# Patient Record
Sex: MALE | Race: WHITE | NOT HISPANIC OR LATINO | Employment: OTHER | URBAN - NONMETROPOLITAN AREA
[De-identification: names, ages, dates, MRNs, and addresses within clinical notes are randomized per-mention and may not be internally consistent; named-entity substitution may affect disease eponyms.]

---

## 2020-09-01 ENCOUNTER — HOSPITAL ENCOUNTER (INPATIENT)
Facility: HOSPITAL | Age: 75
LOS: 11 days | Discharge: NURSING, REHAB OR LONG-TERM CARE | DRG: 870 | End: 2020-09-12
Attending: EMERGENCY MEDICINE | Admitting: STUDENT IN AN ORGANIZED HEALTH CARE EDUCATION/TRAINING PROGRAM
Payer: COMMERCIAL

## 2020-09-01 ENCOUNTER — APPOINTMENT (EMERGENCY)
Dept: CT IMAGING | Facility: HOSPITAL | Age: 75
DRG: 870 | End: 2020-09-01
Payer: COMMERCIAL

## 2020-09-01 ENCOUNTER — APPOINTMENT (EMERGENCY)
Dept: RADIOLOGY | Facility: HOSPITAL | Age: 75
DRG: 870 | End: 2020-09-01
Payer: COMMERCIAL

## 2020-09-01 DIAGNOSIS — N17.9 ACUTE RENAL FAILURE (ARF) (HCC): ICD-10-CM

## 2020-09-01 DIAGNOSIS — A41.9 SEPTIC SHOCK (HCC): ICD-10-CM

## 2020-09-01 DIAGNOSIS — J18.9 PNEUMONIA: ICD-10-CM

## 2020-09-01 DIAGNOSIS — R60.0 BILATERAL LEG EDEMA: ICD-10-CM

## 2020-09-01 DIAGNOSIS — I10 HYPERTENSION: ICD-10-CM

## 2020-09-01 DIAGNOSIS — R41.82 ALTERED MENTAL STATUS, UNSPECIFIED ALTERED MENTAL STATUS TYPE: ICD-10-CM

## 2020-09-01 DIAGNOSIS — R73.9 HYPERGLYCEMIA: ICD-10-CM

## 2020-09-01 DIAGNOSIS — J18.9 COMMUNITY ACQUIRED PNEUMONIA OF RIGHT LUNG: ICD-10-CM

## 2020-09-01 DIAGNOSIS — E11.9 TYPE 2 DIABETES MELLITUS WITHOUT COMPLICATION, WITH LONG-TERM CURRENT USE OF INSULIN (HCC): ICD-10-CM

## 2020-09-01 DIAGNOSIS — R77.8 ELEVATED TROPONIN: ICD-10-CM

## 2020-09-01 DIAGNOSIS — G93.49 ENCEPHALOPATHY DUE TO INFECTION: ICD-10-CM

## 2020-09-01 DIAGNOSIS — Z79.4 TYPE 2 DIABETES MELLITUS WITHOUT COMPLICATION, WITH LONG-TERM CURRENT USE OF INSULIN (HCC): ICD-10-CM

## 2020-09-01 DIAGNOSIS — I42.9 CARDIOMYOPATHY, UNSPECIFIED TYPE (HCC): ICD-10-CM

## 2020-09-01 DIAGNOSIS — R50.9 FEVER AND CHILLS: ICD-10-CM

## 2020-09-01 DIAGNOSIS — J96.01 ACUTE RESPIRATORY FAILURE WITH HYPOXIA (HCC): ICD-10-CM

## 2020-09-01 DIAGNOSIS — D64.9 ANEMIA: ICD-10-CM

## 2020-09-01 DIAGNOSIS — R65.21 SEPTIC SHOCK (HCC): ICD-10-CM

## 2020-09-01 DIAGNOSIS — A41.9 SEPSIS (HCC): Primary | ICD-10-CM

## 2020-09-01 DIAGNOSIS — R79.89 ELEVATED BRAIN NATRIURETIC PEPTIDE (BNP) LEVEL: ICD-10-CM

## 2020-09-01 DIAGNOSIS — B99.9 ENCEPHALOPATHY DUE TO INFECTION: ICD-10-CM

## 2020-09-01 DIAGNOSIS — N17.9 AKI (ACUTE KIDNEY INJURY) (HCC): ICD-10-CM

## 2020-09-01 PROBLEM — E87.1 HYPONATREMIA: Status: ACTIVE | Noted: 2020-09-01

## 2020-09-01 LAB
ALBUMIN SERPL BCP-MCNC: 2.1 G/DL (ref 3.5–5)
ALP SERPL-CCNC: 112 U/L (ref 46–116)
ALT SERPL W P-5'-P-CCNC: 34 U/L (ref 12–78)
AMORPH URATE CRY URNS QL MICRO: ABNORMAL /HPF
AMPHETAMINES SERPL QL SCN: NEGATIVE
ANION GAP SERPL CALCULATED.3IONS-SCNC: 12 MMOL/L (ref 4–13)
ANION GAP SERPL CALCULATED.3IONS-SCNC: 13 MMOL/L (ref 4–13)
APTT PPP: 38 SECONDS (ref 23–37)
ARTERIAL PATENCY WRIST A: YES
AST SERPL W P-5'-P-CCNC: 43 U/L (ref 5–45)
BACTERIA UR QL AUTO: ABNORMAL /HPF
BARBITURATES UR QL: NEGATIVE
BASE EXCESS BLDA CALC-SCNC: -6.7 MMOL/L
BASOPHILS # BLD MANUAL: 0.09 THOUSAND/UL (ref 0–0.1)
BASOPHILS NFR MAR MANUAL: 1 % (ref 0–1)
BENZODIAZ UR QL: NEGATIVE
BILIRUB SERPL-MCNC: 0.73 MG/DL (ref 0.2–1)
BILIRUB UR QL STRIP: ABNORMAL
BUN SERPL-MCNC: 32 MG/DL (ref 5–25)
BUN SERPL-MCNC: 33 MG/DL (ref 5–25)
CALCIUM SERPL-MCNC: 8.3 MG/DL (ref 8.3–10.1)
CALCIUM SERPL-MCNC: 8.6 MG/DL (ref 8.3–10.1)
CHLORIDE SERPL-SCNC: 100 MMOL/L (ref 100–108)
CHLORIDE SERPL-SCNC: 96 MMOL/L (ref 100–108)
CK MB SERPL-MCNC: 3.6 % (ref 0–2.5)
CK MB SERPL-MCNC: 8.6 NG/ML (ref 0–5)
CK SERPL-CCNC: 238 U/L (ref 39–308)
CLARITY UR: ABNORMAL
CO2 SERPL-SCNC: 20 MMOL/L (ref 21–32)
CO2 SERPL-SCNC: 20 MMOL/L (ref 21–32)
COCAINE UR QL: NEGATIVE
COLOR UR: YELLOW
CREAT SERPL-MCNC: 3.21 MG/DL (ref 0.6–1.3)
CREAT SERPL-MCNC: 3.33 MG/DL (ref 0.6–1.3)
CRP SERPL QL: 233.6 MG/L
EOSINOPHIL # BLD MANUAL: 0.18 THOUSAND/UL (ref 0–0.4)
EOSINOPHIL NFR BLD MANUAL: 2 % (ref 0–6)
ERYTHROCYTE [DISTWIDTH] IN BLOOD BY AUTOMATED COUNT: 13.2 % (ref 11.6–15.1)
ETHANOL SERPL-MCNC: <3 MG/DL (ref 0–3)
FERRITIN SERPL-MCNC: 958 NG/ML (ref 8–388)
FLUAV RNA NPH QL NAA+PROBE: NORMAL
FLUBV RNA NPH QL NAA+PROBE: NORMAL
GFR SERPL CREATININE-BSD FRML MDRD: 17 ML/MIN/1.73SQ M
GFR SERPL CREATININE-BSD FRML MDRD: 18 ML/MIN/1.73SQ M
GLUCOSE SERPL-MCNC: 240 MG/DL (ref 65–140)
GLUCOSE SERPL-MCNC: 257 MG/DL (ref 65–140)
GLUCOSE SERPL-MCNC: 304 MG/DL (ref 65–140)
GLUCOSE SERPL-MCNC: 379 MG/DL (ref 65–140)
GLUCOSE UR STRIP-MCNC: NEGATIVE MG/DL
HCO3 BLDA-SCNC: 17.3 MMOL/L (ref 22–28)
HCT VFR BLD AUTO: 28.6 % (ref 36.5–49.3)
HGB BLD-MCNC: 9.5 G/DL (ref 12–17)
HGB UR QL STRIP.AUTO: NEGATIVE
HYPERCHROMIA BLD QL SMEAR: PRESENT
INR PPP: 1.28 (ref 0.84–1.19)
KETONES UR STRIP-MCNC: ABNORMAL MG/DL
LACTATE SERPL-SCNC: 1.5 MMOL/L (ref 0.5–2)
LEUKOCYTE ESTERASE UR QL STRIP: NEGATIVE
LIPASE SERPL-CCNC: 245 U/L (ref 73–393)
LYMPHOCYTES # BLD AUTO: 0.9 THOUSAND/UL (ref 0.6–4.47)
LYMPHOCYTES # BLD AUTO: 10 % (ref 14–44)
MAGNESIUM SERPL-MCNC: 1.5 MG/DL (ref 1.6–2.6)
MCH RBC QN AUTO: 28.1 PG (ref 26.8–34.3)
MCHC RBC AUTO-ENTMCNC: 33.2 G/DL (ref 31.4–37.4)
MCV RBC AUTO: 85 FL (ref 82–98)
METHADONE UR QL: NEGATIVE
MONOCYTES # BLD AUTO: 0.72 THOUSAND/UL (ref 0–1.22)
MONOCYTES NFR BLD: 8 % (ref 4–12)
NASAL CANNULA: 2
NEUTROPHILS # BLD MANUAL: 7.05 THOUSAND/UL (ref 1.85–7.62)
NEUTS SEG NFR BLD AUTO: 78 % (ref 43–75)
NITRITE UR QL STRIP: NEGATIVE
NON-SQ EPI CELLS URNS QL MICRO: ABNORMAL /HPF
NRBC BLD AUTO-RTO: 0 /100 WBCS
NT-PROBNP SERPL-MCNC: 5830 PG/ML
O2 CT BLDA-SCNC: 11.8 ML/DL (ref 16–23)
OPIATES UR QL SCN: NEGATIVE
OXYCODONE+OXYMORPHONE UR QL SCN: NEGATIVE
OXYHGB MFR BLDA: 95.5 % (ref 94–97)
PCO2 BLDA: 28.8 MM HG (ref 36–44)
PCP UR QL: NEGATIVE
PH BLDA: 7.4 [PH] (ref 7.35–7.45)
PH UR STRIP.AUTO: 5 [PH]
PLATELET # BLD AUTO: 222 THOUSANDS/UL (ref 149–390)
PLATELET BLD QL SMEAR: ADEQUATE
PMV BLD AUTO: 10.4 FL (ref 8.9–12.7)
PO2 BLDA: 80.3 MM HG (ref 75–129)
POTASSIUM SERPL-SCNC: 3.8 MMOL/L (ref 3.5–5.3)
POTASSIUM SERPL-SCNC: 4 MMOL/L (ref 3.5–5.3)
PROCALCITONIN SERPL-MCNC: 1.4 NG/ML
PROT SERPL-MCNC: 6.9 G/DL (ref 6.4–8.2)
PROT UR STRIP-MCNC: ABNORMAL MG/DL
PROTHROMBIN TIME: 15.7 SECONDS (ref 11.6–14.5)
RBC # BLD AUTO: 3.38 MILLION/UL (ref 3.88–5.62)
RBC #/AREA URNS AUTO: ABNORMAL /HPF
RBC MORPH BLD: PRESENT
RSV RNA NPH QL NAA+PROBE: NORMAL
SARS-COV-2 RNA RESP QL NAA+PROBE: NEGATIVE
SODIUM SERPL-SCNC: 129 MMOL/L (ref 136–145)
SODIUM SERPL-SCNC: 132 MMOL/L (ref 136–145)
SP GR UR STRIP.AUTO: >=1.03 (ref 1–1.03)
SPECIMEN SOURCE: ABNORMAL
THC UR QL: NEGATIVE
TOTAL CELLS COUNTED SPEC: 100
TRIGL SERPL-MCNC: 286 MG/DL
TROPONIN I SERPL-MCNC: 2.47 NG/ML
TROPONIN I SERPL-MCNC: 2.85 NG/ML
UROBILINOGEN UR QL STRIP.AUTO: 0.2 E.U./DL
VARIANT LYMPHS # BLD AUTO: 1 %
WBC # BLD AUTO: 9.04 THOUSAND/UL (ref 4.31–10.16)
WBC #/AREA URNS AUTO: ABNORMAL /HPF

## 2020-09-01 PROCEDURE — 93005 ELECTROCARDIOGRAM TRACING: CPT

## 2020-09-01 PROCEDURE — 99291 CRITICAL CARE FIRST HOUR: CPT | Performed by: EMERGENCY MEDICINE

## 2020-09-01 PROCEDURE — 85007 BL SMEAR W/DIFF WBC COUNT: CPT | Performed by: PHYSICIAN ASSISTANT

## 2020-09-01 PROCEDURE — 83735 ASSAY OF MAGNESIUM: CPT | Performed by: PHYSICIAN ASSISTANT

## 2020-09-01 PROCEDURE — 87635 SARS-COV-2 COVID-19 AMP PRB: CPT | Performed by: PHYSICIAN ASSISTANT

## 2020-09-01 PROCEDURE — 82043 UR ALBUMIN QUANTITATIVE: CPT | Performed by: NURSE PRACTITIONER

## 2020-09-01 PROCEDURE — 85610 PROTHROMBIN TIME: CPT | Performed by: PHYSICIAN ASSISTANT

## 2020-09-01 PROCEDURE — 86140 C-REACTIVE PROTEIN: CPT | Performed by: EMERGENCY MEDICINE

## 2020-09-01 PROCEDURE — 0T9B70Z DRAINAGE OF BLADDER WITH DRAINAGE DEVICE, VIA NATURAL OR ARTIFICIAL OPENING: ICD-10-PCS | Performed by: EMERGENCY MEDICINE

## 2020-09-01 PROCEDURE — 96361 HYDRATE IV INFUSION ADD-ON: CPT

## 2020-09-01 PROCEDURE — 83605 ASSAY OF LACTIC ACID: CPT | Performed by: PHYSICIAN ASSISTANT

## 2020-09-01 PROCEDURE — 82728 ASSAY OF FERRITIN: CPT | Performed by: EMERGENCY MEDICINE

## 2020-09-01 PROCEDURE — 74176 CT ABD & PELVIS W/O CONTRAST: CPT

## 2020-09-01 PROCEDURE — 99285 EMERGENCY DEPT VISIT HI MDM: CPT

## 2020-09-01 PROCEDURE — G1004 CDSM NDSC: HCPCS

## 2020-09-01 PROCEDURE — 70450 CT HEAD/BRAIN W/O DYE: CPT

## 2020-09-01 PROCEDURE — 71250 CT THORAX DX C-: CPT

## 2020-09-01 PROCEDURE — 87631 RESP VIRUS 3-5 TARGETS: CPT | Performed by: EMERGENCY MEDICINE

## 2020-09-01 PROCEDURE — 96365 THER/PROPH/DIAG IV INF INIT: CPT

## 2020-09-01 PROCEDURE — 82948 REAGENT STRIP/BLOOD GLUCOSE: CPT

## 2020-09-01 PROCEDURE — 83690 ASSAY OF LIPASE: CPT | Performed by: PHYSICIAN ASSISTANT

## 2020-09-01 PROCEDURE — 87449 NOS EACH ORGANISM AG IA: CPT | Performed by: NURSE PRACTITIONER

## 2020-09-01 PROCEDURE — 83520 IMMUNOASSAY QUANT NOS NONAB: CPT | Performed by: EMERGENCY MEDICINE

## 2020-09-01 PROCEDURE — 83935 ASSAY OF URINE OSMOLALITY: CPT | Performed by: NURSE PRACTITIONER

## 2020-09-01 PROCEDURE — 84484 ASSAY OF TROPONIN QUANT: CPT | Performed by: NURSE PRACTITIONER

## 2020-09-01 PROCEDURE — 80048 BASIC METABOLIC PNL TOTAL CA: CPT | Performed by: NURSE PRACTITIONER

## 2020-09-01 PROCEDURE — 82570 ASSAY OF URINE CREATININE: CPT | Performed by: NURSE PRACTITIONER

## 2020-09-01 PROCEDURE — 82553 CREATINE MB FRACTION: CPT | Performed by: EMERGENCY MEDICINE

## 2020-09-01 PROCEDURE — 84145 PROCALCITONIN (PCT): CPT | Performed by: PHYSICIAN ASSISTANT

## 2020-09-01 PROCEDURE — 71045 X-RAY EXAM CHEST 1 VIEW: CPT

## 2020-09-01 PROCEDURE — 36600 WITHDRAWAL OF ARTERIAL BLOOD: CPT

## 2020-09-01 PROCEDURE — 82805 BLOOD GASES W/O2 SATURATION: CPT | Performed by: PHYSICIAN ASSISTANT

## 2020-09-01 PROCEDURE — 84540 ASSAY OF URINE/UREA-N: CPT | Performed by: NURSE PRACTITIONER

## 2020-09-01 PROCEDURE — 72125 CT NECK SPINE W/O DYE: CPT

## 2020-09-01 PROCEDURE — 84300 ASSAY OF URINE SODIUM: CPT | Performed by: NURSE PRACTITIONER

## 2020-09-01 PROCEDURE — 87040 BLOOD CULTURE FOR BACTERIA: CPT | Performed by: PHYSICIAN ASSISTANT

## 2020-09-01 PROCEDURE — 99292 CRITICAL CARE ADDL 30 MIN: CPT | Performed by: STUDENT IN AN ORGANIZED HEALTH CARE EDUCATION/TRAINING PROGRAM

## 2020-09-01 PROCEDURE — 80053 COMPREHEN METABOLIC PANEL: CPT | Performed by: PHYSICIAN ASSISTANT

## 2020-09-01 PROCEDURE — 84478 ASSAY OF TRIGLYCERIDES: CPT | Performed by: EMERGENCY MEDICINE

## 2020-09-01 PROCEDURE — 80320 DRUG SCREEN QUANTALCOHOLS: CPT | Performed by: EMERGENCY MEDICINE

## 2020-09-01 PROCEDURE — 83880 ASSAY OF NATRIURETIC PEPTIDE: CPT | Performed by: PHYSICIAN ASSISTANT

## 2020-09-01 PROCEDURE — 99292 CRITICAL CARE ADDL 30 MIN: CPT | Performed by: EMERGENCY MEDICINE

## 2020-09-01 PROCEDURE — 99291 CRITICAL CARE FIRST HOUR: CPT | Performed by: NURSE PRACTITIONER

## 2020-09-01 PROCEDURE — 81001 URINALYSIS AUTO W/SCOPE: CPT | Performed by: PHYSICIAN ASSISTANT

## 2020-09-01 PROCEDURE — 85730 THROMBOPLASTIN TIME PARTIAL: CPT | Performed by: PHYSICIAN ASSISTANT

## 2020-09-01 PROCEDURE — 80307 DRUG TEST PRSMV CHEM ANLYZR: CPT | Performed by: EMERGENCY MEDICINE

## 2020-09-01 PROCEDURE — 85027 COMPLETE CBC AUTOMATED: CPT | Performed by: PHYSICIAN ASSISTANT

## 2020-09-01 PROCEDURE — 84484 ASSAY OF TROPONIN QUANT: CPT | Performed by: PHYSICIAN ASSISTANT

## 2020-09-01 PROCEDURE — 82550 ASSAY OF CK (CPK): CPT | Performed by: EMERGENCY MEDICINE

## 2020-09-01 PROCEDURE — 36415 COLL VENOUS BLD VENIPUNCTURE: CPT | Performed by: PHYSICIAN ASSISTANT

## 2020-09-01 PROCEDURE — 84156 ASSAY OF PROTEIN URINE: CPT | Performed by: NURSE PRACTITIONER

## 2020-09-01 RX ORDER — INSULIN GLARGINE 100 [IU]/ML
28 INJECTION, SOLUTION SUBCUTANEOUS
Status: ON HOLD | COMMUNITY
End: 2020-09-12 | Stop reason: SDUPTHER

## 2020-09-01 RX ORDER — SIMVASTATIN 20 MG
20 TABLET ORAL
COMMUNITY
End: 2020-09-19 | Stop reason: HOSPADM

## 2020-09-01 RX ORDER — ACETAMINOPHEN 325 MG/1
650 TABLET ORAL ONCE
Status: COMPLETED | OUTPATIENT
Start: 2020-09-01 | End: 2020-09-01

## 2020-09-01 RX ORDER — ALBUMIN, HUMAN INJ 5% 5 %
12.5 SOLUTION INTRAVENOUS ONCE
Status: COMPLETED | OUTPATIENT
Start: 2020-09-01 | End: 2020-09-02

## 2020-09-01 RX ORDER — LEVALBUTEROL INHALATION SOLUTION 0.63 MG/3ML
0.63 SOLUTION RESPIRATORY (INHALATION) EVERY 6 HOURS PRN
Status: DISCONTINUED | OUTPATIENT
Start: 2020-09-01 | End: 2020-09-10

## 2020-09-01 RX ORDER — HEPARIN SODIUM 5000 [USP'U]/ML
5000 INJECTION, SOLUTION INTRAVENOUS; SUBCUTANEOUS EVERY 8 HOURS SCHEDULED
Status: DISCONTINUED | OUTPATIENT
Start: 2020-09-01 | End: 2020-09-02

## 2020-09-01 RX ORDER — CEFTRIAXONE 1 G/50ML
1000 INJECTION, SOLUTION INTRAVENOUS EVERY 24 HOURS
Status: DISCONTINUED | OUTPATIENT
Start: 2020-09-02 | End: 2020-09-02

## 2020-09-01 RX ORDER — CHLORHEXIDINE GLUCONATE 0.12 MG/ML
15 RINSE ORAL EVERY 12 HOURS SCHEDULED
Status: DISCONTINUED | OUTPATIENT
Start: 2020-09-01 | End: 2020-09-09

## 2020-09-01 RX ORDER — ACETAMINOPHEN 325 MG/1
650 TABLET ORAL EVERY 6 HOURS PRN
Status: DISCONTINUED | OUTPATIENT
Start: 2020-09-01 | End: 2020-09-02

## 2020-09-01 RX ORDER — MAGNESIUM SULFATE 1 G/100ML
1 INJECTION INTRAVENOUS ONCE
Status: COMPLETED | OUTPATIENT
Start: 2020-09-01 | End: 2020-09-01

## 2020-09-01 RX ORDER — PRAVASTATIN SODIUM 40 MG
40 TABLET ORAL
Status: DISCONTINUED | OUTPATIENT
Start: 2020-09-02 | End: 2020-09-02

## 2020-09-01 RX ORDER — LISINOPRIL 10 MG/1
10 TABLET ORAL DAILY
COMMUNITY
End: 2020-09-01

## 2020-09-01 RX ORDER — CEFTRIAXONE 2 G/50ML
2000 INJECTION, SOLUTION INTRAVENOUS ONCE
Status: COMPLETED | OUTPATIENT
Start: 2020-09-01 | End: 2020-09-01

## 2020-09-01 RX ADMIN — SODIUM CHLORIDE 1000 ML: 0.9 INJECTION, SOLUTION INTRAVENOUS at 18:03

## 2020-09-01 RX ADMIN — CEFTRIAXONE 2000 MG: 2 INJECTION, SOLUTION INTRAVENOUS at 17:33

## 2020-09-01 RX ADMIN — INSULIN LISPRO 3 UNITS: 100 INJECTION, SOLUTION INTRAVENOUS; SUBCUTANEOUS at 20:36

## 2020-09-01 RX ADMIN — ALBUMIN (HUMAN) 12.5 G: 12.5 INJECTION, SOLUTION INTRAVENOUS at 22:58

## 2020-09-01 RX ADMIN — SODIUM CHLORIDE 1000 ML: 0.9 INJECTION, SOLUTION INTRAVENOUS at 17:06

## 2020-09-01 RX ADMIN — ACETAMINOPHEN 650 MG: 325 TABLET ORAL at 17:19

## 2020-09-01 RX ADMIN — SODIUM CHLORIDE 1000 ML: 0.9 INJECTION, SOLUTION INTRAVENOUS at 17:16

## 2020-09-01 RX ADMIN — HEPARIN SODIUM 5000 UNITS: 5000 INJECTION INTRAVENOUS; SUBCUTANEOUS at 22:58

## 2020-09-01 RX ADMIN — PHENYLEPHRINE HYDROCHLORIDE 25 MCG/MIN: 10 INJECTION INTRAVENOUS at 20:33

## 2020-09-01 RX ADMIN — ACETAMINOPHEN 650 MG: 325 TABLET ORAL at 23:16

## 2020-09-01 RX ADMIN — AZITHROMYCIN 500 MG: 500 INJECTION, POWDER, LYOPHILIZED, FOR SOLUTION INTRAVENOUS at 17:36

## 2020-09-01 RX ADMIN — MAGNESIUM SULFATE HEPTAHYDRATE 1 G: 1 INJECTION, SOLUTION INTRAVENOUS at 18:48

## 2020-09-01 NOTE — ED ATTENDING ATTESTATION
9/1/2020  IWill MD, saw and evaluated the patient  I have discussed the patient with the resident/non-physician practitioner and agree with the resident's/non-physician practitioner's findings, Plan of Care, and MDM as documented in the resident's/non-physician practitioner's note, except where noted  All available labs and Radiology studies were reviewed  I was present for key portions of any procedure(s) performed by the resident/non-physician practitioner and I was immediately available to provide assistance  At this point I agree with the current assessment done in the Emergency Department        ED Course         Critical Care Time  Procedures

## 2020-09-01 NOTE — ED PROVIDER NOTES
History  Chief Complaint   Patient presents with    Altered Mental Status     per ems family called for altered mental status and increase in SOB  pt tachypneic with room air pulse ox 92%  pt disoriented to situation  abdomin distended, denies pain, nausea     The patient is a 66year old male, PMH HTN, DM2, who presents to the ED today via EMS from home due to SOB and confusion  Family called EMS due to SOB  The patient states that he has been having a persistent dry cough at home over the last few days  Family states that he is more confused that his normal      Wife Shankar Parrish states at bedside states that 4 days ago, after returning home from work, he felt very tired and "run down"  He had a sporadic dry cough with his fatigue  She states that his PCP ordered an outpatient COVID yesterday, which was negative  Wife states that today, upon waking up the patient seemed improved, but throughout the day he became more and more confused  She states that he "was seeing and saying things that did not make sense"  She then states that PTA he seemed to have SOB, so EMS was called  Upon arrival the patient was 93% on RA, but EMS placed on 6 L of NC oxygen  The wife denies any nausea, vomiting, dizziness, chest pain, abdominal pain  Wife states that his abdomen is normal per him  Patient does not drink ETOH nor smoke  Patient quit smoking 20 years ago  No recent medication changes  History provided by:  EMS personnel, spouse and patient  History limited by:  Mental status change  Altered Mental Status   Presenting symptoms: confusion    Severity:  Moderate  Most recent episode:   Today  Episode history:  Continuous  Timing:  Constant  Progression:  Worsening  Chronicity:  New  Context: recent illness    Context: not dementia, not drug use, not head injury, not homeless, taking medications as prescribed and not recent change in medication    Associated symptoms: difficulty breathing and hallucinations    Associated symptoms: no abdominal pain, normal movement, no agitation, no bladder incontinence, no decreased appetite, no depression, no fever, no light-headedness, no nausea, no palpitations, no rash, no seizures and no vomiting    Difficulty breathing:     Severity:  Moderate    Duration:  1 day    Timing:  Constant    Progression:  Partially resolved      Prior to Admission Medications   Prescriptions Last Dose Informant Patient Reported? Taking?   insulin aspart (NovoLOG) 100 units/mL injection   Yes Yes   Sig: Inject under the skin 3 (three) times a day before meals   insulin glargine (LANTUS) 100 units/mL subcutaneous injection   Yes Yes   Sig: Inject under the skin daily at bedtime   linaGLIPtin-metFORMIN HCl (JENTADUETO XR PO)   Yes Yes   Sig: Take by mouth   simvastatin (ZOCOR) 20 mg tablet   Yes Yes   Sig: Take 20 mg by mouth daily at bedtime      Facility-Administered Medications: None       Past Medical History:   Diagnosis Date    Diabetes mellitus ([facility] [address] )     Hyperlipidemia     Hypertension        History reviewed  No pertinent surgical history  History reviewed  No pertinent family history  I have reviewed and agree with the history as documented  E-Cigarette/Vaping     E-Cigarette/Vaping Substances     Social History     Tobacco Use    Smoking status: Never Smoker    Smokeless tobacco: Never Used   Substance Use Topics    Alcohol use: Never     Frequency: Never    Drug use: Never       Review of Systems   Constitutional: Negative for chills, decreased appetite and fever  HENT: Negative for ear pain  Eyes: Negative for pain and visual disturbance  Respiratory: Positive for cough and shortness of breath  Negative for stridor  Cardiovascular: Negative for chest pain and palpitations  Gastrointestinal: Negative for abdominal pain, nausea and vomiting  Genitourinary: Negative for bladder incontinence, dysuria and hematuria  Musculoskeletal: Negative for arthralgias and back pain     Skin: Negative for color change and rash  Neurological: Negative for seizures, speech difficulty and light-headedness  Psychiatric/Behavioral: Positive for confusion and hallucinations  Negative for agitation  Physical Exam  Physical Exam  Vitals signs and nursing note reviewed  Constitutional:       General: He is not in acute distress  Appearance: He is well-developed  HENT:      Head: Normocephalic and atraumatic  Mouth/Throat:      Mouth: Mucous membranes are dry  Eyes:      Extraocular Movements: Extraocular movements intact  Right eye: No nystagmus  Left eye: No nystagmus  Pupils: Pupils are equal, round, and reactive to light  Neck:      Musculoskeletal: Normal range of motion  Cardiovascular:      Rate and Rhythm: Regular rhythm  Tachycardia present  Pulses: Normal pulses  Heart sounds: No murmur  Pulmonary:      Effort: Pulmonary effort is normal  No respiratory distress  Breath sounds: Normal breath sounds  Abdominal:      General: Bowel sounds are normal       Palpations: Abdomen is soft  Tenderness: There is no abdominal tenderness  Musculoskeletal:         General: No swelling  Skin:     General: Skin is warm and dry  Capillary Refill: Capillary refill takes less than 2 seconds  Neurological:      General: No focal deficit present  Mental Status: He is alert  He is confused  Motor: Motor function is intact  Coordination: Coordination is intact     Psychiatric:         Mood and Affect: Mood normal          Behavior: Behavior normal          Vital Signs  ED Triage Vitals   Temperature Pulse Respirations Blood Pressure SpO2   09/01/20 1708 09/01/20 1702 09/01/20 1702 09/01/20 1702 09/01/20 1702   (!) 105 °F (40 6 °C) (!) 126 (!) 30 98/57 92 %      Temp Source Heart Rate Source Patient Position - Orthostatic VS BP Location FiO2 (%)   09/01/20 1708 09/01/20 1702 09/01/20 1702 09/01/20 1702 --   Rectal Monitor Sitting Left arm       Pain Score       09/01/20 1719       Med Not Given for Pain - for MAR use only           Vitals:    09/01/20 2030 09/01/20 2056 09/01/20 2110 09/01/20 2115   BP: (!) 81/53 (!) 86/52 97/57 100/58   Pulse: (!) 114 (!) 114 (!) 113 (!) 113   Patient Position - Orthostatic VS:             Visual Acuity  Visual Acuity      Most Recent Value   L Pupil Size (mm)  3   R Pupil Size (mm)  3          ED Medications  Medications   pravastatin (PRAVACHOL) tablet 40 mg (has no administration in time range)   chlorhexidine (PERIDEX) 0 12 % oral rinse 15 mL (has no administration in time range)   heparin (porcine) subcutaneous injection 5,000 Units (has no administration in time range)   insulin lispro (HumaLOG) 100 units/mL subcutaneous injection 1-6 Units (3 Units Subcutaneous Given 9/1/20 2036)   phenylephrine (REG-SYNEPHRINE) 50 mg (STANDARD CONCENTRATION) in sodium chloride 0 9% 250 mL (50 mcg/min Intravenous Rate/Dose Change 9/1/20 2055)   acetaminophen (TYLENOL) tablet 650 mg (has no administration in time range)   azithromycin (ZITHROMAX) 500 mg in sodium chloride 0 9 % 250 mL IVPB (has no administration in time range)   cefTRIAXone (ROCEPHIN) IVPB (premix) 1,000 mg 50 mL (has no administration in time range)   levalbuterol (XOPENEX) inhalation solution 0 63 mg (has no administration in time range)   cefTRIAXone (ROCEPHIN) IVPB (premix) 2,000 mg 50 mL (0 mg Intravenous Stopped 9/1/20 1803)   sodium chloride 0 9 % bolus 1,000 mL (0 mL Intravenous Stopped 9/1/20 1803)     Followed by   sodium chloride 0 9 % bolus 1,000 mL (0 mL Intravenous Stopped 9/1/20 1847)   acetaminophen (TYLENOL) tablet 650 mg (650 mg Oral Given 9/1/20 1719)   azithromycin (ZITHROMAX) 500 mg in sodium chloride 0 9 % 250 mL IVPB (0 mg Intravenous Stopped 9/1/20 1842)   magnesium sulfate IVPB (premix) SOLN 1 g (0 g Intravenous Stopped 9/1/20 1950)       Diagnostic Studies  Results Reviewed     Procedure Component Value Units Date/Time Legionella antigen, urine [[id number]]     Lab Status:  No result Specimen:  Urine, Catheter     Strep Pneumoniae, Urine [[id number]]     Lab Status:  No result Specimen:  Urine     Ferritin [[id number]]  (Abnormal) Collected:  09/01/20 1731    Lab Status:  Final result Specimen:  Blood from Arm, Left Updated:  09/01/20 2123     Ferritin 958 ng/mL     Troponin I [[id number]]  (Abnormal) Collected:  09/01/20 2010    Lab Status:  Final result Specimen:  Blood from Arm, Left Updated:  09/01/20 2039     Troponin I 2 47 ng/mL     Basic metabolic panel [[id number]]  (Abnormal) Collected:  09/01/20 2010    Lab Status:  Final result Specimen:  Blood from Arm, Left Updated:  09/01/20 2035     Sodium 132 mmol/L      Potassium 3 8 mmol/L      Chloride 100 mmol/L      CO2 20 mmol/L      ANION GAP 12 mmol/L      BUN 32 mg/dL      Creatinine 3 21 mg/dL      Glucose 240 mg/dL      Calcium 8 3 mg/dL      eGFR 18 ml/min/1 73sq m     Narrative:       [place] guidelines for Chronic Kidney Disease (CKD):     Stage 1 with normal or high GFR (GFR > 90 mL/min/1 73 square meters)    Stage 2 Mild CKD (GFR = 60-89 mL/min/1 73 square meters)    Stage 3A Moderate CKD (GFR = 45-59 mL/min/1 73 square meters)    Stage 3B Moderate CKD (GFR = 30-44 mL/min/1 73 square meters)    Stage 4 Severe CKD (GFR = 15-29 mL/min/1 73 square meters)    Stage 5 End Stage CKD (GFR <15 mL/min/1 73 square meters)  Note: GFR calculation is accurate only with a steady state creatinine    Fingerstick Glucose (POCT) [[id number]]  (Abnormal) Collected:  09/01/20 2008    Lab Status:  Final result Updated:  09/01/20 2023     POC Glucose 257 mg/dl     Troponin I [[id number]]     Lab Status:  No result Specimen:  Blood     Blood gas, Arterial [[id number]]  (Abnormal) Collected:  09/01/20 1823    Lab Status:  Final result Specimen:  Blood, Arterial from Radial, Left Updated:  09/01/20 1835     pH, Arterial 7 396     pCO2, Arterial 28 8 mm Hg      pO2, Arterial 80 3 mm Hg      HCO3, Arterial 17 3 mmol/L      Base Excess, Arterial -6 7 mmol/L      O2 Content, Arterial 11 8 mL/dL      O2 HGB,Arterial  95 5 %      SOURCE Radial, Left     BETHANIE TEST Yes     Nasal Cannula 2    Influenza A/B and RSV PCR [[id number]]  (Normal) Collected:  09/01/20 1731    Lab Status:  Final result Specimen:  Nasopharyngeal from Nose Updated:  09/01/20 1824     INFLUENZA A PCR None Detected     INFLUENZA B PCR None Detected     RSV PCR None Detected    CKMB [[id number]]  (Abnormal) Collected:  09/01/20 1706    Lab Status:  Final result Specimen:  Blood from Hand, Right Updated:  09/01/20 1818     CK-MB Index 3 6 %      CK-MB 8 6 ng/mL     Novel Coronavirus (Covid-19),PCR [facility] [[id number]]  (Normal) Collected:  09/01/20 1706    Lab Status:  Final result Specimen:  Nares from Nose Updated:  09/01/20 1811     SARS-CoV-2 Negative    Narrative: The specimen collection materials, transport medium, and/or testing methodology utilized in the production of these test results have been proven to be reliable in a limited validation with an abbreviated program under the Emergency Utilization Authorization provided by the FDA  Testing reported as "Presumptive positive" will be confirmed with secondary testing with a reference laboratory to ensure result accuracy  Clinical caution and judgement should be used with the interpretation of these results with consideration of the clinical impression and other laboratory testing  Testing reported as "Positive" or "Negative" has been proven to be accurate according to standard laboratory validation requirements  All testing is performed with control materials showing appropriate reactivity at standard intervals        Rapid drug screen, urine [[id number]]  (Normal) Collected:  09/01/20 1731    Lab Status:  Final result Specimen:  Urine, Catheter Updated:  09/01/20 1758     Amph/Meth UR Negative     Barbiturate Ur Negative     Benzodiazepine Urine Negative     Cocaine Urine Negative     Methadone Urine Negative     Opiate Urine Negative     PCP Ur Negative     THC Urine Negative     Oxycodone Urine Negative    Narrative:       FOR MEDICAL PURPOSES ONLY  IF CONFIRMATION NEEDED PLEASE CONTACT THE LAB WITHIN 5 DAYS      Drug Screen Cutoff Levels:  AMPHETAMINE/METHAMPHETAMINES  1000 ng/mL  BARBITURATES     200 ng/mL  BENZODIAZEPINES     200 ng/mL  COCAINE      300 ng/mL  METHADONE      300 ng/mL  OPIATES      300 ng/mL  PHENCYCLIDINE     25 ng/mL  THC       50 ng/mL  OXYCODONE      100 ng/mL    NT-BNP PRO [[id number]]  (Abnormal) Collected:  09/01/20 1706    Lab Status:  Final result Specimen:  Blood from Hand, Right Updated:  09/01/20 1757     NT-proBNP 5,830 pg/mL     Magnesium [[id number]]  (Abnormal) Collected:  09/01/20 1706    Lab Status:  Final result Specimen:  Blood from Hand, Right Updated:  09/01/20 1757     Magnesium 1 5 mg/dL     Lipase [[id number]]  (Normal) Collected:  09/01/20 1706    Lab Status:  Final result Specimen:  Blood from Hand, Right Updated:  09/01/20 1757     Lipase 245 u/L     C-reactive protein [[id number]]  (Abnormal) Collected:  09/01/20 1706    Lab Status:  Final result Specimen:  Blood from Hand, Right Updated:  09/01/20 1757      6 mg/L     Triglycerides [[id number]]  (Abnormal) Collected:  09/01/20 1706    Lab Status:  Final result Specimen:  Blood from Hand, Right Updated:  09/01/20 1757     Triglycerides 286 mg/dL     CK (with reflex to MB) [[id number]]  (Normal) Collected:  09/01/20 1706    Lab Status:  Final result Specimen:  Blood from Hand, Right Updated:  09/01/20 1757     Total  U/L     CBC and differential [[id number]]  (Abnormal) Collected:  09/01/20 1706    Lab Status:  Final result Specimen:  Blood from Hand, Right Updated:  09/01/20 1756     WBC 9 04 Thousand/uL      RBC 3 38 Million/uL      Hemoglobin 9 5 g/dL      Hematocrit 28 6 %      MCV 85 fL      MCH 28 1 pg      MCHC 33 2 g/dL      RDW 13 2 % MPV 10 4 fL      Platelets 582 Thousands/uL      nRBC 0 /100 WBCs     Narrative: This is an appended report  These results have been appended to a previously verified report  Urine Microscopic [[id number]]  (Abnormal) Collected:  09/01/20 1732    Lab Status:  Final result Specimen:  Urine, Indwelling Means Catheter Updated:  09/01/20 1751     RBC, UA None Seen /hpf      WBC, UA 0-5 /hpf      Epithelial Cells None Seen /hpf      Bacteria, UA Moderate /hpf      AMORPH URATES Moderate /hpf     Ethanol [[id number]]  (Normal) Collected:  09/01/20 1731    Lab Status:  Final result Specimen:  Blood from Arm, Left Updated:  09/01/20 1751     Ethanol Lvl <3 mg/dL     Troponin I [[id number]]  (Abnormal) Collected:  09/01/20 1706    Lab Status:  Final result Specimen:  Blood from Arm, Right Updated:  09/01/20 1746     Troponin I 2 85 ng/mL     UA w Reflex to Microscopic w Reflex to Culture [[id number]]  (Abnormal) Collected:  09/01/20 1732    Lab Status:  Final result Specimen:  Urine, Indwelling Means Catheter Updated:  09/01/20 1743     Color, UA Yellow     Clarity, UA Slightly Cloudy     Specific Gravity, UA >=1 030     pH, UA 5 0     Leukocytes, UA Negative     Nitrite, UA Negative     Protein, UA 30 (1+) mg/dl      Glucose, UA Negative mg/dl      Ketones, UA Trace mg/dl      Urobilinogen, UA 0 2 E U /dl      Bilirubin, UA Moderate     Blood, UA Negative    Fingerstick Glucose (POCT) [[id number]]  (Abnormal) Collected:  09/01/20 1715    Lab Status:  Final result Updated:  09/01/20 1743     POC Glucose 379 mg/dl     Lactic acid [[id number]]  (Normal) Collected:  09/01/20 1706    Lab Status:  Final result Specimen:  Blood from Hand, Right Updated:  09/01/20 1742     LACTIC ACID 1 5 mmol/L     Narrative:       Result may be elevated if tourniquet was used during collection  Interleukin-6,Serum [[id number]] Collected:  09/01/20 1731    Lab Status:   In process Specimen:  Blood from Arm, Left Updated:  09/01/20 1739 Comprehensive metabolic panel [[id number]]  (Abnormal) Collected:  09/01/20 1706    Lab Status:  Final result Specimen:  Blood from Hand, Right Updated:  09/01/20 1737     Sodium 129 mmol/L      Potassium 4 0 mmol/L      Chloride 96 mmol/L      CO2 20 mmol/L      ANION GAP 13 mmol/L      BUN 33 mg/dL      Creatinine 3 33 mg/dL      Glucose 304 mg/dL      Calcium 8 6 mg/dL      AST 43 U/L      ALT 34 U/L      Alkaline Phosphatase 112 U/L      Total Protein 6 9 g/dL      Albumin 2 1 g/dL      Total Bilirubin 0 73 mg/dL      eGFR 17 ml/min/1 73sq m     Narrative:       [place] guidelines for Chronic Kidney Disease (CKD):     Stage 1 with normal or high GFR (GFR > 90 mL/min/1 73 square meters)    Stage 2 Mild CKD (GFR = 60-89 mL/min/1 73 square meters)    Stage 3A Moderate CKD (GFR = 45-59 mL/min/1 73 square meters)    Stage 3B Moderate CKD (GFR = 30-44 mL/min/1 73 square meters)    Stage 4 Severe CKD (GFR = 15-29 mL/min/1 73 square meters)    Stage 5 End Stage CKD (GFR <15 mL/min/1 73 square meters)  Note: GFR calculation is accurate only with a steady state creatinine    Protime-INR [[id number]]  (Abnormal) Collected:  09/01/20 1706    Lab Status:  Final result Specimen:  Blood from Arm, Right Updated:  09/01/20 1736     Protime 15 7 seconds      INR 1 28    APTT [[id number]]  (Abnormal) Collected:  09/01/20 1706    Lab Status:  Final result Specimen:  Blood from Arm, Right Updated:  09/01/20 1736     PTT 38 seconds     Procalcitonin with AM Reflex [[id number]] Collected:  09/01/20 1706    Lab Status: In process Specimen:  Blood from Arm, Right Updated:  09/01/20 1716    Blood culture #1 [[id number]] Collected:  09/01/20 1706    Lab Status: In process Specimen:  Blood from Hand, Left Updated:  09/01/20 1715    Blood culture #2 [[id number]] Collected:  09/01/20 1706    Lab Status:   In process Specimen:  Blood from Hand, Right Updated:  09/01/20 1715    Calcium, ionized [[id number]]     Lab Status:  No result Specimen:  Blood                  CT head without contrast   Final Result by Kaiser Rahman DO (09/01 1817)      No acute intracranial abnormality  Minor microangiopathic changes  Workstation performed: [id number]         CT cervical spine without contrast   Final Result by Kaiser Rahman DO (09/01 1820)      Mild cervical degenerative change with no acute osseous abnormality  Innumerable small pulmonary nodules noted within the lung apices with mild right apical scarring  See separate CT chest, abdomen and pelvis report performed today  Workstation performed: [id number]         CT chest abdomen pelvis wo contrast   Final Result by Mainor Luis MD (09/01 1830)      1  Bilateral predominantly upper lobe diffuse nodular interstitial changes of inflammatory infectious etiology  2   Subsegmental atelectasis /consolidation in the right upper lobe in a bronchovascular distribution  3   Posterior bibasilar dependent changes with possible atelectasis/infiltrate in the left lung base  4   Diffuse bladder wall thickening which is decompressed containing a Means balloon  5  Mediastinal lymphadenopathy as described largest in the subcarinal region measuring 1 8 cm  Workstation performed: [id number]         XR chest portable   Final Result by Shannan Valdes MD (09/01 1742)      Left basilar opacity could represent pericardial fat pad, effusion, atelectasis and/or pneumonia  Right mid lung opacity could represent pneumonia, follow-up with unenhanced chest CT recommended                 Workstation performed: [id number]         XR chest portable    (Results Pending)              Procedures  ECG 12 Lead Documentation Only    Date/Time: 9/1/2020 6:53 PM  Performed by: Quinn Zarate PA-C  Authorized by: Quinn Zarate PA-C     Indications / Diagnosis:  Altered mental status   ECG reviewed by me, the ED Provider: yes    Patient location: ED  Previous ECG:     Previous ECG:  Unavailable    Comparison to cardiac monitor: Yes    Interpretation:     Interpretation: abnormal    Rate:     ECG rate:  128    ECG rate assessment: tachycardic    Rhythm:     Rhythm: sinus tachycardia    Ectopy:     Ectopy: none    Conduction:     Conduction: normal    ST segments:     ST segments:  Non-specific  T waves:     T waves: non-specific    CriticalCare Time  Performed by: Isael Lopez PA-C  Authorized by: Isael Lopez PA-C     Critical care provider statement:     Critical care time (minutes):  120    Critical care end time:  9/1/2020 6:56 PM    Critical care time was exclusive of:  Separately billable procedures and treating other patients and teaching time    Critical care was necessary to treat or prevent imminent or life-threatening deterioration of the following conditions:  Sepsis    Critical care was time spent personally by me on the following activities:  Blood draw for specimens, obtaining history from patient or surrogate, ordering and performing treatments and interventions, interpretation of cardiac output measurements, ordering and review of laboratory studies, ordering and review of radiographic studies, re-evaluation of patient's condition, review of old charts, examination of patient, evaluation of patient's response to treatment, discussions with consultants and development of treatment plan with patient or surrogate    I assumed direction of critical care for this patient from another provider in my specialty: no               ED Course  ED Course as of Sep 01 2154   [address] Sep 01, 2020   1736 Wife is at bedside  States patient was his normal self until a few days ago when he was complaining of malaise  Had negative COVID-19 test yesterday then became confused with fever today  No recent hospitalizations or illnesses  1737 No history of anti-psychotic usage  Concerning for possible sepsis versus NMS (neuroleptic malignant syndrome)  1744 Corrected sodium 133      1815 COVID19 negative      1816 CXR:IMPRESSION:     Left basilar opacity could represent pericardial fat pad, effusion, atelectasis and/or pneumonia      Right mid lung opacity could represent pneumonia, follow-up with unenhanced chest CT recommended                 US AUDIT      Most Recent Value   Initial Alcohol Screen: US AUDIT-C    1  How often do you have a drink containing alcohol?  0 Filed at: 09/01/2020 1701   2  How many drinks containing alcohol do you have on a typical day you are drinking? 0 Filed at: 09/01/2020 1701   3a  Male UNDER 65: How often do you have five or more drinks on one occasion? 0 Filed at: 09/01/2020 1701   3b  FEMALE Any Age, or MALE 65+: How often do you have 4 or more drinks on one occassion? 0 Filed at: 09/01/2020 1701   Audit-C Score  0 Filed at: 09/01/2020 1701                  DOMINICK/DAST-10      Most Recent Value   How many times in the past year have you    Used an illegal drug or used a prescription medication for non-medical reasons? Never Filed at: 09/01/2020 1701              Initial Sepsis Screening     Row Name 09/01/20 1831                Is the patient's history suggestive of a new or worsening infection? (!) Yes (Proceed)  -SB        Suspected source of infection  pneumonia  -SB        Are two or more of the following signs & symptoms of infection both present and new to the patient? (!) Yes (Proceed)  -SB        Indicate SIRS criteria  Hyperthemia > 38 3C (100 9F); Tachycardia > 90 bpm;Tachypnea > 20 resp per min; Altered mental status  -SB        If the answer is yes to both questions, suspicion of sepsis is present          If severe sepsis is present AND tissue hypoperfusion perists in the hour after fluid resuscitation or lactate > 4, the patient meets criteria for SEPTIC SHOCK          Are any of the following organ dysfunction criteria present within 6 hours of suspected infection and SIRS criteria that are NOT considered to be chronic conditions? (!) Yes  -SB        Organ dysfunction  Creatinine > 0 5 mg/dl ABOVE BASELINE;Creatinine > 2 0 mg/dL  -SB        Date of presentation of severe sepsis  09/01/20  -SB        Time of presentation of severe sepsis  1831  -SB        Tissue hypoperfusion persists in the hour after crystalloid fluid administration, evidenced, by either:  SBP < 90 mm/Hg ( ___ mm/Hg in comment field)  -SB        Was hypotension present within one hour of the conclusion of crystalloid fluid administration?   Yes  -SB        Date of presentation of septic shock          Time of presentation of septic shock            User Key  (r) = Recorded By, (t) = Taken By, (c) = Cosigned By    Initials Name Provider Type    AYLEEN Chapa Physician Assistant           Default Flowsheet Data (last 720 hours)      Sepsis Reassess     Row Name 09/01/20 1842                   Repeat Volume Status and Tissue Perfusion Assessment Performed    Repeat Volume Status and Tissue Perfusion Assessment Performed  Yes  -SB           Volume Status and Tissue Perfusion Post Fluid Resuscitation * Must Document All *    Vital Signs Reviewed (HR, RR, BP, T)  Yes  -SB        Shock Index Reviewed  Yes  -SB        Arterial Oxygen Saturation Reviewed (POx, SaO2 or SpO2)  Yes (comment %)  -SB        Cardio  (!) Tachycardia  -SB        Pulmonary  Other (comment) crackles in left base  -SB        Capillary Refill  Brisk  -SB        Peripheral Pulses  Radial  -SB        Peripheral Pulse  +2  -SB        Skin  Warm  -SB        Urine output assessed  Adequate  -SB           *OR*   Intensive Monitoring- Must Document One of the Following Four *:    Vital Signs Reviewed  Yes  -SB        * Central Venous Pressure (CVP or RAP)          * Central Venous Oxygen (SVO2, ScvO2 or Oxygen saturation via central catheter)          * Bedside Cardiovascular US in IVC diameter and % collapse          * Passive Leg Raise OR Crystalloid Challenge  [employer]          User Key  (r) = Recorded By, (t) = Taken By, (c) = Cosigned By    Initials Name Provider Type    SHARAD Carmona PA-C Physician Assistant                      MDM  Number of Diagnoses or Management Options  Altered mental status, unspecified altered mental status type:   Elevated troponin:   Pneumonia:   Sepsis [facility]):   Diagnosis management comments: ddx patient upon arrival was +fever, tachycardic, hypotensive with altered mental status  Septic workup was initiated  Initial orders of 30 ml/kg of NSS placed per sepsis protocol, IV Rocephin azithromycin ordered infection pneumonia  Patient lab work illustrated proBNP elevation troponin creatinine of 3, glucose of 379  No leukocytosis or lactic acidosis  Patient received 2 L normal saline bolus but was stopped due to recommendation of ICU  Dr Ac Hatfield due to elevated BNP  Reassessment illustrated some crackles in the bases of his lung fields  Patient did have cognitive improvement after the IV fluids, Tylenol, antibiotics  Due to patient's lab work, blood pressure of 72/48, tachycardia with found infection of pneumonia possible UTI, discussion with ICU for admission  ICU doctor Ac Hatfield accepted patient          Amount and/or Complexity of Data Reviewed  Clinical lab tests: ordered and reviewed  Tests in the radiology section of CPT®: ordered and reviewed  Decide to obtain previous medical records or to obtain history from someone other than the patient: yes  Obtain history from someone other than the patient: yes  Review and summarize past medical records: yes  Discuss the patient with other providers: yes  Independent visualization of images, tracings, or specimens: yes    Risk of Complications, Morbidity, and/or Mortality  Presenting problems: high  Diagnostic procedures: high  Management options: high  General comments: Cognition improved     Patient Progress  Patient progress: improved        Disposition  Final diagnoses:   Sepsis ([address] )   Altered mental status, unspecified altered mental status type   Pneumonia   Elevated troponin   Acute renal failure (ARF) ([facility])   Fever and chills   Elevated brain natriuretic peptide (BNP) level   Hyperglycemia     Time reflects when diagnosis was documented in both MDM as applicable and the Disposition within this note     Time User Action Codes Description Comment    9/1/2020  6:35 PM Eulis Faby Add [A41 9] Sepsis ([facility] [address] )     9/1/2020  6:35 PM Eulis Faby Add [R41 82] Altered mental status, unspecified altered mental status type     9/1/2020  6:36 PM Eulis Faby Add [J18 9] Pneumonia     9/1/2020  6:36 PM Eulis Faby Add [R79 89] Elevated troponin     9/1/2020  9:52 PM Karen Ric Add [N17 9] Acute renal failure (ARF) ([facility] [address] )     9/1/2020  9:52 PM Karen Ric Add [R50 9] Fever and chills     9/1/2020  9:52 PM Karen Ric Add [R79 89] Elevated brain natriuretic peptide (BNP) level     9/1/2020  9:54 PM Karen Ric Add [R73 9] Hyperglycemia       ED Disposition     ED Disposition Condition Date/Time Comment    Admit Stable Tue Sep 1, 2020  6:36 PM Case was discussed with caron and the patient's admission status was agreed to be Admission Status: inpatient status to the service of Dr Emili Ricketts    Follow-up Information    None         Current Discharge Medication List      CONTINUE these medications which have NOT CHANGED    Details   insulin aspart (NovoLOG) 100 units/mL injection Inject under the skin 3 (three) times a day before meals      insulin glargine (LANTUS) 100 units/mL subcutaneous injection Inject under the skin daily at bedtime      linaGLIPtin-metFORMIN HCl (JENTADUETO XR PO) Take by mouth      simvastatin (ZOCOR) 20 mg tablet Take 20 mg by mouth daily at bedtime           No discharge procedures on file      PDMP Review       Value Time User    PDMP Reviewed  Yes 9/1/2020  9:51 PM Parth Ricks MD          ED Provider  Electronically Signed by           Breanne Lui AYLEEN  09/01/20 2904       Fredi Castañeda MD  09/01/20 3687

## 2020-09-01 NOTE — SEPSIS NOTE
Sepsis Note   Denita Mannena 66 y o  male MRN: [id number]  Unit/Bed#: ED 01 Encounter: [id number]      qSOFA     Row Name 09/01/20 1829 09/01/20 1815 09/01/20 1803 09/01/20 1800 09/01/20 1744    Altered mental status GCS < 15          1    Respiratory Rate > / =22  1  1  1  1      Systolic BP < / =073  0  1  1  1      Q Sofa Score  2  3  3  3  3    Row Name 09/01/20 1730 09/01/20 1702             Altered mental status GCS < 15           Respiratory Rate > / =22  1  1       Systolic BP < / =939  0  1       Q Sofa Score  1  2           Initial Sepsis Screening     Row Name 09/01/20 1831                Is the patient's history suggestive of a new or worsening infection? (!) Yes (Proceed)  -SB        Suspected source of infection  pneumonia  -SB        Are two or more of the following signs & symptoms of infection both present and new to the patient? (!) Yes (Proceed)  -SB        Indicate SIRS criteria  Hyperthemia > 38 3C (100 9F); Tachycardia > 90 bpm;Tachypnea > 20 resp per min; Altered mental status  -SB        If the answer is yes to both questions, suspicion of sepsis is present          If severe sepsis is present AND tissue hypoperfusion perists in the hour after fluid resuscitation or lactate > 4, the patient meets criteria for SEPTIC SHOCK          Are any of the following organ dysfunction criteria present within 6 hours of suspected infection and SIRS criteria that are NOT considered to be chronic conditions? (!) Yes  -SB        Organ dysfunction  Creatinine > 0 5 mg/dl ABOVE BASELINE;Creatinine > 2 0 mg/dL  -SB        Date of presentation of severe sepsis  09/01/20  -SB        Time of presentation of severe sepsis  1831  -SB        Tissue hypoperfusion persists in the hour after crystalloid fluid administration, evidenced, by either:  SBP < 90 mm/Hg ( ___ mm/Hg in comment field)  -SB        Was hypotension present within one hour of the conclusion of crystalloid fluid administration? Yes  -SB        Date of presentation of septic shock          Time of presentation of septic shock            User Key  (r) = Recorded By, (t) = Taken By, (c) = Cosigned By    [address] Name Provider Type    AYLEEN Chapa Physician Assistant

## 2020-09-02 ENCOUNTER — APPOINTMENT (INPATIENT)
Dept: NON INVASIVE DIAGNOSTICS | Facility: HOSPITAL | Age: 75
DRG: 870 | End: 2020-09-02
Payer: COMMERCIAL

## 2020-09-02 ENCOUNTER — APPOINTMENT (INPATIENT)
Dept: RADIOLOGY | Facility: HOSPITAL | Age: 75
DRG: 870 | End: 2020-09-02
Payer: COMMERCIAL

## 2020-09-02 PROBLEM — J96.01 ACUTE RESPIRATORY FAILURE WITH HYPOXIA (HCC): Status: ACTIVE | Noted: 2020-09-02

## 2020-09-02 PROBLEM — D64.9 ANEMIA: Status: ACTIVE | Noted: 2020-09-02

## 2020-09-02 PROBLEM — I21.A1 TYPE 2 MI (MYOCARDIAL INFARCTION) (HCC): Status: ACTIVE | Noted: 2020-09-01

## 2020-09-02 PROBLEM — I21.4 NSTEMI (NON-ST ELEVATED MYOCARDIAL INFARCTION) (HCC): Status: ACTIVE | Noted: 2020-09-01

## 2020-09-02 LAB
ALBUMIN SERPL BCP-MCNC: 2.1 G/DL (ref 3.5–5)
ALP SERPL-CCNC: 100 U/L (ref 46–116)
ALT SERPL W P-5'-P-CCNC: 36 U/L (ref 12–78)
ANION GAP SERPL CALCULATED.3IONS-SCNC: 12 MMOL/L (ref 4–13)
ANION GAP SERPL CALCULATED.3IONS-SCNC: 13 MMOL/L (ref 4–13)
ANION GAP SERPL CALCULATED.3IONS-SCNC: 13 MMOL/L (ref 4–13)
ANION GAP SERPL CALCULATED.3IONS-SCNC: 14 MMOL/L (ref 4–13)
ANION GAP SERPL CALCULATED.3IONS-SCNC: 16 MMOL/L (ref 4–13)
APTT PPP: 63 SECONDS (ref 23–37)
APTT PPP: 70 SECONDS (ref 23–37)
ARTERIAL PATENCY WRIST A: YES
AST SERPL W P-5'-P-CCNC: 81 U/L (ref 5–45)
ATRIAL RATE: 128 BPM
BASE EX.OXY STD BLDV CALC-SCNC: 55.7 % (ref 60–80)
BASE EXCESS BLDA CALC-SCNC: -6.2 MMOL/L
BASE EXCESS BLDA CALC-SCNC: -9.5 MMOL/L
BASE EXCESS BLDV CALC-SCNC: -9.7 MMOL/L
BILIRUB DIRECT SERPL-MCNC: 0.22 MG/DL (ref 0–0.2)
BILIRUB SERPL-MCNC: 0.71 MG/DL (ref 0.2–1)
BLD SMEAR INTERP: NORMAL
BODY TEMPERATURE: 104.5 DEGREES FEHRENHEIT
BUN SERPL-MCNC: 32 MG/DL (ref 5–25)
BUN SERPL-MCNC: 34 MG/DL (ref 5–25)
BUN SERPL-MCNC: 34 MG/DL (ref 5–25)
BUN SERPL-MCNC: 36 MG/DL (ref 5–25)
BUN SERPL-MCNC: 40 MG/DL (ref 5–25)
CA-I BLD-SCNC: 1.1 MMOL/L (ref 1.12–1.32)
CALCIUM SERPL-MCNC: 7.8 MG/DL (ref 8.3–10.1)
CALCIUM SERPL-MCNC: 7.9 MG/DL (ref 8.3–10.1)
CALCIUM SERPL-MCNC: 7.9 MG/DL (ref 8.3–10.1)
CALCIUM SERPL-MCNC: 8.1 MG/DL (ref 8.3–10.1)
CALCIUM SERPL-MCNC: 8.2 MG/DL (ref 8.3–10.1)
CHLORIDE SERPL-SCNC: 100 MMOL/L (ref 100–108)
CHLORIDE SERPL-SCNC: 101 MMOL/L (ref 100–108)
CHLORIDE SERPL-SCNC: 101 MMOL/L (ref 100–108)
CO2 SERPL-SCNC: 17 MMOL/L (ref 21–32)
CO2 SERPL-SCNC: 17 MMOL/L (ref 21–32)
CO2 SERPL-SCNC: 19 MMOL/L (ref 21–32)
CO2 SERPL-SCNC: 20 MMOL/L (ref 21–32)
CO2 SERPL-SCNC: 20 MMOL/L (ref 21–32)
CORTIS SERPL-MCNC: 11.5 UG/DL
CREAT SERPL-MCNC: 2.93 MG/DL (ref 0.6–1.3)
CREAT SERPL-MCNC: 3.01 MG/DL (ref 0.6–1.3)
CREAT SERPL-MCNC: 3.21 MG/DL (ref 0.6–1.3)
CREAT SERPL-MCNC: 3.56 MG/DL (ref 0.6–1.3)
CREAT SERPL-MCNC: 3.61 MG/DL (ref 0.6–1.3)
CREAT UR-MCNC: 282 MG/DL
ERYTHROCYTE [DISTWIDTH] IN BLOOD BY AUTOMATED COUNT: 13.5 % (ref 11.6–15.1)
FERRITIN SERPL-MCNC: 1150 NG/ML (ref 8–388)
GFR SERPL CREATININE-BSD FRML MDRD: 15 ML/MIN/1.73SQ M
GFR SERPL CREATININE-BSD FRML MDRD: 16 ML/MIN/1.73SQ M
GFR SERPL CREATININE-BSD FRML MDRD: 18 ML/MIN/1.73SQ M
GFR SERPL CREATININE-BSD FRML MDRD: 19 ML/MIN/1.73SQ M
GFR SERPL CREATININE-BSD FRML MDRD: 20 ML/MIN/1.73SQ M
GLUCOSE SERPL-MCNC: 203 MG/DL (ref 65–140)
GLUCOSE SERPL-MCNC: 206 MG/DL (ref 65–140)
GLUCOSE SERPL-MCNC: 208 MG/DL (ref 65–140)
GLUCOSE SERPL-MCNC: 228 MG/DL (ref 65–140)
GLUCOSE SERPL-MCNC: 245 MG/DL (ref 65–140)
GLUCOSE SERPL-MCNC: 249 MG/DL (ref 65–140)
GLUCOSE SERPL-MCNC: 253 MG/DL (ref 65–140)
GLUCOSE SERPL-MCNC: 253 MG/DL (ref 65–140)
GLUCOSE SERPL-MCNC: 254 MG/DL (ref 65–140)
GLUCOSE SERPL-MCNC: 259 MG/DL (ref 65–140)
HCO3 BLDA-SCNC: 15.9 MMOL/L (ref 22–28)
HCO3 BLDA-SCNC: 17.1 MMOL/L (ref 22–28)
HCO3 BLDV-SCNC: 16.6 MMOL/L (ref 24–30)
HCT VFR BLD AUTO: 28.2 % (ref 36.5–49.3)
HFNC FLOW LPM: 55
HGB BLD-MCNC: 9.3 G/DL (ref 12–17)
IRON SATN MFR SERPL: 9 %
IRON SERPL-MCNC: 12 UG/DL (ref 65–175)
L PNEUMO1 AG UR QL IA.RAPID: NEGATIVE
LACTATE SERPL-SCNC: 1.7 MMOL/L (ref 0.5–2)
LDH SERPL-CCNC: 458 U/L (ref 81–234)
MAGNESIUM SERPL-MCNC: 1.7 MG/DL (ref 1.6–2.6)
MAGNESIUM SERPL-MCNC: 2.2 MG/DL (ref 1.6–2.6)
MCH RBC QN AUTO: 28.3 PG (ref 26.8–34.3)
MCHC RBC AUTO-ENTMCNC: 33 G/DL (ref 31.4–37.4)
MCV RBC AUTO: 86 FL (ref 82–98)
MICROALBUMIN UR-MCNC: 170 MG/L (ref 0–20)
MICROALBUMIN/CREAT 24H UR: 60 MG/G CREATININE (ref 0–30)
NON VENT HFNC FIO2: 55
NON VENT TYPE HFNC: ABNORMAL
NRBC BLD AUTO-RTO: 0 /100 WBCS
O2 CT BLDA-SCNC: 13.3 ML/DL (ref 16–23)
O2 CT BLDA-SCNC: 95.4 ML/DL (ref 16–23)
O2 CT BLDV-SCNC: 8.1 ML/DL
OSMOLALITY UR: 512 MMOL/KG
OXYHGB MFR BLDA: 90.7 % (ref 94–97)
OXYHGB MFR BLDA: 94.9 % (ref 94–97)
P AXIS: 72 DEGREES
PCO2 BLDA: 27.1 MM HG (ref 36–44)
PCO2 BLDA: 33.1 MM HG (ref 36–44)
PCO2 BLDV: 37.7 MM HG (ref 42–50)
PCO2 TEMP ADJ BLDA: 31.3 MM HG (ref 36–44)
PH BLD: 7.37 [PH] (ref 7.35–7.45)
PH BLDA: 7.3 [PH] (ref 7.35–7.45)
PH BLDA: 7.42 [PH] (ref 7.35–7.45)
PH BLDV: 7.26 [PH] (ref 7.3–7.4)
PHOSPHATE SERPL-MCNC: 2.7 MG/DL (ref 2.3–4.1)
PLATELET # BLD AUTO: 279 THOUSANDS/UL (ref 149–390)
PMV BLD AUTO: 10.2 FL (ref 8.9–12.7)
PO2 BLD: 94.1 MM HG (ref 75–129)
PO2 BLDA: 64.6 MM HG (ref 75–129)
PO2 BLDA: 75.5 MM HG (ref 75–129)
PO2 BLDV: 31.5 MM HG (ref 35–45)
POTASSIUM SERPL-SCNC: 3.8 MMOL/L (ref 3.5–5.3)
POTASSIUM SERPL-SCNC: 3.9 MMOL/L (ref 3.5–5.3)
POTASSIUM SERPL-SCNC: 4.2 MMOL/L (ref 3.5–5.3)
POTASSIUM SERPL-SCNC: 4.5 MMOL/L (ref 3.5–5.3)
POTASSIUM SERPL-SCNC: 4.5 MMOL/L (ref 3.5–5.3)
PR INTERVAL: 152 MS
PROCALCITONIN SERPL-MCNC: 6.81 NG/ML
PROT SERPL-MCNC: 6.4 G/DL (ref 6.4–8.2)
PROT UR-MCNC: 146 MG/DL
PROT/CREAT UR: 0.52 MG/G{CREAT} (ref 0–0.1)
QRS AXIS: 74 DEGREES
QRSD INTERVAL: 96 MS
QT INTERVAL: 298 MS
QTC INTERVAL: 435 MS
RBC # BLD AUTO: 3.29 MILLION/UL (ref 3.88–5.62)
RETICS # AUTO: NORMAL 10*3/UL (ref 14356–105094)
RETICS # CALC: 0.76 % (ref 0.37–1.87)
S PNEUM AG UR QL: NEGATIVE
SODIUM 24H UR-SCNC: 49 MOL/L
SODIUM SERPL-SCNC: 131 MMOL/L (ref 136–145)
SODIUM SERPL-SCNC: 132 MMOL/L (ref 136–145)
SODIUM SERPL-SCNC: 133 MMOL/L (ref 136–145)
SODIUM SERPL-SCNC: 133 MMOL/L (ref 136–145)
SODIUM SERPL-SCNC: 134 MMOL/L (ref 136–145)
SPECIMEN SOURCE: ABNORMAL
SPECIMEN SOURCE: ABNORMAL
T WAVE AXIS: -55 DEGREES
TIBC SERPL-MCNC: 136 UG/DL (ref 250–450)
TROPONIN I SERPL-MCNC: 10.54 NG/ML
TROPONIN I SERPL-MCNC: 17.64 NG/ML
TROPONIN I SERPL-MCNC: 26.17 NG/ML
TROPONIN I SERPL-MCNC: 33.67 NG/ML
TROPONIN I SERPL-MCNC: 34.83 NG/ML
TROPONIN I SERPL-MCNC: 4.55 NG/ML
TROPONIN I SERPL-MCNC: 8.25 NG/ML
TSH SERPL DL<=0.05 MIU/L-ACNC: 0.76 UIU/ML (ref 0.36–3.74)
UUN 24H UR-MCNC: 623 MG/DL
VENTRICULAR RATE: 128 BPM
WBC # BLD AUTO: 11.58 THOUSAND/UL (ref 4.31–10.16)

## 2020-09-02 PROCEDURE — 85730 THROMBOPLASTIN TIME PARTIAL: CPT | Performed by: STUDENT IN AN ORGANIZED HEALTH CARE EDUCATION/TRAINING PROGRAM

## 2020-09-02 PROCEDURE — 82948 REAGENT STRIP/BLOOD GLUCOSE: CPT

## 2020-09-02 PROCEDURE — 94770 HB EXHALED CARBON DIOXIDE TEST: CPT

## 2020-09-02 PROCEDURE — 99292 CRITICAL CARE ADDL 30 MIN: CPT | Performed by: NURSE PRACTITIONER

## 2020-09-02 PROCEDURE — 99223 1ST HOSP IP/OBS HIGH 75: CPT | Performed by: INTERNAL MEDICINE

## 2020-09-02 PROCEDURE — 84484 ASSAY OF TROPONIN QUANT: CPT | Performed by: INTERNAL MEDICINE

## 2020-09-02 PROCEDURE — 82533 TOTAL CORTISOL: CPT | Performed by: INTERNAL MEDICINE

## 2020-09-02 PROCEDURE — 93308 TTE F-UP OR LMTD: CPT | Performed by: INTERNAL MEDICINE

## 2020-09-02 PROCEDURE — 84100 ASSAY OF PHOSPHORUS: CPT | Performed by: NURSE PRACTITIONER

## 2020-09-02 PROCEDURE — 94760 N-INVAS EAR/PLS OXIMETRY 1: CPT

## 2020-09-02 PROCEDURE — 84145 PROCALCITONIN (PCT): CPT | Performed by: PHYSICIAN ASSISTANT

## 2020-09-02 PROCEDURE — 84484 ASSAY OF TROPONIN QUANT: CPT | Performed by: NURSE PRACTITIONER

## 2020-09-02 PROCEDURE — 71045 X-RAY EXAM CHEST 1 VIEW: CPT

## 2020-09-02 PROCEDURE — 83735 ASSAY OF MAGNESIUM: CPT | Performed by: NURSE PRACTITIONER

## 2020-09-02 PROCEDURE — 83540 ASSAY OF IRON: CPT | Performed by: INTERNAL MEDICINE

## 2020-09-02 PROCEDURE — 83010 ASSAY OF HAPTOGLOBIN QUANT: CPT | Performed by: INTERNAL MEDICINE

## 2020-09-02 PROCEDURE — 82248 BILIRUBIN DIRECT: CPT | Performed by: INTERNAL MEDICINE

## 2020-09-02 PROCEDURE — 82330 ASSAY OF CALCIUM: CPT | Performed by: INTERNAL MEDICINE

## 2020-09-02 PROCEDURE — 36620 INSERTION CATHETER ARTERY: CPT | Performed by: NURSE PRACTITIONER

## 2020-09-02 PROCEDURE — 80048 BASIC METABOLIC PNL TOTAL CA: CPT | Performed by: INTERNAL MEDICINE

## 2020-09-02 PROCEDURE — 31500 INSERT EMERGENCY AIRWAY: CPT | Performed by: NURSE PRACTITIONER

## 2020-09-02 PROCEDURE — 93325 DOPPLER ECHO COLOR FLOW MAPG: CPT | Performed by: INTERNAL MEDICINE

## 2020-09-02 PROCEDURE — 83615 LACTATE (LD) (LDH) ENZYME: CPT | Performed by: INTERNAL MEDICINE

## 2020-09-02 PROCEDURE — 80053 COMPREHEN METABOLIC PANEL: CPT | Performed by: NURSE PRACTITIONER

## 2020-09-02 PROCEDURE — 87081 CULTURE SCREEN ONLY: CPT | Performed by: INTERNAL MEDICINE

## 2020-09-02 PROCEDURE — 94640 AIRWAY INHALATION TREATMENT: CPT

## 2020-09-02 PROCEDURE — 94002 VENT MGMT INPAT INIT DAY: CPT

## 2020-09-02 PROCEDURE — 4A133B1 MONITORING OF ARTERIAL PRESSURE, PERIPHERAL, PERCUTANEOUS APPROACH: ICD-10-PCS | Performed by: INTERNAL MEDICINE

## 2020-09-02 PROCEDURE — 85045 AUTOMATED RETICULOCYTE COUNT: CPT | Performed by: INTERNAL MEDICINE

## 2020-09-02 PROCEDURE — 36556 INSERT NON-TUNNEL CV CATH: CPT | Performed by: INTERNAL MEDICINE

## 2020-09-02 PROCEDURE — 03HY32Z INSERTION OF MONITORING DEVICE INTO UPPER ARTERY, PERCUTANEOUS APPROACH: ICD-10-PCS | Performed by: INTERNAL MEDICINE

## 2020-09-02 PROCEDURE — 5A1955Z RESPIRATORY VENTILATION, GREATER THAN 96 CONSECUTIVE HOURS: ICD-10-PCS | Performed by: INTERNAL MEDICINE

## 2020-09-02 PROCEDURE — 0BH18EZ INSERTION OF ENDOTRACHEAL AIRWAY INTO TRACHEA, VIA NATURAL OR ARTIFICIAL OPENING ENDOSCOPIC: ICD-10-PCS | Performed by: INTERNAL MEDICINE

## 2020-09-02 PROCEDURE — 93308 TTE F-UP OR LMTD: CPT

## 2020-09-02 PROCEDURE — 80048 BASIC METABOLIC PNL TOTAL CA: CPT | Performed by: NURSE PRACTITIONER

## 2020-09-02 PROCEDURE — 93321 DOPPLER ECHO F-UP/LMTD STD: CPT | Performed by: INTERNAL MEDICINE

## 2020-09-02 PROCEDURE — 83550 IRON BINDING TEST: CPT | Performed by: INTERNAL MEDICINE

## 2020-09-02 PROCEDURE — 82805 BLOOD GASES W/O2 SATURATION: CPT | Performed by: INTERNAL MEDICINE

## 2020-09-02 PROCEDURE — 84443 ASSAY THYROID STIM HORMONE: CPT | Performed by: INTERNAL MEDICINE

## 2020-09-02 PROCEDURE — 99291 CRITICAL CARE FIRST HOUR: CPT | Performed by: NURSE PRACTITIONER

## 2020-09-02 PROCEDURE — 02HV33Z INSERTION OF INFUSION DEVICE INTO SUPERIOR VENA CAVA, PERCUTANEOUS APPROACH: ICD-10-PCS | Performed by: INTERNAL MEDICINE

## 2020-09-02 PROCEDURE — 85730 THROMBOPLASTIN TIME PARTIAL: CPT | Performed by: NURSE PRACTITIONER

## 2020-09-02 PROCEDURE — 85027 COMPLETE CBC AUTOMATED: CPT | Performed by: NURSE PRACTITIONER

## 2020-09-02 PROCEDURE — 82805 BLOOD GASES W/O2 SATURATION: CPT | Performed by: NURSE PRACTITIONER

## 2020-09-02 PROCEDURE — 82728 ASSAY OF FERRITIN: CPT | Performed by: INTERNAL MEDICINE

## 2020-09-02 PROCEDURE — 93005 ELECTROCARDIOGRAM TRACING: CPT

## 2020-09-02 PROCEDURE — 4A133J1 MONITORING OF ARTERIAL PULSE, PERIPHERAL, PERCUTANEOUS APPROACH: ICD-10-PCS | Performed by: INTERNAL MEDICINE

## 2020-09-02 PROCEDURE — 93010 ELECTROCARDIOGRAM REPORT: CPT | Performed by: INTERNAL MEDICINE

## 2020-09-02 PROCEDURE — 87476 LYME DIS DNA AMP PROBE: CPT | Performed by: INTERNAL MEDICINE

## 2020-09-02 PROCEDURE — 83605 ASSAY OF LACTIC ACID: CPT | Performed by: INTERNAL MEDICINE

## 2020-09-02 RX ORDER — HEPARIN SODIUM 10000 [USP'U]/100ML
3-20 INJECTION, SOLUTION INTRAVENOUS
Status: DISCONTINUED | OUTPATIENT
Start: 2020-09-02 | End: 2020-09-05

## 2020-09-02 RX ORDER — FENTANYL CITRATE 50 UG/ML
100 INJECTION, SOLUTION INTRAMUSCULAR; INTRAVENOUS
Status: DISCONTINUED | OUTPATIENT
Start: 2020-09-02 | End: 2020-09-06

## 2020-09-02 RX ORDER — FENTANYL CITRATE 50 UG/ML
50 INJECTION, SOLUTION INTRAMUSCULAR; INTRAVENOUS ONCE
Status: COMPLETED | OUTPATIENT
Start: 2020-09-02 | End: 2020-09-02

## 2020-09-02 RX ORDER — HEPARIN SODIUM 1000 [USP'U]/ML
4000 INJECTION, SOLUTION INTRAVENOUS; SUBCUTANEOUS
Status: DISCONTINUED | OUTPATIENT
Start: 2020-09-02 | End: 2020-09-05

## 2020-09-02 RX ORDER — ALBUMIN, HUMAN INJ 5% 5 %
25 SOLUTION INTRAVENOUS ONCE
Status: COMPLETED | OUTPATIENT
Start: 2020-09-02 | End: 2020-09-02

## 2020-09-02 RX ORDER — SUCCINYLCHOLINE/SOD CL,ISO/PF 100 MG/5ML
1 SYRINGE (ML) INTRAVENOUS ONCE
Status: COMPLETED | OUTPATIENT
Start: 2020-09-02 | End: 2020-09-02

## 2020-09-02 RX ORDER — VANCOMYCIN HYDROCHLORIDE 1 G/200ML
12.5 INJECTION, SOLUTION INTRAVENOUS EVERY 24 HOURS
Status: DISCONTINUED | OUTPATIENT
Start: 2020-09-03 | End: 2020-09-05

## 2020-09-02 RX ORDER — HEPARIN SODIUM 1000 [USP'U]/ML
2000 INJECTION, SOLUTION INTRAVENOUS; SUBCUTANEOUS
Status: DISCONTINUED | OUTPATIENT
Start: 2020-09-02 | End: 2020-09-05

## 2020-09-02 RX ORDER — MIDAZOLAM HYDROCHLORIDE 2 MG/2ML
2 INJECTION, SOLUTION INTRAMUSCULAR; INTRAVENOUS ONCE
Status: COMPLETED | OUTPATIENT
Start: 2020-09-02 | End: 2020-09-02

## 2020-09-02 RX ORDER — FUROSEMIDE 10 MG/ML
20 INJECTION INTRAMUSCULAR; INTRAVENOUS ONCE
Status: COMPLETED | OUTPATIENT
Start: 2020-09-02 | End: 2020-09-02

## 2020-09-02 RX ORDER — LEVALBUTEROL 1.25 MG/.5ML
1.25 SOLUTION, CONCENTRATE RESPIRATORY (INHALATION)
Status: DISCONTINUED | OUTPATIENT
Start: 2020-09-02 | End: 2020-09-03

## 2020-09-02 RX ORDER — MIDAZOLAM HYDROCHLORIDE 2 MG/2ML
4 INJECTION, SOLUTION INTRAMUSCULAR; INTRAVENOUS ONCE
Status: COMPLETED | OUTPATIENT
Start: 2020-09-02 | End: 2020-09-02

## 2020-09-02 RX ORDER — MAGNESIUM SULFATE HEPTAHYDRATE 40 MG/ML
2 INJECTION, SOLUTION INTRAVENOUS ONCE
Status: COMPLETED | OUTPATIENT
Start: 2020-09-02 | End: 2020-09-02

## 2020-09-02 RX ORDER — PROPOFOL 10 MG/ML
5-50 INJECTION, EMULSION INTRAVENOUS
Status: DISCONTINUED | OUTPATIENT
Start: 2020-09-02 | End: 2020-09-06

## 2020-09-02 RX ORDER — CEFEPIME HYDROCHLORIDE 1 G/50ML
1000 INJECTION, SOLUTION INTRAVENOUS EVERY 12 HOURS
Status: DISCONTINUED | OUTPATIENT
Start: 2020-09-02 | End: 2020-09-03

## 2020-09-02 RX ORDER — FENTANYL CITRATE-0.9 % NACL/PF 10 MCG/ML
50 PLASTIC BAG, INJECTION (ML) INTRAVENOUS CONTINUOUS
Status: DISCONTINUED | OUTPATIENT
Start: 2020-09-02 | End: 2020-09-06

## 2020-09-02 RX ORDER — ACETAMINOPHEN 160 MG/5ML
650 SUSPENSION, ORAL (FINAL DOSE FORM) ORAL EVERY 4 HOURS PRN
Status: DISCONTINUED | OUTPATIENT
Start: 2020-09-02 | End: 2020-09-06

## 2020-09-02 RX ORDER — ETOMIDATE 2 MG/ML
30 INJECTION INTRAVENOUS ONCE
Status: COMPLETED | OUTPATIENT
Start: 2020-09-02 | End: 2020-09-02

## 2020-09-02 RX ADMIN — METRONIDAZOLE 500 MG: 500 INJECTION, SOLUTION INTRAVENOUS at 09:21

## 2020-09-02 RX ADMIN — NOREPINEPHRINE BITARTRATE 5 MCG/MIN: 1 INJECTION INTRAVENOUS at 08:07

## 2020-09-02 RX ADMIN — CEFEPIME HYDROCHLORIDE 1000 MG: 1 INJECTION, SOLUTION INTRAVENOUS at 14:10

## 2020-09-02 RX ADMIN — FENTANYL CITRATE 100 MCG: 50 INJECTION INTRAMUSCULAR; INTRAVENOUS at 22:12

## 2020-09-02 RX ADMIN — LEVALBUTEROL HYDROCHLORIDE 1.25 MG: 1.25 SOLUTION, CONCENTRATE RESPIRATORY (INHALATION) at 15:05

## 2020-09-02 RX ADMIN — NOREPINEPHRINE BITARTRATE 12 MCG/MIN: 1 INJECTION INTRAVENOUS at 19:42

## 2020-09-02 RX ADMIN — FENTANYL CITRATE 50 MCG: 0.05 INJECTION, SOLUTION INTRAMUSCULAR; INTRAVENOUS at 03:32

## 2020-09-02 RX ADMIN — INSULIN LISPRO 2 UNITS: 100 INJECTION, SOLUTION INTRAVENOUS; SUBCUTANEOUS at 00:27

## 2020-09-02 RX ADMIN — IPRATROPIUM BROMIDE 0.5 MG: 0.5 SOLUTION RESPIRATORY (INHALATION) at 20:04

## 2020-09-02 RX ADMIN — CEFEPIME HYDROCHLORIDE 1000 MG: 1 INJECTION, SOLUTION INTRAVENOUS at 02:56

## 2020-09-02 RX ADMIN — LEVALBUTEROL HYDROCHLORIDE 0.63 MG: 0.63 SOLUTION RESPIRATORY (INHALATION) at 00:19

## 2020-09-02 RX ADMIN — ETOMIDATE 30 MG: 2 INJECTION, SOLUTION INTRAVENOUS at 04:00

## 2020-09-02 RX ADMIN — INSULIN LISPRO 3 UNITS: 100 INJECTION, SOLUTION INTRAVENOUS; SUBCUTANEOUS at 12:03

## 2020-09-02 RX ADMIN — CHLORHEXIDINE GLUCONATE 0.12% ORAL RINSE 15 ML: 1.2 LIQUID ORAL at 08:24

## 2020-09-02 RX ADMIN — Medication 0.04 UNITS/MIN: at 02:30

## 2020-09-02 RX ADMIN — FUROSEMIDE 20 MG: 10 INJECTION, SOLUTION INTRAMUSCULAR; INTRAVENOUS at 10:10

## 2020-09-02 RX ADMIN — PROPOFOL 30 MCG/KG/MIN: 10 INJECTION, EMULSION INTRAVENOUS at 18:55

## 2020-09-02 RX ADMIN — Medication 0.04 UNITS/MIN: at 09:21

## 2020-09-02 RX ADMIN — IPRATROPIUM BROMIDE 0.5 MG: 0.5 SOLUTION RESPIRATORY (INHALATION) at 15:05

## 2020-09-02 RX ADMIN — ACETAMINOPHEN 650 MG: 325 TABLET ORAL at 03:39

## 2020-09-02 RX ADMIN — INSULIN LISPRO 3 UNITS: 100 INJECTION, SOLUTION INTRAVENOUS; SUBCUTANEOUS at 06:06

## 2020-09-02 RX ADMIN — IPRATROPIUM BROMIDE 0.5 MG: 0.5 SOLUTION RESPIRATORY (INHALATION) at 08:14

## 2020-09-02 RX ADMIN — PROPOFOL 35 MCG/KG/MIN: 10 INJECTION, EMULSION INTRAVENOUS at 12:32

## 2020-09-02 RX ADMIN — Medication 80 MG: at 04:00

## 2020-09-02 RX ADMIN — CHLORHEXIDINE GLUCONATE 0.12% ORAL RINSE 15 ML: 1.2 LIQUID ORAL at 20:20

## 2020-09-02 RX ADMIN — MAGNESIUM SULFATE HEPTAHYDRATE 2 G: 40 INJECTION, SOLUTION INTRAVENOUS at 02:26

## 2020-09-02 RX ADMIN — METRONIDAZOLE 500 MG: 500 INJECTION, SOLUTION INTRAVENOUS at 16:52

## 2020-09-02 RX ADMIN — MIDAZOLAM HYDROCHLORIDE 4 MG: 1 INJECTION, SOLUTION INTRAMUSCULAR; INTRAVENOUS at 23:05

## 2020-09-02 RX ADMIN — IPRATROPIUM BROMIDE 0.5 MG: 0.5 SOLUTION RESPIRATORY (INHALATION) at 11:02

## 2020-09-02 RX ADMIN — LEVALBUTEROL HYDROCHLORIDE 1.25 MG: 1.25 SOLUTION, CONCENTRATE RESPIRATORY (INHALATION) at 08:14

## 2020-09-02 RX ADMIN — VANCOMYCIN HYDROCHLORIDE 1250 MG: 1 INJECTION, POWDER, LYOPHILIZED, FOR SOLUTION INTRAVENOUS at 03:46

## 2020-09-02 RX ADMIN — MIDAZOLAM 2 MG: 1 INJECTION INTRAMUSCULAR; INTRAVENOUS at 04:34

## 2020-09-02 RX ADMIN — PHENYLEPHRINE HYDROCHLORIDE 150 MCG/MIN: 10 INJECTION INTRAVENOUS at 10:10

## 2020-09-02 RX ADMIN — LEVALBUTEROL HYDROCHLORIDE 1.25 MG: 1.25 SOLUTION, CONCENTRATE RESPIRATORY (INHALATION) at 11:02

## 2020-09-02 RX ADMIN — HEPARIN SODIUM 12 UNITS/KG/HR: 10000 INJECTION, SOLUTION INTRAVENOUS at 02:23

## 2020-09-02 RX ADMIN — PHENYLEPHRINE HYDROCHLORIDE 160 MCG/MIN: 10 INJECTION INTRAVENOUS at 05:11

## 2020-09-02 RX ADMIN — ALBUMIN (HUMAN) 25 G: 12.5 INJECTION, SOLUTION INTRAVENOUS at 03:36

## 2020-09-02 RX ADMIN — PROPOFOL 40 MCG/KG/MIN: 10 INJECTION, EMULSION INTRAVENOUS at 04:00

## 2020-09-02 RX ADMIN — PROPOFOL 40 MCG/KG/MIN: 10 INJECTION, EMULSION INTRAVENOUS at 07:09

## 2020-09-02 RX ADMIN — LEVALBUTEROL HYDROCHLORIDE 1.25 MG: 1.25 SOLUTION, CONCENTRATE RESPIRATORY (INHALATION) at 20:04

## 2020-09-02 RX ADMIN — PROPOFOL 50 MCG/KG/MIN: 10 INJECTION, EMULSION INTRAVENOUS at 23:06

## 2020-09-02 RX ADMIN — Medication 0.04 UNITS/MIN: at 18:10

## 2020-09-02 RX ADMIN — SODIUM CHLORIDE 6 UNITS/HR: 9 INJECTION, SOLUTION INTRAVENOUS at 16:51

## 2020-09-02 RX ADMIN — Medication 50 MCG/HR: at 05:03

## 2020-09-02 NOTE — PROCEDURES
Intubation    Date/Time: 9/2/2020 4:00 AM  Performed by: Jonatan Rivers by: JASMINE Self     Patient location:  Bedside  Other Assisting Provider: No    Consent:     Consent obtained:  Emergent situation  Universal protocol:     Relevant documents present and verified: yes      Test results available and properly labeled: yes      Radiology Images displayed and confirmed  If images not available, report reviewed: yes      Required blood products, implants, devices, and special equipment available: yes      Immediately prior to procedure, a time out was called: yes      Patient identity confirmed:  Arm band, anonymous protocol, patient vented/unresponsive and verbally with patient  Pre-procedure details:     Patient status:  Altered mental status    Pretreatment medications:  Etomidate    Paralytics:  Succinylcholine  Indications:     Indications for intubation: respiratory distress, airway protection and hypoxemia    Procedure details:     Preoxygenation:  Bag valve mask (On HFNC)    CPR in progress: no      Intubation method:  Oral    Oral intubation technique:  Glidescope ([place])    Laryngoscope blade: Mac 3    Tube size (mm):  8 0    Tube type:  Hi-lo and cuffed    Number of attempts:  1    Ventilation between attempts: no      Cricoid pressure: no      Tube visualized through cords: yes    Placement assessment:     ETT to lip:  23    Tube secured with:  ETT leblanc    Breath sounds:  Equal and absent over the epigastrium    Placement verification: chest rise, condensation, CXR verification, direct visualization, equal breath sounds, ETCO2 detector and tube exhalation      CXR findings:  ETT in proper place    Ventilator settings:  CMV, 500 60% +8, 12  Post-procedure details:     Patient tolerance of procedure:   Tolerated well, no immediate complications

## 2020-09-02 NOTE — PLAN OF CARE
Pt admitted to ICU at 2200  Pt pleasant, alert and oriented  At 2400, pt began to have a temp 103 and greater  Pt became confused, agitated, wanting to get up and have a BM in the bathroom and leave  [place] placed by CRNP at [address]  Pt given tylenol and paced with ice packs with no alleviation of temperature  Pt put on targeted temperature mgmt machine at 0300 to assist with getting patient normothermic  [place] Temp was 105  3  Pt repeated attempted to get OOB and go home  Pt became agitated, aggressive, and unable to be reoriented  It look 2-4 staff to keep patient in bed  Pt's O2 needs went from O22L to high flow at 0100  Pt tachypneic with pursed lip breathing and Dyspnea at rest despite pt stating he is not SOB  Pt on shane, vasopressin, and heparin for elevated trops  At 0400, pt intubated, OGT placed  Pt continued to bite tube  Pt given prn fentanyl, versed  Started on propofol and fentanyl gtts  [place]ane titrated for MAP >65  Pt placed in restraints d/t agitation, inability to follow commands, and pulling at tube  Education about restraints provided to wife by [address]  Pt with low urine output-pt only had 85ml out for shift  Pt also received total 750ml albumin  Pt placed on TTM machine to assist with getting patient to normothermia  Problem: Potential for Falls  Goal: Patient will remain free of falls  Description: INTERVENTIONS:  - Assess patient frequently for physical needs  -  Identify cognitive and physical deficits and behaviors that affect risk of falls    -  [place] fall precautions as indicated by assessment   - Educate patient/family on patient safety including physical limitations  - Instruct patient to call for assistance with activity based on assessment  - Modify environment to reduce risk of injury  - Consider OT/PT consult to assist with strengthening/mobility  9/2/2020 0757 by Mattie Crowe, RN  Outcome: Progressing  9/2/2020 0756 by Mattie Crowe, RN  Outcome: Progressing     Problem: Prexisting or High Potential for Compromised Skin Integrity  Goal: Skin integrity is maintained or improved  Description: INTERVENTIONS:  - Identify patients at risk for skin breakdown  - Assess and monitor skin integrity  - Assess and monitor nutrition and hydration status  - Monitor labs   - Assess for incontinence   - Turn and reposition patient  - Assist with mobility/ambulation  - Relieve pressure over bony prominences  - Avoid friction and shearing  - Provide appropriate hygiene as needed including keeping skin clean and dry  - Evaluate need for skin moisturizer/barrier cream  - Collaborate with interdisciplinary team   - Patient/family teaching  - Consider wound care consult   9/2/2020 0757 by Damari Broderick RN  Outcome: Progressing  9/2/2020 0756 by Damari Broderick RN  Outcome: Progressing     Problem: Nutrition/Hydration-ADULT  Goal: Nutrient/Hydration intake appropriate for improving, restoring or maintaining nutritional needs  Description: Monitor and assess patient's nutrition/hydration status for malnutrition  Collaborate with interdisciplinary team and initiate plan and interventions as ordered  Monitor patient's weight and dietary intake as ordered or per policy  Utilize nutrition screening tool and intervene as necessary  Determine patient's food preferences and provide high-protein, high-caloric foods as appropriate       INTERVENTIONS:  - Monitor oral intake, urinary output, labs, and treatment plans  - Assess nutrition and hydration status and recommend course of action  - Evaluate amount of meals eaten  - Assist patient with eating if necessary   - Allow adequate time for meals  - Recommend/ encourage appropriate diets, oral nutritional supplements, and vitamin/mineral supplements  - Order, calculate, and assess calorie counts as needed  - Recommend, monitor, and adjust tube feedings and TPN/PPN based on assessed needs  - Assess need for intravenous fluids  - Provide specific nutrition/hydration education as appropriate  - Include patient/family/caregiver in decisions related to nutrition  9/2/2020 0757 by Petty Goss RN  Outcome: Progressing  9/2/2020 0756 by Petty Goss RN  Outcome: Not Progressing     Problem: PAIN - ADULT  Goal: Verbalizes/displays adequate comfort level or baseline comfort level  Description: Interventions:  - Encourage patient to monitor pain and request assistance  - Assess pain using appropriate pain scale  - Administer analgesics based on type and severity of pain and evaluate response  - Implement non-pharmacological measures as appropriate and evaluate response  - Consider cultural and social influences on pain and pain management  - Notify physician/advanced practitioner if interventions unsuccessful or patient reports new pain  9/2/2020 0757 by Petty Goss RN  Outcome: Progressing  9/2/2020 0756 by Petty Goss RN  Outcome: Progressing     Problem: INFECTION - ADULT  Goal: Absence or prevention of progression during hospitalization  Description: INTERVENTIONS:  - Assess and monitor for signs and symptoms of infection  - Monitor lab/diagnostic results  - Monitor all insertion sites, i e  indwelling lines, tubes, and drains  - Monitor endotracheal if appropriate and nasal secretions for changes in amount and color  - [place] appropriate cooling/warming therapies per order  - Administer medications as ordered  - Instruct and encourage patient and family to use good hand hygiene technique  - Identify and instruct in appropriate isolation precautions for identified infection/condition  9/2/2020 0757 by Petty Goss RN  Outcome: Progressing  9/2/2020 0756 by Petty Goss RN  Outcome: Progressing     Problem: SAFETY ADULT  Goal: Patient will remain free of falls  Description: INTERVENTIONS:  - Assess patient frequently for physical needs  -  Identify cognitive and physical deficits and behaviors that affect risk of falls    -  [place] fall precautions as indicated by assessment   - Educate patient/family on patient safety including physical limitations  - Instruct patient to call for assistance with activity based on assessment  - Modify environment to reduce risk of injury  - Consider OT/PT consult to assist with strengthening/mobility  9/2/2020 0757 by Ozzy Blanco RN  Outcome: Progressing  9/2/2020 0756 by Ozzy Blanco RN  Outcome: Progressing  Goal: Maintain or return to baseline ADL function  Description: INTERVENTIONS:  -  Assess patient's ability to carry out ADLs; assess patient's baseline for ADL function and identify physical deficits which impact ability to perform ADLs (bathing, care of mouth/teeth, toileting, grooming, dressing, etc )  - Assess/evaluate cause of self-care deficits   - Assess range of motion  - Assess patient's mobility; develop plan if impaired  - Assess patient's need for assistive devices and provide as appropriate  - Encourage maximum independence but intervene and supervise when necessary  - Involve family in performance of ADLs  - Assess for home care needs following discharge   - Consider OT consult to assist with ADL evaluation and planning for discharge  - Provide patient education as appropriate  9/2/2020 0757 by Ozyz Blanco RN  Outcome: Progressing  9/2/2020 0756 by Ozzy Blanco RN  Outcome: Progressing  Goal: Maintain or return mobility status to optimal level  Description: INTERVENTIONS:  - Assess patient's baseline mobility status (ambulation, transfers, stairs, etc )    - Identify cognitive and physical deficits and behaviors that affect mobility  - Identify mobility aids required to assist with transfers and/or ambulation (gait belt, sit-to-stand, lift, walker, cane, etc )  - [place] fall precautions as indicated by assessment  - Record patient progress and toleration of activity level on Mobility SBAR; progress patient to next Phase/Stage  - Instruct patient to call for assistance with activity based on assessment  - Consider rehabilitation consult to assist with strengthening/weightbearing, etc   9/2/2020 0757 by Quita Padron RN  Outcome: Progressing  9/2/2020 0756 by Quita Padron RN  Outcome: Progressing     Problem: DISCHARGE PLANNING  Goal: Discharge to home or other facility with appropriate resources  Description: INTERVENTIONS:  - Identify barriers to discharge w/patient and caregiver  - Arrange for needed discharge resources and transportation as appropriate  - Identify discharge learning needs (meds, wound care, etc )  - Arrange for interpretive services to assist at discharge as needed  - Refer to Case Management Department for coordinating discharge planning if the patient needs post-hospital services based on physician/advanced practitioner order or complex needs related to functional status, cognitive ability, or social support system  9/2/2020 0757 by Quita Padron RN  Outcome: Progressing  9/2/2020 0756 by Qiuta Padron RN  Outcome: Progressing     Problem: Knowledge Deficit  Goal: Patient/family/caregiver demonstrates understanding of disease process, treatment plan, medications, and discharge instructions  Description: Complete learning assessment and assess knowledge base    Interventions:  - Provide teaching at level of understanding  - Provide teaching via preferred learning methods  9/2/2020 0757 by Quita Padron RN  Outcome: Progressing  9/2/2020 0756 by Quita Padron RN  Outcome: Progressing     Problem: CARDIOVASCULAR - ADULT  Goal: Absence of cardiac dysrhythmias or at baseline rhythm  Description: INTERVENTIONS:  - Continuous cardiac monitoring, vital signs, obtain 12 lead EKG if ordered  - Administer antiarrhythmic and heart rate control medications as ordered  - Monitor electrolytes and administer replacement therapy as ordered  9/2/2020 0757 by Quita Padron RN  Outcome: Progressing  9/2/2020 0756 by Ely Mijares RN  Outcome: Progressing     Problem: METABOLIC, FLUID AND ELECTROLYTES - ADULT  Goal: Electrolytes maintained within normal limits  Description: INTERVENTIONS:  - Monitor labs and assess patient for signs and symptoms of electrolyte imbalances  - Administer electrolyte replacement as ordered  - Monitor response to electrolyte replacements, including repeat lab results as appropriate  - Instruct patient on fluid and nutrition as appropriate  9/2/2020 0757 by Ely Mijares RN  Outcome: Progressing  9/2/2020 0756 by Ely Mijares RN  Outcome: Progressing  Goal: Glucose maintained within target range  Description: INTERVENTIONS:  - Monitor Blood Glucose as ordered  - Assess for signs and symptoms of hyperglycemia and hypoglycemia  - Administer ordered medications to maintain glucose within target range  - Assess nutritional intake and initiate nutrition service referral as needed  9/2/2020 0757 by Ely Mijares RN  Outcome: Progressing  9/2/2020 0756 by Ely Mijares RN  Outcome: Progressing     Problem: NEUROSENSORY - ADULT  Goal: Achieves stable or improved neurological status  Description: INTERVENTIONS  - Monitor and report changes in neurological status  - Monitor vital signs such as temperature, blood pressure, glucose, and any other labs ordered   - Initiate measures to prevent increased intracranial pressure  - Monitor for seizure activity and implement precautions if appropriate      9/2/2020 0757 by Ely Mijares RN  Outcome: Not Progressing  9/2/2020 0756 by Ely Mijares RN  Outcome: Progressing     Problem: CARDIOVASCULAR - ADULT  Goal: Maintains optimal cardiac output and hemodynamic stability  Description: INTERVENTIONS:  - Monitor I/O, vital signs and rhythm  - Monitor for S/S and trends of decreased cardiac output  - Administer and titrate ordered vasoactive medications to optimize hemodynamic stability  - Assess quality of pulses, skin color and temperature  - Assess for signs of decreased coronary artery perfusion  - Instruct patient to report change in severity of symptoms  9/2/2020 0757 by Kaylee Moore RN  Outcome: Not Progressing  9/2/2020 0756 by Kaylee Moore RN  Outcome: Progressing     Problem: RESPIRATORY - ADULT  Goal: Achieves optimal ventilation and oxygenation  Description: INTERVENTIONS:  - Assess for changes in respiratory status  - Assess for changes in mentation and behavior  - Position to facilitate oxygenation and minimize respiratory effort  - Oxygen administered by appropriate delivery if ordered  - Initiate smoking cessation education as indicated  - Encourage broncho-pulmonary hygiene including cough, deep breathe, Incentive Spirometry  - Assess the need for suctioning and aspirate as needed  - Assess and instruct to report SOB or any respiratory difficulty  - Respiratory Therapy support as indicated  9/2/2020 0757 by Kaylee Moore RN  Outcome: Not Progressing  9/2/2020 0756 by Kaylee Moore RN  Outcome: Progressing     Problem: GENITOURINARY - ADULT  Goal: Maintains or returns to baseline urinary function  Description: INTERVENTIONS:  - Assess urinary function  - Encourage oral fluids to ensure adequate hydration if ordered  - Administer IV fluids as ordered to ensure adequate hydration  - Administer ordered medications as needed  - Offer frequent toileting  - Follow urinary retention protocol if ordered  9/2/2020 0757 by Kaylee Moore RN  Outcome: Not Progressing  9/2/2020 0756 by Kaylee Moore RN  Outcome: Progressing     Problem: METABOLIC, FLUID AND ELECTROLYTES - ADULT  Goal: Fluid balance maintained  Description: INTERVENTIONS:  - Monitor labs   - Monitor I/O and WT  - Instruct patient on fluid and nutrition as appropriate  - Assess for signs & symptoms of volume excess or deficit  9/2/2020 0757 by Kaylee Moore RN  Outcome: Not Progressing  9/2/2020 0756 by Raysa Hendrix RN  Outcome: Progressing

## 2020-09-02 NOTE — ASSESSMENT & PLAN NOTE
· Unknown baseline, but patient and wife deny any history of kidney disease  · Creat in ED 3 33, now 3 6  · Means placed in ED  · Received 3L NS in ED  · Trend labs  · Avoid nephrotoxins, hypotension  · Continue pressors  · Given 750ml albumin overnight  · Urine studies ordered  · Consider nephrology consult if does not improve

## 2020-09-02 NOTE — UTILIZATION REVIEW
Notification of Inpatient Admission/Inpatient Authorization Request   This is a Notification of Inpatient Admission for [address]  Be advised that this patient was admitted to our facility under Inpatient Status  Contact Amaury Kay at [phone] for additional admission information  [facility]  DEDICATED [phone]  Patient Name:   Katharine Race   YOB: 1945       [address]   [address]:   [address]  Tax ID: [id number]  NPI: [id number] Attending Provider/NPI:  Phone:  Address: Fabian Watkins [[id number]]  [phone]  SAME AS FACILITY   Place of Service Code: 24 Place of Service Name:  [address] [place] [address]   Start Date: 9/1/20 1842     Discharge Date & Time: No discharge date for patient encounter  Type of Admission: Inpatient Status Discharge Disposition (if discharged): Final discharge disposition not confirmed   Patient Diagnoses: Altered mental status [R41 82]  Pneumonia [J18 9]  Elevated troponin [R79 89]  Sepsis ([facility] [address] ) [A41 9]  Altered mental status, unspecified altered mental status type [R41 82]     Orders: Admission Orders (From admission, onward)     Ordered        09/01/20 1842  Inpatient Admission  Once                    Assigned Utilization Review Contact: Amaury Kay  Utilization   Network Utilization Review Department  Phone: [phone]; Fax [phone]  Email: [email or web address]   ATTENTION PAYERS: Please call the assigned Utilization  directly with any questions or concerns ALL voicemails in the department are confidential  Send all requests for admission clinical reviews, approved or denied determinations and any other requests to dedicated fax number belonging to the campus where the patient is receiving treatment

## 2020-09-02 NOTE — RESPIRATORY THERAPY NOTE
RT Protocol Note  Kiana Guzman 66 y o  male MRN: [id number]  Unit/Bed#: -01 Encounter: [id number]    Assessment    Principal Problem:    Septic shock ([facility] )  Active Problems:    Community acquired pneumonia of right lung    Hypertension    CHERISE (acute kidney injury) ([place] [address] )    Elevated brain natriuretic peptide (BNP) level    Elevated troponin    Type 2 diabetes mellitus, with long-term current use of insulin (HCC)    Encephalopathy due to infection    Hyponatremia      Home Pulmonary Medications:         Past Medical History:   Diagnosis Date    Diabetes mellitus ([place] [address] )     Hyperlipidemia     Hypertension      Social History     Socioeconomic History    Marital status: /Civil Union     Spouse name: None    Number of children: None    Years of education: None    Highest education level: None   Occupational History    None   Social Needs    Financial resource strain: None    Food insecurity     Worry: None     Inability: None    Transportation needs     Medical: None     Non-medical: None   Tobacco Use    Smoking status: Never Smoker    Smokeless tobacco: Never Used   Substance and Sexual Activity    Alcohol use: Not Currently     Frequency: Never    Drug use: Never    Sexual activity: Yes     Partners: Female   Lifestyle    Physical activity     Days per week: None     Minutes per session: None    Stress: None   Relationships    Social connections     Talks on phone: None     Gets together: None     Attends Confucianist service: None     Active member of club or organization: None     Attends meetings of clubs or organizations: None     Relationship status: None    Intimate partner violence     Fear of current or ex partner: None     Emotionally abused: None     Physically abused: None     Forced sexual activity: None   Other Topics Concern    None   Social History Narrative    None       Subjective         Objective    Physical Exam:   Assessment Type: Assess only  General Appearance: Alert, Awake  Respiratory Pattern: Normal  Chest Assessment: Chest expansion symmetrical, Trachea midline  Bilateral Breath Sounds: Diminished  Cough: None  O2 Device: 2L NC    Vitals:  Blood pressure (!) 77/52, pulse (!) 112, temperature 100 4 °F (38 °C), temperature source Oral, resp  rate (!) 30, height 6' 3" (1 905 m), weight 78 8 kg (173 lb 11 6 oz), SpO2 93 %  Results from last 7 days   Lab Units 09/01/20  1823   PH ART  7 396   PCO2 ART mm Hg 28 8*   PO2 ART mm Hg 80 3   HCO3 ART mmol/L 17 3*   BASE EXC ART mmol/L -6 7   O2 CONTENT ART mL/dL 11 8*   O2 HGB, ARTERIAL % 95 5   ABG SOURCE  Radial, Left   BETHANIE TEST  Yes       Imaging and other studies: I have personally reviewed pertinent reports  O2 Device: 2L NC     Plan    Respiratory Plan: Mild Distress pathway        Resp Comments: Pt admitted for change in MS/Sepsis/PNA with mild SOB upon arrival  Pt stable on 2L NC with no dyspnea/no distress  BS dim  Pt Hx states he was a former smoker - pt denies tobacco use  No home O2/no cpap  Plan to try flutter valve /IS to assist with PNA

## 2020-09-02 NOTE — PROGRESS NOTES
Vancomycin Assessment    Dionte Arora is a 66 y o  male who is currently receiving vancomycin 1250mg IV once for Pneumonia     Relevant clinical data and objective history reviewed:  Creatinine   Date Value Ref Range Status   09/02/2020 3 01 (H) 0 60 - 1 30 mg/dL Final     Comment:     Standardized to IDMS reference method   09/01/2020 3 21 (H) 0 60 - 1 30 mg/dL Final     Comment:     Standardized to IDMS reference method   09/01/2020 3 33 (H) 0 60 - 1 30 mg/dL Final     Comment:     Standardized to IDMS reference method     BP (!) 82/50   Pulse (!) 135   Temp (!) 104 5 °F (40 3 °C)   Resp (!) 44   Ht 6' 3" (1 905 m)   Wt 78 8 kg (173 lb 11 6 oz)   SpO2 93%   BMI 21 71 kg/m²   I/O last 3 completed shifts: In: 3300 [IV Piggyback:3300]  Out: -   Lab Results   Component Value Date/Time    BUN 32 (H) 09/02/2020 12:04 AM    WBC 9 04 09/01/2020 05:06 PM    HGB 9 5 (L) 09/01/2020 05:06 PM    HCT 28 6 (L) 09/01/2020 05:06 PM    MCV 85 09/01/2020 05:06 PM     09/01/2020 05:06 PM     Temp Readings from Last 3 Encounters:   09/02/20 (!) 104 5 °F (40 3 °C)     Vancomycin Days of Therapy: 1    Assessment/Plan  The patient is currently on vancomycin utilizing scheduled dosing based on actual body weight  Baseline risks associated with therapy include: pre-existing renal impairment, concomitant nephrotoxic medications, and advanced age  The patient is currently receiving 1250mg IV once and after clinical evaluation will be changed to 1250mg IV once, then 1000mg IV Q24H  Pharmacy will also follow closely for s/sx of nephrotoxicity, infusion reactions, and appropriateness of therapy  BMP and CBC will be ordered per protocol  Plan for trough as patient approaches steady state, prior to the 4th  dose at approximately 0330 on 9/5/20  Due to infection severity, will target a trough of 15-20 (appropriate for most indications)     Pharmacy will continue to follow the patients culture results and clinical progress daily      Romana Michaud, Pharmacist

## 2020-09-02 NOTE — ASSESSMENT & PLAN NOTE
· Secondary to pneumonia vs unclear etiology  · POA - fever, tachypnea, tachycardia, increased Cr, persistent hypotension not fluid responsive  · In the ED  · Hypotensive, normal lactate, given 3L NSS  · ABX - Ceftriaxone, Azithromycin  · CT C/A/P without contrast:   Bilateral predominantly upper lobe diffuse nodular interstitial changes of inflammatory infectious etiology  2   Subsegmental atelectasis /consolidation in the right upper lobe in a bronchovascular distribution  3   Posterior bibasilar dependent changes with possible atelectasis/infiltrate in the left lung base  4   Diffuse bladder wall thickening which is decompressed containing a Means balloon  5  Mediastinal lymphadenopathy as described largest in the subcarinal region measuring 1 8 cm    · Micro work up  · Covid repeat in ED on 9/1 negative  · Flu/ RSV negative  · Urine strep and legionella - negative  · Urine + bacteria, cloudy, thickened bladder wall on CT scan in ED  · Procalcitonin 1 4  > 6 8  · Blood cultures - No growth in 24 hrs  · Sputum culture - needs to be collected  · Lyme - pending    · ABX - broadened due to severity of illness, Cont Cefepime, Vanco, and Flagyl  · Persistent fever despite tylenol and ice packs, utilizing [employer] for normothermia  · Vasopressors added after continued hypotension despite IVF resuscitation  · Cont Vaso and Levo   Shane weaned off evening of 9/2/2020  · Consider LP if not improving, in setting of high temp to 105 and acute encephalopathy

## 2020-09-02 NOTE — PROCEDURES
Arterial Line Insertion    Date/Time: 9/2/2020 2:30 AM  Performed by: Jamar Carroll by: Nathanial Rinne, [facility]     Patient location:  Bedside and ICU  Other Assisting Provider: No    Consent:     Consent obtained:  Emergent situation  Universal protocol:     Test results available and properly labeled: yes      Site/side marked: yes      Immediately prior to procedure a time out was called: yes      Patient identity confirmed:  Arm band and verbally with patient  Indications:     Indications: hemodynamic monitoring, continuous blood pressure monitoring and frequent labs / infusion    Pre-procedure details:     Skin preparation:  Chlorhexidine    Preparation: Patient was prepped and draped in sterile fashion    Anesthesia (see MAR for exact dosages): Anesthesia method:  Local infiltration    Local anesthetic:  Lidocaine 1% w/o epi  Procedure details:     Location / Tip of Catheter:  Radial    Laterality:  Right    Rony's test performed: yes      Needle gauge:  20 G    Placement technique:  Ultrasound guided    Number of attempts:  2    Successful placement: yes      Transducer: waveform confirmed    Post-procedure details:     Post-procedure:  Secured with tape, sterile dressing applied, sutured and wrist guard applied    CMS:  Normal and unchanged    Patient tolerance of procedure:   Tolerated well, no immediate complications

## 2020-09-02 NOTE — ASSESSMENT & PLAN NOTE
No results found for: HGBA1C    Recent Labs     09/01/20  1715 09/01/20 2008   POCGLU 379* 257*       Blood Sugar Average: Last 72 hrs:  (P) 318       · Takes metformin 1000mg BID, Januvia 100mg daily, Lantus 28 units at hs, Novolog 6 units TID w meals  · Hold home meds- pt currently on clear liquids  · Sliding scale q6 hours   · Consider non-DKA insulin gtt if remains hyperglycemic

## 2020-09-02 NOTE — CASE MANAGEMENT
CM spoke to [address] [place] [address] team:  Intubated and on IV pressors  Cefepime,Flagyl  Heparin drip:  Shane: Vasopressin: Norepinephrine    CM  Consulted  Reason for Consult? Post Acute Placement (specify)      Post Acute Home Needs (VNA/DME/Infusion      Will continue to follow medical management and assist with dc needs

## 2020-09-02 NOTE — RESPIRATORY THERAPY NOTE
RT Ventilator Management Note  Alfonzo Hutchison 66 y o  male MRN: [id number]  Unit/Bed#: -01 Encounter: [id number]      Daily Screen       9/2/2020  0820             Patient safety screen outcome[de-identified]  Failed    Not Ready for Weaning due to[de-identified]  Underline problem not resolved            Physical Exam:   Assessment Type: Assess only  General Appearance: Alert, Awake  Respiratory Pattern: Normal  Chest Assessment: Chest expansion symmetrical, Trachea midline  Bilateral Breath Sounds: Diminished  Cough: None  O2 Device: 2L NC      Pt comfortable on current vent settings

## 2020-09-02 NOTE — ASSESSMENT & PLAN NOTE
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[employer]  C/o feeling cold on 8/31- outpt covid test was negative  Worse day of admission, EMS called and was 93% on RA  · Covid repeat in ED on 9/1 negative  · Consider retesting if no improvement  · Flu/ RSV negative  · Urine + bacteria, cloudy, thickened bladder wall on CT scan in ED  · Received 3L NSS in ED  · Lactic was normal on presentation  · Hypotension and elevated creatinine to 3 33  · CT C/A/P without contrast:   Bilateral predominantly upper lobe diffuse nodular interstitial changes of inflammatory infectious etiology  2   Subsegmental atelectasis /consolidation in the right upper lobe in a bronchovascular distribution  3   Posterior bibasilar dependent changes with possible atelectasis/infiltrate in the left lung base  4   Diffuse bladder wall thickening which is decompressed containing a Means balloon  5  Mediastinal lymphadenopathy as described largest in the subcarinal region measuring 1 8 cm    · Follow up on Blood cultures  · Started on Azithromycin and Ceftriaxone in ED for CAP  · Above ABX stopped and broadened to Vanc and Cefepime due to repeat temp up to 105 2 (bladder temp)  · Arctic sun for fever  · Procalcitonin 1 4  · Albumin, neosynephrine gtt PRN for hypotension     · Vasopressin added for continued hypotension  · Consider LP if not improving, in setting of high temp to 105 on 2 occasions

## 2020-09-02 NOTE — H&P
H&P- Leena Garcia 1945, 66 y o  male MRN: [id number]    Unit/Bed#: -01 Encounter: [id number]    Primary Care Provider: Lidya Hernadez DO   Date and time admitted to hospital: 9/1/2020  4:56 PM        * Septic shock 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"[employer]  C/o feeling cold on 8/31- outpt covid test was negative  Worse day of admission, EMS called and was 93% on RA  · Covid repeat in ED on 9/1 negative  · Flu/ RSV negative  · Urine + bacteria, cloudy, thickened bladder wall on CT scan in ED  · Received 3L NSS in ED  · Lactic was normal on presentation  · Hypotension and elevated creatinine to 3 33  · CT C/A/P without contrast:   Bilateral predominantly upper lobe diffuse nodular interstitial changes of inflammatory infectious etiology  2   Subsegmental atelectasis /consolidation in the right upper lobe in a bronchovascular distribution  3   Posterior bibasilar dependent changes with possible atelectasis/infiltrate in the left lung base  4   Diffuse bladder wall thickening which is decompressed containing a Means balloon  5  Mediastinal lymphadenopathy as described largest in the subcarinal region measuring 1 8 cm    · Follow up on Blood cultures  · Started on Azithromycin and Ceftriaxone in ED for CAP  · Procalcitonin pending  · Albumin, neosynephrine gtt PRN for hypotension       Community acquired pneumonia of right lung  Assessment & Plan  · RML pna concern on CXR  · Received azithromycin , ceftriaxone in ED  · Will add strept/ legionella to urine studies  · Continue ABX  · O2 via NC PRN, maintain sat > 90%  · Respiratory protocol, airway clearance, IS  · Xopenex tx PRN    CHERISE (acute kidney injury) ([facility] [address] )  Assessment & Plan  · Unknown baseline, but patient and wife deny any history of kidney disease  · Creat in ED 3 33  · Means placed in ED  · Received 3L NS in ED  · Trend labs  · Avoid nephrotoxins, hypotension  · Consider nephrology consult if does not improve  Elevated troponin  Assessment & Plan  · Elevated to 2 85 in ED without EKG changes  · Likely demand in setting of sepsis  · Continue to trend to peak  · EKG PRN    Elevated brain natriuretic peptide (BNP) level  Assessment & Plan  · No history of heart failure  · Strict I&O  · Daily weight  · TTE in am   · Received 3L NSS in ED  Hold on further fluids, consider albumin for hypotension  Encephalopathy due to infection  Assessment & Plan  · Wife reports confusion new today  · Likely secondary to PNA, sepsis  · Avoid sedating medications  · Monitor neuro status  Type 2 diabetes mellitus, with long-term current use of insulin ([facility] [address] )  Assessment & Plan  No results found for: HGBA1C    Recent Labs     09/01/20  1715 09/01/20 2008   POCGLU 379* 257*       Blood Sugar Average: Last 72 hrs:  (P) 318       · Takes metformin 1000mg BID, Januvia 100mg daily, Lantus 28 units at hs, Novolog 6 units TID w meals  · Hold home meds- pt currently on clear liquids  · Sliding scale q6 hours   · Consider non-DKA insulin gtt if remains hyperglycemic     Hyponatremia  Assessment & Plan  · 129 on presentation to ED, 132 after corrected for glucose  · Secondary to dehydration, hyperglycemia  · Received 3L NSS in ED  · Monitor labs  · Monitor neurostatus    Hypertension  Assessment & Plan  · Previously on lisinopril, no longer taking  · Currently hypotensive secondary to # 1 and 2  · Continue to monitor BP  ·           -------------------------------------------------------------------------------------------------------------  Chief Complaint: SOB    History of Present Illness     Kris Jones is a 66 y o  male w PMH of DM, HTN, hyperlipidemia, who presented to  ED with 4 day history of feeling run down    Yesterday his wife states that he complained feeling cold and had decreased appetite  Blood sugars yesterday were per his norm in the mid 100 range  He had outpt Covid testing which was negative  Today his wife states he unsteady gait generally was not feeling continue with intermittent dry cough, and  confusion  EMS was called  Sats were 93% on air that he was placed on 6 L via nasal cannula EN route for his shortness of breath  He denies any chest pain, palpitations, productive cough, nausea/vomiting/diarrhea  Denies any sick contacts or recent trauma  No lightheadedness, dizziness, ear pain/pressure or any nasal congestion  In the ED, chest x-ray was concerning for right middle lobe pneumonia  CT of the chest was done and showed right-sided atelectasis/consolidation, atelectasis/infiltrate in left lung base  CT of the abdomen and pelvis were also completed and showed some gallbladder wall thickening  Lactic was negative  Pro BNP was 5800  UDS negative, alcohol negative  Rectal temp 105°  Sodium 129, corrected to 132 for a glucose of 304  Initial troponin was 2 85 without EKG changes, thought to be due to demand  Systolic blood pressure in the 70s and 80s at times  He received a total of 3 L of normal saline, magnesium replacement, given Azithromycin and ceftriaxone  Admitted to the critical care service  History obtained from patient, wife, ED provider  -------------------------------------------------------------------------------------------------------------  Dispo: Admit to Stepdown Level 1    Code Status: Level 1 - Full Code  --------------------------------------------------------------------------------------------------------------  Review of Systems    A 12-point, complete review of systems was reviewed and negative except as stated above     Physical Exam  Vitals signs and nursing note reviewed  Constitutional:       General: He is not in acute distress  Appearance: He is obese  He is not toxic-appearing or diaphoretic     HENT:      Head: Normocephalic and atraumatic  Nose: No rhinorrhea  Mouth/Throat:      Comments: Lips and tongue dried  Lips reddened, some edema, peeling  Dried blood from pt chewing  Picking at dried skin  Eyes:      General: No scleral icterus  Conjunctiva/sclera: Conjunctivae normal       Pupils: Pupils are equal, round, and reactive to light  Neck:      Musculoskeletal: Neck supple  Trachea: No tracheal deviation  Cardiovascular:      Rate and Rhythm: Regular rhythm  Tachycardia present  Pulses: Normal pulses  Heart sounds: Normal heart sounds  No murmur  No friction rub  Pulmonary:      Effort: Pulmonary effort is normal  No respiratory distress  Breath sounds: Normal breath sounds  No wheezing  Comments: Crackles left base  Diminished  Congested non-productive cough  Abdominal:      General: Bowel sounds are normal  There is no distension  Palpations: Abdomen is soft  Tenderness: There is no abdominal tenderness  There is no guarding  Comments: Large, protuberant, semifirm  States this is has norm  Genitourinary:     Comments: Means in place, draining dark yellow urine  Musculoskeletal: Normal range of motion  General: No swelling  Lymphadenopathy:      Cervical: No cervical adenopathy  Skin:     General: Skin is warm and dry  Capillary Refill: Capillary refill takes less than 2 seconds  Coloration: Skin is not jaundiced or pale  Neurological:      Mental Status: He is alert  Comments: Awake and alert, Answers questions appropriately  States is in hospital, can recall situation prior to presenting to ED  Knows year, birthday, president   Able to hold full coherent conversation w staff, wife and friends   Psychiatric:         Mood and Affect: Mood normal        --------------------------------------------------------------------------------------------------------------  Vitals:   Vitals:    09/01/20 2056 09/01/20 2110 09/01/20 2115 09/01/20 2150   BP: (!) 86/52 97/57 100/58    BP Location:       Pulse: (!) 114 (!) 113 (!) 113    Resp: 19 19 (!) 29    Temp: (!) 101 7 °F (38 7 °C) (!) 101 8 °F (38 8 °C) (!) 101 8 °F (38 8 °C)    TempSrc:       SpO2: 94% 93% 93%    Weight:    78 8 kg (173 lb 11 6 oz)   Height:    6' 3" (1 905 m)     Temp  Min: 101 5 °F (38 6 °C)  Max: 105 °F (40 6 °C)  IBW: 84 5 kg  Height: 6' 3" (190 5 cm)  Body mass index is 21 71 kg/m²  Laboratory and Diagnostics:  Results from last 7 days   Lab Units 09/01/20  1706   WBC Thousand/uL 9 04   HEMOGLOBIN g/dL 9 5*   HEMATOCRIT % 28 6*   PLATELETS Thousands/uL 222   MONO PCT % 8     Results from last 7 days   Lab Units 09/01/20 2010 09/01/20  1706   SODIUM mmol/L 132* 129*   POTASSIUM mmol/L 3 8 4 0   CHLORIDE mmol/L 100 96*   CO2 mmol/L 20* 20*   ANION GAP mmol/L 12 13   BUN mg/dL 32* 33*   CREATININE mg/dL 3 21* 3 33*   CALCIUM mg/dL 8 3 8 6   GLUCOSE RANDOM mg/dL 240* 304*   ALT U/L  --  34   AST U/L  --  43   ALK PHOS U/L  --  112   ALBUMIN g/dL  --  2 1*   TOTAL BILIRUBIN mg/dL  --  0 73     Results from last 7 days   Lab Units 09/01/20  1706   MAGNESIUM mg/dL 1 5*      Results from last 7 days   Lab Units 09/01/20  1706   INR  1 28*   PTT seconds 38*      Results from last 7 days   Lab Units 09/01/20 2010 09/01/20  1706   TROPONIN I ng/mL 2 47* 2 85*     Results from last 7 days   Lab Units 09/01/20  1706   LACTIC ACID mmol/L 1 5     ABG:  Results from last 7 days   Lab Units 09/01/20  1823   PH ART  7 396   PCO2 ART mm Hg 28 8*   PO2 ART mm Hg 80 3   HCO3 ART mmol/L 17 3*   BASE EXC ART mmol/L -6 7   ABG SOURCE  Radial, Left     VBG:  Results from last 7 days   Lab Units 09/01/20  1823   ABG SOURCE  Radial, Left           Micro: COVID RSV/ flu negative  Blood cultures pending  EKG: ST, no ST changes  Imaging: I have personally reviewed pertinent reports     and I have personally reviewed pertinent films in PACS     CXR left base opacity, right middle lobe opacity  CT C/A/P without contrast: 1  Bilateral predominantly upper lobe diffuse nodular interstitial changes of inflammatory infectious etiology      2   Subsegmental atelectasis /consolidation in the right upper lobe in a bronchovascular distribution      3  Posterior bibasilar dependent changes with possible atelectasis/infiltrate in the left lung base      4  Diffuse bladder wall thickening which is decompressed containing a Means balloon      5  Mediastinal lymphadenopathy as described largest in the subcarinal region measuring 1 8 cm  Historical Information   Past Medical History:   Diagnosis Date    Diabetes mellitus ([facility] [address] )     Hyperlipidemia     Hypertension      History reviewed  No pertinent surgical history  Social History   Social History     Substance and Sexual Activity   Alcohol Use Not Currently    Frequency: Never     Social History     Substance and Sexual Activity   Drug Use Never     Social History     Tobacco Use   Smoking Status Never Smoker   Smokeless Tobacco Never Used     Exercise History: Play golf routinely  Continues to work about 50 hours/ week  Family History:   History reviewed  No pertinent family history          Medications:  Current Facility-Administered Medications   Medication Dose Route Frequency    acetaminophen (TYLENOL) tablet 650 mg  650 mg Oral Q6H PRN    [START ON 9/2/2020] azithromycin (ZITHROMAX) 500 mg in sodium chloride 0 9 % 250 mL IVPB  500 mg Intravenous Q24H    [START ON 9/2/2020] cefTRIAXone (ROCEPHIN) IVPB (premix) 1,000 mg 50 mL  1,000 mg Intravenous Q24H    chlorhexidine (PERIDEX) 0 12 % oral rinse 15 mL  15 mL Swish & Spit Q12H [facility]    heparin (porcine) subcutaneous injection 5,000 Units  5,000 Units Subcutaneous Q8H [facility]    insulin lispro (HumaLOG) 100 units/mL subcutaneous injection 1-6 Units  1-6 Units Subcutaneous Q6H [facility]    levalbuterol (XOPENEX) inhalation solution 0 63 mg  0 63 mg Nebulization Q6H PRN    phenylephrine (REG-SYNEPHRINE) 50 mg (STANDARD CONCENTRATION) in sodium chloride 0 9% 250 mL   mcg/min Intravenous Titrated    [START ON 9/2/2020] pravastatin (PRAVACHOL) tablet 40 mg  40 mg Oral Daily With Dinner     Home medications:  Prior to Admission Medications   Prescriptions Last Dose Informant Patient Reported? Taking?   insulin aspart (NovoLOG) 100 units/mL injection   Yes Yes   Sig: Inject under the skin 3 (three) times a day before meals   insulin glargine (LANTUS) 100 units/mL subcutaneous injection   Yes Yes   Sig: Inject under the skin daily at bedtime   linaGLIPtin-metFORMIN HCl (JENTADUETO XR PO)   Yes Yes   Sig: Take by mouth   simvastatin (ZOCOR) 20 mg tablet   Yes Yes   Sig: Take 20 mg by mouth daily at bedtime      Facility-Administered Medications: None     Allergies: Allergies   Allergen Reactions    Iodinated Diagnostic Agents      Hives       ------------------------------------------------------------------------------------------------------------  Advance Directive and Living Will:      Power of :    POLST:    ------------------------------------------------------------------------------------------------------------  Anticipated Length of Stay is > 2 midnights    Care Time Delivered:   Upon my evaluation, this patient had a high probability of imminent or life-threatening deterioration due to sepsis, PNA, hypotension  , which required my direct attention, intervention, and personal management  I have personally provided 35 minutes (1820 to 1855) of critical care time, exclusive of procedures, teaching, family meetings, and any prior time recorded by providers other than myself  Deny Overall, CRNP        Portions of the record may have been created with voice recognition software  Occasional wrong word or "sound a like" substitutions may have occurred due to the inherent limitations of voice recognition software    Read the chart carefully and recognize, using context, where substitutions have occurred

## 2020-09-02 NOTE — ASSESSMENT & PLAN NOTE
· Elevated to 2 85 in ED without EKG changes  · Likely demand in setting of sepsis  · Continue to trend to peak  · EKG PRN

## 2020-09-02 NOTE — RESPIRATORY THERAPY NOTE
RT Ventilator Management Note  Favio Benites 66 y o  male MRN: [id number]  Unit/Bed#: -01 Encounter: [id number]      Daily Screen     No data found in the last 10 encounters  Physical Exam:   Pt status unimproved- pt highly agitated/restless/combative- attending MD notified - decision made to intubae for airway protection/impending resp failure  Pt intubated by AP without incident via [place] with 8ETT/24@ lip (CXR pending)- b/l BS, +ETCO2 change  Pt placed on APV-CMV 50/12 +8 @ 60% -pt highly asynchronous- causing erratic mechanics (sedation meds to be ordered)  BS dim/improved with scant thick brown secretions  EtCo2 22  Pt remains hypotensive  SpO2 99%  No plan to wean at this time  Repeat ABG pending when calm  Pt placed into ASV 85% +8 @ 60% with improvement with asynchrony - resp rate in high 20's but overall mechanics are more stable  Vt remains 500 range   PS ~5

## 2020-09-02 NOTE — ASSESSMENT & PLAN NOTE
· Elevated to 2 85 in ED without EKG changes  · Likely demand in setting of sepsis  · Continue to trend to peak  · EKG PRN    · Troponin up to 8 5  Asymptomatic prior to intubation  · Heparin gtt    · TTE ordered

## 2020-09-02 NOTE — PROGRESS NOTES
CM met with Mrs Kelsie Jones, baseline information was obtained  CM met with patient at the bedside,baseline information  was obtained  CM discussed the role of CM in helping the patient develop a discharge plan and assist the patient in carry out their plan  09/02/20 1201   Patient Information   Mental Status Intubated   Primary Caregiver Self   Support System Immediate family   Activities of Daily Living Prior to Admission   Functional Status Independent   Assistive Device No device needed   Living Arrangement House;Lives with someone   [address] to [address]: [address] Self       No POA/ No Advance Directive: Mrs Kelsie Jones would be the person assisting with decisions with making if need be  PCP: Cynthia Patel    Pt has a prescription plan and uses [facility] for his medications  Pt is not a   Denies SA/MH hx    Pt resides with his wife in a 1 story home, has 1 daughter Kareem  Pt works full time as a   House set up: 1 story home  Functional level PTA: very Independent prior to admission   DME use:  none  Prior use of Home Care or STR:none  Transportation:+ , family available for transport prn   Community Resources: None    Reviewed the role of CM with Mrs Kelsie Jones, + support provided  Will continue to follow for medical management and needs upon dc

## 2020-09-02 NOTE — ASSESSMENT & PLAN NOTE
· Baseline Cr 1 0 as of 05/2020  · Creat in ED 3 33  Trending down appropriately  · Means placed in ED  · Received 3L NS in ED  · Trend labs  · Avoid nephrotoxins, hypotension  · Continue pressors  · Goal MAP >65  · Urine studies ordered     · Nephrology consult - pt may need CRRT  · 9/2 Lasix given

## 2020-09-02 NOTE — ASSESSMENT & PLAN NOTE
· 129 on presentation to ED, 132 after corrected for glucose  · Secondary to dehydration, hyperglycemia     · Received 3L NSS in ED  · Monitor labs  · Monitor neurostatus

## 2020-09-02 NOTE — ASSESSMENT & PLAN NOTE
· RML pna concern on CXR  · Received azithromycin , ceftriaxone in ED  · Will add strept/ legionella to urine studies  · Continue ABX  · O2 via NC PRN, maintain sat > 90%  · Respiratory protocol, airway clearance, IS  · Xopenex tx PRN

## 2020-09-02 NOTE — ASSESSMENT & PLAN NOTE
· RML pna concern on CXR  · Received azithromycin , ceftriaxone in ED  · Will add strept/ legionella to urine studies  · Continue ABX  · O2 via NC PRN, maintain sat > 90%  · Respiratory protocol, airway clearance, IS  · Xopenex tx PRN  · Febrile to 105 2- abx broadened to Vanc and Cefepime  · Intubated overnight on 9/2  · ASV 85%, FOI2 60%

## 2020-09-02 NOTE — ASSESSMENT & PLAN NOTE
· Wife reports confusion new today  · Likely secondary to PNA, sepsis  · Avoid sedating medications  · Monitor neuro status

## 2020-09-02 NOTE — PROGRESS NOTES
Pt presently intubated/sedated, propofol running at 18 9 ml/h, providing 499 kcal at current rate  Noting use of vasopressin, hypotension  As medically appropriate, recommend initiating trophic feeding of Jevity 1 2 at 10 ml/h  Advance as medically appropriate 10 ml/h q 8h to goal rate of 55 ml/h  Addition of prosource daily  TF + propofol + prosource daily will provide 2144 kcal, 88g PRO, 1065 ml free water, 224 g CHO, 24 g fiber  Water flush per MD given hypotension  Will monitor BS levels  If unable to control BS levels with insulin, can adjust to glucerna formula  Will follow up

## 2020-09-02 NOTE — ASSESSMENT & PLAN NOTE
· CT chest - consolidation right upper lobe, infiltrate left lower lobe, diffuse nodular interstitial inflammatory changes  · See septic shock plan above  · See acute hypoxic respiratory plan above

## 2020-09-02 NOTE — ASSESSMENT & PLAN NOTE
· No history of heart failure  · Strict I&O  · Daily weight  · TTE in am   · Received 3L NSS in ED  Hold on further fluids, consider albumin for hypotension     · Received 750ml albumin overnight

## 2020-09-02 NOTE — UTILIZATION REVIEW
Initial Clinical Review    Admission: Date/Time/Statement:   Admission Orders (From admission, onward)     Ordered        09/01/20 1842  Inpatient Admission  Once                   Orders Placed This Encounter   Procedures    Inpatient Admission     Standing Status:   Standing     Number of Occurrences:   1     Order Specific Question:   Admitting Physician     Answer:   Sandy Martinez [[id number]]     Order Specific Question:   Level of Care     Answer:   Level 1 Stepdown [13]     Order Specific Question:   Estimated length of stay     Answer:   More than 2 Midnights     Order Specific Question:   Certification     Answer:   I certify that inpatient services are medically necessary for this patient for a duration of greater than two midnights  See H&P and MD Progress Notes for additional information about the patient's course of treatment  ED Arrival Information     Expected Arrival Acuity Means of Arrival Escorted By Service Admission Type    - 9/1/2020 16:56 [facility], [facility]  Critical Care/ICU Emergency    Arrival Complaint    confusion        Chief Complaint   Patient presents with    Altered Mental Status     per ems family called for altered mental status and increase in SOB  pt tachypneic with room air pulse ox 92%  pt disoriented to situation  abdomin distended, denies pain, nausea     Assessment/Plan: 66year old male to the ED from home via EMS with change in mental status and shortness of breath  H/O htn, DM Type II  4 days prior he started with fatigue, sporadic dry cough  Tested day prior and neg for COVID  Admitted to ICU critical care for  Septic shock  Upon EMS arrival, pulse ox 93%  He arrives with temp of 105, tachycardic, tachypneic, hypotensive and hypoxic  Given IVfluid bolus  Blood cultures pending  IV abx started  Has elevated BNP so unable to complete full fluid bolus per weight  Crackles in lungs  BP 72/48    Treat for pneumonia, possible UTI  Head CT normal  CT C/A/P shows:  Bilateral predominantly upper lobe diffuse nodular interstitial changes of inflammatory infectious etiology  Procal elevated  Creatinine 3 33  Albumin, neosynephrine as needed for bp  Place on NC oxygen  Confusion is new today  Continue to monitor neuro status  Troponin elevated likely due to sepsis  Continue to trend  His lips and tongue are dried  Awake and alert, able to answer questions appropriately, holding conversation with family, friends  PROCEDURE NOTE:  9/2/20  Arterial line placed right radial    9/2 UPdate:  Repeat COVID negative  Intubated for increased WOB, tachypnea agitation  Heparin drip started for NSTEMI  Troponin up to 8 5  Check ECHO  Fentanyl and propofol drip  Spiked a temp of 105 3 overnight despite tylenol  Placed on cooling blanket  ON norepi, vasopressin drip       ED Triage Vitals   Temperature Pulse Respirations Blood Pressure SpO2   09/01/20 1708 09/01/20 1702 09/01/20 1702 09/01/20 1702 09/01/20 1702   (!) 105 °F (40 6 °C) (!) 126 (!) 30 98/57 92 %      Temp Source Heart Rate Source Patient Position - Orthostatic VS BP Location FiO2 (%)   09/01/20 1708 09/01/20 1702 09/01/20 1702 09/01/20 1702 09/02/20 0100   Rectal Monitor Sitting Left arm 55      Pain Score       09/01/20 1719       Med Not Given for Pain - for MAR use only          Wt Readings from Last 1 Encounters:   09/01/20 78 8 kg (173 lb 11 6 oz)     Additional Vital Signs:  Date/Time   Temp   Pulse   Resp   BP   MAP (mmHg)   Arterial Line BP   MAP   SpO2   FiO2 (%)   Calculated FIO2 (%) - Nasal Cannula   O2 Flow Rate (L/min)   Nasal Cannula O2 Flow Rate (L/min)   O2 Device   Patient Position - Orthostatic VS   CVP (mean)    09/02/20 1400   98 4 °F (36 9 °C)   87   20         101/58   73 mmHg   94 %               Ventilator   Lying   23 mmHg    09/02/20 1100   98 4 °F (36 9 °C)   90   17         97/57   71 mmHg   95 %               Ventilator   Lying   21 mmHg 09/02/20 1030   98 5 °F (36 9 °C)   91   19   98/56   71   103/61   74 mmHg   96 %               Ventilator   Lying   21 mmHg    09/02/20 1000   98 8 °F (37 1 °C)   93   19         96/54   69 mmHg   95 %               Ventilator   Lying   21 mmHg    09/02/20 0800   98 3 °F (36 8 °C)   91   23Abnormal           80/50   61 mmHg   93 %               Ventilator   Lying       09/02/20 0730   98 °F (36 7 °C)   90   26Abnormal     79/47Abnormal     59   82/50   62 mmHg   93 %               Ventilator   Lying       09/02/20 0515      71   28Abnormal           71/48   56 mmHg   97 %                         09/02/20 0500   101 9 °F (38 8 °C)Abnormal     107Abnormal     27Abnormal           75/50   59 mmHg   96 %                         09/02/20 0445      64   30Abnormal           76/49   59 mmHg   74 %Abnormal                           09/02/20 0430   103 1 °F (39 5 °C)Abnormal     107Abnormal     33Abnormal     84/47Abnormal     62   84/52   64 mmHg   91 %                  Lying       09/02/20 0415      132Abnormal     32Abnormal     91/53   66   93/52   67 mmHg   85 %Abnormal                           09/02/20 0400   104 9 °F (40 5 °C)Abnormal     85   44Abnormal     99/54   70   105/56   75 mmHg   32 %Abnormal                           09/02/20 0300   105 3 °F (40 7 °C)Abnormal     135Abnormal     44Abnormal           101/61   74 mmHg   93 %                         09/01/20 2345      133Abnormal     39Abnormal                 92 %                         09/01/20 2215      119Abnormal     30Abnormal     82/52Abnormal     62         93 %                         09/01/20 2110   101 8 °F (38 8 °C)Abnormal     113Abnormal     19   97/57   65         93 %      28      2 L/min   Nasal cannula          09/01/20 2056   101 7 °F (38 7 °C)Abnormal     114Abnormal     19   86/52Abnormal     63         94 %      28      2 L/min   Nasal cannula          09/01/20 2030   101 5 °F (38 6 °C)Abnormal     114Abnormal     24Abnormal     81/53Abnormal     62         93 %      28      2 L/min   Nasal cannula          09/01/20 1930   101 8 °F (38 8 °C)Abnormal     115Abnormal     28Abnormal     71/46Abnormal     54         94 %      28      2 L/min   Nasal cannula          09/01/20 1915   102 °F (38 9 °C)Abnormal     118Abnormal     31Abnormal     80/53Abnormal     62         93 %      28      2 L/min   Nasal cannula          09/01/20 1900   102 2 °F (39 °C)Abnormal     114Abnormal     31Abnormal     83/51Abnormal     61         93 %      28      2 L/min   Nasal cannula          09/01/20 1845   102 4 °F (39 1 °C)Abnormal     116Abnormal     33Abnormal     72/48Abnormal     55         92 %               Nasal cannula          09/01/20 1841   102 6 °F (39 2 °C)Abnormal     119Abnormal     30Abnormal     83/50Abnormal     57         91 %      28      2 L/min   Nasal cannula          09/01/20 1829   102 9 °F (39 4 °C)Abnormal     116Abnormal     30Abnormal     108/56   59         92 %      28      2 L/min   Nasal cannula   Lying       09/01/20 1730      122Abnormal     33Abnormal     103/54            96 %      28      2 L/min   Nasal cannula          09/01/20 1708   105 °F (40 6 °C)Abnormal                                                09/01/20 1702      126Abnormal     30Abnormal     98/57            92 %               None (Room air)   Sitting          Weights (last 14 days)       Pertinent Labs/Diagnostic Test Results:   9/2 CXR @5442: Interval advancement of the endotracheal catheter with its tip now residing 4 cm from the supriya  Left IJ central venous catheter identified with its tip in the superior vena cava   No pneumothorax  Increasing interstitial and alveolar edema  9/2 CXR  0558:  Interval advancement of the endotracheal catheter with its tip now residing 4 cm from the supriya  Left IJ central venous catheter identified with its tip in the superior vena cava   No pneumothorax  Increasing interstitial and alveolar edema  9/1 CT head: No acute intracranial abnormality   Minor microangiopathic changes  9/1 CT Cspine: Mild cervical degenerative change with no acute osseous abnormality  Innumerable small pulmonary nodules noted within the lung apices with mild right apical scarring   See separate CT chest, abdomen and pelvis report performed today  9/1 CT C/A/P: Bilateral predominantly upper lobe diffuse nodular interstitial changes of inflammatory infectious etiology     Subsegmental atelectasis /consolidation in the right upper lobe in a bronchovascular distribution      Posterior bibasilar dependent changes with possible atelectasis/infiltrate in the left lung base     Diffuse bladder wall thickening which is decompressed containing a Means balloon  5  Mediastinal lymphadenopathy as described largest in the subcarinal region measuring 1 8 cm    9/1 CXR: Left basilar opacity could represent pericardial fat pad, effusion, atelectasis and/or pneumonia  Right mid lung opacity could represent pneumonia, follow-up with unenhanced chest CT recommended  9/1 EKG:  Interpretation:     Interpretation: abnormal    Rate:     ECG rate:  128    ECG rate assessment: tachycardic    Rhythm:     Rhythm: sinus tachycardia    Ectopy:     Ectopy: none    Conduction:     Conduction: normal    ST segments:     ST segments:  Non-specific  T waves:     T waves: non-specific    Results from last 7 days   Lab Units 09/01/20  1706   SARS-COV-2  Negative     Results from last 7 days   Lab Units 09/02/20  0449 09/01/20  1706   WBC Thousand/uL 11 58* 9 04   HEMOGLOBIN g/dL 9 3* 9 5*   HEMATOCRIT % 28 2* 28 6*   PLATELETS Thousands/uL 279 222         Results from last 7 days   Lab Units 09/02/20  1034 09/02/20  0915 09/02/20  0449 09/02/20  0004 09/01/20 2010 09/01/20  1706 SODIUM mmol/L  --  133* 133* 134* 132* 129*   POTASSIUM mmol/L  --  4 2 3 8 3 9 3 8 4 0   CHLORIDE mmol/L  --  100 100 101 100 96*   CO2 mmol/L  --  20* 17* 19* 20* 20*   ANION GAP mmol/L  --  13 16* 14* 12 13   BUN mg/dL  --  34* 34* 32* 32* 33*   CREATININE mg/dL  --  3 56* 3 61* 3 01* 3 21* 3 33*   EGFR ml/min/1 73sq m  --  16 15 19 18 17   CALCIUM mg/dL  --  8 1* 7 9* 8 2* 8 3 8 6   CALCIUM, IONIZED mmol/L 1 10*  --   --   --   --   --    MAGNESIUM mg/dL  --   --  2 2 1 7  --  1 5*   PHOSPHORUS mg/dL  --   --  2 7  --   --   --      Results from last 7 days   Lab Units 09/02/20  0449 09/01/20  1706   AST U/L 81* 43   ALT U/L 36 34   ALK PHOS U/L 100 112   TOTAL PROTEIN g/dL 6 4 6 9   ALBUMIN g/dL 2 1* 2 1*   TOTAL BILIRUBIN mg/dL 0 71 0 73     Results from last 7 days   Lab Units 09/02/20  0605 09/01/20 2008 09/01/20  1715   POC GLUCOSE mg/dl 249* 257* 379*     Results from last 7 days   Lab Units 09/02/20  0915 09/02/20  0449 09/02/20 0004 09/01/20 2010 09/01/20  1706   GLUCOSE RANDOM mg/dL 245* 253* 208* 240* 304*             No results found for: BETA-HYDROXYBUTYRATE   Results from last 7 days   Lab Units 09/02/20  0734 09/02/20  0219 09/01/20  1823   PH ART  7 300* 7 417 7  396   PCO2 ART mm Hg 33 1* 27 1* 28 8*   PO2 ART mm Hg 64 6* 75 5 80 3   HCO3 ART mmol/L 15 9* 17 1* 17 3*   BASE EXC ART mmol/L -9 5 -6 2 -6 7   O2 CONTENT ART mL/dL 13 3* 95 4* 11 8*   O2 HGB, ARTERIAL % 90 7* 94 9 95 5   ABG SOURCE  Line, Arterial Line, Arterial Radial, Left   NON VENT TYPE HFNC   --  HFNC Flow  --    HFNC FLOW LPM   --  55  --      Results from last 7 days   Lab Units 09/02/20  0915   PH GOLD  7 261*   PCO2 GOLD mm Hg 37 7*   PO2 GOLD mm Hg 31 5*   HCO3 GOLD mmol/L 16 6*   BASE EXC GOLD mmol/L -9 7   O2 CONTENT GOLD ml/dL 8 1   O2 HGB, VENOUS % 55 7*         Results from last 7 days   Lab Units 09/01/20  1706   CK TOTAL U/L 238   CK MB INDEX % 3 6*   CK MB ng/mL 8 6*     Results from last 7 days   Lab Units 09/02/20  1158 09/02/20  0734 09/02/20  0449 09/02/20  0004 09/01/20 2010 09/01/20  1706   TROPONIN I ng/mL 17 64* 10 54* 8 25* 4 55* 2 47* 2 85*         Results from last 7 days   Lab Units 09/02/20  0818 09/01/20  1706   PROTIME seconds  --  15 7*   INR   --  1 28*   PTT seconds 70* 38*     Results from last 7 days   Lab Units 09/02/20  0734   TSH 3RD GENERATON uIU/mL 0 760     Results from last 7 days   Lab Units 09/02/20  0449 09/01/20  1706   PROCALCITONIN ng/ml 6 81* 1 40*     Results from last 7 days   Lab Units 09/02/20  0915 09/01/20  1706   LACTIC ACID mmol/L 1 7 1 5       Results from last 7 days   Lab Units 09/01/20  1706   NT-PRO BNP pg/mL 5,830*     Results from last 7 days   Lab Units 09/01/20  1731   FERRITIN ng/mL 958*         Results from last 7 days   Lab Units 09/01/20  1706   LIPASE u/L 245     Results from last 7 days   Lab Units 09/01/20  1706   CRP mg/L 233 6*         Results from last 7 days   Lab Units 09/01/20  1732   CLARITY UA  Slightly Cloudy   COLOR UA  Yellow   SPEC GRAV UA  >=1 030   PH UA  5 0   GLUCOSE UA mg/dl Negative   KETONES UA mg/dl Trace*   BLOOD UA  Negative   PROTEIN UA mg/dl 30 (1+)*   NITRITE UA  Negative   BILIRUBIN UA  Moderate*   UROBILINOGEN UA E U /dl 0 2   LEUKOCYTES UA  Negative   WBC UA /hpf 0-5   RBC UA /hpf None Seen   BACTERIA UA /hpf Moderate*   EPITHELIAL CELLS WET PREP /hpf None Seen   SODIUM UR  49   CREATININE UR mg/dL 282 0  282 0  282 0   PROTEIN UR mg/dL 146   PROT/CREAT RATIO UR  0 52*     Results from last 7 days   Lab Units 09/01/20  2238 09/01/20  1731   STREP PNEUMONIAE ANTIGEN, URINE   --  Negative   LEGIONELLA URINARY ANTIGEN  Negative  --    INFLUENZA A PCR   --  None Detected   INFLUENZA B PCR   --  None Detected   RSV PCR   --  None Detected         Results from last 7 days   Lab Units 09/01/20  1731   AMPH/METH  Negative   BARBITURATE UR  Negative   BENZODIAZEPINE UR  Negative   COCAINE UR  Negative   METHADONE URINE  Negative   OPIATE UR Negative   PCP UR  Negative   THC UR  Negative     Results from last 7 days   Lab Units 09/01/20  1731   ETHANOL LVL mg/dL <3     Results from last 7 days   Lab Units 09/01/20  1706   BLOOD CULTURE  Received in Microbiology Lab  Culture in Progress  Received in Microbiology Lab  Culture in Progress       Results from last 7 days   Lab Units 09/01/20  1706   TOTAL COUNTED  100     ED Treatment:   Medication Administration from 09/01/2020 1655 to 09/01/2020 2147       Date/Time Order Dose Route Action     09/01/2020 1733 cefTRIAXone (ROCEPHIN) IVPB (premix) 2,000 mg 50 mL 2,000 mg Intravenous New Bag     09/01/2020 1706 sodium chloride 0 9 % bolus 1,000 mL 1,000 mL Intravenous New Bag     09/01/2020 1716 sodium chloride 0 9 % bolus 1,000 mL 1,000 mL Intravenous New Bag     09/01/2020 1803 sodium chloride 0 9 % bolus 1,000 mL 1,000 mL Intravenous New Bag     09/01/2020 1719 acetaminophen (TYLENOL) tablet 650 mg 650 mg Oral Given     09/01/2020 1736 azithromycin (ZITHROMAX) 500 mg in sodium chloride 0 9 % 250 mL IVPB 500 mg Intravenous New Bag     09/01/2020 1848 magnesium sulfate IVPB (premix) SOLN 1 g 1 g Intravenous New Bag     09/01/2020 2036 insulin lispro (HumaLOG) 100 units/mL subcutaneous injection 1-6 Units 3 Units Subcutaneous Given     09/01/2020 2033 phenylephrine (REG-SYNEPHRINE) 50 mg (STANDARD CONCENTRATION) in sodium chloride 0 9% 250 mL 25 mcg/min Intravenous New Bag        Past Medical History:   Diagnosis Date    [facility]     Hyperlipidemia     Hypertension        Admitting Diagnosis: Altered mental status [R41 82]  Pneumonia [J18 9]  Elevated troponin [R79 89]  Sepsis ([facility] [address] ) [A41 9]  Altered mental status, unspecified altered mental status type [R41 82]  Age/Sex: 66 y o  male  Admission Orders:  Deep laryngeal suction  Elevate HOB  Heparin drip with PTT  SCDS  I/O  Neuro check every 4 hours  CMP, CBC, ionized calcium, mag, phos, troponin every 3 hours  PTT, BMP,lyme pcr  Scheduled Medications:  cefepime, 1,000 mg, Intravenous, Q12H  chlorhexidine, 15 mL, Swish & Spit, Q12H [address]  insulin lispro, 1-6 Units, Subcutaneous, Q6H JAVID  ipratropium, 0 5 mg, Nebulization, 4x Daily  levalbuterol, 1 25 mg, Nebulization, 4x Daily  metroNIDAZOLE, 500 mg, Intravenous, Q8H  [START ON 9/3/2020] vancomycin, 12 5 mg/kg, Intravenous, Q24H      Continuous IV Infusions:  fentaNYL, 50 mcg/hr, Intravenous, Continuous  heparin (porcine), 3-20 Units/kg/hr (Order-Specific), Intravenous, Titrated  norepinephrine, 1-30 mcg/min, Intravenous, Titrated  phenylephine,  mcg/min, Intravenous, Titrated  propofol, 5-50 mcg/kg/min, Intravenous, Titrated  vasopressin (PITRESSIN) in 0 9 % sodium chloride 100 mL, 0 04 Units/min, Intravenous, Continuous      PRN Meds:  acetaminophen, 650 mg, Per NG Tube, Q4H PRN  fentanyl citrate (PF), 100 mcg, Intravenous, Q1H PRN  heparin (porcine), 2,000 Units, Intravenous, Q1H PRN  heparin (porcine), 4,000 Units, Intravenous, Q1H PRN  levalbuterol, 0 63 mg, Nebulization, Q6H PRN        IP CONSULT TO CASE MANAGEMENT  IP CONSULT TO CASE MANAGEMENT  IP CONSULT TO PHARMACY  IP CONSULT TO NEPHROLOGY    Network Utilization Review Department  [email or web address]  ATTENTION: Please call with any questions or concerns to [phone] and carefully listen to the prompts so that you are directed to the right person  All voicemails are confidential   Clare Willard all requests for admission clinical reviews, approved or denied determinations and any other requests to dedicated fax number below belonging to the campus where the patient is receiving treatment   List of dedicated fax numbers for the Facilities:  FACILITY NAME UR FAX NUMBER   ADMISSION DENIALS (Administrative/Medical Necessity) [phone]   [address] (Maternity/NICU/Pediatrics) [phone]   [facility] [address] [facility] [address] [facility] [phone]   [address] [facility] [address] [facility] [phone]   [employer] [address] [phone] [address] [facility], [facility]  [phone]   [address] [place], [place] [address] [address] [phone]

## 2020-09-02 NOTE — ASSESSMENT & PLAN NOTE
· No history of heart failure  · Strict I&O  · Daily weight  · TTE in am   · Received 3L NSS in ED  Hold on further fluids, consider albumin for hypotension

## 2020-09-02 NOTE — ASSESSMENT & PLAN NOTE
No results found for: HGBA1C    Recent Labs     09/01/20  1715 09/01/20 2008 09/02/20  0605   POCGLU 379* 257* 249*       Blood Sugar Average: Last 72 hrs:  (P) 295       · Takes metformin 1000mg BID, Januvia 100mg daily, Lantus 28 units at hs, Novolog 6 units TID w meals  · Hold home meds- pt currently on clear liquids  · Sliding scale q6 hours   · Consider non-DKA insulin gtt if remains hyperglycemic

## 2020-09-02 NOTE — CONSULTS
Consultation - Nephrology   Jonatan Quiñonez 66 y o  male MRN: [id number]  Unit/Bed#: -01 Encounter: [id number]      A/P:    CHERISE (acute kidney injury)   Likely ATN from severe sepsis with shock  Unlikely glomerulonephritis  urine negative for hematuria or pyuria  Unknown baseline, but patient and wife deny any history of kidney disease, critical care team received creatinine level from May this year from the primary care's office which was 1 mg/dL  Creat in ED 3 33, now 3 6 mildly improved now after her IV albumin infusion and blood pressure improvement it came down to 2 93 with mild increase in urine output 800 cc which is improved from 160 cc yesterday  Received 3L NS in ED    Plan:  Trend labs  Avoid nephrotoxins, hypotension, IV contrast agents  Adjust renally excreted medication as per the GFR less than 10  MAP  maintain around 65 mm of mercury  Continue pressors as needed and  Given 750ml albumin overnight, once blood pressure is better tolerated would prefer diuretics  Patient hypoalbuminemic severely hypotensive will benefit from further albumin infusion with diuretics  Urine studies  noted , Urine does not show any hematuria no pyuria just 1+ protein in the setting of sepsis that is unremarkable  Unlikely to have any GN   Most likely ATN from severe sepsis  Imaging of the kidney on CAT scan was unremarkable  Clinically volume overloaded, with severe dilated heart failure,  Anion gap metabolic acidosis most likely from the sepsis    He renal function does not improve with albumin infusion, blood pressure improvement and diuretics--- will suggest inotropics agents milrinone for increasing ejection fraction in the setting of severe dilated Takotsubo cardiomyopathy  No need for acute renal replacement therapy at this point but it might be necessary near future if ATN does not respond to the current management plan    Wife and daughter at the bedside, they clearly mentioning to me that temporary dialysis can be acceptable but he never wanted to be on  permanent renal replacement therapy or life-sustaining measures  Septic shock   Workup and empiric antibiotic as per the primary team   Most likely pneumonia on CT scan but on atypical infection isn't also been working out in the setting of high ferritin  Continue empiric antibiotic and adjust as contrast sensitivity report involves  SARS neg  NSTEMI (non-ST elevated myocardial infarction)   Troponin elevation peaked at 34 which most likely from the severe sepsis with cardiomyopathy currently on heparin drip , need and inotropic agents if hypertension does not improve for better renal perfusion and renal function to be improved        Type 2 diabetes mellitus, with long-term current use of insulin   Blood sugar is running high will defer insulin management by the critical care team   Avoid metformin  Hyponatremia    129 on presentation to ED, 132 after corrected for glucose  Secondary to dehydration, hyperglycemia  Severe d septic shock with volume depletion  Received 3L NSS in ED  Improving slowly  Hopefully with diuresis it will continue to improve if renal function improves      Anemia    Unknown baseline, Stable at 9 since admission  Continue to trend labs  Iron Level no but ferritin and CRP is I most likely from the bone marrow suppression from acute sepsis with reactant protein is high  Monitor for any signs of bleeding  Consider further anemia work up       Thank you for allowing us to participate in the care of your patient  Please feel free to contact us regarding the care of this patient, or any other questions/concerns that may be applicable      Patient Active Problem List   Diagnosis    Septic shock ([facility] [address] )    Community acquired pneumonia    Hypertension    CHERISE (acute kidney injury) ([facility] [address] )    Elevated brain natriuretic peptide (BNP) level    [facility]    Type 2 diabetes mellitus, with long-term current use of insulin (HCC)    Encephalopathy due to infection    Hyponatremia    Acute respiratory failure with hypoxia (HCC)    Anemia       History of Present Illness   Physician Requesting Consult: Michael Alva DO  Reason for Consult / Principal Problem: Sam with sepsis  Hx and PE limited by:   HPI: Erin Chen is a 66y o  year old male who  admitted for severe sepsis, PNA  Patient currently intubated sedated on multiple pressors history was taken mostly from the clinically free air team as well as from wife and daughter at the bedside  As per wife's statement the patient very active only history of insulin dependent diabetes mellitus  He has active  last worked on Thursday Saturday he started feeling sick and Sunday cut a row so Monday he was brought in because of altered mental status  In the ICU patient was hyper thyroid make 80 and currently on [place]  He is very hypotensive he received several L of IV fluids without any good response now on 3 different pressors blood pressure responding little better than before  But remained oliguric creatinine upon presentation was 3 33  Wife not aware of any prior kidney failure  According to the primary care team the patient's baseline creatinine is 1 which was done in May  Suspected days elevated creatinine is from septic shock with severe hypertension  Also noted by the critical care team the 2D echo did show only 25% ejection fraction and severely dilated cardiomyopathy likely Takotsubo  Elevated troponin 34 on heparin drip  Empiric antibiotic broad-spectrum started with suspicion of pneumonia although CT scan of the chest did not show impressive pneumonia but muscle atelectasis  Patient received 700 mg of high% albumin  Currently the patient is maintaining better mean artery pressure and pressures has been weaned with urine output improvement noted from 160-800 the daytime    Trial for respiratory support with withdrawal of sedation is being weaned on without much success  Anemia noted with high ferritin CRP but LFTs are normal   [id number], anemia noted with low iron high ferritin level  He will ice his workup is ongoing with LDH elevation  Nephrologist being here her sister side spending but given thrombocytopenia absent unlikely renal failure is secondary to HE with HUS TTP  Urine does not show any hematuria no pyuria just 1+ protein in the setting of sepsis that is unremarkable  Unlikely to have any GN   Will not pursue any surgical workup  Nephrology was consulted for Lamin I in setting of severe sepsis  Wife and daughter at the bedside day clearly mentioning to me that temporary dialysis can be acceptable but he never wanted to be on for permanent renal replacement therapy or life-sustaining measures  Although he is a  he has not traveled anywhere outside [facility] neighborhood  History obtained from chart review and unobtainable from patient due to mental status    Review of Systems - Negative except as mentioned above in HPI, more specifics as mentioned below  Review of Systems - Negative except HPI    Historical Information   Past Medical History:   Diagnosis Date    Diabetes mellitus ([facility] [address] )     Hyperlipidemia     Hypertension      History reviewed  No pertinent surgical history  Social History   Social History     Substance and Sexual Activity   Alcohol Use Not Currently    Frequency: Never     Social History     Substance and Sexual Activity   Drug Use Never     Social History     Tobacco Use   Smoking Status Never Smoker   Smokeless Tobacco Never Used     History reviewed  No pertinent family history      Meds/Allergies   all current active meds have been reviewed, current meds:   Current Facility-Administered Medications   Medication Dose Route Frequency    acetaminophen (TYLENOL) oral suspension 650 mg  650 mg Per NG Tube Q4H PRN    cefepime (MAXIPIME) IVPB (premix) 1,000 mg 50 mL  1,000 mg Intravenous Q12H    chlorhexidine (PERIDEX) 0 12 % oral rinse 15 mL  15 mL Swish & Spit Q12H [facility]    fentaNYL 1000 mcg in sodium chloride 0 9% 100mL infusion  50 mcg/hr Intravenous Continuous    fentanyl citrate (PF) 100 MCG/2ML 100 mcg  100 mcg Intravenous Q1H PRN    heparin (porcine) 25,000 units in 0 45% NaCl 250 mL infusion (premix)  3-20 Units/kg/hr (Order-Specific) Intravenous Titrated    heparin (porcine) injection 2,000 Units  2,000 Units Intravenous Q1H PRN    heparin (porcine) injection 4,000 Units  4,000 Units Intravenous Q1H PRN    insulin lispro (HumaLOG) 100 units/mL subcutaneous injection 1-6 Units  1-6 Units Subcutaneous Q6H [facility]    ipratropium (ATROVENT) 0 02 % inhalation solution 0 5 mg  0 5 mg Nebulization 4x Daily    levalbuterol (XOPENEX) inhalation solution 0 63 mg  0 63 mg Nebulization Q6H PRN    levalbuterol (XOPENEX) inhalation solution 1 25 mg  1 25 mg Nebulization 4x Daily    metroNIDAZOLE (FLAGYL) IVPB (premix) 500 mg 100 mL  500 mg Intravenous Q8H    norepinephrine (LEVOPHED) 4 mg (STANDARD CONCENTRATION) IV in sodium chloride 0 9% 250 mL  1-30 mcg/min Intravenous Titrated    phenylephrine (REG-SYNEPHRINE) 50 mg (STANDARD CONCENTRATION) in sodium chloride 0 9% 250 mL   mcg/min Intravenous Titrated    propofol (DIPRIVAN) 1000 mg in 100 mL infusion (premix)  5-50 mcg/kg/min Intravenous Titrated    [START ON 9/3/2020] vancomycin (VANCOCIN) IVPB (premix) 1,000 mg 200 mL  12 5 mg/kg Intravenous Q24H    vasopressin (PITRESSIN) 20 Units in sodium chloride 0 9 % 100 mL infusion  0 04 Units/min Intravenous Continuous    and PTA meds:    Medications Prior to Admission   Medication    insulin aspart (NovoLOG) 100 units/mL injection    insulin glargine (LANTUS) 100 units/mL subcutaneous injection    metFORMIN (GLUCOPHAGE) 1000 MG tablet    simvastatin (ZOCOR) 20 mg tablet    sitaGLIPtin (JANUVIA) 100 mg tablet Allergies   Allergen Reactions    Iodinated Diagnostic Agents Hives       Objective     Intake/Output Summary (Last 24 hours) at 9/2/2020 1015  Last data filed at 9/2/2020 1001  Gross per 24 hour   Intake 5317 62 ml   Output 500 ml   Net 4817 62 ml       Invasive Devices:   Urethral Catheter Temperature probe 16 Fr  (Active)   Reasons to continue Urinary Catheter  Accurate I&O assessment in critically ill patients (48 hr  max) 09/01/20 2200   Site Assessment Clean;Skin intact 09/01/20 2200   Collection Container Standard drainage bag 09/01/20 2200   Securement Method Securing device (Describe) 09/01/20 2200   Output (mL) 20 mL 09/02/20 1001       Physical Exam      I/O last 3 completed shifts: In: 6394 [P O :240; I V :481; IV Piggyback:4500]  Out: 470 [Urine:160; Emesis/NG output:300; Blood:10]    Vitals:    09/02/20 1000   BP:    Pulse: 93   Resp: 19   Temp: 98 8 °F (37 1 °C)   SpO2: 95%       Gen: in NAD, intubated sedated on mechanical ventilation  HEENT: no sclerous icterus, MMM, neck supple left IJ triple-lumen catheter noted ETT in please at midline  CV: +S1/S2, RRR  Lungs:  Wheezes bilaterally with decreased air entry bilaterally  Abd: +BS, soft NT/ND, bees  Ext: all four extremities are warm , the TM pads in place is  Skin: no rashes noted  Neuro Sedated    Current Weight: Weight - Scale: 78 8 kg (173 lb 11 6 oz)  First Weight: Weight - Scale: 101 kg (223 lb 12 3 oz)    Lab Results:  I have personally reviewed pertinent labs      CBC:   Lab Results   Component Value Date    WBC 11 58 (H) 09/02/2020    HGB 9 3 (L) 09/02/2020    HCT 28 2 (L) 09/02/2020    MCV 86 09/02/2020     09/02/2020    MCH 28 3 09/02/2020    MCHC 33 0 09/02/2020    RDW 13 5 09/02/2020    MPV 10 2 09/02/2020    NRBC 0 09/02/2020     CMP:   Lab Results   Component Value Date    K 4 2 09/02/2020     09/02/2020    CO2 20 (L) 09/02/2020    BUN 34 (H) 09/02/2020    CREATININE 3 56 (H) 09/02/2020    CALCIUM 8 1 (L) 09/02/2020    AST 81 (H) 09/02/2020    ALT 36 09/02/2020    ALKPHOS 100 09/02/2020    EGFR 16 09/02/2020     Phosphorus:   Lab Results   Component Value Date    PHOS 2 7 09/02/2020     Magnesium:   Lab Results   Component Value Date    MG 2 2 09/02/2020     Urinalysis:   Lab Results   Component Value Date    COLORU Yellow 09/01/2020    CLARITYU Slightly Cloudy 09/01/2020    SPECGRAV >=1 030 09/01/2020    PHUR 5 0 09/01/2020    LEUKOCYTESUR Negative 09/01/2020    NITRITE Negative 09/01/2020    GLUCOSEU Negative 09/01/2020    KETONESU Trace (A) 09/01/2020    BILIRUBINUR Moderate (A) 09/01/2020    BLOODU Negative 09/01/2020     Ionized Calcium: No results found for: CAION  Coagulation:   Lab Results   Component Value Date    INR 1 28 (H) 09/01/2020     Troponin:   Lab Results   Component Value Date    TROPONINI 10 54 (H) 09/02/2020     ABG:   Lab Results   Component Value Date    PHART 7 300 (L) 09/02/2020    HXZ8UFB 33 1 (L) 09/02/2020    PO2ART 64 6 (L) 09/02/2020    OOW4UXR 15 9 (L) 09/02/2020    BEART -9 5 09/02/2020    SOURCE Line, Arterial 09/02/2020       Results from last 7 days   Lab Units 09/02/20  0915 09/02/20  0449 09/02/20  0004  09/01/20  1706   POTASSIUM mmol/L 4 2 3 8 3 9   < > 4 0   CHLORIDE mmol/L 100 100 101   < > 96*   CO2 mmol/L 20* 17* 19*   < > 20*   BUN mg/dL 34* 34* 32*   < > 33*   CREATININE mg/dL 3 56* 3 61* 3 01*   < > 3 33*   CALCIUM mg/dL 8 1* 7 9* 8 2*   < > 8 6   ALK PHOS U/L  --  100  --   --  112   ALT U/L  --  36  --   --  34   AST U/L  --  81*  --   --  43    < > = values in this interval not displayed  Radiology review:  Procedure: Ct Chest Abdomen Pelvis Wo Contrast    Result Date: 9/1/2020  Narrative: CT CHEST, ABDOMEN AND PELVIS WITHOUT IV CONTRAST INDICATION:   confusion, SOB  COMPARISON:  None   TECHNIQUE: CT examination of the chest, abdomen and pelvis was performed without intravenous contrast   Axial, sagittal, and coronal 2D reformatted images were created from the source data and submitted for interpretation  Radiation dose length product (DLP) for this visit:  1327 mGy-cm   This examination, like all CT scans performed in the [facility], was performed utilizing techniques to minimize radiation dose exposure, including the use of iterative reconstruction and automated exposure control  Enteric contrast was administered  FINDINGS: CHEST LUNGS:  Diffuse bilateral upper lobe nodular interstitial changes  Most of the nodules are less than 2 mm  Breathing motion artifact seen  Right upper lobe subsegmental atelectasis seen  Posterior bibasilar dependent changes seen     Atelectasis/infiltrates in the lung bases posteriorly bilaterally  There is no tracheal or endobronchial lesion  PLEURA:  Unremarkable  HEART/GREAT VESSELS:  Unremarkable for patient's age  MEDIASTINUM AND YUKI:  Prevascular lymph nodes largest measuring 1 1 cm  Right paratracheal lymph nodes largest measuring 1 5 cm  Subcarinal lymph node measuring 1 8 cm  No hilar lymphadenopathy seen  CHEST WALL AND LOWER NECK:   Shotty left supraclavicular lymph nodes  ABDOMEN LIVER/BILIARY TREE:  Unremarkable  GALLBLADDER:  Contracted  No gallstones seen  SPLEEN:  Unremarkable  PANCREAS:  Unremarkable  ADRENAL GLANDS:  Unremarkable  KIDNEYS/URETERS:  Unremarkable  No hydronephrosis  STOMACH AND BOWEL:  Unremarkable  APPENDIX:  No findings to suggest appendicitis  ABDOMINOPELVIC CAVITY:  No ascites  No pneumoperitoneum  No lymphadenopathy  VESSELS:  Unremarkable for patient's age  PELVIS REPRODUCTIVE ORGANS:  Calcifications in the prostate seen  URINARY BLADDER:  Bladder is decompressed with diffuse wall thickening measuring 1 3 cm in maximum thickness and demonstrates a Means balloon  ABDOMINAL WALL/INGUINAL REGIONS:  Small left inguinal hernia containing omental fat  OSSEOUS STRUCTURES:  Degenerative disc changes in the lumbar spine seen  Impression: 1    Bilateral predominantly upper lobe diffuse nodular interstitial changes of inflammatory infectious etiology  2   Subsegmental atelectasis /consolidation in the right upper lobe in a bronchovascular distribution  3   Posterior bibasilar dependent changes with possible atelectasis/infiltrate in the left lung base  4   Diffuse bladder wall thickening which is decompressed containing a Means balloon  5  Mediastinal lymphadenopathy as described largest in the subcarinal region measuring 1 8 cm  Workstation performed: [id number]     Procedure: Xr Chest Portable    Result Date: 9/2/2020  Narrative: CHEST INDICATION:   CVC insertion  COMPARISON:  Chest x-ray from September 2, 2020  EXAM PERFORMED/VIEWS:  XR CHEST PORTABLE FINDINGS: Endotracheal tube tip has advanced with its tip now measuring 4 cm from the supriya  Interval level introduction of a left IJ central venous catheter with its tip in the in the superior vena cava  Orogastric catheter is identified with its tip distal to  the hemidiaphragm  Stable cardiomediastinal silhouette with prominence of the hilar shadows and central more than peripheral vascular markings  Interval increase in the hazy opacities in the midlung fields likely represent alveolar edema  Suggestion of right mid lung platelike atelectasis  No pneumothorax or pleural effusion  No consolidation  The lungs are clear  No pneumothorax or pleural effusion  Osseous structures appear within normal limits for patient age  Impression: Interval advancement of the endotracheal catheter with its tip now residing 4 cm from the supriya  Left IJ central venous catheter identified with its tip in the superior vena cava  No pneumothorax  Increasing interstitial and alveolar edema  Workstation performed: [id number]     Procedure: Xr Chest Portable    Result Date: 9/2/2020  Narrative: CHEST INDICATION:   ETT placement    Sepsis COMPARISON:  09/01/2020 EXAM PERFORMED/VIEWS:  XR CHEST PORTABLE Images: 4 FINDINGS: Cardiomediastinal silhouette appears unremarkable  Endotracheal tube is present with its tip lying approximately 7 6 cm above the supriya  This can be advanced 2 to 3 cm  Diffuse increased interstitial lung markings are noted  No pneumothorax or pleural effusion  Osseous structures appear within normal limits for patient age  Nasogastric tube extends into the proximal stomach below the left hemidiaphragm  Impression: 1  The endotracheal tube is present with its tip lying approximately 7 6 cm above the supriya  This can be advanced 2 to 3 cm  2   Increased interstitial lung markings  Consider infectious etiologies  The study was marked in [facility] for immediate notification  Workstation performed: [id number]     Procedure: Xr Chest Portable    Result Date: 9/1/2020  Narrative: CHEST INDICATION:   sob  COMPARISON:  None EXAM PERFORMED/VIEWS:  XR CHEST PORTABLE FINDINGS: Cardiomediastinal silhouette appears unremarkable  Left basilar opacity could represent pericardial fat pad, effusion, atelectasis and/or pneumonia  Right mid lung opacity could represent pneumonia, follow-up with unenhanced chest CT recommended  No pneumothorax  Osseous structures appear within normal limits for patient age  Impression: Left basilar opacity could represent pericardial fat pad, effusion, atelectasis and/or pneumonia  Right mid lung opacity could represent pneumonia, follow-up with unenhanced chest CT recommended  Workstation performed: [id number]     Procedure: Ct Head Without Contrast    Result Date: 9/1/2020  Narrative: CT BRAIN - WITHOUT CONTRAST INDICATION:   confusion  COMPARISON:  None  TECHNIQUE:  CT examination of the brain was performed  In addition to axial images, sagittal and coronal 2D reformatted images were created and submitted for interpretation  Radiation dose length product (DLP) for this visit:  999 mGy-cm     This examination, like all CT scans performed in the [facility], was performed utilizing techniques to minimize radiation dose exposure, including the use of iterative reconstruction and automated exposure control  IMAGE QUALITY:  Diagnostic  FINDINGS: PARENCHYMA: Decreased attenuation is noted in periventricular and subcortical white matter demonstrating an appearance that is statistically most likely to represent mild microangiopathic change  No CT signs of acute infarction  No intracranial mass, mass effect or midline shift  No acute parenchymal hemorrhage  VENTRICLES AND EXTRA-AXIAL SPACES:  Normal for the patient's age  VISUALIZED ORBITS AND PARANASAL SINUSES:  Unremarkable  CALVARIUM AND EXTRACRANIAL SOFT TISSUES:  Normal      Impression: No acute intracranial abnormality  Minor microangiopathic changes  Workstation performed: [id number]     Procedure: Ct Cervical Spine Without Contrast    Result Date: 9/1/2020  Narrative: CT CERVICAL SPINE - WITHOUT CONTRAST INDICATION:   confusion  COMPARISON:  None  TECHNIQUE:  CT examination of the cervical spine was performed without intravenous contrast   Contiguous axial images were obtained  Sagittal and coronal reconstructions were performed  Radiation dose length product (DLP) for this visit:  490 mGy-cm   This examination, like all CT scans performed in the [facility], was performed utilizing techniques to minimize radiation dose exposure, including the use of iterative reconstruction and automated exposure control  IMAGE QUALITY:  Diagnostic  FINDINGS: ALIGNMENT:  Normal alignment of the cervical spine  No subluxation  VERTEBRAL BODIES:  No fracture  DEGENERATIVE CHANGES:  Mild multilevel cervical degenerative changes are noted without critical central canal stenosis  Partial fusion of the left C2-3 facet and right C4-5 facet  PREVERTEBRAL AND PARASPINAL SOFT TISSUES:  Normal  THORACIC INLET:  Numerous small nodules are seen within the lung apices bilaterally, too numerous to count  Mild right apical scarring    See separate CT chest, abdomen and pelvis report  Impression: Mild cervical degenerative change with no acute osseous abnormality  Innumerable small pulmonary nodules noted within the lung apices with mild right apical scarring  See separate CT chest, abdomen and pelvis report performed today  Workstation performed: [id number]         EKG, Pathology, and Other Studies:  Reviewed    Counseling / Coordination of Care  Total ADDITIONAL floor / unit time spent today 68 minutes minutes  Greater than 50% of total time was spent with the patient and / or family counseling and / or coordination of care  A description of the counseling / coordination of care:  Primary care care team nursing staff and insulin staff patient's daughter and patient's wife    Leon Ramírez MD      This consultation note was produced in part using a dictation device which may document imprecise wording from author's original intent

## 2020-09-02 NOTE — ASSESSMENT & PLAN NOTE
· Unknown baseline, Stable at 9 since admission  · Continue to trend labs  · Monitor for any signs of bleeding  · Consider further anemia work up

## 2020-09-02 NOTE — ASSESSMENT & PLAN NOTE
· Previously on lisinopril, no longer taking  · Currently hypotensive secondary to # 1 and 2  · Continue to monitor BP  ·

## 2020-09-02 NOTE — ASSESSMENT & PLAN NOTE
· Secondary to septic shock  · Elevated Trop 2 85 in ED without EKG changes, pt asymptomatic  · Trop peaked at 34 and are trending down  Draw with morning labs and stop trending  · EKG Q12  · Heparin gtt    · ECHO - EF 05-81%, RV systolic function reduced, mod MVR  · If ECG changes will need transfer for interventional cardiology

## 2020-09-02 NOTE — PROGRESS NOTES
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[employer]  C/o feeling cold on 8/31- outpt covid test was negative  Worse day of admission, EMS called and was 93% on RA  · Covid repeat in ED on 9/1 negative  · Consider retesting if no improvement  · Flu/ RSV negative  · Urine + bacteria, cloudy, thickened bladder wall on CT scan in ED  · Received 3L NSS in ED  · Lactic was normal on presentation  · Hypotension and elevated creatinine to 3 33  · CT C/A/P without contrast:   Bilateral predominantly upper lobe diffuse nodular interstitial changes of inflammatory infectious etiology  2   Subsegmental atelectasis /consolidation in the right upper lobe in a bronchovascular distribution  3   Posterior bibasilar dependent changes with possible atelectasis/infiltrate in the left lung base  4   Diffuse bladder wall thickening which is decompressed containing a Means balloon  5  Mediastinal lymphadenopathy as described largest in the subcarinal region measuring 1 8 cm    · Follow up on Blood cultures  · Started on Azithromycin and Ceftriaxone in ED for CAP  · Above ABX stopped and broadened to Vanc and Cefepime due to repeat temp up to 105 2 (bladder temp)  · Arctic sun for fever  · Procalcitonin 1 4  · Albumin, neosynephrine gtt PRN for hypotension     · Vasopressin added for continued hypotension  · Consider LP if not improving, in setting of high temp to 105 on 2 occasions    Community acquired pneumonia  Assessment & Plan  · RML pna concern on CXR  · Received azithromycin , ceftriaxone in ED  · Will add strept/ legionella to urine studies  · Continue ABX  · O2 via NC PRN, maintain sat > 90%  · Respiratory protocol, airway clearance, IS  · Xopenex tx PRN  · Febrile to 105 2- abx broadened to Vanc and Cefepime  · Intubated overnight on 9/2  · ASV 85%, FOI2 60%    CHERISE (acute kidney injury) ([facility] [address] )  Assessment & Plan  · Unknown baseline, but patient and wife deny any history of kidney disease  · Creat in ED 3 33, now 3 6  · Means placed in ED  · Received 3L NS in ED  · Trend labs  · Avoid nephrotoxins, hypotension  · Continue pressors  · Given 750ml albumin overnight  · Urine studies ordered  · Consider nephrology consult if does not improve  NSTEMI (non-ST elevated myocardial infarction) ([facility])  Assessment & Plan  · Elevated to 2 85 in ED without EKG changes  · Likely demand in setting of sepsis  · Continue to trend to peak  · EKG PRN    · Troponin up to 8 5  Asymptomatic prior to intubation  · Heparin gtt  · TTE ordered    Elevated brain natriuretic peptide (BNP) level  Assessment & Plan  · No history of heart failure  · Strict I&O  · Daily weight  · TTE in am   · Received 3L NSS in ED  Hold on further fluids, consider albumin for hypotension  · Received 750ml albumin overnight    Encephalopathy due to infection  Assessment & Plan  · Wife reports confusion new today  · Likely secondary to PNA, sepsis, fever  · Avoid sedating medications  · Monitor neuro status     · UDS and alcohol negative on admission    Type 2 diabetes mellitus, with long-term current use of insulin [facility])  Assessment & Plan  No results found for: HGBA1C    Recent Labs     09/01/20  1715 09/01/20 2008 09/02/20  0605   POCGLU 379* 257* 249*       Blood Sugar Average: Last 72 hrs:  (P) 295       · Takes metformin 1000mg BID, Januvia 100mg daily, Lantus 28 units at hs, Novolog 6 units TID w meals  · Hold home meds- pt currently on clear liquids  · Sliding scale q6 hours   · Consider non-DKA insulin gtt if remains hyperglycemic     Hyponatremia  Assessment & Plan  · 129 on presentation to ED, 132 after corrected for glucose  · Secondary to dehydration, hyperglycemia  · Received 3L NSS in ED  · Monitor labs  · Monitor neurostatus    Anemia  Assessment & Plan  · Unknown baseline, Stable at 9 since admission  · Continue to trend labs  · Monitor for any signs of bleeding  · Consider further anemia work up    Acute respiratory failure with hypoxia ([address] )  Assessment & Plan  · Pt w worsening agitation, work of breathing  Unable to tolerate mask for aerosol tx  Pulling off HFNC  · Intubated 8 0 ETT  · Vent day #1 , 16, 60%, +8  · ETT high on CXR, will advance 2 cm  · Will Need post intubation ABG, difficulty w vent synchrony  Will increase sedation  · Propofol gtt at 40 mcg  · Fentanyl gtt @ 50  · PRN Fentanyl and versed  · Component may be related to Arctic sun use/ shivering  Will wean off and evaluate          ----------------------------------------------------------------------------------------  HPI/24hr events: Pt admitted yesterday for sepsis, PNA  Overnight temp spiked to 105 2  Despite tylenol and other measures, unable to reduce temp and arctic sun was placed  Became more agitated and confused after temp spike which continued to escalate throughout the night  BP was low after starting casi gtt  Given 250 5% albumin with minimal improvement  Started on vasopressin gtt and was given additional 500ml 5% albumin, again w minimal effect  Sally was placed  Staff at bedside throughout the night due to agitation and confusion  Had multiple episodes where he would desat and started wheezing  Aerosol was given, but pt pulled at mask  Antibiotics were also broadened out after temp spike  He continued to have increased WOB, agitation  Decision was made for intubation, which was done without incident  Placed on vent but had difficulty getting pt to become synchronous w vent despite additional sedation and multiple trialed modes   Was able to get settled on ASV    Disposition: Continue Critical Care   Code Status: Level 1 - Full Code  ---------------------------------------------------------------------------------------  SUBJECTIVE    Review of Systems   Reason unable to perform ROS: intubated and sedated  ---------------------------------------------------------------------------------------  OBJECTIVE    Vitals   Vitals:    20 0615 20 0630 20 0645 20 0700   BP:       BP Location:       Pulse: 100 98 95    Resp: (!) 26 (!) 25 (!) 28    Temp:    97 8 °F (36 6 °C)   TempSrc:       SpO2: 92% 92% 92%    Weight:       Height:         Temp (24hrs), Av 7 °F (39 3 °C), Min:97 8 °F (36 6 °C), Max:105 3 °F (40 7 °C)  Current: Temperature: 97 8 °F (36 6 °C)  Arterial Line BP: 83/51  Arterial Line MAP (mmHg): 62 mmHg    Respiratory:  SpO2: SpO2: 94 %, SpO2 Activity: SpO2 Activity: At Rest, SpO2 Device: O2 Device: Ventilator, Capnography: Capnography: No    Invasive/non-invasive ventilation settings   Respiratory    Lab Data (Last 4 hours)    None         O2/Vent Data (Last 4 hours)       0427          Insp Time (sec) (sec) 1 65                   Physical Exam  Vitals signs and nursing note reviewed  Constitutional:       General: He is not in acute distress  Appearance: He is not ill-appearing or diaphoretic  Comments: Intubated and sedated   HENT:      Head: Normocephalic and atraumatic  Mouth/Throat:      Comments: 8 0 ETT in place, OGT in place  Eyes:      Conjunctiva/sclera: Conjunctivae normal       Pupils: Pupils are equal, round, and reactive to light  Neck:      Musculoskeletal: Neck supple  Trachea: No tracheal deviation  Cardiovascular:      Rate and Rhythm: Regular rhythm  Tachycardia present  Pulses: Normal pulses  Heart sounds: Normal heart sounds  No murmur  No friction rub  Pulmonary:      Breath sounds: No wheezing or rales  Comments: On vent  Currently synchronous     Abdominal:      General: Bowel sounds are normal  There is no distension  Tenderness: There is no abdominal tenderness  There is no guarding  Comments: Large and round  Semisoft  Genitourinary:     Comments: Means in place draining dark yellow cloudy urine  Musculoskeletal:      Comments: Currently intubated and sedated, equal strength, PEREZ prior to intubation  Trace extremity edema   Skin:     General: Skin is warm and dry  Capillary Refill: Capillary refill takes less than 2 seconds  Coloration: Skin is not pale     Neurological:      Comments: Intubated and sedated         Laboratory and Diagnostics:  Results from last 7 days   Lab Units 09/02/20 0449 09/01/20  1706   WBC Thousand/uL 11 58* 9 04   HEMOGLOBIN g/dL 9 3* 9 5*   HEMATOCRIT % 28 2* 28 6*   PLATELETS Thousands/uL 279 222   MONO PCT %  --  8     Results from last 7 days   Lab Units 09/02/20 0449 09/02/20 0004 09/01/20 2010 09/01/20  1706   SODIUM mmol/L 133* 134* 132* 129*   POTASSIUM mmol/L 3 8 3 9 3 8 4 0   CHLORIDE mmol/L 100 101 100 96*   CO2 mmol/L 17* 19* 20* 20*   ANION GAP mmol/L 16* 14* 12 13   BUN mg/dL 34* 32* 32* 33*   CREATININE mg/dL 3 61* 3 01* 3 21* 3 33*   CALCIUM mg/dL 7 9* 8 2* 8 3 8 6   GLUCOSE RANDOM mg/dL 253* 208* 240* 304*   ALT U/L 36  --   --  34   AST U/L 81*  --   --  43   ALK PHOS U/L 100  --   --  112   ALBUMIN g/dL 2 1*  --   --  2 1*   TOTAL BILIRUBIN mg/dL 0 71  --   --  0 73     Results from last 7 days   Lab Units 09/02/20 0449 09/02/20 0004 09/01/20  1706   MAGNESIUM mg/dL 2 2 1 7 1 5*   PHOSPHORUS mg/dL 2 7  --   --       Results from last 7 days   Lab Units 09/01/20  1706   INR  1 28*   PTT seconds 38*      Results from last 7 days   Lab Units 09/02/20 0449 09/02/20  0004 09/01/20 2010 09/01/20  1706   TROPONIN I ng/mL 8 25* 4 55* 2 47* 2 85*     Results from last 7 days   Lab Units 09/01/20  1706   LACTIC ACID mmol/L 1 5     ABG:  Results from last 7 days   Lab Units 09/02/20  0219   PH ART  7 417   PCO2 ART mm Hg 27 1*   PO2 ART mm Hg 75 5   HCO3 ART mmol/L 17 1*   BASE EXC ART mmol/L -6 2   ABG SOURCE  Line, Arterial     VBG:  Results from last 7 days   Lab Units 09/02/20  0219   ABG SOURCE  Line, Arterial     Results from last 7 days   Lab Units 09/01/20  1706   PROCALCITONIN ng/ml 1 40*       Micro  Results from last 7 days   Lab Units 09/01/20  1706   BLOOD CULTURE  Received in Microbiology Lab  Culture in Progress  Received in Microbiology Lab  Culture in Progress  EKG: ST  Imaging: I have personally reviewed pertinent reports  and I have personally reviewed pertinent films in PACS  CXR w ETT approx 8 0 cm above supriya  Was advanced 2 cm by RT  OGT in stomach  Intake and Output  I/O       08/31 0701 - 09/01 0700 09/01 0701 - 09/02 0700 09/02 0701 - 09/03 0700    P  O   240     I V  (mL/kg)  481 (6 1)     IV Piggyback  4500     Total Intake(mL/kg)  5221 (66 3)     Urine (mL/kg/hr)  160     Emesis/NG output  300     Blood  10     Total Output  470     Net  +4751                  Height and Weights   Height: 6' 3" (190 5 cm)  IBW: 84 5 kg  Body mass index is 21 71 kg/m²  Weight (last 2 days)     Date/Time   Weight    09/01/20 2150   78 8 (173 72)    09/01/20 1702   101 (223 77)                Nutrition       Diet Orders   (From admission, onward)             Start     Ordered    09/02/20 0239  Diet NPO  Diet effective now     Question Answer Comment   Diet Type NPO    RD to adjust diet per protocol?  No        09/02/20 0238                  Active Medications  Scheduled Meds:  Current Facility-Administered Medications   Medication Dose Route Frequency Provider Last Rate    acetaminophen  650 mg Per NG Tube Q4H PRN JASMINE Lewis      cefepime  1,000 mg Intravenous Q12H JASMINE Lewis Stopped (09/02/20 0326)    chlorhexidine  15 mL Swish & Spit Q12H [address] JASMINE Lewis      fentaNYL  50 mcg/hr Intravenous Continuous ZIA LewisNP 50 mcg/hr (09/02/20 1723)    fentanyl citrate (PF) 100 mcg Intravenous Q1H PRN Glenora Blood, CRNP      heparin (porcine)  3-20 Units/kg/hr (Order-Specific) Intravenous Titrated Glenora Blood, CRNP 12 Units/kg/hr (09/02/20 0223)    heparin (porcine)  2,000 Units Intravenous Q1H PRN Glenora Blood, CRNP      heparin (porcine)  4,000 Units Intravenous Q1H PRN Glenora Blood, CRNP      insulin lispro  1-6 Units Subcutaneous Q6H [address] Norma Robjesus [place], CRNP      ipratropium  0 5 mg Nebulization 4x Daily Blondell Escort, CRNP      levalbuterol  0 63 mg Nebulization Q6H PRN Glenora Blood, CRNP      levalbuterol  1 25 mg Nebulization 4x Daily Ericka A Ramella, CRNP      norepinephrine  1-30 mcg/min Intravenous Titrated Ericka A Ramella, CRNP      phenylephine   mcg/min Intravenous Titrated Glenora Blood, CRNP 180 mcg/min (09/02/20 0630)    propofol  5-50 mcg/kg/min Intravenous Titrated Glenora Blood, CRNP 40 mcg/kg/min (09/02/20 0709)   Moe [START ON 9/3/2020] vancomycin  12 5 mg/kg Intravenous Q24H Glenora Blood, CRNP      vasopressin (PITRESSIN) in 0 9 % sodium chloride 100 mL  0 04 Units/min Intravenous Continuous Glenora Blood, CRNP 0 04 Units/min (09/02/20 0230)     Continuous Infusions:  fentaNYL, 50 mcg/hr, Last Rate: 50 mcg/hr (09/02/20 0503)  heparin (porcine), 3-20 Units/kg/hr (Order-Specific), Last Rate: 12 Units/kg/hr (09/02/20 0223)  norepinephrine, 1-30 mcg/min  phenylephine,  mcg/min, Last Rate: 180 mcg/min (09/02/20 0630)  propofol, 5-50 mcg/kg/min, Last Rate: 40 mcg/kg/min (09/02/20 0709)  vasopressin (PITRESSIN) in 0 9 % sodium chloride 100 mL, 0 04 Units/min, Last Rate: 0 04 Units/min (09/02/20 0230)      PRN Meds:   acetaminophen, 650 mg, Q4H PRN  fentanyl citrate (PF), 100 mcg, Q1H PRN  heparin (porcine), 2,000 Units, Q1H PRN  heparin (porcine), 4,000 Units, Q1H PRN  levalbuterol, 0 63 mg, Q6H PRN        Invasive Devices Review  Invasive Devices     Peripheral Intravenous Line            Peripheral IV 09/01/20 Left Antecubital 1 day    Peripheral IV 09/01/20 Right Hand less than 1 day    Peripheral IV 09/02/20 Distal;Dorsal (posterior); Left Forearm less than 1 day          Arterial Line            Arterial Line 09/02/20 Right Radial less than 1 day          Drain            NG/OG/Enteral Tube Orogastric 14 Fr Center mouth less than 1 day    Urethral Catheter Temperature probe 16 Fr  less than 1 day          Airway            ETT  Oral 8 mm less than 1 day                Rationale for remaining devices: bonnie nur, ETT for support of respiratory and hemodynamics  Accurate I&O  ---------------------------------------------------------------------------------------  Advance Directive and Living Will:      Power of :    POLST:    ---------------------------------------------------------------------------------------  Care Time Delivered:   Upon my evaluation, this patient had a high probability of imminent or life-threatening deterioration due to worsening sepsis, hypotension, respiratory failure , which required my direct attention, intervention, and personal management  I have personally provided total of 90 minutes (0010 to 0530) of critical care time, exclusive of procedures, teaching, family meetings, and any prior time recorded by providers other than myself  Wife called and notified of above events  All questions answered  Planning to visit around lunchtime w daughter today  JASMINE Conway      Portions of the record may have been created with voice recognition software  Occasional wrong word or "sound a like" substitutions may have occurred due to the inherent limitations of voice recognition software    Read the chart carefully and recognize, using context, where substitutions have occurred

## 2020-09-02 NOTE — ASSESSMENT & PLAN NOTE
· Wife reports confusion new today  · Likely secondary to PNA, sepsis, fever  · Avoid sedating medications  · Monitor neuro status     · UDS and alcohol negative on admission

## 2020-09-02 NOTE — ASSESSMENT & PLAN NOTE
· Pt w worsening agitation, work of breathing  Unable to tolerate mask for aerosol tx  Pulling off HFNC  · Intubated 8 0 ETT  · Vent day #1 , 16, 60%, +8  · ETT high on CXR, will advance 2 cm  · Will Need post intubation ABG, difficulty w vent synchrony  Will increase sedation  · Propofol gtt at 40 mcg  · Fentanyl gtt @ 50  · PRN Fentanyl and versed  · Component may be related to Arctic sun use/ shivering  Will wean off and evaluate

## 2020-09-02 NOTE — ASSESSMENT & PLAN NOTE
· Unknown baseline, but patient and wife deny any history of kidney disease  · Creat in ED 3 33  · Means placed in ED  · Received 3L NS in ED  · Trend labs  · Avoid nephrotoxins, hypotension  · Consider nephrology consult if does not improve

## 2020-09-02 NOTE — ASSESSMENT & PLAN NOTE
· Increase oxygenation requirements with worsening encephalopathy  · 9/2 Intubated 8 0 ETT  · Vent day #2 ASV 85%, 60% fiO2  · Propofol and Fentanyl for sedation  · PRN Fentanyl and versed  · Atrovent/Xopenex Q 6

## 2020-09-02 NOTE — PROCEDURES
Central Line Insertion    Date/Time: 9/2/2020 7:45 AM  Performed by: JASMINE Claudio  Authorized by: JASMINE Claudio     Patient location:  ICU  Other Assisting Provider: Yes (comment) Farzad Zhang RN)    Consent:     Consent obtained:  Verbal and emergent situation (via phone)    Consent given by:  Spouse    Risks discussed:  Arterial puncture, incorrect placement, nerve damage, bleeding, infection and pneumothorax    Alternatives discussed:  No treatment  Universal protocol:     Procedure explained and questions answered to patient or proxy's satisfaction: yes      Radiology Images displayed and confirmed  If images not available, report reviewed: yes      Site/side marked: yes      Immediately prior to procedure, a time out was called: yes      Patient identity confirmed:  Arm band and hospital-assigned identification number  Pre-procedure details:     Hand hygiene: Hand hygiene performed prior to insertion      Sterile barrier technique: All elements of maximal sterile technique followed      Skin preparation:  ChloraPrep    Skin preparation agent: Skin preparation agent completely dried prior to procedure    Indications:     Central line indications: medications requiring central line and hemodynamic monitoring    Anesthesia (see MAR for exact dosages):      Anesthesia method:  Local infiltration    Local anesthetic:  Lidocaine 1% w/o epi  Procedure details:     Location:  Left internal jugular    Vessel type: vein      Laterality:  Left    Approach: percutaneous technique used      Patient position:  Flat    Catheter type:  Triple lumen 16cm    Catheter size:  7 Fr    Landmarks identified: yes      Ultrasound guidance: yes      Sterile ultrasound techniques: Sterile gel and sterile probe covers were used      Manometry confirmation: no      Number of attempts:  1    Successful placement: yes    Post-procedure details:     Post-procedure:  Dressing applied and line sutured    Assessment:  Blood return through all ports, free fluid flow, no pneumothorax on x-ray and placement verified by x-ray    Post-procedure complications: none      Patient tolerance of procedure: Tolerated well, no immediate complications  Comments:      Image of wire placement in [facility] saved and attempted to upload to [facility] system

## 2020-09-02 NOTE — ASSESSMENT & PLAN NOTE
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[employer]  C/o feeling cold on 8/31- outpt covid test was negative  Worse day of admission, EMS called and was 93% on RA  · Covid repeat in ED on 9/1 negative  · Flu/ RSV negative  · Urine + bacteria, cloudy, thickened bladder wall on CT scan in ED  · Received 3L NSS in ED  · Lactic was normal on presentation  · Hypotension and elevated creatinine to 3 33  · CT C/A/P without contrast:   Bilateral predominantly upper lobe diffuse nodular interstitial changes of inflammatory infectious etiology  2   Subsegmental atelectasis /consolidation in the right upper lobe in a bronchovascular distribution  3   Posterior bibasilar dependent changes with possible atelectasis/infiltrate in the left lung base  4   Diffuse bladder wall thickening which is decompressed containing a Means balloon  5  Mediastinal lymphadenopathy as described largest in the subcarinal region measuring 1 8 cm    · Follow up on Blood cultures  · Started on Azithromycin and Ceftriaxone in ED for CAP  · Procalcitonin pending  · Albumin, neosynephrine gtt PRN for hypotension

## 2020-09-03 ENCOUNTER — APPOINTMENT (INPATIENT)
Dept: ULTRASOUND IMAGING | Facility: HOSPITAL | Age: 75
DRG: 870 | End: 2020-09-03
Payer: COMMERCIAL

## 2020-09-03 ENCOUNTER — APPOINTMENT (INPATIENT)
Dept: RADIOLOGY | Facility: HOSPITAL | Age: 75
DRG: 870 | End: 2020-09-03
Payer: COMMERCIAL

## 2020-09-03 LAB
ABO GROUP BLD: NORMAL
ABO GROUP BLD: NORMAL
ALBUMIN SERPL BCP-MCNC: 2.6 G/DL (ref 3.5–5)
ALP SERPL-CCNC: 90 U/L (ref 46–116)
ALT SERPL W P-5'-P-CCNC: 74 U/L (ref 12–78)
ANION GAP SERPL CALCULATED.3IONS-SCNC: 14 MMOL/L (ref 4–13)
APTT PPP: 57 SECONDS (ref 23–37)
APTT PPP: 58 SECONDS (ref 23–37)
APTT PPP: 66 SECONDS (ref 23–37)
APTT PPP: 74 SECONDS (ref 23–37)
AST SERPL W P-5'-P-CCNC: 230 U/L (ref 5–45)
BASE EXCESS BLDA CALC-SCNC: -6.9 MMOL/L
BASOPHILS # BLD AUTO: 0.04 THOUSANDS/ΜL (ref 0–0.1)
BASOPHILS NFR BLD AUTO: 0 % (ref 0–1)
BILIRUB SERPL-MCNC: 0.52 MG/DL (ref 0.2–1)
BLD GP AB SCN SERPL QL: NEGATIVE
BODY TEMPERATURE: 98.9 DEGREES FEHRENHEIT
BUN SERPL-MCNC: 38 MG/DL (ref 5–25)
CA-I BLD-SCNC: 1.04 MMOL/L (ref 1.12–1.32)
CALCIUM SERPL-MCNC: 7.7 MG/DL (ref 8.3–10.1)
CHLORIDE SERPL-SCNC: 99 MMOL/L (ref 100–108)
CK MB SERPL-MCNC: 175.5 NG/ML (ref 0–5)
CK MB SERPL-MCNC: 4.5 % (ref 0–2.5)
CK SERPL-CCNC: 3922 U/L (ref 39–308)
CO2 SERPL-SCNC: 18 MMOL/L (ref 21–32)
CREAT SERPL-MCNC: 2.47 MG/DL (ref 0.6–1.3)
EOSINOPHIL # BLD AUTO: 0.25 THOUSAND/ΜL (ref 0–0.61)
EOSINOPHIL NFR BLD AUTO: 2 % (ref 0–6)
ERYTHROCYTE [DISTWIDTH] IN BLOOD BY AUTOMATED COUNT: 14 % (ref 11.6–15.1)
EST. AVERAGE GLUCOSE BLD GHB EST-MCNC: 189 MG/DL
GFR SERPL CREATININE-BSD FRML MDRD: 24 ML/MIN/1.73SQ M
GLUCOSE SERPL-MCNC: 116 MG/DL (ref 65–140)
GLUCOSE SERPL-MCNC: 117 MG/DL (ref 65–140)
GLUCOSE SERPL-MCNC: 133 MG/DL (ref 65–140)
GLUCOSE SERPL-MCNC: 139 MG/DL (ref 65–140)
GLUCOSE SERPL-MCNC: 153 MG/DL (ref 65–140)
GLUCOSE SERPL-MCNC: 166 MG/DL (ref 65–140)
GLUCOSE SERPL-MCNC: 181 MG/DL (ref 65–140)
GLUCOSE SERPL-MCNC: 185 MG/DL (ref 65–140)
GLUCOSE SERPL-MCNC: 185 MG/DL (ref 65–140)
GLUCOSE SERPL-MCNC: 196 MG/DL (ref 65–140)
GLUCOSE SERPL-MCNC: 206 MG/DL (ref 65–140)
GLUCOSE SERPL-MCNC: 219 MG/DL (ref 65–140)
GLUCOSE SERPL-MCNC: 219 MG/DL (ref 65–140)
GLUCOSE SERPL-MCNC: 229 MG/DL (ref 65–140)
HAPTOGLOB SERPL-MCNC: 400 MG/DL (ref 34–355)
HBA1C MFR BLD: 8.2 %
HCO3 BLDA-SCNC: 18.6 MMOL/L (ref 22–28)
HCT VFR BLD AUTO: 24.1 % (ref 36.5–49.3)
HGB BLD-MCNC: 8.2 G/DL (ref 12–17)
IMM GRANULOCYTES # BLD AUTO: 0.14 THOUSAND/UL (ref 0–0.2)
IMM GRANULOCYTES NFR BLD AUTO: 1 % (ref 0–2)
LIPASE SERPL-CCNC: 61 U/L (ref 73–393)
LYMPHOCYTES # BLD AUTO: 1.04 THOUSANDS/ΜL (ref 0.6–4.47)
LYMPHOCYTES NFR BLD AUTO: 9 % (ref 14–44)
MAGNESIUM SERPL-MCNC: 2 MG/DL (ref 1.6–2.6)
MCH RBC QN AUTO: 29 PG (ref 26.8–34.3)
MCHC RBC AUTO-ENTMCNC: 34 G/DL (ref 31.4–37.4)
MCV RBC AUTO: 85 FL (ref 82–98)
MONOCYTES # BLD AUTO: 0.63 THOUSAND/ΜL (ref 0.17–1.22)
MONOCYTES NFR BLD AUTO: 5 % (ref 4–12)
NEUTROPHILS # BLD AUTO: 9.92 THOUSANDS/ΜL (ref 1.85–7.62)
NEUTS SEG NFR BLD AUTO: 83 % (ref 43–75)
NRBC BLD AUTO-RTO: 0 /100 WBCS
O2 CT BLDA-SCNC: 12.6 ML/DL (ref 16–23)
OXYHGB MFR BLDA: 96.4 % (ref 94–97)
PCO2 BLDA: 36.9 MM HG (ref 36–44)
PCO2 TEMP ADJ BLDA: 37.2 MM HG (ref 36–44)
PH BLD: 7.32 [PH] (ref 7.35–7.45)
PH BLDA: 7.32 [PH] (ref 7.35–7.45)
PLATELET # BLD AUTO: 244 THOUSANDS/UL (ref 149–390)
PMV BLD AUTO: 10.4 FL (ref 8.9–12.7)
PO2 BLD: 94.6 MM HG (ref 75–129)
PO2 BLDA: 93.4 MM HG (ref 75–129)
POTASSIUM SERPL-SCNC: 4.4 MMOL/L (ref 3.5–5.3)
PROCALCITONIN SERPL-MCNC: 36.73 NG/ML
PROT SERPL-MCNC: 6.8 G/DL (ref 6.4–8.2)
PS VENT FIO2: 60
PS VENT PEEP: 8
RBC # BLD AUTO: 2.83 MILLION/UL (ref 3.88–5.62)
RH BLD: POSITIVE
RH BLD: POSITIVE
SODIUM SERPL-SCNC: 131 MMOL/L (ref 136–145)
SPECIMEN EXPIRATION DATE: NORMAL
SPECIMEN SOURCE: ABNORMAL
TROPONIN I SERPL-MCNC: 22.59 NG/ML
TROPONIN I SERPL-MCNC: 29.42 NG/ML
VENT - PS: ABNORMAL
WBC # BLD AUTO: 12.02 THOUSAND/UL (ref 4.31–10.16)

## 2020-09-03 PROCEDURE — 80053 COMPREHEN METABOLIC PANEL: CPT | Performed by: INTERNAL MEDICINE

## 2020-09-03 PROCEDURE — G0427 INPT/ED TELECONSULT70: HCPCS | Performed by: INTERNAL MEDICINE

## 2020-09-03 PROCEDURE — 84145 PROCALCITONIN (PCT): CPT | Performed by: INTERNAL MEDICINE

## 2020-09-03 PROCEDURE — 84484 ASSAY OF TROPONIN QUANT: CPT | Performed by: PHYSICIAN ASSISTANT

## 2020-09-03 PROCEDURE — 94760 N-INVAS EAR/PLS OXIMETRY 1: CPT

## 2020-09-03 PROCEDURE — 94640 AIRWAY INHALATION TREATMENT: CPT

## 2020-09-03 PROCEDURE — 83735 ASSAY OF MAGNESIUM: CPT | Performed by: INTERNAL MEDICINE

## 2020-09-03 PROCEDURE — 82948 REAGENT STRIP/BLOOD GLUCOSE: CPT

## 2020-09-03 PROCEDURE — 30233N1 TRANSFUSION OF NONAUTOLOGOUS RED BLOOD CELLS INTO PERIPHERAL VEIN, PERCUTANEOUS APPROACH: ICD-10-PCS | Performed by: INTERNAL MEDICINE

## 2020-09-03 PROCEDURE — 82330 ASSAY OF CALCIUM: CPT | Performed by: INTERNAL MEDICINE

## 2020-09-03 PROCEDURE — 94770 HB EXHALED CARBON DIOXIDE TEST: CPT

## 2020-09-03 PROCEDURE — 71045 X-RAY EXAM CHEST 1 VIEW: CPT

## 2020-09-03 PROCEDURE — 86900 BLOOD TYPING SEROLOGIC ABO: CPT | Performed by: PHYSICIAN ASSISTANT

## 2020-09-03 PROCEDURE — 86920 COMPATIBILITY TEST SPIN: CPT

## 2020-09-03 PROCEDURE — 87205 SMEAR GRAM STAIN: CPT | Performed by: INTERNAL MEDICINE

## 2020-09-03 PROCEDURE — 83036 HEMOGLOBIN GLYCOSYLATED A1C: CPT | Performed by: INTERNAL MEDICINE

## 2020-09-03 PROCEDURE — 87070 CULTURE OTHR SPECIMN AEROBIC: CPT | Performed by: INTERNAL MEDICINE

## 2020-09-03 PROCEDURE — 99232 SBSQ HOSP IP/OBS MODERATE 35: CPT | Performed by: INTERNAL MEDICINE

## 2020-09-03 PROCEDURE — 86850 RBC ANTIBODY SCREEN: CPT | Performed by: PHYSICIAN ASSISTANT

## 2020-09-03 PROCEDURE — 82805 BLOOD GASES W/O2 SATURATION: CPT | Performed by: PHYSICIAN ASSISTANT

## 2020-09-03 PROCEDURE — 76705 ECHO EXAM OF ABDOMEN: CPT

## 2020-09-03 PROCEDURE — 86901 BLOOD TYPING SEROLOGIC RH(D): CPT | Performed by: PHYSICIAN ASSISTANT

## 2020-09-03 PROCEDURE — 85730 THROMBOPLASTIN TIME PARTIAL: CPT | Performed by: PHYSICIAN ASSISTANT

## 2020-09-03 PROCEDURE — 94003 VENT MGMT INPAT SUBQ DAY: CPT

## 2020-09-03 PROCEDURE — 85730 THROMBOPLASTIN TIME PARTIAL: CPT | Performed by: STUDENT IN AN ORGANIZED HEALTH CARE EDUCATION/TRAINING PROGRAM

## 2020-09-03 PROCEDURE — 82553 CREATINE MB FRACTION: CPT | Performed by: PHYSICIAN ASSISTANT

## 2020-09-03 PROCEDURE — 82550 ASSAY OF CK (CPK): CPT | Performed by: PHYSICIAN ASSISTANT

## 2020-09-03 PROCEDURE — 83690 ASSAY OF LIPASE: CPT | Performed by: PHYSICIAN ASSISTANT

## 2020-09-03 PROCEDURE — 99233 SBSQ HOSP IP/OBS HIGH 50: CPT | Performed by: PHYSICIAN ASSISTANT

## 2020-09-03 PROCEDURE — P9016 RBC LEUKOCYTES REDUCED: HCPCS

## 2020-09-03 PROCEDURE — 85025 COMPLETE CBC W/AUTO DIFF WBC: CPT | Performed by: INTERNAL MEDICINE

## 2020-09-03 RX ORDER — CEFEPIME HYDROCHLORIDE 1 G/50ML
1000 INJECTION, SOLUTION INTRAVENOUS EVERY 24 HOURS
Status: DISCONTINUED | OUTPATIENT
Start: 2020-09-04 | End: 2020-09-04

## 2020-09-03 RX ORDER — FUROSEMIDE 10 MG/ML
40 INJECTION INTRAMUSCULAR; INTRAVENOUS ONCE
Status: COMPLETED | OUTPATIENT
Start: 2020-09-03 | End: 2020-09-03

## 2020-09-03 RX ORDER — ALBUMIN (HUMAN) 12.5 G/50ML
50 SOLUTION INTRAVENOUS ONCE
Status: COMPLETED | OUTPATIENT
Start: 2020-09-03 | End: 2020-09-03

## 2020-09-03 RX ORDER — LEVALBUTEROL 1.25 MG/.5ML
1.25 SOLUTION, CONCENTRATE RESPIRATORY (INHALATION)
Status: DISCONTINUED | OUTPATIENT
Start: 2020-09-03 | End: 2020-09-06

## 2020-09-03 RX ORDER — CALCIUM GLUCONATE 20 MG/ML
2 INJECTION, SOLUTION INTRAVENOUS ONCE
Status: COMPLETED | OUTPATIENT
Start: 2020-09-03 | End: 2020-09-03

## 2020-09-03 RX ADMIN — Medication 0.04 UNITS/MIN: at 17:35

## 2020-09-03 RX ADMIN — Medication 0.04 UNITS/MIN: at 09:45

## 2020-09-03 RX ADMIN — METRONIDAZOLE 500 MG: 500 INJECTION, SOLUTION INTRAVENOUS at 00:15

## 2020-09-03 RX ADMIN — CHLORHEXIDINE GLUCONATE 0.12% ORAL RINSE 15 ML: 1.2 LIQUID ORAL at 08:28

## 2020-09-03 RX ADMIN — PROPOFOL 30 MCG/KG/MIN: 10 INJECTION, EMULSION INTRAVENOUS at 23:11

## 2020-09-03 RX ADMIN — PROPOFOL 30 MCG/KG/MIN: 10 INJECTION, EMULSION INTRAVENOUS at 17:14

## 2020-09-03 RX ADMIN — HEPARIN SODIUM 12 UNITS/KG/HR: 10000 INJECTION, SOLUTION INTRAVENOUS at 00:45

## 2020-09-03 RX ADMIN — LEVALBUTEROL HYDROCHLORIDE 1.25 MG: 1.25 SOLUTION, CONCENTRATE RESPIRATORY (INHALATION) at 08:00

## 2020-09-03 RX ADMIN — ACETAMINOPHEN 650 MG: 650 SUSPENSION ORAL at 16:03

## 2020-09-03 RX ADMIN — NOREPINEPHRINE BITARTRATE 10 MCG/MIN: 1 INJECTION INTRAVENOUS at 01:56

## 2020-09-03 RX ADMIN — IPRATROPIUM BROMIDE 0.5 MG: 0.5 SOLUTION RESPIRATORY (INHALATION) at 20:26

## 2020-09-03 RX ADMIN — PROPOFOL 30 MCG/KG/MIN: 10 INJECTION, EMULSION INTRAVENOUS at 12:11

## 2020-09-03 RX ADMIN — METRONIDAZOLE 500 MG: 500 INJECTION, SOLUTION INTRAVENOUS at 16:03

## 2020-09-03 RX ADMIN — HEPARIN SODIUM 2000 UNITS: 1000 INJECTION, SOLUTION INTRAVENOUS; SUBCUTANEOUS at 17:15

## 2020-09-03 RX ADMIN — NOREPINEPHRINE BITARTRATE 8 MCG/MIN: 1 INJECTION INTRAVENOUS at 19:34

## 2020-09-03 RX ADMIN — CALCIUM GLUCONATE 2 G: 20 INJECTION, SOLUTION INTRAVENOUS at 04:25

## 2020-09-03 RX ADMIN — LEVALBUTEROL HYDROCHLORIDE 1.25 MG: 1.25 SOLUTION, CONCENTRATE RESPIRATORY (INHALATION) at 20:26

## 2020-09-03 RX ADMIN — Medication 50 MCG/HR: at 16:16

## 2020-09-03 RX ADMIN — HEPARIN SODIUM 2000 UNITS: 1000 INJECTION, SOLUTION INTRAVENOUS; SUBCUTANEOUS at 05:50

## 2020-09-03 RX ADMIN — PROPOFOL 30 MCG/KG/MIN: 10 INJECTION, EMULSION INTRAVENOUS at 05:18

## 2020-09-03 RX ADMIN — CEFEPIME HYDROCHLORIDE 1000 MG: 1 INJECTION, SOLUTION INTRAVENOUS at 14:22

## 2020-09-03 RX ADMIN — IPRATROPIUM BROMIDE 0.5 MG: 0.5 SOLUTION RESPIRATORY (INHALATION) at 08:00

## 2020-09-03 RX ADMIN — Medication 0.04 UNITS/MIN: at 03:11

## 2020-09-03 RX ADMIN — FUROSEMIDE 40 MG: 10 INJECTION, SOLUTION INTRAMUSCULAR; INTRAVENOUS at 10:36

## 2020-09-03 RX ADMIN — SODIUM CHLORIDE 8 UNITS/HR: 9 INJECTION, SOLUTION INTRAVENOUS at 04:51

## 2020-09-03 RX ADMIN — NOREPINEPHRINE BITARTRATE 9 MCG/MIN: 1 INJECTION INTRAVENOUS at 08:36

## 2020-09-03 RX ADMIN — VANCOMYCIN HYDROCHLORIDE 1000 MG: 1 INJECTION, SOLUTION INTRAVENOUS at 03:06

## 2020-09-03 RX ADMIN — IPRATROPIUM BROMIDE 0.5 MG: 0.5 SOLUTION RESPIRATORY (INHALATION) at 14:35

## 2020-09-03 RX ADMIN — Medication 50 MCG/HR: at 01:10

## 2020-09-03 RX ADMIN — HEPARIN SODIUM 16 UNITS/KG/HR: 10000 INJECTION, SOLUTION INTRAVENOUS at 21:27

## 2020-09-03 RX ADMIN — ACETAMINOPHEN 650 MG: 650 SUSPENSION ORAL at 04:12

## 2020-09-03 RX ADMIN — LEVALBUTEROL HYDROCHLORIDE 1.25 MG: 1.25 SOLUTION, CONCENTRATE RESPIRATORY (INHALATION) at 14:35

## 2020-09-03 RX ADMIN — SODIUM CHLORIDE 8 UNITS/HR: 9 INJECTION, SOLUTION INTRAVENOUS at 13:09

## 2020-09-03 RX ADMIN — METRONIDAZOLE 500 MG: 500 INJECTION, SOLUTION INTRAVENOUS at 08:32

## 2020-09-03 RX ADMIN — ALBUMIN (HUMAN) 50 G: 0.25 INJECTION, SOLUTION INTRAVENOUS at 03:05

## 2020-09-03 RX ADMIN — CEFEPIME HYDROCHLORIDE 1000 MG: 1 INJECTION, SOLUTION INTRAVENOUS at 01:15

## 2020-09-03 RX ADMIN — CHLORHEXIDINE GLUCONATE 0.12% ORAL RINSE 15 ML: 1.2 LIQUID ORAL at 21:33

## 2020-09-03 RX ADMIN — FAMOTIDINE 20 MG: 10 INJECTION, SOLUTION INTRAVENOUS at 10:58

## 2020-09-03 NOTE — ASSESSMENT & PLAN NOTE
· 129 on presentation to ED  · Secondary to dehydration, hyperglycemia     · Received 3L NSS in ED  · Monitor labs  · Monitor neuro status

## 2020-09-03 NOTE — ASSESSMENT & PLAN NOTE
Lab Results   Component Value Date    HGBA1C 8 2 (H) 09/03/2020       Recent Labs     09/03/20  1608 09/03/20  1811 09/03/20 2011 09/03/20  2210   POCGLU 116 153* 139 133       Blood Sugar Average: Last 72 hrs:  (P) 126 [id number]       · Takes metformin 1000mg BID, Januvia 100mg daily, Lantus 28 units at hs, Novolog 6 units TID w meals  · Hold home meds- pt currently on clear liquids  · Non-DKA insulin gtt started 9/2 for persistent hyperglycemia

## 2020-09-03 NOTE — ASSESSMENT & PLAN NOTE
· Unknown baseline, Stable at 9 since admission  · Continue to trend labs  · Monitor for any signs of bleeding  · Consider further anemia work up  · Transfuse with 1 u PRBC 9/3/20 - consent obtained from wife  · No obvious signs of bleeding

## 2020-09-03 NOTE — ASSESSMENT & PLAN NOTE
· Secondary to pneumonia vs unclear etiology  · POA - fever, tachypnea, tachycardia, increased Cr, persistent hypotension not fluid responsive  · In the ED  · Hypotensive, normal lactate, given 3L NSS  · ABX - Ceftriaxone, Azithromycin  · CT C/A/P without contrast:   Bilateral predominantly upper lobe diffuse nodular interstitial changes of inflammatory infectious etiology  2   Subsegmental atelectasis /consolidation in the right upper lobe in a bronchovascular distribution  3   Posterior bibasilar dependent changes with possible atelectasis/infiltrate in the left lung base  4   Diffuse bladder wall thickening which is decompressed containing a Means balloon  5  Mediastinal lymphadenopathy as described largest in the subcarinal region measuring 1 8 cm    · Micro work up  · Covid repeat in ED on 9/1 negative  · Flu/ RSV negative  · Urine strep and legionella - negative  · Urine + bacteria, cloudy, thickened bladder wall on CT scan in ED  · Procalcitonin 1 4  > 6 8  · Blood cultures - No growth in 48 hrs  · Sputum culture - 2+ polys, no bacteria  · Lyme - pending    · ABX - broadened due to severity of illness, Cont Cefepime (1 G Q 24 due to renal insufficiency), Vanco, and Flagyl (D#4)  · Increase cefepime to 2 Q 24 when GFR 30-60 per ID  · Persistent fever despite tylenol and ice packs, utilizing [employer] for normothermia  · Vasopressors added after continued hypotension despite IVF resuscitation  · Cont Levo  Shane weaned off evening of 9/2/2020  Vaso off 9/3  · Consider LP if not improving, in setting of high temp to 105 and acute encephalopathy  · Spoke with wife - pt had altered mental status at home that improved acutely with administration of IVF and antibiotics  · RUQ u/s performed today: No gallstones seen    Some gallbladder wall thickening and pericholecystic fluid is present which could be related to underdistention or other pathology  ·  ID consult placed today - Dr Nickolas Olea  · Consult general surgery for possible cholecystitis per ID recommendations

## 2020-09-03 NOTE — PROGRESS NOTES
Vancomycin IV Pharmacy-to-Dose Consultation    Denita Arellano is a 66 y o  male who is currently receiving Vancomycin IV with management by the Pharmacy Consult service  Assessment/Plan:  The patient was reviewed  Renal function is stable and no signs or symptoms of nephrotoxicity and/or infusion reactions were documented in the chart  Based on todays assessment, continue current vancomycin (day # 2) dosing of 1000 mg Q24H, with a plan for trough to be drawn at 0330 on 9/5/20  We will continue to follow the patients culture results and clinical progress daily      Kenzie Feldman

## 2020-09-03 NOTE — PLAN OF CARE
Pt remains on vasopressin, levophed, propofol, fentanyl, heparin, and insulin gtts  Attempted to wean levophed and propofol however Pt's BP didn't tolerate and pt with difficulty with comfort with ventilator on lower propofol levels less than 30  Pt's temp slowly up to 101 1  Attempted to give tylenol without success of bringing temperature down  TTM machine restarted to manage temperature at 0500  Pt coughing on vent and suctioned for sm amt sputum which was sent to the lab  Pt given calcium replacement this am   Pt given 50g of albumin this shift per nephrology orders  Urine output steady and QS  Will continue to monitor pt and treat as needed  Problem: Potential for Falls  Goal: Patient will remain free of falls  Description: INTERVENTIONS:  - Assess patient frequently for physical needs  -  Identify cognitive and physical deficits and behaviors that affect risk of falls    -  [place] fall precautions as indicated by assessment   - Educate patient/family on patient safety including physical limitations  - Instruct patient to call for assistance with activity based on assessment  - Modify environment to reduce risk of injury  - Consider OT/PT consult to assist with strengthening/mobility  Outcome: Progressing     Problem: Prexisting or High Potential for Compromised Skin Integrity  Goal: Skin integrity is maintained or improved  Description: INTERVENTIONS:  - Identify patients at risk for skin breakdown  - Assess and monitor skin integrity  - Assess and monitor nutrition and hydration status  - Monitor labs   - Assess for incontinence   - Turn and reposition patient  - Assist with mobility/ambulation  - Relieve pressure over bony prominences  - Avoid friction and shearing  - Provide appropriate hygiene as needed including keeping skin clean and dry  - Evaluate need for skin moisturizer/barrier cream  - Collaborate with interdisciplinary team   - Patient/family teaching  - Consider wound care consult Outcome: Progressing     Problem: Nutrition/Hydration-ADULT  Goal: Nutrient/Hydration intake appropriate for improving, restoring or maintaining nutritional needs  Description: Monitor and assess patient's nutrition/hydration status for malnutrition  Collaborate with interdisciplinary team and initiate plan and interventions as ordered  Monitor patient's weight and dietary intake as ordered or per policy  Utilize nutrition screening tool and intervene as necessary  Determine patient's food preferences and provide high-protein, high-caloric foods as appropriate       INTERVENTIONS:  - Monitor oral intake, urinary output, labs, and treatment plans  - Assess nutrition and hydration status and recommend course of action  - Evaluate amount of meals eaten  - Assist patient with eating if necessary   - Allow adequate time for meals  - Recommend/ encourage appropriate diets, oral nutritional supplements, and vitamin/mineral supplements  - Order, calculate, and assess calorie counts as needed  - Recommend, monitor, and adjust tube feedings and TPN/PPN based on assessed needs  - Assess need for intravenous fluids  - Provide specific nutrition/hydration education as appropriate  - Include patient/family/caregiver in decisions related to nutrition  Outcome: Progressing     Problem: PAIN - ADULT  Goal: Verbalizes/displays adequate comfort level or baseline comfort level  Description: Interventions:  - Encourage patient to monitor pain and request assistance  - Assess pain using appropriate pain scale  - Administer analgesics based on type and severity of pain and evaluate response  - Implement non-pharmacological measures as appropriate and evaluate response  - Consider cultural and social influences on pain and pain management  - Notify physician/advanced practitioner if interventions unsuccessful or patient reports new pain  Outcome: Progressing     Problem: INFECTION - ADULT  Goal: Absence or prevention of progression during hospitalization  Description: INTERVENTIONS:  - Assess and monitor for signs and symptoms of infection  - Monitor lab/diagnostic results  - Monitor all insertion sites, i e  indwelling lines, tubes, and drains  - Monitor endotracheal if appropriate and nasal secretions for changes in amount and color  - [place] appropriate cooling/warming therapies per order  - Administer medications as ordered  - Instruct and encourage patient and family to use good hand hygiene technique  - Identify and instruct in appropriate isolation precautions for identified infection/condition  Outcome: Progressing     Problem: SAFETY ADULT  Goal: Patient will remain free of falls  Description: INTERVENTIONS:  - Assess patient frequently for physical needs  -  Identify cognitive and physical deficits and behaviors that affect risk of falls  -  [place] fall precautions as indicated by assessment   - Educate patient/family on patient safety including physical limitations  - Instruct patient to call for assistance with activity based on assessment  - Modify environment to reduce risk of injury  - Consider OT/PT consult to assist with strengthening/mobility  Outcome: Progressing     Problem: Knowledge Deficit  Goal: Patient/family/caregiver demonstrates understanding of disease process, treatment plan, medications, and discharge instructions  Description: Complete learning assessment and assess knowledge base    Interventions:  - Provide teaching at level of understanding  - Provide teaching via preferred learning methods  Outcome: Progressing     Problem: NEUROSENSORY - ADULT  Goal: Achieves stable or improved neurological status  Description: INTERVENTIONS  - Monitor and report changes in neurological status  - Monitor vital signs such as temperature, blood pressure, glucose, and any other labs ordered   - Initiate measures to prevent increased intracranial pressure  - Monitor for seizure activity and implement precautions if appropriate      Outcome: Progressing     Problem: CARDIOVASCULAR - ADULT  Goal: Absence of cardiac dysrhythmias or at baseline rhythm  Description: INTERVENTIONS:  - Continuous cardiac monitoring, vital signs, obtain 12 lead EKG if ordered  - Administer antiarrhythmic and heart rate control medications as ordered  - Monitor electrolytes and administer replacement therapy as ordered  Outcome: Progressing     Problem: METABOLIC, FLUID AND ELECTROLYTES - ADULT  Goal: Electrolytes maintained within normal limits  Description: INTERVENTIONS:  - Monitor labs and assess patient for signs and symptoms of electrolyte imbalances  - Administer electrolyte replacement as ordered  - Monitor response to electrolyte replacements, including repeat lab results as appropriate  - Instruct patient on fluid and nutrition as appropriate  Outcome: Progressing  Goal: Glucose maintained within target range  Description: INTERVENTIONS:  - Monitor Blood Glucose as ordered  - Assess for signs and symptoms of hyperglycemia and hypoglycemia  - Administer ordered medications to maintain glucose within target range  - Assess nutritional intake and initiate nutrition service referral as needed  Outcome: Progressing     Problem: SAFETY ADULT  Goal: Maintain or return to baseline ADL function  Description: INTERVENTIONS:  -  Assess patient's ability to carry out ADLs; assess patient's baseline for ADL function and identify physical deficits which impact ability to perform ADLs (bathing, care of mouth/teeth, toileting, grooming, dressing, etc )  - Assess/evaluate cause of self-care deficits   - Assess range of motion  - Assess patient's mobility; develop plan if impaired  - Assess patient's need for assistive devices and provide as appropriate  - Encourage maximum independence but intervene and supervise when necessary  - Involve family in performance of ADLs  - Assess for home care needs following discharge   - Consider OT consult to assist with ADL evaluation and planning for discharge  - Provide patient education as appropriate  Outcome: Not Progressing  Goal: Maintain or return mobility status to optimal level  Description: INTERVENTIONS:  - Assess patient's baseline mobility status (ambulation, transfers, stairs, etc )    - Identify cognitive and physical deficits and behaviors that affect mobility  - Identify mobility aids required to assist with transfers and/or ambulation (gait belt, sit-to-stand, lift, walker, cane, etc )  - [place] fall precautions as indicated by assessment  - Record patient progress and toleration of activity level on Mobility SBAR; progress patient to next Phase/Stage  - Instruct patient to call for assistance with activity based on assessment  - Consider rehabilitation consult to assist with strengthening/weightbearing, etc   Outcome: Not Progressing     Problem: DISCHARGE PLANNING  Goal: Discharge to home or other facility with appropriate resources  Description: INTERVENTIONS:  - Identify barriers to discharge w/patient and caregiver  - Arrange for needed discharge resources and transportation as appropriate  - Identify discharge learning needs (meds, wound care, etc )  - Arrange for interpretive services to assist at discharge as needed  - Refer to Case Management Department for coordinating discharge planning if the patient needs post-hospital services based on physician/advanced practitioner order or complex needs related to functional status, cognitive ability, or social support system  Outcome: Not Progressing     Problem: CARDIOVASCULAR - ADULT  Goal: Maintains optimal cardiac output and hemodynamic stability  Description: INTERVENTIONS:  - Monitor I/O, vital signs and rhythm  - Monitor for S/S and trends of decreased cardiac output  - Administer and titrate ordered vasoactive medications to optimize hemodynamic stability  - Assess quality of pulses, skin color and temperature  - Assess for signs of decreased coronary artery perfusion  - Instruct patient to report change in severity of symptoms  Outcome: Not Progressing     Problem: RESPIRATORY - ADULT  Goal: Achieves optimal ventilation and oxygenation  Description: INTERVENTIONS:  - Assess for changes in respiratory status  - Assess for changes in mentation and behavior  - Position to facilitate oxygenation and minimize respiratory effort  - Oxygen administered by appropriate delivery if ordered  - Initiate smoking cessation education as indicated  - Encourage broncho-pulmonary hygiene including cough, deep breathe, Incentive Spirometry  - Assess the need for suctioning and aspirate as needed  - Assess and instruct to report SOB or any respiratory difficulty  - Respiratory Therapy support as indicated  Outcome: Not Progressing     Problem: GENITOURINARY - ADULT  Goal: Maintains or returns to baseline urinary function  Description: INTERVENTIONS:  - Assess urinary function  - Encourage oral fluids to ensure adequate hydration if ordered  - Administer IV fluids as ordered to ensure adequate hydration  - Administer ordered medications as needed  - Offer frequent toileting  - Follow urinary retention protocol if ordered  Outcome: Not Progressing     Problem: METABOLIC, FLUID AND ELECTROLYTES - ADULT  Goal: Fluid balance maintained  Description: INTERVENTIONS:  - Monitor labs   - Monitor I/O and WT  - Instruct patient on fluid and nutrition as appropriate  - Assess for signs & symptoms of volume excess or deficit  Outcome: Not Progressing

## 2020-09-03 NOTE — RESPIRATORY THERAPY NOTE
RT Ventilator Management Note  Kiana Guzman 66 y o  male MRN: [id number]  Unit/Bed#: -01 Encounter: [id number]      Daily Screen       9/2/2020  2004 9/3/2020  0800          Patient safety screen outcome[de-identified]    Failed      Not Ready for Weaning due to[de-identified]  Underline problem not resolved  Underline problem not resolved              Physical Exam:   Assessment Type: During-treatment  General Appearance: Sedated  Respiratory Pattern: Assisted  Chest Assessment: Chest expansion symmetrical  Bilateral Breath Sounds: Coarse, Rhonchi  Cough: Productive  Suction: ET Tube  O2 Device: vent      Resp Comments: Pt resting comfortably on current ASV settings, no vent changes at this time

## 2020-09-03 NOTE — ASSESSMENT & PLAN NOTE
· Wife reports confusion new on day of admission that improved with administration of IVF and antibiotics  · Likely secondary to PNA, sepsis, fever  · Avoid sedating medications  · Monitor neuro status     · UDS and alcohol negative on admission

## 2020-09-03 NOTE — ASSESSMENT & PLAN NOTE
· Baseline Cr 1 0 as of 05/2020  · Creat in ED 3 33  Trending down appropriately  · Means placed in ED  · Received 3L NS in ED  · Trend labs  · Avoid nephrotoxins, hypotension  · Continue pressors  · Goal MAP >65  · Urine studies ordered     · Nephrology consult - pt may need CRRT  · Dosing lasix prn - 40 mg IV 9/3/20

## 2020-09-03 NOTE — ASSESSMENT & PLAN NOTE
· Secondary to septic shock  · Elevated Trop 2 85 in ED without EKG changes, pt asymptomatic  · Trop peaked at 34 and are trending down  Draw with morning labs and spot check on 9/3/20 to ensure it is trending down  · EKG Q12  · Continue Heparin gtt  · ECHO - EF 02-88%, RV systolic function reduced, mod MVR - leaning toward takotsubo cardiomyopathy based on the echo findings  Could represent apical ballooning/Takotsubo cardiomyopathy given the basal   segments have dynamic function    · If ECG changes will need transfer for interventional cardiology

## 2020-09-03 NOTE — ASSESSMENT & PLAN NOTE
No results found for: HGBA1C    Recent Labs     09/02/20  1202 09/02/20  1558 09/02/20  1803 09/02/20 2015   POCGLU 259* 253* 228* 206*       Blood Sugar Average: Last 72 hrs:  (P) 261 [id number]       · Takes metformin 1000mg BID, Januvia 100mg daily, Lantus 28 units at hs, Novolog 6 units TID w meals  · Hold home meds- pt currently on clear liquids  · Non-DKA insulin gtt started 9/2 for persistent hyperglycemia

## 2020-09-03 NOTE — CONSULTS
Consultation - General Surgery   Molly Vickers 66 y o  male MRN: [id number]  Unit/Bed#: -01 Encounter: [id number]    Assessment/Plan     Assessment:  Sepsis  respiratory failure  Doubt gallbladder etiology for the symptoms based on current findings  Plan:  I recommend continue with current treatment  The patient does become more stable and there are more findings of an abdominal pain or symptoms then we may proceed with a HIDA scan  At this point I recommend continue with broad-spectrum antibiotics  Will follow with you  History of Present Illness     HPI:  Molly Vickers is a 66 y o  male who presents with hospitalization for decreased appetite and fatigue and unsteady gait  And confusion  Patient is currently on the ventilator and all history is obtained from the patient's chart       Consults    Review of Systems    Historical Information   Past Medical History:   Diagnosis Date    Diabetes mellitus ([facility], [place] [address] )     Hyperlipidemia     Hypertension      History reviewed  No pertinent surgical history    Social History   Social History     Substance and Sexual Activity   Alcohol Use Not Currently    Frequency: Never     Social History     Substance and Sexual Activity   Drug Use Never     E-Cigarette/Vaping     E-Cigarette/Vaping Substances     Social History     Tobacco Use   Smoking Status Never Smoker   Smokeless Tobacco Never Used     Family History: non-contributory    Meds/Allergies   all current active meds have been reviewed  Allergies   Allergen Reactions    Iodinated Diagnostic Agents Hives       Objective   First Vitals:   Blood Pressure: 98/57 (09/01/20 1702)  Pulse: (!) 126 (09/01/20 1702)  Temperature: (!) 105 °F (40 6 °C) (09/01/20 1708)  Temp Source: Rectal (09/01/20 1708)  Respirations: (!) 30 (09/01/20 1702)  Height: 6' 3" (190 5 cm) (09/01/20 2150)  Weight - Scale: 101 kg (223 lb 12 3 oz) (09/01/20 1702)  SpO2: 92 % (09/01/20 1702)    Current Vitals:   Blood Pressure: 104/51 (09/03/20 1615)  Pulse: 104 (09/03/20 1630)  Temperature: 98 7 °F (37 1 °C) (09/03/20 1600)  Temp Source: Bladder (09/03/20 1600)  Respirations: 19 (09/03/20 1630)  Height: 6' 3" (190 5 cm) (09/02/20 0857)  Weight - Scale: 78 8 kg (173 lb 11 6 oz) (09/01/20 2150)  SpO2: 97 % (09/03/20 1630)      Intake/Output Summary (Last 24 hours) at 9/3/2020 1729  Last data filed at 9/3/2020 1633  Gross per 24 hour   Intake 3522 16 ml   Output 2850 ml   Net 672 16 ml       Invasive Devices     Central Venous Catheter Line            CVC Central Lines 09/02/20 Triple 16cm 1 day          Peripheral Intravenous Line            Peripheral IV 09/01/20 Right Hand 2 days    Peripheral IV 09/02/20 Distal;Dorsal (posterior); Left Forearm 1 day          Arterial Line            Arterial Line 09/02/20 Right Radial 1 day          Drain            NG/OG/Enteral Tube Orogastric 14 Fr Center mouth 1 day    Urethral Catheter Temperature probe 16 Fr  1 day          Airway            ETT  Oral 8 mm 1 day                Physical Exam    Lab Results: I have personally reviewed pertinent lab results  Imaging: I have personally reviewed pertinent reports  EKG, Pathology, and Other Studies: I have personally reviewed pertinent reports  Counseling / Coordination of Care  Total floor / unit time spent today 25 minutes  Greater than 50% of total time was spent with the patient and / or family counseling and / or coordination of care  A description of the counseling / coordination of care: Huma Tavarez

## 2020-09-03 NOTE — PROGRESS NOTES
Progress Note - Leena Garcia 1945, 66 y o  male MRN: [id number]    Unit/Bed#: -01 Encounter: [id number]    Primary Care Provider: Lidya Hernadez DO   Date and time admitted to hospital: 9/1/2020  4:56 PM        * Septic shock ([facility] [address] )  Assessment & Plan  · Secondary to pneumonia vs unclear etiology  · POA - fever, tachypnea, tachycardia, increased Cr, persistent hypotension not fluid responsive  · In the ED  · Hypotensive, normal lactate, given 3L NSS  · ABX - Ceftriaxone, Azithromycin  · CT C/A/P without contrast:   Bilateral predominantly upper lobe diffuse nodular interstitial changes of inflammatory infectious etiology  2   Subsegmental atelectasis /consolidation in the right upper lobe in a bronchovascular distribution  3   Posterior bibasilar dependent changes with possible atelectasis/infiltrate in the left lung base  4   Diffuse bladder wall thickening which is decompressed containing a Means balloon  5  Mediastinal lymphadenopathy as described largest in the subcarinal region measuring 1 8 cm    · Micro work up  · Covid repeat in ED on 9/1 negative  · Flu/ RSV negative  · Urine strep and legionella - negative  · Urine + bacteria, cloudy, thickened bladder wall on CT scan in ED  · Procalcitonin 1 4  > 6 8  · Blood cultures - No growth in 24 hrs  · Sputum culture - needs to be collected  · Lyme - pending    · ABX - broadened due to severity of illness, Cont Cefepime, Vanco, and Flagyl  · Persistent fever despite tylenol and ice packs, utilizing [employer] for normothermia  · Vasopressors added after continued hypotension despite IVF resuscitation  · Cont Vaso and Levo   Shane weaned off evening of 9/2/2020  · Consider LP if not improving, in setting of high temp to 105 and acute encephalopathy    Acute respiratory failure with hypoxia [facility])  Assessment & Plan  · Increase oxygenation requirements with worsening encephalopathy  · 9/2 Intubated 8 0 ETT  · Vent day #2 ASV 85%, 60% fiO2  · Propofol and Fentanyl for sedation  · PRN Fentanyl and versed  · Atrovent/Xopenex Q 6    Community acquired pneumonia  Assessment & Plan  · CT chest - consolidation right upper lobe, infiltrate left lower lobe, diffuse nodular interstitial inflammatory changes  · See septic shock plan above  · See acute hypoxic respiratory plan above    CHERISE (acute kidney injury) ([facility] )  Assessment & Plan  · Baseline Cr 1 0 as of 05/2020  · Creat in ED 3 33  Trending down appropriately  · Means placed in ED  · Received 3L NS in ED  · Trend labs  · Avoid nephrotoxins, hypotension  · Continue pressors  · Goal MAP >65  · Urine studies ordered  · Nephrology consult - pt may need CRRT  · 9/2 Lasix given    Type 2 MI (myocardial infarction) ([facility] )  Assessment & Plan  · Secondary to septic shock  · Elevated Trop 2 85 in ED without EKG changes, pt asymptomatic  · Trop peaked at 34 and are trending down  Draw with morning labs and stop trending  · EKG Q12  · Heparin gtt  · ECHO - EF 47-96%, RV systolic function reduced, mod MVR  · If ECG changes will need transfer for interventional cardiology    Elevated brain natriuretic peptide (BNP) level  Assessment & Plan  · No history of heart failure  · Strict I&O  · Daily weight  · TTE in am   · Received 3L NSS in ED  Hold on further fluids, consider albumin for hypotension  · Received 750ml albumin overnight    Encephalopathy due to infection  Assessment & Plan  · Wife reports confusion new on day of admission  · Likely secondary to PNA, sepsis, fever  · Avoid sedating medications  · Monitor neuro status     · UDS and alcohol negative on admission    Type 2 diabetes mellitus, with long-term current use of insulin [facility])  Assessment & Plan  No results found for: HGBA1C    Recent Labs     09/02/20  1202 09/02/20  1558 09/02/20  1803 09/02/20 2015   POCGLU 259* 253* 228* 206*       Blood Sugar Average: Last 72 hrs:  (P) 261 [id number]       · Takes metformin 1000mg BID, Januvia 100mg daily, Lantus 28 units at hs, Novolog 6 units TID w meals  · Hold home meds- pt currently on clear liquids  · Non-DKA insulin gtt started 9/2 for persistent hyperglycemia  Hyponatremia  Assessment & Plan  · 129 on presentation to ED, 132 after corrected for glucose  · Secondary to dehydration, hyperglycemia  · Received 3L NSS in ED  · Monitor labs  · Monitor neurostatus    Anemia  Assessment & Plan  · Unknown baseline, Stable at 9 since admission  · Continue to trend labs  · Monitor for any signs of bleeding  · Consider further anemia work up    ----------------------------------------------------------------------------------------  HPI/24hr events:  Pressor support increased during the day on 9/2 and Levophed was added  During the evening of 9/2, Neosynephrine was weaned off  Late evening 9/2, he started breath holding with volumes greater than 1 L and became tachycardic  Sedation was increased and versed 4 mg IV were administered  Attempts were also made to change the mode of ventilation which resulted in worsening asynchrony  After Versed was administered, breath holding and elevated volumes resolved  Troponin peaked at 34 83 and decreased to 33 67  Every 3 hour troponin checks discontinued  Repeat troponin scheduled with morning labs  No other significant events overnight        Disposition: Continue Critical Care   Code Status: Level 1 - Full Code  ---------------------------------------------------------------------------------------    Review of Systems  Review of systems was unable to be performed secondary to sedation  ---------------------------------------------------------------------------------------  OBJECTIVE    Vitals   Vitals:    09/03/20 0415 09/03/20 0430 09/03/20 0445 09/03/20 0500   BP:       BP Location:       Pulse: 97 97 97 99   Resp: 16 (!) 24 17 22   Temp:    (!) 101 1 °F (38 4 °C)   TempSrc:    Bladder   SpO2: 97% 97% 97% 97%   Weight:       Height:         Temp (24hrs), Av 8 °F (37 1 °C), Min:97 8 °F (36 6 °C), Max:101 1 °F (38 4 °C)  Current: Temperature: (!) 101 1 °F (38 4 °C)  Arterial Line BP: 108/62  Arterial Line MAP (mmHg): 79 mmHg    Respiratory:  SpO2: SpO2: 97 %  Nasal Cannula O2 Flow Rate (L/min): 6 L/min    Invasive/non-invasive ventilation settings   Respiratory    Lab Data (Last 4 hours)    None         O2/Vent Data (Last 4 hours)    None                Physical Exam  Constitutional:       Appearance: He is well-developed and overweight  He is ill-appearing  He is not diaphoretic  Interventions: He is sedated, intubated and restrained  HENT:      Head: Normocephalic and atraumatic  Right Ear: External ear normal       Left Ear: External ear normal       Nose: Nose normal    Eyes:      General: No scleral icterus  Right eye: No discharge  Left eye: No discharge  Comments: Pupils pinpoint  Sluggish to light  Cardiovascular:      Rate and Rhythm: Normal rate and regular rhythm  Pulses: Normal pulses  Heart sounds: No friction rub  No gallop  Pulmonary:      Effort: Respiratory distress present  He is intubated  Breath sounds: No rhonchi or rales  Comments: Wheezes noted bilaterally  Abdominal:      General: Bowel sounds are normal  There is no distension  Palpations: Abdomen is soft  There is no mass  Musculoskeletal:         General: No deformity or signs of injury  Right lower leg: No edema  Left lower leg: No edema  Skin:     General: Skin is warm and dry  Coloration: Skin is not jaundiced or pale  Findings: No rash  Comments: TTM pads in place  Neurological:      Comments: Sedated           Laboratory and Diagnostics:  Results from last 7 days   Lab Units 20  0355 20  0449 20  1706   WBC Thousand/uL 12 02* 11 58* 9 04   HEMOGLOBIN g/dL 8 2* 9 3* 9 5*   HEMATOCRIT % 24 1* 28 2* 28 6*   PLATELETS Thousands/uL 244 279 222   NEUTROS PCT % 83*  -- --    MONOS PCT % 5  --   --    MONO PCT %  --   --  8     Results from last 7 days   Lab Units 09/03/20 0355 09/02/20 1938 09/02/20 1448 09/02/20 0915 09/02/20 0449 09/02/20 0004 09/01/20 2010 09/01/20  1706   SODIUM mmol/L 131* 131* 132* 133* 133* 134* 132* 129*   POTASSIUM mmol/L 4 4 4 5 4 5 4 2 3 8 3 9 3 8 4 0   CHLORIDE mmol/L 99* 101 100 100 100 101 100 96*   CO2 mmol/L 18* 17* 20* 20* 17* 19* 20* 20*   ANION GAP mmol/L 14* 13 12 13 16* 14* 12 13   BUN mg/dL 38* 40* 36* 34* 34* 32* 32* 33*   CREATININE mg/dL 2 47* 2 93* 3 21* 3 56* 3 61* 3 01* 3 21* 3 33*   CALCIUM mg/dL 7 7* 7 8* 7 9* 8 1* 7 9* 8 2* 8 3 8 6   GLUCOSE RANDOM mg/dL 219* 203* 254* 245* 253* 208* 240* 304*   ALT U/L 74  --   --   --  36  --   --  34   AST U/L 230*  --   --   --  81*  --   --  43   ALK PHOS U/L 90  --   --   --  100  --   --  112   ALBUMIN g/dL 2 6*  --   --   --  2 1*  --   --  2 1*   TOTAL BILIRUBIN mg/dL 0 52  --   --   --  0 71  --   --  0 73     Results from last 7 days   Lab Units 09/03/20 0355 09/02/20 0449 09/02/20 0004 09/01/20  1706   MAGNESIUM mg/dL 2 0 2 2 1 7 1 5*   PHOSPHORUS mg/dL  --  2 7  --   --       Results from last 7 days   Lab Units 09/03/20 0355 09/02/20 1448 09/02/20 0818 09/01/20  1706   INR   --   --   --  1 28*   PTT seconds 58* 63* 70* 38*      Results from last 7 days   Lab Units 09/03/20 0357 09/02/20 2217 09/02/20 1938 09/02/20  1600 09/02/20  1158 09/02/20  0734 09/02/20  0449   TROPONIN I ng/mL 29 42* 33 67* 34 83* 26 17* 17 64* 10 54* 8 25*     Results from last 7 days   Lab Units 09/02/20  0915 09/01/20  1706   LACTIC ACID mmol/L 1 7 1 5     ABG:  Results from last 7 days   Lab Units 09/02/20  0734   PH ART  7 300*   PCO2 ART mm Hg 33 1*   PO2 ART mm Hg 64 6*   HCO3 ART mmol/L 15 9*   BASE EXC ART mmol/L -9 5   ABG SOURCE  Line, Arterial     VBG:  Results from last 7 days   Lab Units 09/02/20  0915 09/02/20  0734   PH GOLD  7 261*  --    PCO2 GOLD mm Hg 37 7*  --    PO2 GOLD mm Hg 31 5*  --    HCO3 GOLD mmol/L 16 6*  --    BASE EXC GOLD mmol/L -9 7  --    ABG SOURCE   --  Line, Arterial     Results from last 7 days   Lab Units 09/02/20  0449 09/01/20  1706   PROCALCITONIN ng/ml 6 81* 1 40*       Micro  Results from last 7 days   Lab Units 09/01/20  2238 09/01/20  1731 09/01/20  1706   BLOOD CULTURE   --   --  No Growth at 24 hrs  No Growth at 24 hrs  LEGIONELLA URINARY ANTIGEN  Negative  --   --    STREP PNEUMONIAE ANTIGEN, URINE   --  Negative  --        EKG: Reviewed  Imaging: I have personally reviewed pertinent reports  Intake and Output  I/O       09/01 0701 - 09/02 0700 09/02 0701 - 09/03 0700    P  O  240     I V  (mL/kg) 481 (6 1) 1188 7 (15 1)    NG/GT  30    IV Piggyback 4500 250    Feedings  57    Total Intake(mL/kg) 5221 (66 3) 1525 7 (19 4)    Urine (mL/kg/hr) 160 815 (0 7)    Emesis/NG output 300     Stool  0    Blood 10 5    Total Output 470 820    Net +4751 +705 7          Unmeasured Stool Occurrence  1 x          Height and Weights   Height: 6' 3" (190 5 cm)  IBW: 84 5 kg  Body mass index is 21 71 kg/m²  Weight (last 2 days)     Date/Time   Weight    09/01/20 2150   78 8 (173 72)    09/01/20 1702   101 (223 77)                Nutrition       Diet Orders   (From admission, onward)             Start     Ordered    09/02/20 0955  Diet Enteral/Parenteral; Tube Feeding No Oral Diet; Jevity 1 2 Jefry; Continuous; 10; Prosource Protein Liquid - One Packet; 0  Diet effective now     Question Answer Comment   Diet Type Enteral/Parenteral    Enteral/Parenteral Tube Feeding No Oral Diet    Tube Feeding Formula: Jevity 1 2 Jefry    Bolus/Cyclic/Continuous Continuous    Tube Feeding Goal Rate (mL/hr): 10    Prosource Protein Liquid - No Carb Prosource Protein Liquid - One Packet    Tube Feeding water flush (mL): 0    RD to adjust diet per protocol?  No        09/02/20 0955                  Active Medications  Scheduled Meds:  Current Facility-Administered Medications   Medication Dose Route Frequency Provider Last Rate    acetaminophen  650 mg Per NG Tube Q4H PRN Kayla Settle, CRNP      cefepime  1,000 mg Intravenous Q12H Kayla Settle, CRNP Stopped (09/03/20 0145)    chlorhexidine  15 mL Swish & Spit Q12H [address] Kayla Settle, CRNP      fentaNYL  50 mcg/hr Intravenous Continuous Kayla Settle, CRNP 50 mcg/hr (09/03/20 0110)    fentanyl citrate (PF)  100 mcg Intravenous Q1H PRN Kayla Settle, CRNP      heparin (porcine)  3-20 Units/kg/hr (Order-Specific) Intravenous Titrated Kayla Settle, CRNP 12 Units/kg/hr (09/03/20 0045)    heparin (porcine)  2,000 Units Intravenous Q1H PRN Kayla Settle, CRNP      heparin (porcine)  4,000 Units Intravenous Q1H PRN Kayla Settle, CRNP      insulin regular (HumuLIN R,NovoLIN R) infusion  0 3-21 Units/hr Intravenous Titrated Ericka A Ramella, CRNP 8 Units/hr (09/03/20 0451)    ipratropium  0 5 mg Nebulization 4x Daily Ericka A Ramella, CRNP      levalbuterol  0 63 mg Nebulization Q6H PRN Kayla Settle, CRNP      levalbuterol  1 25 mg Nebulization 4x Daily Ericka A Ramella, CRNP      metroNIDAZOLE  500 mg Intravenous Q8H Ericka A Ramella, CRNP Stopped (09/03/20 0045)    norepinephrine  1-30 mcg/min Intravenous Titrated Ericka A Ramella, CRNP 10 mcg/min (09/03/20 0354)    propofol  5-50 mcg/kg/min Intravenous Titrated Kayla Settle, CRNP 30 mcg/kg/min (09/03/20 0518)    vancomycin  12 5 mg/kg Intravenous Q24H Kayla Settle, CRNP Stopped (09/03/20 0406)    vasopressin (PITRESSIN) in 0 9 % sodium chloride 100 mL  0 04 Units/min Intravenous Continuous Kayla Settle, CRNP 0 04 Units/min (09/03/20 0311)     Continuous Infusions:  fentaNYL, 50 mcg/hr, Last Rate: 50 mcg/hr (09/03/20 0110)  heparin (porcine), 3-20 Units/kg/hr (Order-Specific), Last Rate: 12 Units/kg/hr (09/03/20 0045)  insulin regular (HumuLIN R,NovoLIN R) infusion, 0 3-21 Units/hr, Last Rate: 8 Units/hr (09/03/20 0451)  norepinephrine, 1-30 mcg/min, Last Rate: 10 mcg/min (09/03/20 0354)  propofol, 5-50 mcg/kg/min, Last Rate: 30 mcg/kg/min (09/03/20 0318)  vasopressin (PITRESSIN) in 0 9 % sodium chloride 100 mL, 0 04 Units/min, Last Rate: 0 04 Units/min (09/03/20 2831)      PRN Meds:   acetaminophen, 650 mg, Q4H PRN  fentanyl citrate (PF), 100 mcg, Q1H PRN  heparin (porcine), 2,000 Units, Q1H PRN  heparin (porcine), 4,000 Units, Q1H PRN  levalbuterol, 0 63 mg, Q6H PRN        Invasive Devices Review  Invasive Devices     Central Venous Catheter Line            CVC Central Lines 09/02/20 Triple 16cm less than 1 day          Peripheral Intravenous Line            Peripheral IV 09/01/20 Left Antecubital 2 days    Peripheral IV 09/01/20 Right Hand 1 day    Peripheral IV 09/02/20 Distal;Dorsal (posterior); Left Forearm 1 day          Arterial Line            Arterial Line 09/02/20 Right Radial 1 day          Drain            NG/OG/Enteral Tube Orogastric 14 Fr Center mouth 1 day    Urethral Catheter Temperature probe 16 Fr  1 day          Airway            ETT  Oral 8 mm 1 day                Rationale for remaining devices: CVC: medications requiring central venous access  ETT: respiratory failure  Arterial line: hypotension present  Miguelangel: accurate I/O monitoring   ---------------------------------------------------------------------------------------  Advance Directive and Living Will:      Power of :    POLST:    ---------------------------------------------------------------------------------------  Care Time Delivered:   Upon my evaluation, this patient had a high probability of imminent or life-threatening deterioration due to septic shock requiring pressors, respiratory failure requiring mechanical ventilation, which required my direct attention, intervention, and personal management    I have personally provided 20 minutes (0400 to [phone]) of critical care time, exclusive of procedures, teaching, family meetings, and any prior time recorded by providers other than myself  [facility], PA-C      Portions of the record may have been created with voice recognition software  Occasional wrong word or "sound a like" substitutions may have occurred due to the inherent limitations of voice recognition software    Read the chart carefully and recognize, using context, where substitutions have occurred

## 2020-09-03 NOTE — RESPIRATORY THERAPY NOTE
RT Ventilator Management Note  Yulia Vargas 66 y o  male MRN: [id number]  Unit/Bed#: -01 Encounter: [id number]      Daily Screen       9/2/2020  0820 9/2/2020 2004          Patient safety screen outcome[de-identified]  Failed        Not Ready for Weaning due to[de-identified]  Underline problem not resolved  Underline problem not resolved              Physical Exam:          Rt assumed care of 73yo pt on vent support for airway protection/impending resp failure -  Pt initially admitted for change in MS/Sepsis/PNA/fever with mild SOB upon arrival to ED  Pt received on ASV 85%, +8 @ 60% - tolerating well with adequate mechanics (spont breaths, PS 5, VT ~ 600)  EtCO2 26  Size 8ETT secure 26 @ lip (ETT advanced as per cxr)     BS dim/coarse with scant thick cloudy secretions  VSS/SpO2 95%  No plan to wean at this time- maintain/titrate ASV as appropriate

## 2020-09-03 NOTE — PROGRESS NOTES
Progress Note - Nephrology   Kiana Guzman 66 y o  male MRN: [id number]  Unit/Bed#: -01 Encounter: [id number]    A/P:     CHERISE (acute kidney injury)   Likely ATN from severe sepsis with shock  Unlikely glomerulonephritis  urine negative for hematuria or pyuria  Unknown baseline, but patient and wife deny any history of kidney disease, critical care team received creatinine level from May this year from the primary care's office which was 1 mg/dL  Creat in ED 3 33, now 3 6 mildly improved now after her IV albumin infusion and blood pressure improvement it came down to 2 93 with mild increase in urine output 800 cc which is improved from 160 cc yesterday  Creatinine and urine output both continues to be improving with improvement of septic shock and vasopressor has been weaned off      Continue to Avoid nephrotoxins, hypotension, IV contrast agents  Adjust renally excreted medication as per the GFR less than 10  MAP  maintain around 65 mm of mercury    Imaging of the kidney on CAT scan was unremarkable  Clinically volume overloaded, with severe dilated heart failure,  Anion gap metabolic acidosis improving    No need for acute renal replacement therapy at this point but it might be necessary near future if ATN does not respond to the current management plan  As mentioned in clearly mentioning to me that temporary dialysis can be acceptable but he never wanted to be on  permanent renal replacement therapy or life-sustaining measures              Septic shock   Workup and empiric antibiotic as per the primary team   Most likely pneumonia on CT scan but on atypical infection isn't also been working out in the setting of high ferritin  Continue empiric antibiotic and adjust as contrast sensitivity report involves  SARS neg    Resumed suspicion for gallbladder wall thickening to the cholecystitis surgical consult a requested      NSTEMI (non-ST elevated myocardial infarction)   Troponin elevation peaked at 29 which most likely from the severe sepsis with cardiomyopathy currently on heparin drip , need and inotropic agents if hypertension does not improve for better renal perfusion and renal function to be improved         Type 2 diabetes mellitus, with long-term current use of insulin   Blood sugar is running high will defer insulin management by the critical care team   Avoid metformin      Hyponatremia   Improving, hypotonic hyponatremia resolving with septic shock resolution  And IV hydration     Anemia     Unknown baseline, Stable at 9 since admission  Continue to trend labs  Iron Level no but ferritin and CRP is I most likely from the bone marrow suppression from acute sepsis with reactant protein is high  Monitor for any signs of bleeding  Consider further anemia work up    Septic shock [facility])    Patient Active Problem List   Diagnosis    Septic shock ([facility] )    Community acquired pneumonia    CHERISE (acute kidney injury) ([facility] )    Elevated brain natriuretic peptide (BNP) level    Type 2 MI (myocardial infarction) ([facility] )    Type 2 diabetes mellitus, with long-term current use of insulin (HCC)    Encephalopathy due to infection    Hyponatremia    Acute respiratory failure with hypoxia (HCC)    Anemia         Subjective:   Remains intubated and sedated    Objective:     Vitals: Blood pressure 101/60, pulse 86, temperature 98 9 °F (37 2 °C), temperature source Bladder, resp  rate 16, height 6' 3" (1 905 m), weight 78 8 kg (173 lb 11 6 oz), SpO2 90 %  ,Body mass index is 21 71 kg/m²  Weight (last 2 days)     Date/Time   Weight    09/01/20 2150   78 8 (173 72)    09/01/20 1702   101 (223 77)                Intake/Output Summary (Last 24 hours) at 9/3/2020 1214  Last data filed at 9/3/2020 1153  Gross per 24 hour   Intake 2958 93 ml   Output 2150 ml   Net 808 93 ml     I/O last 3 completed shifts: In: 5462 1 [P O :240; I V :2872 1; NG/GT:80; IV Piggyback:2100;  Feedings:170]  Out: 2973 [Urine:1400; Emesis/NG output:300; Blood:15]    Urethral Catheter Temperature probe 16 Fr  (Active)   Reasons to continue Urinary Catheter  Accurate I&O assessment in critically ill patients (48 hr  max) 09/03/20 1048   Site Assessment Clean;Skin intact 09/03/20 0400   Collection Container Standard drainage bag 09/03/20 0400   Securement Method Securing device (Describe) 09/03/20 0400   Output (mL) 450 mL 09/03/20 1153       Physical Exam: /60   Pulse 86   Temp 98 9 °F (37 2 °C) (Bladder)   Resp 16   Ht 6' 3" (1 905 m)   Wt 78 8 kg (173 lb 11 6 oz)   SpO2 90%   BMI 21 71 kg/m²     General Appearance:    Alert, cooperative, no distress, intubated sedated ill-appearing  On MV   Head:    Normocephalic, without obvious abnormality, atraumatic   Eyes:    Conjunctiva/corneas clear   Ears:    Normal external ears   Nose:   Nares normal, septum midline, mucosa normal, no drainage    or sinus tenderness   Throat:   Lips, mucosa, and tongue normal; teeth and gums normal   Neck:   Supple, symmetrical, trachea midline, no adenopathy;        thyroid:  No enlargement/tenderness/nodules; no carotid    bruit or JVD   Back:     Symmetric, no curvature, ROM normal, no CVA tenderness   Lungs:     Critical and wheezes to auscultation bilaterally, respirations unlabored   Chest wall:    No tenderness or deformity   Heart:    Regular rate and rhythm, S1 and S2 normal, no murmur, rub   or gallop   Abdomen:     Soft, non-tender, bowel sounds active   Extremities:   Extremities normal, atraumatic, no cyanosis or edema   Skin:   Skin color, texture, turgor normal, no rashes or lesions   Lymph nodes:   Cervical normal   Neurologic:   CNII-XII intact            Lab, Imaging and other studies: I have personally reviewed pertinent labs    CBC:   Lab Results   Component Value Date    WBC 12 02 (H) 09/03/2020    HGB 8 2 (L) 09/03/2020    HCT 24 1 (L) 09/03/2020    MCV 85 09/03/2020     09/03/2020    MCH 29 0 09/03/2020    MCHC 34 0 09/03/2020    RDW 14 0 09/03/2020    MPV 10 4 09/03/2020    NRBC 0 09/03/2020     CMP:   Lab Results   Component Value Date    K 4 4 09/03/2020    CL 99 (L) 09/03/2020    CO2 18 (L) 09/03/2020    BUN 38 (H) 09/03/2020    CREATININE 2 47 (H) 09/03/2020    CALCIUM 7 7 (L) 09/03/2020     (H) 09/03/2020    ALT 74 09/03/2020    ALKPHOS 90 09/03/2020    EGFR 24 09/03/2020         Results from last 7 days   Lab Units 09/03/20  0355 09/02/20  1938 09/02/20  1448  09/02/20  0449  09/01/20  1706   POTASSIUM mmol/L 4 4 4 5 4 5   < > 3 8   < > 4 0   CHLORIDE mmol/L 99* 101 100   < > 100   < > 96*   CO2 mmol/L 18* 17* 20*   < > 17*   < > 20*   BUN mg/dL 38* 40* 36*   < > 34*   < > 33*   CREATININE mg/dL 2 47* 2 93* 3 21*   < > 3 61*   < > 3 33*   CALCIUM mg/dL 7 7* 7 8* 7 9*   < > 7 9*   < > 8 6   ALK PHOS U/L 90  --   --   --  100  --  112   ALT U/L 74  --   --   --  36  --  34   AST U/L 230*  --   --   --  81*  --  43    < > = values in this interval not displayed  Phosphorus: No results found for: PHOS  Magnesium:   Lab Results   Component Value Date    MG 2 0 09/03/2020     Urinalysis: No results found for: Georjean Shall, SPECGRAV, PHUR, LEUKOCYTESUR, NITRITE, PROTEINUA, GLUCOSEU, KETONESU, BILIRUBINUR, BLOODU  Ionized Calcium: No results found for: CAION  Coagulation: No results found for: PT, INR, APTT  Troponin:   Lab Results   Component Value Date    TROPONINI 22 59 (H) 09/03/2020     ABG:   Lab Results   Component Value Date    PHART 7 320 (L) 09/03/2020    ONW5NIW 36 9 09/03/2020    PO2ART 93 4 09/03/2020    QII8SSE 18 6 (L) 09/03/2020    BEART -6 9 09/03/2020    SOURCE Line, Arterial 09/03/2020     Radiology review:     IMAGING  Procedure: Ct Chest Abdomen Pelvis Wo Contrast    Result Date: 9/1/2020  Narrative: CT CHEST, ABDOMEN AND PELVIS WITHOUT IV CONTRAST INDICATION:   confusion, SOB  COMPARISON:  None   TECHNIQUE: CT examination of the chest, abdomen and pelvis was performed without intravenous contrast   Axial, sagittal, and coronal 2D reformatted images were created from the source data and submitted for interpretation  Radiation dose length product (DLP) for this visit:  1327 mGy-cm   This examination, like all CT scans performed in the [facility], was performed utilizing techniques to minimize radiation dose exposure, including the use of iterative reconstruction and automated exposure control  Enteric contrast was administered  FINDINGS: CHEST LUNGS:  Diffuse bilateral upper lobe nodular interstitial changes  Most of the nodules are less than 2 mm  Breathing motion artifact seen  Right upper lobe subsegmental atelectasis seen  Posterior bibasilar dependent changes seen     Atelectasis/infiltrates in the lung bases posteriorly bilaterally  There is no tracheal or endobronchial lesion  PLEURA:  Unremarkable  HEART/GREAT VESSELS:  Unremarkable for patient's age  MEDIASTINUM AND YUKI:  Prevascular lymph nodes largest measuring 1 1 cm  Right paratracheal lymph nodes largest measuring 1 5 cm  Subcarinal lymph node measuring 1 8 cm  No hilar lymphadenopathy seen  CHEST WALL AND LOWER NECK:   Shotty left supraclavicular lymph nodes  ABDOMEN LIVER/BILIARY TREE:  Unremarkable  GALLBLADDER:  Contracted  No gallstones seen  SPLEEN:  Unremarkable  PANCREAS:  Unremarkable  ADRENAL GLANDS:  Unremarkable  KIDNEYS/URETERS:  Unremarkable  No hydronephrosis  STOMACH AND BOWEL:  Unremarkable  APPENDIX:  No findings to suggest appendicitis  ABDOMINOPELVIC CAVITY:  No ascites  No pneumoperitoneum  No lymphadenopathy  VESSELS:  Unremarkable for patient's age  PELVIS REPRODUCTIVE ORGANS:  Calcifications in the prostate seen  URINARY BLADDER:  Bladder is decompressed with diffuse wall thickening measuring 1 3 cm in maximum thickness and demonstrates a Emans balloon  ABDOMINAL WALL/INGUINAL REGIONS:  Small left inguinal hernia containing omental fat   OSSEOUS STRUCTURES:  Degenerative disc changes in the lumbar spine seen  Impression: 1  Bilateral predominantly upper lobe diffuse nodular interstitial changes of inflammatory infectious etiology  2   Subsegmental atelectasis /consolidation in the right upper lobe in a bronchovascular distribution  3   Posterior bibasilar dependent changes with possible atelectasis/infiltrate in the left lung base  4   Diffuse bladder wall thickening which is decompressed containing a Means balloon  5  Mediastinal lymphadenopathy as described largest in the subcarinal region measuring 1 8 cm  Workstation performed: [id number]     Procedure: Xr Chest Portable    Result Date: 9/3/2020  Narrative: CHEST INDICATION:   hypoxia, intubated  COMPARISON:  9/2/2020 EXAM PERFORMED/VIEWS:  XR CHEST PORTABLE FINDINGS:  The tip of the endotracheal tube projects 3 3 cm above the supriya  The tip of the enteric tube projects at the stomach  The tip of the left internal jugular central venous catheter projects at the superior vena cava  Cardiomediastinal silhouette appears unremarkable  Redemonstrated are bilateral parahilar and diffuse interstitial opacities throughout both lungs, which have improved  No new lung opacity  No evidence of a pleural effusion or pneumothorax  Osseous structures appear within normal limits for patient age  Impression: Improved pulmonary edema  Support devices as described Workstation performed: [id number]     Procedure: Xr Chest Portable    Result Date: 9/2/2020  Narrative: CHEST INDICATION:   CVC insertion  COMPARISON:  Chest x-ray from September 2, 2020  EXAM PERFORMED/VIEWS:  XR CHEST PORTABLE FINDINGS: Endotracheal tube tip has advanced with its tip now measuring 4 cm from the supriya  Interval level introduction of a left IJ central venous catheter with its tip in the in the superior vena cava  Orogastric catheter is identified with its tip distal to  the hemidiaphragm   Stable cardiomediastinal silhouette with prominence of the hilar shadows and central more than peripheral vascular markings  Interval increase in the hazy opacities in the midlung fields likely represent alveolar edema  Suggestion of right mid lung platelike atelectasis  No pneumothorax or pleural effusion  No consolidation  The lungs are clear  No pneumothorax or pleural effusion  Osseous structures appear within normal limits for patient age  Impression: Interval advancement of the endotracheal catheter with its tip now residing 4 cm from the supriya  Left IJ central venous catheter identified with its tip in the superior vena cava  No pneumothorax  Increasing interstitial and alveolar edema  Workstation performed: [id number]     Procedure: Xr Chest Portable    Result Date: 9/2/2020  Narrative: CHEST INDICATION:   ETT placement  Sepsis COMPARISON:  09/01/2020 EXAM PERFORMED/VIEWS:  XR CHEST PORTABLE Images: 4 FINDINGS: Cardiomediastinal silhouette appears unremarkable  Endotracheal tube is present with its tip lying approximately 7 6 cm above the supriya  This can be advanced 2 to 3 cm  Diffuse increased interstitial lung markings are noted  No pneumothorax or pleural effusion  Osseous structures appear within normal limits for patient age  Nasogastric tube extends into the proximal stomach below the left hemidiaphragm  Impression: 1  The endotracheal tube is present with its tip lying approximately 7 6 cm above the supriya  This can be advanced 2 to 3 cm  2   Increased interstitial lung markings  Consider infectious etiologies  The study was marked in [facility] for immediate notification  Workstation performed: [id number]     Procedure: Xr Chest Portable    Result Date: 9/1/2020  Narrative: CHEST INDICATION:   sob  COMPARISON:  None EXAM PERFORMED/VIEWS:  XR CHEST PORTABLE FINDINGS: Cardiomediastinal silhouette appears unremarkable  Left basilar opacity could represent pericardial fat pad, effusion, atelectasis and/or pneumonia   Right mid lung opacity could represent pneumonia, follow-up with unenhanced chest CT recommended  No pneumothorax  Osseous structures appear within normal limits for patient age  Impression: Left basilar opacity could represent pericardial fat pad, effusion, atelectasis and/or pneumonia  Right mid lung opacity could represent pneumonia, follow-up with unenhanced chest CT recommended  Workstation performed: [id number]     Procedure: Ct Head Without Contrast    Result Date: 9/1/2020  Narrative: CT BRAIN - WITHOUT CONTRAST INDICATION:   confusion  COMPARISON:  None  TECHNIQUE:  CT examination of the brain was performed  In addition to axial images, sagittal and coronal 2D reformatted images were created and submitted for interpretation  Radiation dose length product (DLP) for this visit:  999 mGy-cm   This examination, like all CT scans performed in the [facility], was performed utilizing techniques to minimize radiation dose exposure, including the use of iterative reconstruction and automated exposure control  IMAGE QUALITY:  Diagnostic  FINDINGS: PARENCHYMA: Decreased attenuation is noted in periventricular and subcortical white matter demonstrating an appearance that is statistically most likely to represent mild microangiopathic change  No CT signs of acute infarction  No intracranial mass, mass effect or midline shift  No acute parenchymal hemorrhage  VENTRICLES AND EXTRA-AXIAL SPACES:  Normal for the patient's age  VISUALIZED ORBITS AND PARANASAL SINUSES:  Unremarkable  CALVARIUM AND EXTRACRANIAL SOFT TISSUES:  Normal      Impression: No acute intracranial abnormality  Minor microangiopathic changes  Workstation performed: [id number]     Procedure: Ct Cervical Spine Without Contrast    Result Date: 9/1/2020  Narrative: CT CERVICAL SPINE - WITHOUT CONTRAST INDICATION:   confusion  COMPARISON:  None  TECHNIQUE:  CT examination of the cervical spine was performed without intravenous contrast   Contiguous axial images were obtained  Sagittal and coronal reconstructions were performed  Radiation dose length product (DLP) for this visit:  490 mGy-cm   This examination, like all CT scans performed in the [facility], was performed utilizing techniques to minimize radiation dose exposure, including the use of iterative reconstruction and automated exposure control  IMAGE QUALITY:  Diagnostic  FINDINGS: ALIGNMENT:  Normal alignment of the cervical spine  No subluxation  VERTEBRAL BODIES:  No fracture  DEGENERATIVE CHANGES:  Mild multilevel cervical degenerative changes are noted without critical central canal stenosis  Partial fusion of the left C2-3 facet and right C4-5 facet  PREVERTEBRAL AND PARASPINAL SOFT TISSUES:  Normal  THORACIC INLET:  Numerous small nodules are seen within the lung apices bilaterally, too numerous to count  Mild right apical scarring  See separate CT chest, abdomen and pelvis report  Impression: Mild cervical degenerative change with no acute osseous abnormality  Innumerable small pulmonary nodules noted within the lung apices with mild right apical scarring  See separate CT chest, abdomen and pelvis report performed today  Workstation performed: [id number]     Procedure: Us Abdomen Limited    Result Date: 9/3/2020  Narrative: RIGHT UPPER QUADRANT ULTRASOUND INDICATION:    sepsis  COMPARISON:  CT from 9/1/2020 TECHNIQUE:   Real-time ultrasound of the right upper quadrant was performed with a curvilinear transducer with both volumetric sweeps and still imaging techniques  FINDINGS: PANCREAS:  The pancreas is partially obscured by bowel gas  AORTA AND IVC:  Visualized portions are normal for patient age  LIVER: Size:  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  The liver measures 20 7 cm in the midclavicular line  Contour:  Surface contour is smooth  Parenchyma:  Echogenicity and echotexture are within normal limits  No evidence of suspicious mass   Limited imaging of the main portal vein shows it to be patent and hepatopetal  Prominent vessel extends from the liver towards the abdominal wall with faint Doppler flow and may represent a recannulated periumbilical vein  No obvious nodularity in the liver is seen  BILIARY: No gallstones are seen  The gallbladder wall is mildly thickened at 3 5 mm  Trace pericholecystic fluid is present  No gallstone is seen  Sonographic Savage Lose sign could not be obtained on this limited patient No intrahepatic biliary dilatation  CBD measures 2 4 mm  No choledocholithiasis  KIDNEY: Right kidney measures 11 0 cm  Within normal limits  ASCITES:   None  Impression: No gallstones seen  Some gallbladder wall thickening and pericholecystic fluid is present which could be related to underdistention or other pathology  Gallbladder inflammation is somewhat less likely given the lack of distention unless early and should be correlated clinically   The common bile duct is normal in caliber Workstation performed: [id number]       Current Facility-Administered Medications   Medication Dose Route Frequency    acetaminophen (TYLENOL) oral suspension 650 mg  650 mg Per NG Tube Q4H PRN    cefepime (MAXIPIME) IVPB (premix) 1,000 mg 50 mL  1,000 mg Intravenous Q12H    chlorhexidine (PERIDEX) 0 12 % oral rinse 15 mL  15 mL Swish & Spit Q12H [address]    famotidine (PEPCID) injection 20 mg  20 mg Intravenous Q24H JAVID    fentaNYL 1000 mcg in sodium chloride 0 9% 100mL infusion  50 mcg/hr Intravenous Continuous    fentanyl citrate (PF) 100 MCG/2ML 100 mcg  100 mcg Intravenous Q1H PRN    heparin (porcine) 25,000 units in 0 45% NaCl 250 mL infusion (premix)  3-20 Units/kg/hr (Order-Specific) Intravenous Titrated    heparin (porcine) injection 2,000 Units  2,000 Units Intravenous Q1H PRN    heparin (porcine) injection 4,000 Units  4,000 Units Intravenous Q1H PRN    insulin regular (HumuLIN R,NovoLIN R) 1 Units/mL in sodium chloride 0 9 % 100 mL infusion  0 3-21 Units/hr Intravenous Titrated    ipratropium (ATROVENT) 0 02 % inhalation solution 0 5 mg  0 5 mg Nebulization Q6H    levalbuterol (XOPENEX) inhalation solution 0 63 mg  0 63 mg Nebulization Q6H PRN    levalbuterol (XOPENEX) inhalation solution 1 25 mg  1 25 mg Nebulization Q6H    metroNIDAZOLE (FLAGYL) IVPB (premix) 500 mg 100 mL  500 mg Intravenous Q8H    norepinephrine (LEVOPHED) 4 mg (STANDARD CONCENTRATION) IV in sodium chloride 0 9% 250 mL  1-30 mcg/min Intravenous Titrated    propofol (DIPRIVAN) 1000 mg in 100 mL infusion (premix)  5-50 mcg/kg/min Intravenous Titrated    vancomycin (VANCOCIN) IVPB (premix) 1,000 mg 200 mL  12 5 mg/kg Intravenous Q24H    vasopressin (PITRESSIN) 20 Units in sodium chloride 0 9 % 100 mL infusion  0 04 Units/min Intravenous Continuous     Medications Discontinued During This Encounter   Medication Reason    sodium chloride 0 9 % bolus 1,000 mL     sodium chloride 0 9 % bolus 1,000 mL     lisinopril (ZESTRIL) 10 mg tablet     linaGLIPtin-metFORMIN HCl (JENTADUETO XR PO) Discontinued by another clinician    heparin (porcine) subcutaneous injection 5,000 Units     cefTRIAXone (ROCEPHIN) IVPB (premix) 1,000 mg 50 mL     heparin (ACS LOW) Duplicate order    pravastatin (PRAVACHOL) tablet 40 mg     acetaminophen (TYLENOL) tablet 650 mg     azithromycin (ZITHROMAX) 500 mg in sodium chloride 0 9 % 250 mL IVPB     insulin lispro (HumaLOG) 100 units/mL subcutaneous injection 1-6 Units     phenylephrine (REG-SYNEPHRINE) 50 mg (STANDARD CONCENTRATION) in sodium chloride 0 9% 250 mL     ipratropium (ATROVENT) 0 02 % inhalation solution 0 5 mg     levalbuterol (XOPENEX) inhalation solution 1 25 mg        Spring Baum MD      This progress note was produced in part using a dictation device which may document imprecise wording from author's original intent

## 2020-09-03 NOTE — CONSULTS
REQUIRED DOCUMENTATION:   1  This service was provided via Telemedicine  2  Provider located at [address] [place] [address]  3  TeleMed provider: DO Melvin Hager  Identify all parties in room with patient during tele consult: GABRIEL Herndon  After connecting through [employer], patient was identified by name and date of birth and assistant checked wristband  Telemedicine visit and exam was conducted confidentially over secure lines  My office door was closed  No one else was in the room  The nurse stayed in the room in order to assist with the history and to conduct the exam   I have reviewed the patient's record in [address] [place] [address] Rd  TeleConsultation - Infectious Disease   Duey Seen 66 y o  male MRN: [id number]  Unit/Bed#: -01 Encounter: [id number]    IMPRESSION:    Septic shock: With high-grade fever (105 F), tachypnea, tachycardia, CHERISE, persistent hypotension refractory to IV fluid hydration, acute hypoxic respiratory failure   Source of septic shock may be community-acquired pneumonia or biliary:  Noted COVID-19 has been negative x2, flu/RSV negative, urine strep and Legionella antigen negative  Blood cultures no growth to date  Elevated procalcitonin of 36 noted with upward trend   Distended abdomen, transaminitis (AST>ALT), abnormal CT abd, RUQ abd US: consider the possibility of acute cholecystitis  Cholestasis is not evident with total bilirubin of 0 52 and normal alkaline phosphatase   Acute hypoxic respiratory failure:  Requiring mechanical ventilation  Patient was intubated on September 2nd   NSTEMI: noted peak troponin of 34, which is trending down   Acute encephalopathy in the setting of sepsis   CHERISE:  Baseline creatinine 1 0 in May 2020  Creatinine is trending down with increased urine output     Patient is critically ill with multiorgan dysfunction noted    RECOMMENDATIONS:   Follow blood and sputum cultures   Send tracheal aspirate culture results   Check MRSA nares screen   Serial abdominal exams  Consider HIDA scan  Consider general surgery consult given severity of pt's illness and concern for acute cholecystitis   Agree with current broad-spectrum antibiotic regimen of vancomycin IV, cefepime IV and metronidazole 500 mg IV q 8 hours   Continue vancomycin IV dosing per pharmacy protocol for target trough of 15-20 given severity of his illness   Renal adjust dose of cefepime to 1g IV q 24 hours  If GFR improves to 30 - 60, increase cefepime dose to 2 g IV Q 24 hours   Remains on vasopressors per ICU service   Ventilator management and sedation as per ICU service   Monitor clinical response, temperature curve, WBC count, creatinine, LFTs    Extensive review of the medical records in epic including review of the notes, radiographs, and laboratory results  My recommendations were discussed with Dr Brisa Diehl, from the critical care service  Thank you for allowing me to participate in the care of this patient  The ID service will follow  HISTORY OF PRESENT ILLNESS:  Reason for Consult:  Septic shock  Source: EMR, RN  Patient is intubated and unable to provide history  HPI: Jonatan Quiñonez is a 66y o  year old man with PMH of DM 2, hypertension who was admitted on September 1st with a 4 day history of malaise and fatigue  His wife reported that he felt cold and had decreased appetite the day prior to admission  Outpatient COVID-19 test was negative  On the day of admission his wife noted that his gait was unsteady, he was not feeling well  She also noted intermittent dry cough and that he was confused  EMS was contacted and noted that his O2 saturation was 93% on room air  In the ER, chest x-ray was done and was concerning for right middle lobe infiltrate  CT chest demonstrated right-sided atelectasis and consolidation  His rectal temperature was 105° F  He was noted to be hypotensive with SBP 70s to 80s, requiring IVF hydration    He was initially given ceftriaxone and azithromycin and admitted to the critical care service for septic shock  Patient has required vasopressors while in ICU care  Pt was intubated yesterday for acute hypoxic respiratory failure  REVIEW OF SYSTEMS: unable to obtain    PAST MEDICAL HISTORY:  Past Medical History:   Diagnosis Date    Diabetes mellitus ([address] )     Hyperlipidemia     Hypertension      History reviewed  No pertinent surgical history  FAMILY HISTORY: Unknown    SOCIAL HISTORY:  Social History   Social History     Substance and Sexual Activity   Alcohol Use Not Currently    Frequency: Never     Social History     Substance and Sexual Activity   Drug Use Never     Social History     Tobacco Use   Smoking Status Never Smoker   Smokeless Tobacco Never Used     ALLERGIES:  Allergies   Allergen Reactions    Iodinated Diagnostic Agents Hives     MEDICATIONS:  All current active medications have been reviewed    Scheduled Meds:  Current Facility-Administered Medications   Medication Dose Route Frequency Provider Last Rate    acetaminophen  650 mg Per NG Tube Q4H PRN JASMINE Christian      cefepime  1,000 mg Intravenous Q12H JASMINE Christian Stopped (09/03/20 0145)    chlorhexidine  15 mL Swish & Spit Q12H [facility] JASMINE Christian      famotidine  20 mg Intravenous Q24H [facility] Nina Monte PA-C      fentaNYL  50 mcg/hr Intravenous Continuous JASMINE Christian 50 mcg/hr (09/03/20 0110)    fentanyl citrate (PF)  100 mcg Intravenous Q1H PRN JASMINE Christian      heparin (porcine)  3-20 Units/kg/hr (Order-Specific) Intravenous Titrated ZIA ChristianNP 14 Units/kg/hr (09/03/20 1042)    heparin (porcine)  2,000 Units Intravenous Q1H PRN JASMINE Christian      heparin (porcine)  4,000 Units Intravenous Q1H PRN JASMINE Christian      insulin regular (HumuLIN R,NovoLIN R) infusion  0 3-21 Units/hr Intravenous Titrated JASMINE Sykes 8 Units/hr (09/03/20 1213)    ipratropium  0 5 mg Nebulization Q6H Gurbinder Noemi Alysa, DO      levalbuterol  0 63 mg Nebulization Q6H PRN JASMINE Mullins      levalbuterol  1 25 mg Nebulization Q6H Gurbinder Noemi Alysa, DO      metroNIDAZOLE  500 mg Intravenous Q8H Erickapamella Arrington CRNP Stopped (20 0902)    norepinephrine  1-30 mcg/min Intravenous Titrated Ericka Arrington, CRNP 8 mcg/min (20 1216)    propofol  5-50 mcg/kg/min Intravenous Titrated John Smithd, CRNP 30 mcg/kg/min (20 1211)    vancomycin  12 5 mg/kg Intravenous Q24H John Smithd, CRNP Stopped (20 0406)    vasopressin (PITRESSIN) in 0 9 % sodium chloride 100 mL  0 04 Units/min Intravenous Continuous John Siegel, CRNP 0 04 Units/min (20 0945)     Continuous Infusions:fentaNYL, 50 mcg/hr, Last Rate: 50 mcg/hr (20 0110)  heparin (porcine), 3-20 Units/kg/hr (Order-Specific), Last Rate: 14 Units/kg/hr (20 1042)  insulin regular (HumuLIN R,NovoLIN R) infusion, 0 3-21 Units/hr, Last Rate: 8 Units/hr (20 1213)  norepinephrine, 1-30 mcg/min, Last Rate: 8 mcg/min (20 121)  propofol, 5-50 mcg/kg/min, Last Rate: 30 mcg/kg/min (20 1211)  vasopressin (PITRESSIN) in 0 9 % sodium chloride 100 mL, 0 04 Units/min, Last Rate: 0 04 Units/min (20 0945)    PRN Meds:   acetaminophen    fentanyl citrate (PF)    heparin (porcine)    heparin (porcine)    levalbuterol    PHYSICAL EXAM:  Temp:  [97 8 °F (36 6 °C)-101 3 °F (38 5 °C)] 98 9 °F (37 2 °C)  HR:  [] 86  Resp:  [10-33] 16  BP: ()/(52-65) 101/60  SpO2:  [90 %-100 %] 90 %  Temp (24hrs), Av °F (37 2 °C), Min:97 8 °F (36 6 °C), Max:101 3 °F (38 5 °C)  Current: Temperature: 98 9 °F (37 2 °C)    Intake/Output Summary (Last 24 hours) at 9/3/2020 1242  Last data filed at 9/3/2020 1153  Gross per 24 hour   Intake 2958 93 ml   Output 2150 ml   Net 808 93 ml     Limited exam due to telehealth visit which was mostly done by the patient's nurse    Pt is still on vasopressin and levophed  He is on propofol and fentanyl for sedation  He is also on insulin drip and heparin drip  General Appearance:  Pt is critically ill, requiring pressors, remains intubated   Head:  Normocephalic, without obvious abnormality, atraumatic   Eyes:  closed   Throat: ETT, OGT intact   Lungs:   Coarse breath sounds with crackles throughout lung sanchez per RN   Heart:  S1, S2, regular rate/rhythm per RN  Monitor shows HR 60-80s per RN   Abdomen:   Soft, distended, firm, nontender per RN   : Scrotal edema reported  Means catheter present per RN Urine output 200-300 cc/hr per  Urine appears yellow, hazy    Extremities: Trace edema of upper legs/knees per RN    Skin: No rash per RN   Neurologic: Sedated      LABS, IMAGING, & OTHER STUDIES:  Lab Results:  I have personally reviewed pertinent labs  Results from last 7 days   Lab Units 09/03/20 0355 09/02/20 0449 09/01/20  1706   WBC Thousand/uL 12 02* 11 58* 9 04   HEMOGLOBIN g/dL 8 2* 9 3* 9 5*   PLATELETS Thousands/uL 244 279 222     Results from last 7 days   Lab Units 09/03/20  0355 09/02/20 0449 09/01/20  1706   POTASSIUM mmol/L 4 4   < > 3 8   < > 4 0   CHLORIDE mmol/L 99*   < > 100   < > 96*   CO2 mmol/L 18*   < > 17*   < > 20*   BUN mg/dL 38*   < > 34*   < > 33*   CREATININE mg/dL 2 47*   < > 3 61*   < > 3 33*   EGFR ml/min/1 73sq m 24   < > 15   < > 17   CALCIUM mg/dL 7 7*   < > 7 9*   < > 8 6   AST U/L 230*  --  81*  --  43   ALT U/L 74  --  36  --  34   ALK PHOS U/L 90  --  100  --  112    < > = values in this interval not displayed  Lipase 245, 61    Results from last 7 days   Lab Units 09/03/20  0405 09/01/20 2238 09/01/20  1706   BLOOD CULTURE   --   --  No Growth at 24 hrs  No Growth at 24 hrs     GRAM STAIN RESULT  2+ Polys  No bacteria seen  --   --    LEGIONELLA URINARY ANTIGEN   --  Negative  --      Results from last 7 days   Lab Units 09/03/20  0355 09/02/20 0449 09/01/20  1706   PROCALCITONIN ng/ml 36 73* 6 81* 1 40*     Results from last 7 days   Lab Units 09/01/20  1706   CRP mg/L 233 6*     Results from last 7 days   Lab Units 09/02/20  0915 09/01/20  1731   FERRITIN ng/mL 1,150* 958*     Imaging Studies:   9/3 CXR (images personally reviewed): Redemonstrated are bilateral parahilar and diffuse interstitial opacities throughout both lungs, which have improved  9/3 RUQ US: No gallstones seen  Some gallbladder wall thickening and pericholecystic fluid is present which could be related to underdistention or other pathology  The common bile duct is normal in caliber    9/1 CT Ch/Abd/Pelv (images personally reviewed):   1  Bilateral predominantly upper lobe diffuse nodular interstitial changes of inflammatory infectious etiology  2   Subsegmental atelectasis /consolidation in the right upper lobe in a bronchovascular distribution  3   Posterior bibasilar dependent changes with possible atelectasis/infiltrate in the left lung base  4   Diffuse bladder wall thickening which is decompressed containing a Means balloon  5  Mediastinal lymphadenopathy as described largest in the subcarinal region measuring 1 8 cm  I have personally reviewed pertinent imaging study reports  VIRTUAL VISIT DISCLAIMER  In the absence of a face-to-face physical evaluation by the physician, the diagnosis provided is both limited and provisional in terms of accuracy and completeness  This is not intended to replace a full medical face-to-face evaluation by the physician

## 2020-09-03 NOTE — PLAN OF CARE
Problem: Potential for Falls  Goal: Patient will remain free of falls  Description: INTERVENTIONS:  - Assess patient frequently for physical needs  -  Identify cognitive and physical deficits and behaviors that affect risk of falls  -  [place] fall precautions as indicated by assessment   - Educate patient/family on patient safety including physical limitations  - Instruct patient to call for assistance with activity based on assessment  - Modify environment to reduce risk of injury  - Consider OT/PT consult to assist with strengthening/mobility  Outcome: Progressing     Problem: INFECTION - ADULT  Goal: Absence or prevention of progression during hospitalization  Description: INTERVENTIONS:  - Assess and monitor for signs and symptoms of infection  - Monitor lab/diagnostic results  - Monitor all insertion sites, i e  indwelling lines, tubes, and drains  - Monitor endotracheal if appropriate and nasal secretions for changes in amount and color  - Nunda appropriate cooling/warming therapies per order  - Administer medications as ordered  - Instruct and encourage patient and family to use good hand hygiene technique  - Identify and instruct in appropriate isolation precautions for identified infection/condition  Outcome: Progressing     Problem: SAFETY ADULT  Goal: Patient will remain free of falls  Description: INTERVENTIONS:  - Assess patient frequently for physical needs  -  Identify cognitive and physical deficits and behaviors that affect risk of falls    -  [place] fall precautions as indicated by assessment   - Educate patient/family on patient safety including physical limitations  - Instruct patient to call for assistance with activity based on assessment  - Modify environment to reduce risk of injury  - Consider OT/PT consult to assist with strengthening/mobility  Outcome: Progressing  Goal: Maintain or return to baseline ADL function  Description: INTERVENTIONS:  -  Assess patient's ability to carry out ADLs; assess patient's baseline for ADL function and identify physical deficits which impact ability to perform ADLs (bathing, care of mouth/teeth, toileting, grooming, dressing, etc )  - Assess/evaluate cause of self-care deficits   - Assess range of motion  - Assess patient's mobility; develop plan if impaired  - Assess patient's need for assistive devices and provide as appropriate  - Encourage maximum independence but intervene and supervise when necessary  - Involve family in performance of ADLs  - Assess for home care needs following discharge   - Consider OT consult to assist with ADL evaluation and planning for discharge  - Provide patient education as appropriate  Outcome: Progressing  Goal: Maintain or return mobility status to optimal level  Description: INTERVENTIONS:  - Assess patient's baseline mobility status (ambulation, transfers, stairs, etc )    - Identify cognitive and physical deficits and behaviors that affect mobility  - Identify mobility aids required to assist with transfers and/or ambulation (gait belt, sit-to-stand, lift, walker, cane, etc )  - [place] fall precautions as indicated by assessment  - Record patient progress and toleration of activity level on Mobility SBAR; progress patient to next Phase/Stage  - Instruct patient to call for assistance with activity based on assessment  - Consider rehabilitation consult to assist with strengthening/weightbearing, etc   Outcome: Progressing     Problem: CARDIOVASCULAR - ADULT  Goal: Maintains optimal cardiac output and hemodynamic stability  Description: INTERVENTIONS:  - Monitor I/O, vital signs and rhythm  - Monitor for S/S and trends of decreased cardiac output  - Administer and titrate ordered vasoactive medications to optimize hemodynamic stability  - Assess quality of pulses, skin color and temperature  - Assess for signs of decreased coronary artery perfusion  - Instruct patient to report change in severity of symptoms  Outcome: Progressing  Goal: Absence of cardiac dysrhythmias or at baseline rhythm  Description: INTERVENTIONS:  - Continuous cardiac monitoring, vital signs, obtain 12 lead EKG if ordered  - Administer antiarrhythmic and heart rate control medications as ordered  - Monitor electrolytes and administer replacement therapy as ordered  Outcome: Progressing

## 2020-09-03 NOTE — RESPIRATORY THERAPY NOTE
RT Ventilator Management Note  Sandrine Salcedo 66 y o  male MRN: [id number]  Unit/Bed#: -01 Encounter: [id number]      Daily Screen       9/2/2020  2004 9/3/2020  0800          Patient safety screen outcome[de-identified]    Failed      Not Ready for Weaning due to[de-identified]  Underline problem not resolved  Underline problem not resolved              Physical Exam:   Assessment Type: During-treatment  General Appearance: Sedated  Respiratory Pattern: Assisted  Chest Assessment: Chest expansion symmetrical  Bilateral Breath Sounds: Coarse, Rhonchi  Cough: Productive  Suction: ET Tube  O2 Device: vent      Resp Comments: (P) FiO2 decreased to 40% for SpO2 of 98%

## 2020-09-03 NOTE — ASSESSMENT & PLAN NOTE
· No history of heart failure  · Strict I&O  · Daily weight  · TTE in am   · Received 3L NSS in ED  Hold on further fluids, consider albumin for hypotension  · Received 750ml albumin overnight  · Dosing with prn lasix - pt   Is exhibiting anasarca on exam

## 2020-09-03 NOTE — ASSESSMENT & PLAN NOTE
· Wife reports confusion new on day of admission  · Likely secondary to PNA, sepsis, fever  · Avoid sedating medications  · Monitor neuro status     · UDS and alcohol negative on admission

## 2020-09-04 PROBLEM — E87.1 HYPONATREMIA: Status: RESOLVED | Noted: 2020-09-01 | Resolved: 2020-09-04

## 2020-09-04 LAB
ABO GROUP BLD BPU: NORMAL
ALBUMIN SERPL BCP-MCNC: 2.1 G/DL (ref 3.5–5)
ALP SERPL-CCNC: 108 U/L (ref 46–116)
ALT SERPL W P-5'-P-CCNC: 82 U/L (ref 12–78)
ANION GAP SERPL CALCULATED.3IONS-SCNC: 11 MMOL/L (ref 4–13)
APTT PPP: 70 SECONDS (ref 23–37)
ARTERIAL PATENCY WRIST A: YES
AST SERPL W P-5'-P-CCNC: 172 U/L (ref 5–45)
ATRIAL RATE: 100 BPM
ATRIAL RATE: 137 BPM
ATRIAL RATE: 91 BPM
ATRIAL RATE: 91 BPM
BASE EXCESS BLDA CALC-SCNC: -5.9 MMOL/L
BASOPHILS # BLD AUTO: 0.04 THOUSANDS/ΜL (ref 0–0.1)
BASOPHILS NFR BLD AUTO: 0 % (ref 0–1)
BILIRUB SERPL-MCNC: 0.53 MG/DL (ref 0.2–1)
BPU ID: NORMAL
BUN SERPL-MCNC: 28 MG/DL (ref 5–25)
CA-I BLD-SCNC: 1.08 MMOL/L (ref 1.12–1.32)
CALCIUM SERPL-MCNC: 8 MG/DL (ref 8.3–10.1)
CHLORIDE SERPL-SCNC: 103 MMOL/L (ref 100–108)
CHOLEST SERPL-MCNC: 63 MG/DL (ref 50–200)
CO2 SERPL-SCNC: 21 MMOL/L (ref 21–32)
CREAT SERPL-MCNC: 1.53 MG/DL (ref 0.6–1.3)
CROSSMATCH: NORMAL
EOSINOPHIL # BLD AUTO: 0.31 THOUSAND/ΜL (ref 0–0.61)
EOSINOPHIL NFR BLD AUTO: 2 % (ref 0–6)
ERYTHROCYTE [DISTWIDTH] IN BLOOD BY AUTOMATED COUNT: 14.5 % (ref 11.6–15.1)
GFR SERPL CREATININE-BSD FRML MDRD: 44 ML/MIN/1.73SQ M
GLUCOSE SERPL-MCNC: 107 MG/DL (ref 65–140)
GLUCOSE SERPL-MCNC: 108 MG/DL (ref 65–140)
GLUCOSE SERPL-MCNC: 117 MG/DL (ref 65–140)
GLUCOSE SERPL-MCNC: 118 MG/DL (ref 65–140)
GLUCOSE SERPL-MCNC: 120 MG/DL (ref 65–140)
GLUCOSE SERPL-MCNC: 129 MG/DL (ref 65–140)
GLUCOSE SERPL-MCNC: 130 MG/DL (ref 65–140)
GLUCOSE SERPL-MCNC: 130 MG/DL (ref 65–140)
GLUCOSE SERPL-MCNC: 131 MG/DL (ref 65–140)
GLUCOSE SERPL-MCNC: 132 MG/DL (ref 65–140)
GLUCOSE SERPL-MCNC: 138 MG/DL (ref 65–140)
GLUCOSE SERPL-MCNC: 141 MG/DL (ref 65–140)
GLUCOSE SERPL-MCNC: 159 MG/DL (ref 65–140)
GLUCOSE SERPL-MCNC: 172 MG/DL (ref 65–140)
HCO3 BLDA-SCNC: 18.3 MMOL/L (ref 22–28)
HCT VFR BLD AUTO: 24.7 % (ref 36.5–49.3)
HDLC SERPL-MCNC: 9 MG/DL
HGB BLD-MCNC: 8.3 G/DL (ref 12–17)
HOROWITZ INDEX BLDA+IHG-RTO: 50 MM[HG]
IMM GRANULOCYTES # BLD AUTO: 0.2 THOUSAND/UL (ref 0–0.2)
IMM GRANULOCYTES NFR BLD AUTO: 2 % (ref 0–2)
LDLC SERPL CALC-MCNC: 9 MG/DL (ref 0–100)
LYMPHOCYTES # BLD AUTO: 0.86 THOUSANDS/ΜL (ref 0.6–4.47)
LYMPHOCYTES NFR BLD AUTO: 7 % (ref 14–44)
MAGNESIUM SERPL-MCNC: 1.8 MG/DL (ref 1.6–2.6)
MCH RBC QN AUTO: 28 PG (ref 26.8–34.3)
MCHC RBC AUTO-ENTMCNC: 33.6 G/DL (ref 31.4–37.4)
MCV RBC AUTO: 83 FL (ref 82–98)
MONOCYTES # BLD AUTO: 0.69 THOUSAND/ΜL (ref 0.17–1.22)
MONOCYTES NFR BLD AUTO: 5 % (ref 4–12)
MRSA NOSE QL CULT: NORMAL
NEUTROPHILS # BLD AUTO: 11 THOUSANDS/ΜL (ref 1.85–7.62)
NEUTS SEG NFR BLD AUTO: 84 % (ref 43–75)
NONHDLC SERPL-MCNC: 54 MG/DL
NRBC BLD AUTO-RTO: 0 /100 WBCS
O2 CT BLDA-SCNC: 12.5 ML/DL (ref 16–23)
OXYHGB MFR BLDA: 94.7 % (ref 94–97)
P AXIS: 63 DEGREES
P AXIS: 72 DEGREES
P AXIS: 79 DEGREES
P AXIS: 88 DEGREES
PCO2 BLDA: 31.1 MM HG (ref 36–44)
PEEP RESPIRATORY: 5 CM[H2O]
PH BLDA: 7.39 [PH] (ref 7.35–7.45)
PHOSPHATE SERPL-MCNC: 3.5 MG/DL (ref 2.3–4.1)
PLATELET # BLD AUTO: 277 THOUSANDS/UL (ref 149–390)
PMV BLD AUTO: 10.4 FL (ref 8.9–12.7)
PO2 BLDA: 76.9 MM HG (ref 75–129)
POTASSIUM SERPL-SCNC: 3.9 MMOL/L (ref 3.5–5.3)
PR INTERVAL: 152 MS
PR INTERVAL: 166 MS
PR INTERVAL: 174 MS
PR INTERVAL: 196 MS
PROCALCITONIN SERPL-MCNC: 17.12 NG/ML
PROCALCITONIN SERPL-MCNC: 17.61 NG/ML
PROT SERPL-MCNC: 6.5 G/DL (ref 6.4–8.2)
QRS AXIS: 70 DEGREES
QRS AXIS: 72 DEGREES
QRS AXIS: 78 DEGREES
QRS AXIS: 85 DEGREES
QRSD INTERVAL: 100 MS
QRSD INTERVAL: 108 MS
QRSD INTERVAL: 84 MS
QRSD INTERVAL: 96 MS
QT INTERVAL: 288 MS
QT INTERVAL: 352 MS
QT INTERVAL: 386 MS
QT INTERVAL: 398 MS
QTC INTERVAL: 434 MS
QTC INTERVAL: 454 MS
QTC INTERVAL: 474 MS
QTC INTERVAL: 489 MS
RBC # BLD AUTO: 2.96 MILLION/UL (ref 3.88–5.62)
SODIUM SERPL-SCNC: 135 MMOL/L (ref 136–145)
SPECIMEN SOURCE: ABNORMAL
T WAVE AXIS: -1 DEGREES
T WAVE AXIS: -88 DEGREES
T WAVE AXIS: 180 DEGREES
T WAVE AXIS: 47 DEGREES
TRIGL SERPL-MCNC: 226 MG/DL
UNIT DISPENSE STATUS: NORMAL
UNIT PRODUCT CODE: NORMAL
UNIT RH: NORMAL
VENTRICULAR RATE: 100 BPM
VENTRICULAR RATE: 137 BPM
VENTRICULAR RATE: 91 BPM
VENTRICULAR RATE: 91 BPM
WBC # BLD AUTO: 13.1 THOUSAND/UL (ref 4.31–10.16)

## 2020-09-04 PROCEDURE — 84100 ASSAY OF PHOSPHORUS: CPT | Performed by: PHYSICIAN ASSISTANT

## 2020-09-04 PROCEDURE — 82330 ASSAY OF CALCIUM: CPT | Performed by: PHYSICIAN ASSISTANT

## 2020-09-04 PROCEDURE — P9016 RBC LEUKOCYTES REDUCED: HCPCS

## 2020-09-04 PROCEDURE — 94760 N-INVAS EAR/PLS OXIMETRY 1: CPT

## 2020-09-04 PROCEDURE — 84145 PROCALCITONIN (PCT): CPT | Performed by: PHYSICIAN ASSISTANT

## 2020-09-04 PROCEDURE — 99291 CRITICAL CARE FIRST HOUR: CPT | Performed by: STUDENT IN AN ORGANIZED HEALTH CARE EDUCATION/TRAINING PROGRAM

## 2020-09-04 PROCEDURE — 82805 BLOOD GASES W/O2 SATURATION: CPT | Performed by: PHYSICIAN ASSISTANT

## 2020-09-04 PROCEDURE — 94640 AIRWAY INHALATION TREATMENT: CPT

## 2020-09-04 PROCEDURE — 80061 LIPID PANEL: CPT | Performed by: PHYSICIAN ASSISTANT

## 2020-09-04 PROCEDURE — 80053 COMPREHEN METABOLIC PANEL: CPT | Performed by: PHYSICIAN ASSISTANT

## 2020-09-04 PROCEDURE — 82948 REAGENT STRIP/BLOOD GLUCOSE: CPT

## 2020-09-04 PROCEDURE — 93010 ELECTROCARDIOGRAM REPORT: CPT | Performed by: INTERNAL MEDICINE

## 2020-09-04 PROCEDURE — 94770 HB EXHALED CARBON DIOXIDE TEST: CPT

## 2020-09-04 PROCEDURE — 94003 VENT MGMT INPAT SUBQ DAY: CPT

## 2020-09-04 PROCEDURE — 85730 THROMBOPLASTIN TIME PARTIAL: CPT | Performed by: STUDENT IN AN ORGANIZED HEALTH CARE EDUCATION/TRAINING PROGRAM

## 2020-09-04 PROCEDURE — 84145 PROCALCITONIN (PCT): CPT | Performed by: INTERNAL MEDICINE

## 2020-09-04 PROCEDURE — 99232 SBSQ HOSP IP/OBS MODERATE 35: CPT | Performed by: INTERNAL MEDICINE

## 2020-09-04 PROCEDURE — 85025 COMPLETE CBC W/AUTO DIFF WBC: CPT | Performed by: PHYSICIAN ASSISTANT

## 2020-09-04 PROCEDURE — 30233N1 TRANSFUSION OF NONAUTOLOGOUS RED BLOOD CELLS INTO PERIPHERAL VEIN, PERCUTANEOUS APPROACH: ICD-10-PCS | Performed by: INTERNAL MEDICINE

## 2020-09-04 PROCEDURE — 83735 ASSAY OF MAGNESIUM: CPT | Performed by: PHYSICIAN ASSISTANT

## 2020-09-04 RX ORDER — GUAIFENESIN 100 MG/5ML
200 SOLUTION ORAL EVERY 4 HOURS
Status: DISCONTINUED | OUTPATIENT
Start: 2020-09-04 | End: 2020-09-09

## 2020-09-04 RX ORDER — MAGNESIUM SULFATE HEPTAHYDRATE 40 MG/ML
2 INJECTION, SOLUTION INTRAVENOUS ONCE
Status: COMPLETED | OUTPATIENT
Start: 2020-09-04 | End: 2020-09-04

## 2020-09-04 RX ORDER — POLYETHYLENE GLYCOL 3350 17 G/17G
17 POWDER, FOR SOLUTION ORAL DAILY PRN
Status: DISCONTINUED | OUTPATIENT
Start: 2020-09-04 | End: 2020-09-05

## 2020-09-04 RX ORDER — BISACODYL 10 MG
10 SUPPOSITORY, RECTAL RECTAL DAILY PRN
Status: DISCONTINUED | OUTPATIENT
Start: 2020-09-04 | End: 2020-09-05

## 2020-09-04 RX ORDER — SENNOSIDES 8.8 MG/5ML
8.8 LIQUID ORAL
Status: DISCONTINUED | OUTPATIENT
Start: 2020-09-04 | End: 2020-09-06

## 2020-09-04 RX ORDER — ALBUMIN, HUMAN INJ 5% 5 %
12.5 SOLUTION INTRAVENOUS ONCE
Status: COMPLETED | OUTPATIENT
Start: 2020-09-04 | End: 2020-09-04

## 2020-09-04 RX ORDER — CEFEPIME HYDROCHLORIDE 2 G/50ML
2000 INJECTION, SOLUTION INTRAVENOUS EVERY 24 HOURS
Status: DISCONTINUED | OUTPATIENT
Start: 2020-09-05 | End: 2020-09-08

## 2020-09-04 RX ADMIN — METRONIDAZOLE 500 MG: 500 INJECTION, SOLUTION INTRAVENOUS at 18:01

## 2020-09-04 RX ADMIN — HEPARIN SODIUM 16 UNITS/KG/HR: 10000 INJECTION, SOLUTION INTRAVENOUS at 21:33

## 2020-09-04 RX ADMIN — METRONIDAZOLE 500 MG: 500 INJECTION, SOLUTION INTRAVENOUS at 10:15

## 2020-09-04 RX ADMIN — FENTANYL CITRATE 100 MCG: 50 INJECTION INTRAMUSCULAR; INTRAVENOUS at 23:52

## 2020-09-04 RX ADMIN — SODIUM CHLORIDE 1 UNITS/HR: 9 INJECTION, SOLUTION INTRAVENOUS at 14:39

## 2020-09-04 RX ADMIN — FAMOTIDINE 20 MG: 10 INJECTION, SOLUTION INTRAVENOUS at 08:50

## 2020-09-04 RX ADMIN — Medication 8.8 MG: at 21:33

## 2020-09-04 RX ADMIN — IPRATROPIUM BROMIDE 0.5 MG: 0.5 SOLUTION RESPIRATORY (INHALATION) at 07:43

## 2020-09-04 RX ADMIN — LEVALBUTEROL HYDROCHLORIDE 1.25 MG: 1.25 SOLUTION, CONCENTRATE RESPIRATORY (INHALATION) at 02:47

## 2020-09-04 RX ADMIN — IPRATROPIUM BROMIDE 0.5 MG: 0.5 SOLUTION RESPIRATORY (INHALATION) at 13:26

## 2020-09-04 RX ADMIN — VANCOMYCIN HYDROCHLORIDE 1000 MG: 1 INJECTION, SOLUTION INTRAVENOUS at 04:31

## 2020-09-04 RX ADMIN — Medication 50 MCG/HR: at 15:22

## 2020-09-04 RX ADMIN — PROPOFOL 30 MCG/KG/MIN: 10 INJECTION, EMULSION INTRAVENOUS at 03:30

## 2020-09-04 RX ADMIN — FENTANYL CITRATE 100 MCG: 50 INJECTION INTRAMUSCULAR; INTRAVENOUS at 02:15

## 2020-09-04 RX ADMIN — PROPOFOL 15 MCG/KG/MIN: 10 INJECTION, EMULSION INTRAVENOUS at 10:34

## 2020-09-04 RX ADMIN — IPRATROPIUM BROMIDE 0.5 MG: 0.5 SOLUTION RESPIRATORY (INHALATION) at 02:47

## 2020-09-04 RX ADMIN — LEVALBUTEROL HYDROCHLORIDE 1.25 MG: 1.25 SOLUTION, CONCENTRATE RESPIRATORY (INHALATION) at 20:19

## 2020-09-04 RX ADMIN — CHLORHEXIDINE GLUCONATE 0.12% ORAL RINSE 15 ML: 1.2 LIQUID ORAL at 21:33

## 2020-09-04 RX ADMIN — NOREPINEPHRINE BITARTRATE 5 MCG/MIN: 1 INJECTION INTRAVENOUS at 04:29

## 2020-09-04 RX ADMIN — IPRATROPIUM BROMIDE 0.5 MG: 0.5 SOLUTION RESPIRATORY (INHALATION) at 20:19

## 2020-09-04 RX ADMIN — GUAIFENESIN 200 MG: 100 SOLUTION ORAL at 14:25

## 2020-09-04 RX ADMIN — FENTANYL CITRATE 100 MCG: 50 INJECTION INTRAMUSCULAR; INTRAVENOUS at 18:02

## 2020-09-04 RX ADMIN — GUAIFENESIN 200 MG: 100 SOLUTION ORAL at 21:33

## 2020-09-04 RX ADMIN — CEFEPIME HYDROCHLORIDE 1000 MG: 1 INJECTION, SOLUTION INTRAVENOUS at 14:25

## 2020-09-04 RX ADMIN — LEVALBUTEROL HYDROCHLORIDE 1.25 MG: 1.25 SOLUTION, CONCENTRATE RESPIRATORY (INHALATION) at 07:43

## 2020-09-04 RX ADMIN — MAGNESIUM SULFATE HEPTAHYDRATE 2 G: 40 INJECTION, SOLUTION INTRAVENOUS at 06:39

## 2020-09-04 RX ADMIN — GUAIFENESIN 200 MG: 100 SOLUTION ORAL at 10:14

## 2020-09-04 RX ADMIN — LEVALBUTEROL HYDROCHLORIDE 1.25 MG: 1.25 SOLUTION, CONCENTRATE RESPIRATORY (INHALATION) at 13:26

## 2020-09-04 RX ADMIN — GUAIFENESIN 200 MG: 100 SOLUTION ORAL at 18:00

## 2020-09-04 RX ADMIN — METRONIDAZOLE 500 MG: 500 INJECTION, SOLUTION INTRAVENOUS at 01:03

## 2020-09-04 RX ADMIN — ALBUMIN (HUMAN) 12.5 G: 12.5 INJECTION, SOLUTION INTRAVENOUS at 23:10

## 2020-09-04 RX ADMIN — ACETAMINOPHEN 650 MG: 650 SUSPENSION ORAL at 17:59

## 2020-09-04 RX ADMIN — Medication 50 MCG/HR: at 21:43

## 2020-09-04 RX ADMIN — PROPOFOL 15 MCG/KG/MIN: 10 INJECTION, EMULSION INTRAVENOUS at 22:51

## 2020-09-04 RX ADMIN — CHLORHEXIDINE GLUCONATE 0.12% ORAL RINSE 15 ML: 1.2 LIQUID ORAL at 10:14

## 2020-09-04 NOTE — PROGRESS NOTES
Progress Note - Katharine Race 1945, 66 y o  male MRN: [id number]    Unit/Bed#: -01 Encounter: [id number]    Primary Care Provider: Jared Zabala DO   Date and time admitted to hospital: 9/1/2020  4:56 PM        * Septic shock ([facility] [address] )  Assessment & Plan  · Secondary to pneumonia vs unclear etiology  · POA - fever, tachypnea, tachycardia, increased Cr, persistent hypotension not fluid responsive  · In the ED  · Hypotensive, normal lactate, given 3L NSS  · ABX - Ceftriaxone, Azithromycin  · CT C/A/P without contrast:   Bilateral predominantly upper lobe diffuse nodular interstitial changes of inflammatory infectious etiology  2   Subsegmental atelectasis /consolidation in the right upper lobe in a bronchovascular distribution  3   Posterior bibasilar dependent changes with possible atelectasis/infiltrate in the left lung base  4   Diffuse bladder wall thickening which is decompressed containing a Means balloon  5  Mediastinal lymphadenopathy as described largest in the subcarinal region measuring 1 8 cm    · Micro work up  · Covid repeat in ED on 9/1 negative  · Flu/ RSV negative  · Urine strep and legionella - negative  · Urine + bacteria, cloudy, thickened bladder wall on CT scan in ED  · Procalcitonin 1 4  > 6 8  · Blood cultures - No growth in 48 hrs  · Sputum culture - 2+ polys, no bacteria  · Lyme - pending    · ABX - broadened due to severity of illness, Cont Cefepime (1 G Q 24 due to renal insufficiency), Vanco, and Flagyl (D#4)  · Increase cefepime to 2 Q 24 when GFR 30-60 per ID  · Persistent fever despite tylenol and ice packs, utilizing [employer] for normothermia  · Vasopressors added after continued hypotension despite IVF resuscitation  · Cont Levo  Shane weaned off evening of 9/2/2020  Vaso off 9/3    · Consider LP if not improving, in setting of high temp to 105 and acute encephalopathy  · Spoke with wife - pt had altered mental status at home that improved acutely with administration of IVF and antibiotics  · RUQ u/s performed today: No gallstones seen  Some gallbladder wall thickening and pericholecystic fluid is present which could be related to underdistention or other pathology  ·  ID consult placed today - Dr Gutiérrez Master  · Consult general surgery for possible cholecystitis per ID recommendations    Acute respiratory failure with hypoxia [facility])  Assessment & Plan  · Increase oxygenation requirements with worsening encephalopathy  · 9/2 Intubated 8 0 ETT  · Vent day #2 ASV 85%, 60% fiO2  · Propofol and Fentanyl for sedation  · PRN Fentanyl and versed  · Atrovent/Xopenex Q 6    Community acquired pneumonia  Assessment & Plan  · CT chest - consolidation right upper lobe, infiltrate left lower lobe, diffuse nodular interstitial inflammatory changes  · See septic shock plan above  · See acute hypoxic respiratory plan above    CHERISE (acute kidney injury) ([facility] [address] )  Assessment & Plan  · Baseline Cr 1 0 as of 05/2020  · Creat in ED 3 33  Trending down appropriately  · Means placed in ED  · Received 3L NS in ED  · Trend labs  · Avoid nephrotoxins, hypotension  · Continue pressors  · Goal MAP >65  · Urine studies ordered  · Nephrology consult - pt may need CRRT  · Dosing lasix prn - 40 mg IV 9/3/20     Type 2 MI (myocardial infarction) [facility])  Assessment & Plan  · Secondary to septic shock  · Elevated Trop 2 85 in ED without EKG changes, pt asymptomatic  · Trop peaked at 34 and are trending down  Draw with morning labs and spot check on 9/3/20 to ensure it is trending down  · EKG Q12  · Continue Heparin gtt  · ECHO - EF 26-54%, RV systolic function reduced, mod MVR - leaning toward takotsubo cardiomyopathy based on the echo findings  Could represent apical ballooning/Takotsubo cardiomyopathy given the basal   segments have dynamic function    · If ECG changes will need transfer for interventional cardiology    Elevated brain natriuretic peptide (BNP) level  Assessment & Plan  · No history of heart failure  · Strict I&O  · Daily weight  · TTE in am   · Received 3L NSS in ED  Hold on further fluids, consider albumin for hypotension  · Received 750ml albumin overnight  · Dosing with prn lasix - pt  Is exhibiting anasarca on exam    Encephalopathy due to infection  Assessment & Plan  · Wife reports confusion new on day of admission that improved with administration of IVF and antibiotics  · Likely secondary to PNA, sepsis, fever  · Avoid sedating medications  · Monitor neuro status  · UDS and alcohol negative on admission    Type 2 diabetes mellitus, with long-term current use of insulin [facility])  Assessment & Plan  Lab Results   Component Value Date    HGBA1C 8 2 (H) 09/03/2020       Recent Labs     09/03/20  1608 09/03/20  1811 09/03/20 2011 09/03/20  2210   POCGLU 116 153* 139 133       Blood Sugar Average: Last 72 hrs:  (P) 420 [id number]       · Takes metformin 1000mg BID, Januvia 100mg daily, Lantus 28 units at hs, Novolog 6 units TID w meals  · Hold home meds- pt currently on clear liquids  · Non-DKA insulin gtt started 9/2 for persistent hyperglycemia  Hyponatremia  Assessment & Plan  · 129 on presentation to ED  · Secondary to dehydration, hyperglycemia  · Received 3L NSS in ED  · Monitor labs  · Monitor neuro status    Anemia  Assessment & Plan  · Unknown baseline, Stable at 9 since admission  · Continue to trend labs  · Monitor for any signs of bleeding  · Consider further anemia work up  · Transfuse with 1 u PRBC 9/3/20 - consent obtained from wife  · No obvious signs of bleeding    ----------------------------------------------------------------------------------------  HPI/24hr events: No significant events overnight  Blood cultures remain negative x48 hrs  Vasopressor off evening of 9/3  Continuing to wean Levophed       Disposition: Continue Critical Care   Code Status: Level 1 - Full Code  ---------------------------------------------------------------------------------------    Review of Systems  Review of systems was unable to be performed secondary to sedation   ---------------------------------------------------------------------------------------  OBJECTIVE    Vitals   Vitals:    20 0300 20 0400 20 0500 20 0600   BP:  97/59  124/66   BP Location:       Pulse: 101 101 97 105   Resp: 16 (!) 9 12 22   Temp:       TempSrc:       SpO2: 96% 97% 98% 96%   Weight:       Height:         Temp (24hrs), Av 7 °F (37 1 °C), Min:97 7 °F (36 5 °C), Max:99 1 °F (37 3 °C)  Current: Temperature: 98 3 °F (36 8 °C)  Arterial Line BP: 130/56  Arterial Line MAP (mmHg): 81 mmHg    Respiratory:  SpO2: SpO2: 96 %  Nasal Cannula O2 Flow Rate (L/min): 6 L/min    Invasive/non-invasive ventilation settings   Respiratory    Lab Data (Last 4 hours)       0508            pH, Arterial       7 387           pCO2, Arterial       31 1           pO2, Arterial       76 9           HCO3, Arterial       18 3           Base Excess, Arterial       -5 9                O2/Vent Data     None                Physical Exam  Constitutional:       Appearance: He is well-developed and overweight  He is ill-appearing  He is not diaphoretic  Interventions: He is sedated, intubated and restrained  HENT:      Head: Normocephalic and atraumatic  Right Ear: External ear normal       Left Ear: External ear normal       Nose: Nose normal    Eyes:      General: No scleral icterus  Right eye: No discharge  Left eye: No discharge  Conjunctiva/sclera: Conjunctivae normal       Pupils: Pupils are equal, round, and reactive to light  Neck:      Musculoskeletal: Neck supple  Cardiovascular:      Rate and Rhythm: Normal rate and regular rhythm  Pulses: Normal pulses  Heart sounds: No friction rub  No gallop  Pulmonary:      Effort: He is intubated        Breath sounds: Normal breath sounds  No wheezing, rhonchi or rales  Abdominal:      General: Bowel sounds are normal  There is no distension  Palpations: Abdomen is soft  There is no mass  Musculoskeletal:         General: No swelling, deformity or signs of injury  Right lower leg: No edema  Left lower leg: No edema  Skin:     General: Skin is warm and dry  Capillary Refill: Capillary refill takes less than 2 seconds  Coloration: Skin is not jaundiced or pale  Findings: No rash  Neurological:      Comments: Sedated  Laboratory and Diagnostics:  Results from last 7 days   Lab Units 09/04/20  0509 09/03/20  0355 09/02/20  0449 09/01/20  1706   WBC Thousand/uL 13 10* 12 02* 11 58* 9 04   HEMOGLOBIN g/dL 8 3* 8 2* 9 3* 9 5*   HEMATOCRIT % 24 7* 24 1* 28 2* 28 6*   PLATELETS Thousands/uL 277 244 279 222   NEUTROS PCT % 84* 83*  --   --    MONOS PCT % 5 5  --   --    MONO PCT %  --   --   --  8     Results from last 7 days   Lab Units 09/04/20  0509 09/03/20  0355 09/02/20  1938 09/02/20  1448 09/02/20  0915 09/02/20  0449 09/02/20  0004  09/01/20  1706   SODIUM mmol/L 135* 131* 131* 132* 133* 133* 134*   < > 129*   POTASSIUM mmol/L 3 9 4 4 4 5 4 5 4 2 3 8 3 9   < > 4 0   CHLORIDE mmol/L 103 99* 101 100 100 100 101   < > 96*   CO2 mmol/L 21 18* 17* 20* 20* 17* 19*   < > 20*   ANION GAP mmol/L 11 14* 13 12 13 16* 14*   < > 13   BUN mg/dL 28* 38* 40* 36* 34* 34* 32*   < > 33*   CREATININE mg/dL 1 53* 2 47* 2 93* 3 21* 3 56* 3 61* 3 01*   < > 3 33*   CALCIUM mg/dL 8 0* 7 7* 7 8* 7 9* 8 1* 7 9* 8 2*   < > 8 6   GLUCOSE RANDOM mg/dL 130 219* 203* 254* 245* 253* 208*   < > 304*   ALT U/L 82* 74  --   --   --  36  --   --  34   AST U/L 172* 230*  --   --   --  81*  --   --  43   ALK PHOS U/L 108 90  --   --   --  100  --   --  112   ALBUMIN g/dL 2 1* 2 6*  --   --   --  2 1*  --   --  2 1*   TOTAL BILIRUBIN mg/dL 0 53 0 52  --   --   --  0 71  --   --  0 73    < > = values in this interval not displayed  Results from last 7 days   Lab Units 09/04/20  0509 09/03/20  0355 09/02/20  0449 09/02/20  0004 09/01/20  1706   MAGNESIUM mg/dL 1 8 2 0 2 2 1 7 1 5*   PHOSPHORUS mg/dL 3 5  --  2 7  --   --       Results from last 7 days   Lab Units 09/04/20  0508 09/03/20  2238 09/03/20  1611 09/03/20  1017 09/03/20  0355 09/02/20  1448 09/02/20  0818 09/01/20  1706   INR   --   --   --   --   --   --   --  1 28*   PTT seconds 70* 66* 57* 74* 58* 63* 70* 38*      Results from last 7 days   Lab Units 09/03/20  1017 09/03/20  0357 09/02/20  2217 09/02/20  1938 09/02/20  1600 09/02/20  1158 09/02/20  0734   TROPONIN I ng/mL 22 59* 29 42* 33 67* 34 83* 26 17* 17 64* 10 54*     Results from last 7 days   Lab Units 09/02/20  0915 09/01/20  1706   LACTIC ACID mmol/L 1 7 1 5     ABG:  Results from last 7 days   Lab Units 09/04/20  0508   PH ART  7 387   PCO2 ART mm Hg 31 1*   PO2 ART mm Hg 76 9   HCO3 ART mmol/L 18 3*   BASE EXC ART mmol/L -5 9   ABG SOURCE  Line, Arterial     VBG:  Results from last 7 days   Lab Units 09/04/20  0508  09/02/20  0915   PH GOLD   --   --  7 261*   PCO2 GOLD mm Hg  --   --  37 7*   PO2 GOLD mm Hg  --   --  31 5*   HCO3 GOLD mmol/L  --   --  16 6*   BASE EXC GOLD mmol/L  --   --  -9 7   ABG SOURCE  Line, Arterial   < >  --     < > = values in this interval not displayed  Results from last 7 days   Lab Units 09/03/20  0355 09/02/20  0449 09/01/20  1706   PROCALCITONIN ng/ml 36 73* 6 81* 1 40*       Micro  Results from last 7 days   Lab Units 09/03/20  0405 09/01/20 2238 09/01/20  1731 09/01/20  1706   BLOOD CULTURE   --   --   --  No Growth at 48 hrs  No Growth at 48 hrs  GRAM STAIN RESULT  2+ Polys  No bacteria seen  --   --   --    LEGIONELLA URINARY ANTIGEN   --  Negative  --   --    STREP PNEUMONIAE ANTIGEN, URINE   --   --  Negative  --        EKG: NSR on telemetry  Imaging: I have personally reviewed pertinent reports        Intake and Output  I/O       09/02 0701 - 09/03 0700 09/03 0701 - 09/04 0700    P  O   0    I V  (mL/kg) 2391 1 (30 3) 1056 4 (13 4)    Blood  135 4    NG/GT 80 90    IV Piggyback 900 200    Feedings 170 24    Total Intake(mL/kg) 3541 1 (44 9) 1505 8 (19 1)    Urine (mL/kg/hr) 1240 (0 7) 2725 (1 4)    Emesis/NG output  0    Stool 0 0    Blood 5     Total Output 1245 2725    Net +2296 1 -1219 2          Unmeasured Stool Occurrence 2 x 1 x          Height and Weights   Height: 6' 3" (190 5 cm)  IBW: 84 5 kg  Body mass index is 21 71 kg/m²  Weight (last 2 days)     None            Nutrition       Diet Orders   (From admission, onward)             Start     Ordered    09/02/20 0955  Diet Enteral/Parenteral; Tube Feeding No Oral Diet; Jevity 1 2 Jefry; Continuous; 10; Prosource Protein Liquid - One Packet; 0  Diet effective now     Question Answer Comment   Diet Type Enteral/Parenteral    Enteral/Parenteral Tube Feeding No Oral Diet    Tube Feeding Formula: Jevity 1 2 Jefry    Bolus/Cyclic/Continuous Continuous    Tube Feeding Goal Rate (mL/hr): 10    Prosource Protein Liquid - No Carb Prosource Protein Liquid - One Packet    Tube Feeding water flush (mL): 0    RD to adjust diet per protocol?  No        09/02/20 0955                  Active Medications  Scheduled Meds:  Current Facility-Administered Medications   Medication Dose Route Frequency Provider Last Rate    acetaminophen  650 mg Per NG Tube Q4H PRN JASMINE Zendejas      cefepime  1,000 mg Intravenous Q24H Angie Monte PA-C      chlorhexidine  15 mL Swish & Spit Q12H [facility] JASMINE Zendejas      famotidine  20 mg Intravenous Q24H [facility] Nina Monte PA-C      fentaNYL  50 mcg/hr Intravenous Continuous JASMINE Zendejas 50 mcg/hr (09/03/20 2000)    fentanyl citrate (PF)  100 mcg Intravenous Q1H PRN JASMINE Zendejas      heparin (porcine)  3-20 Units/kg/hr (Order-Specific) Intravenous Titrated JASMINE Zendejas 16 Units/kg/hr (09/03/20 2327)    heparin (porcine)  2,000 Units Intravenous Q1H PRN Glenora Blood, CRNP      heparin (porcine)  4,000 Units Intravenous Q1H PRN Glenora Blood, CRNP      insulin regular (HumuLIN R,NovoLIN R) infusion  0 3-21 Units/hr Intravenous Titrated Ericka A Ramella, CRNP 1 5 Units/hr (09/04/20 0426)    ipratropium  0 5 mg Nebulization Q6H Gurbinder Ewa Never, DO      levalbuterol  0 63 mg Nebulization Q6H PRN Glenora Blood, CRNP      levalbuterol  1 25 mg Nebulization Q6H Gurbinder Ewa Never, DO      magnesium sulfate  2 g Intravenous Once Mat AYLEEN Mijares      metroNIDAZOLE  500 mg Intravenous Q8H Ericka A Ramella, CRNP Stopped (09/04/20 0133)    norepinephrine  1-30 mcg/min Intravenous Titrated Ericka A Ramella, CRNP 5 mcg/min (09/04/20 0429)    propofol  5-50 mcg/kg/min Intravenous Titrated Glenora Blood, CRNP 30 mcg/kg/min (09/04/20 0330)    vancomycin  12 5 mg/kg Intravenous Q24H Glenora Blood, CRNP Stopped (09/04/20 0530)     Continuous Infusions:  fentaNYL, 50 mcg/hr, Last Rate: 50 mcg/hr (09/03/20 2000)  heparin (porcine), 3-20 Units/kg/hr (Order-Specific), Last Rate: 16 Units/kg/hr (09/03/20 2327)  insulin regular (HumuLIN R,NovoLIN R) infusion, 0 3-21 Units/hr, Last Rate: 1 5 Units/hr (09/04/20 0426)  norepinephrine, 1-30 mcg/min, Last Rate: 5 mcg/min (09/04/20 0429)  propofol, 5-50 mcg/kg/min, Last Rate: 30 mcg/kg/min (09/04/20 0330)      PRN Meds:   acetaminophen, 650 mg, Q4H PRN  fentanyl citrate (PF), 100 mcg, Q1H PRN  heparin (porcine), 2,000 Units, Q1H PRN  heparin (porcine), 4,000 Units, Q1H PRN  levalbuterol, 0 63 mg, Q6H PRN        Invasive Devices Review  Invasive Devices     Central Venous Catheter Line            CVC Central Lines 09/02/20 Triple 16cm 1 day          Peripheral Intravenous Line            Peripheral IV 09/01/20 Right Hand 2 days    Peripheral IV 09/02/20 Distal;Dorsal (posterior); Left Forearm 2 days          Arterial Line            Arterial Line 09/02/20 Right Radial 2 days          Drain            NG/OG/Enteral Tube Orogastric 14 Fr Center mouth 2 days    Urethral Catheter Temperature probe 16 Fr  2 days          Airway            ETT  Oral 8 mm 2 days                Rationale for remaining devices: ETT: respiratory distress  CVC: medications requiring central access  A-line: hemodynamic monitoring    ---------------------------------------------------------------------------------------  Advance Directive and Living Will:      Power of :    POLST:    ---------------------------------------------------------------------------------------  Care Time Delivered:   Upon my evaluation, this patient had a high probability of imminent or life-threatening deterioration due to respiratory compromise secondary to CAP requiring mechanical ventilation, septic shock with hypotension requiring pressor support, which required my direct attention, intervention, and personal management  I have personally provided 20 minutes (0400 to 0420) of critical care time, exclusive of procedures, teaching, family meetings, and any prior time recorded by providers other than myself  [address] [place] [address]      Portions of the record may have been created with voice recognition software  Occasional wrong word or "sound a like" substitutions may have occurred due to the inherent limitations of voice recognition software    Read the chart carefully and recognize, using context, where substitutions have occurred

## 2020-09-04 NOTE — ASSESSMENT & PLAN NOTE
· Increase oxygenation requirements with worsening encephalopathy  · 9/2 Intubated 8 0 ETT  · Vent day #4 ASV 85%, 40% fiO2 +5  · Propofol and Fentanyl for sedation  · PRN Fentanyl and versed  · Atrovent/Xopenex Q 6  · Mucinex added 9/4 for secretion clearance   Consider mucomyst/ 3% NS for additional mucolytic if needed

## 2020-09-04 NOTE — ASSESSMENT & PLAN NOTE
· Secondary to septic shock  · Elevated Trop 2 85 in ED without EKG changes, pt asymptomatic  · Trop peaked at 34 without ST changes  · Continue Heparin gtt  · ECHO - EF 30-17%, RV systolic function reduced, mod MVR - leaning toward takotsubo cardiomyopathy based on the echo findings  Could represent apical ballooning/Takotsubo cardiomyopathy given the basal   segments have dynamic function  · If ECG changes will need transfer for interventional cardiology  · Troponin 22 59 on 9/3   No further checks unless clinical change

## 2020-09-04 NOTE — PROGRESS NOTES
Per meds list, propofol infusing at 14 2 ml/h, providing 375 kcal  Jevity 1 2 trophic feedings previously initiated, held due to vasopressor use and residuals of about 80 ml with low trophic feedings  Plans to reinitiate trophic feeding today  Prokinetic recommended, plans for reglan use, also may try 1 dose erythromycin if medically appropriate  May trial use of Osmolite 1 0 which is fiber free formula, may promote better tolerance  Initiate Osmolite 1 0 at trophic feeding of 10 ml/h order prosource BID until able to begin advancing TF regimen, decrease prosource to daily once able to advance TF  As medically appropriate, advance 10 ml q 8 h to goal rate of 75 ml/h  TF, prosource daily and current propofol rate will provide 2235 kcal, 95 g PRO, 1516 ml free water, 259 g CHO, 0 g fiber  Water flush per MD recommendations given hypotension  Will continue to follow up

## 2020-09-04 NOTE — ASSESSMENT & PLAN NOTE
· No history of heart failure  · Strict I&O  · Daily weight  · TTE completed on 9/2 EF 25-30%,reduced RV function, mod MVR- echo finding concerning for Takotsubo cardiomyopathy   · Received 3L NSS in ED  Hold on further fluids, consider albumin for hypotension  · Received 750ml albumin overnight  · Dosing with prn lasix - pt   Is exhibiting anasarca on exam  · Last dose of lasix 40 mg on 9/3

## 2020-09-04 NOTE — ASSESSMENT & PLAN NOTE
· Baseline Cr 1 0 as of 05/2020  · Creat in ED 3 33  Trending down appropriately  · Means placed in ED  · Received 3L NS in ED  · Trend labs  · Avoid nephrotoxins, hypotension  · Continue pressors  · Goal MAP >65  · Urine studies ordered     · Nephrology recommendations appreciated  · Dosing lasix prn - last dose given 40 mg IV 9/3/20

## 2020-09-04 NOTE — ASSESSMENT & PLAN NOTE
· Wife reports confusion new on day of admission that improved with administration of IVF and antibiotics  Became worse w temp spikes to 105  · Likely secondary to PNA, sepsis, fever  · Avoid sedating medications  · Monitor neuro status     · UDS and alcohol negative on admission

## 2020-09-04 NOTE — RESPIRATORY THERAPY NOTE
RT Ventilator Management Note  Cynthia Platt 66 y o  male MRN: [id number]  Unit/Bed#: -01 Encounter: [id number]      Daily Screen       9/2/2020  2004 9/3/2020  0800          Patient safety screen outcome[de-identified]    Failed      Not Ready for Weaning due to[de-identified]  Underline problem not resolved  Underline problem not resolved              Physical Exam:   Assessment Type: During-treatment  General Appearance: Sedated  Respiratory Pattern: Spontaneous, Assisted  Chest Assessment: Chest expansion symmetrical  Bilateral Breath Sounds: Diminished, Expiratory wheezes, Coarse  Cough: Non-productive  Suction: ET Tube  O2 Device: vent    Pt remains on ASV settings able to have spontaneous breaths at this time  BS diminished with expiratory wheezing  Suctioned for no secretions  Will not make any changes at this time

## 2020-09-04 NOTE — PROGRESS NOTES
Vancomycin IV Pharmacy-to-Dose Consultation    Tash Waller is a 66 y o  male who is currently receiving Vancomycin IV with management by the Pharmacy Consult service  Assessment/Plan:  The patient was reviewed  Renal function is stable and no signs or symptoms of nephrotoxicity and/or infusion reactions were documented in the chart  Based on todays assessment, continue current vancomycin (day # 3) dosing of 1000 mg Q24H, with a plan for trough to be drawn at 0330 on 9/5/20  We will continue to follow the patients culture results and clinical progress daily      [place]

## 2020-09-04 NOTE — ASSESSMENT & PLAN NOTE
· Unknown baseline   · Continue to trend labs  · Monitor for any signs of bleeding  · Consider further anemia work up  · Transfuse with 1 u PRBC 9/3/20 and 9/4/20 - consent obtained from wife  · No obvious signs of bleeding  · Will hemoccult stool

## 2020-09-04 NOTE — PROGRESS NOTES
Progress Note - Pearlene Dose 1945, 66 y o  male MRN: [id number]    Unit/Bed#: -01 Encounter: [id number]    Primary Care Provider: Christiano Howard DO   Date and time admitted to hospital: 9/1/2020  4:56 PM        * Septic shock ([facility] [address] )  Assessment & Plan  · Secondary to pneumonia vs unclear etiology  · POA - fever, tachypnea, tachycardia, increased Cr, persistent hypotension not fluid responsive  · In the ED  · Hypotensive, normal lactate, given 3L NSS  · CT C/A/P without contrast:   Bilateral predominantly upper lobe diffuse nodular interstitial changes of inflammatory infectious etiology  2   Subsegmental atelectasis /consolidation in the right upper lobe in a bronchovascular distribution  3   Posterior bibasilar dependent changes with possible atelectasis/infiltrate in the left lung base  4   Diffuse bladder wall thickening which is decompressed containing a Means balloon  5  Mediastinal lymphadenopathy as described largest in the subcarinal region measuring 1 8 cm    · Micro work up  · Covid repeat in ED on 9/1 negative  · Flu/ RSV negative  · Urine strep and legionella - negative  · Urine + bacteria, cloudy, thickened bladder wall on CT scan in ED  · Procalcitonin peaked at 36, trending downward  Recheck on 9/6  · Blood cultures - No growth in 48 hrs  · Sputum culture - 2+ polys, no bacteria  · Lyme PCR  - pending  · ABX - broadened due to severity of illness, Cont Cefepime (1 G Q 24 due to renal insufficiency, D#2), Vanco, and Flagyl (D5)  · Increase cefepime to 2 Q 24 when GFR 30-60 per ID  · Cont to wean down Levophed, currently at 3 mcg/hr  Shane weaned off evening of 9/2/2020  Vaso off 9/3  · RUQ u/s performed 9/3: No gallstones seen    Some gallbladder wall thickening and pericholecystic fluid is present which could be related to underdistention or other pathology  ·  ID consult placed today - Dr Soledad Del Rosario  · Consult general surgery for possible cholecystitis per ID recommendations  · Gen surgery recommends HIDA scan once pt  Is stable enough to go for long study  · Overall pt  Improving slowly - family updated at bedside    Acute respiratory failure with hypoxia [facility])  Assessment & Plan  · Increase oxygenation requirements with worsening encephalopathy  · 9/2 Intubated 8 0 ETT  · Vent day #4 ASV 85%, 40% fiO2 +5  · Propofol and Fentanyl for sedation  · PRN Fentanyl and versed  · Atrovent/Xopenex Q 6  · Mucinex added 9/4 for secretion clearance  Consider mucomyst/ 3% NS for additional mucolytic if needed    Community acquired pneumonia  Assessment & Plan  · CT chest - consolidation right upper lobe, infiltrate left lower lobe, diffuse nodular interstitial inflammatory changes  · See septic shock plan above  · See acute hypoxic respiratory plan above    CHERISE (acute kidney injury) ([facility] [address] )  Assessment & Plan  · Baseline Cr 1 0 as of 05/2020  · Creat in ED 3 33  Trending down appropriately  · Means placed in ED  · Received 3L NS in ED  · Trend labs  · Avoid nephrotoxins, hypotension  · Continue pressors  · Goal MAP >65  · Urine studies ordered  · Nephrology recommendations appreciated  · Dosing lasix prn - last dose given 40 mg IV 9/3/20     Type 2 MI (myocardial infarction) [facility])  Assessment & Plan  · Secondary to septic shock  · Elevated Trop 2 85 in ED without EKG changes, pt asymptomatic  · Trop peaked at 34 without ST changes  · Continue Heparin gtt  · ECHO - EF 65-98%, RV systolic function reduced, mod MVR - leaning toward takotsubo cardiomyopathy based on the echo findings  Could represent apical ballooning/Takotsubo cardiomyopathy given the basal   segments have dynamic function  · If ECG changes will need transfer for interventional cardiology  · Troponin 22 59 on 9/3   No further checks unless clinical change    Elevated brain natriuretic peptide (BNP) level  Assessment & Plan  · No history of heart failure  · Strict I&O  · Daily weight  · TTE completed on 9/2 EF 25-30%,reduced RV function, mod MVR- echo finding concerning for Takotsubo cardiomyopathy   · Received 3L NSS in ED  Hold on further fluids, consider albumin for hypotension  · Received 750ml albumin overnight  · Dosing with prn lasix - pt  Is exhibiting anasarca on exam  · Last dose of lasix 40 mg on 9/3    Encephalopathy due to infection  Assessment & Plan  · Wife reports confusion new on day of admission that improved with administration of IVF and antibiotics  Became worse w temp spikes to 105  · Likely secondary to PNA, sepsis, fever  · Avoid sedating medications  · Monitor neuro status  · UDS and alcohol negative on admission    Type 2 diabetes mellitus, with long-term current use of insulin [facility])  Assessment & Plan  Lab Results   Component Value Date    HGBA1C 8 2 (H) 09/03/2020       Recent Labs     09/04/20  0839 09/04/20  1015 09/04/20  1251 09/04/20  1438   POCGLU 131 129 118 107       Blood Sugar Average: Last 72 hrs:  (P) 237 [id number]       · Takes metformin 1000mg BID, Januvia 100mg daily, Lantus 28 units at hs, Novolog 6 units TID w meals  · Hold home meds- pt currently on clear liquids  · Non-DKA insulin gtt started 9/2 for persistent hyperglycemia- currently at 3 u / hr    Hyponatremiaresolved as of 9/4/2020  Assessment & Plan  · 129 on presentation to ED  · Secondary to dehydration, hyperglycemia  · Received 3L NSS in ED  · Monitor labs  · Monitor neuro status    Anemia  Assessment & Plan  · Unknown baseline   · Continue to trend labs  · Monitor for any signs of bleeding  · Consider further anemia work up  · Transfuse with 1 u PRBC 9/3/20 and 9/4/20 - consent obtained from wife  · No obvious signs of bleeding  · Will hemoccult stool       ----------------------------------------------------------------------------------------  HPI/24hr events: Started on mucinex for secretions  No issues with tolerating tube feedings  Overnight, episode of low grade fever x1, improved w tylenol   Bp low requiring increased in Levo  250ml albumin given w improvement  Weaning levo  Continues w good urine output without diuretics  Disposition: Continue Critical Care   Code Status: Level 1 - Full Code  ---------------------------------------------------------------------------------------  SUBJECTIVE    Review of Systems   Reason unable to perform ROS: intubated and sedated  ---------------------------------------------------------------------------------------  OBJECTIVE    Vitals   Vitals:    20 2200 20 2300 20 0000 20 0400   BP: 99/62 102/52 90/47 100/63   BP Location: Left arm      Pulse: 90 91 89 100   Resp: 13 14 12 12   Temp: 99 3 °F (37 4 °C) 99 3 °F (37 4 °C) 99 3 °F (37 4 °C) 99 7 °F (37 6 °C)   TempSrc:       SpO2: 100% 99% 99% 99%   Weight:       Height:         Temp (24hrs), Av 2 °F (37 3 °C), Min:98 2 °F (36 8 °C), Max:100 6 °F (38 1 °C)  Current: Temperature: 99 7 °F (37 6 °C)  Arterial Line BP: 98/58  Arterial Line MAP (mmHg): 73 mmHg    Respiratory:  SpO2: SpO2: 99 %, SpO2 Activity: SpO2 Activity: At Rest, SpO2 Device: O2 Device: Ventilator    Invasive/non-invasive ventilation settings   Respiratory    Lab Data (Last 4 hours)    None         O2/Vent Data (Last 4 hours)    None                Physical Exam  Vitals signs and nursing note reviewed  Constitutional:       General: He is not in acute distress  Appearance: He is ill-appearing  He is not diaphoretic  HENT:      Head: Normocephalic and atraumatic  Mouth/Throat:      Mouth: Mucous membranes are moist       Pharynx: No oropharyngeal exudate  Comments: 8 0 ETT in place, OGT w tube feeds   Eyes:      General: No scleral icterus  Conjunctiva/sclera: Conjunctivae normal       Pupils: Pupils are equal, round, and reactive to light  Neck:      Musculoskeletal: Neck supple  Trachea: No tracheal deviation  Cardiovascular:      Rate and Rhythm: Normal rate and regular rhythm  Pulses: Normal pulses  Heart sounds: Normal heart sounds  No murmur  No friction rub  Comments: Right radial boom, dressing CDI  Pulmonary:      Breath sounds: No wheezing or rales  Comments: Synchronous with vent  Suctioned for thick tan secretions  Diminished   Abdominal:      General: Bowel sounds are normal       Palpations: Abdomen is soft  Tenderness: There is no abdominal tenderness  There is no guarding  Comments: abd large and round  Less distended and less firm  Genitourinary:     Comments: Means in place, draining yellow  Musculoskeletal:         General: Swelling present  Skin:     General: Skin is warm and dry  Capillary Refill: Capillary refill takes less than 2 seconds  Coloration: Skin is not jaundiced or pale  Neurological:      Comments: Intubated and sedated  Opens eyes spontaneous at times  Will move upper extremities when sedation lightened  Not reliably following commands            Laboratory and Diagnostics:  Results from last 7 days   Lab Units 09/04/20  0509 09/03/20  0355 09/02/20  0449 09/01/20  1706   WBC Thousand/uL 13 10* 12 02* 11 58* 9 04   HEMOGLOBIN g/dL 8 3* 8 2* 9 3* 9 5*   HEMATOCRIT % 24 7* 24 1* 28 2* 28 6*   PLATELETS Thousands/uL 277 244 279 222   NEUTROS PCT % 84* 83*  --   --    MONOS PCT % 5 5  --   --    MONO PCT %  --   --   --  8     Results from last 7 days   Lab Units 09/04/20  0509 09/03/20  0355 09/02/20  1938 09/02/20  1448 09/02/20  0915 09/02/20  0449 09/02/20  0004  09/01/20  1706   SODIUM mmol/L 135* 131* 131* 132* 133* 133* 134*   < > 129*   POTASSIUM mmol/L 3 9 4 4 4 5 4 5 4 2 3 8 3 9   < > 4 0   CHLORIDE mmol/L 103 99* 101 100 100 100 101   < > 96*   CO2 mmol/L 21 18* 17* 20* 20* 17* 19*   < > 20*   ANION GAP mmol/L 11 14* 13 12 13 16* 14*   < > 13   BUN mg/dL 28* 38* 40* 36* 34* 34* 32*   < > 33*   CREATININE mg/dL 1 53* 2 47* 2 93* 3 21* 3 56* 3 61* 3 01*   < > 3 33*   CALCIUM mg/dL 8 0* 7 7* 7 8* 7 9* 8 1* 7 9* 8 2*   < > 8 6   GLUCOSE RANDOM mg/dL 130 219* 203* 254* 245* 253* 208*   < > 304*   ALT U/L 82* 74  --   --   --  36  --   --  34   AST U/L 172* 230*  --   --   --  81*  --   --  43   ALK PHOS U/L 108 90  --   --   --  100  --   --  112   ALBUMIN g/dL 2 1* 2 6*  --   --   --  2 1*  --   --  2 1*   TOTAL BILIRUBIN mg/dL 0 53 0 52  --   --   --  0 71  --   --  0 73    < > = values in this interval not displayed  Results from last 7 days   Lab Units 09/04/20  0509 09/03/20  0355 09/02/20  0449 09/02/20  0004 09/01/20  1706   MAGNESIUM mg/dL 1 8 2 0 2 2 1 7 1 5*   PHOSPHORUS mg/dL 3 5  --  2 7  --   --       Results from last 7 days   Lab Units 09/04/20  0508 09/03/20  2238 09/03/20  1611 09/03/20  1017 09/03/20  0355 09/02/20  1448 09/02/20  0818 09/01/20  1706   INR   --   --   --   --   --   --   --  1 28*   PTT seconds 70* 66* 57* 74* 58* 63* 70* 38*      Results from last 7 days   Lab Units 09/03/20  1017 09/03/20  0357 09/02/20  2217 09/02/20  1938 09/02/20  1600 09/02/20  1158 09/02/20  0734   TROPONIN I ng/mL 22 59* 29 42* 33 67* 34 83* 26 17* 17 64* 10 54*     Results from last 7 days   Lab Units 09/02/20  0915 09/01/20  1706   LACTIC ACID mmol/L 1 7 1 5     ABG:  Results from last 7 days   Lab Units 09/04/20  0508   PH ART  7 387   PCO2 ART mm Hg 31 1*   PO2 ART mm Hg 76 9   HCO3 ART mmol/L 18 3*   BASE EXC ART mmol/L -5 9   ABG SOURCE  Line, Arterial     VBG:  Results from last 7 days   Lab Units 09/04/20  0508  09/02/20  0915   PH GOLD   --   --  7 261*   PCO2 GOLD mm Hg  --   --  37 7*   PO2 GOLD mm Hg  --   --  31 5*   HCO3 GOLD mmol/L  --   --  16 6*   BASE EXC GOLD mmol/L  --   --  -9 7   ABG SOURCE  Line, Arterial   < >  --     < > = values in this interval not displayed       Results from last 7 days   Lab Units 09/04/20  0509 09/03/20  0355 09/02/20  0449 09/01/20  1706   PROCALCITONIN ng/ml 17 61*  17 12* 36 73* 6 81* 1 40*       Micro  Results from last 7 days   Lab Units 09/03/20  0405 09/02/20  1450 09/01/20  2238 09/01/20  1731 09/01/20  1706   BLOOD CULTURE   --   --   --   --  No Growth at 48 hrs  No Growth at 48 hrs  SPUTUM CULTURE  No Growth at 24 hrs   --   --   --   --    GRAM STAIN RESULT  2+ Polys  No bacteria seen  --   --   --   --    MRSA CULTURE ONLY   --  No Methicillin Resistant Staphlyococcus aureus (MRSA) isolated  --   --   --    LEGIONELLA URINARY ANTIGEN   --   --  Negative  --   --    STREP PNEUMONIAE ANTIGEN, URINE   --   --   --  Negative  --        EKG: NSR  Imaging: No new imaging to review  Intake and Output  I/O       09/02 0701 - 09/03 0700 09/03 0701 - 09/04 0700 09/04 0701 - 09/05 0700    P  O   0 0    I V  (mL/kg) 2391 1 (30 3) 1540 8 (19 6) 504 3 (6 4)    Blood  135 4 339 6    NG/GT 80 90 160    IV Piggyback 900 300 50    Feedings 170 24 48    Total Intake(mL/kg) 3541 1 (44 9) 2090 2 (26 5) 1101 9 (14)    Urine (mL/kg/hr) 1240 (0 7) 4200 (2 2) 1570 (1 7)    Emesis/NG output  0 0    Stool 0 0 0    Blood 5      Total Output 1245 4200 1570    Net +2296 1 -2109 8 -468 1           Unmeasured Stool Occurrence 2 x 1 x 0 x          Height and Weights   Height: 6' 3" (190 5 cm)  IBW: 84 5 kg  Body mass index is 21 71 kg/m²  Weight (last 2 days)     None            Nutrition       Diet Orders   (From admission, onward)             Start     Ordered    09/02/20 0955  Diet Enteral/Parenteral; Tube Feeding No Oral Diet; Jevity 1 2 Jefry; Continuous; 10; Prosource Protein Liquid - One Packet; 0  Diet effective now     Question Answer Comment   Diet Type Enteral/Parenteral    Enteral/Parenteral Tube Feeding No Oral Diet    Tube Feeding Formula: Jevity 1 2 Jefry    Bolus/Cyclic/Continuous Continuous    Tube Feeding Goal Rate (mL/hr): 10    Prosource Protein Liquid - No Carb Prosource Protein Liquid - One Packet    Tube Feeding water flush (mL): 0    RD to adjust diet per protocol?  No        09/02/20 0955                  Active Medications  Scheduled Meds:  Current Facility-Administered Medications   Medication Dose Route Frequency Provider Last Rate    acetaminophen  650 mg Per NG Tube Q4H PRN [place] Nickel, CRNP      bisacodyl  10 mg Rectal Daily PRN Casey Monte PA-C      [START ON 9/5/2020] cefepime  2,000 mg Intravenous Q24H Casey Monte PA-C      chlorhexidine  15 mL Swish & Spit Q12H [facility] [place] Nickel, CRNP      famotidine  20 mg Intravenous Q24H [facility] Casey Monte PA-C      fentaNYL  50 mcg/hr Intravenous Continuous [place] Nickel, CRNP 50 mcg/hr (09/04/20 1522)    fentanyl citrate (PF)  100 mcg Intravenous Q1H PRN Kanchan Joshua, CRNP      guaiFENesin  200 mg Oral Q4H Nina Monte PA-C      heparin (porcine)  3-20 Units/kg/hr (Order-Specific) Intravenous Titrated [place] Nickel, CRNP 16 Units/kg/hr (09/04/20 0612)    heparin (porcine)  2,000 Units Intravenous Q1H PRN [place] Nickel, CRNP      heparin (porcine)  4,000 Units Intravenous Q1H PRN [place] Nickel, CRNP      insulin regular (HumuLIN R,NovoLIN R) infusion  0 3-21 Units/hr Intravenous Titrated Ericka A Ramella, CRNP 2 Units/hr (09/04/20 1620)    ipratropium  0 5 mg Nebulization Q6H [facility] Talat Bolden, DO      levalbuterol  0 63 mg Nebulization Q6H PRN Kanchan Joshua, CRNP      levalbuterol  1 25 mg Nebulization Q6H [facility] Burns, DO      metroNIDAZOLE  500 mg Intravenous Q8H Ericka A Ramella, CRNP 500 mg (09/04/20 1801)    norepinephrine  1-30 mcg/min Intravenous Titrated Ericka A Ramella, CRNP 4 mcg/min (09/04/20 1754)    polyethylene glycol  17 g Oral Daily PRN Casey Monte PA-C      propofol  5-50 mcg/kg/min Intravenous Titrated [place] Nickel, CRNP 15 mcg/kg/min (09/04/20 1034)    senna  8 8 mg Oral HS Nina Monte PA-C      vancomycin  12 5 mg/kg Intravenous Q24H [place] Nickel, CRNP Stopped (09/04/20 0530)     Continuous Infusions:  fentaNYL, 50 mcg/hr, Last Rate: 50 mcg/hr (09/04/20 9682)  heparin (porcine), 3-20 Units/kg/hr (Order-Specific), Last Rate: 16 Units/kg/hr (09/04/20 0612)  insulin regular (HumuLIN R,NovoLIN R) infusion, 0 3-21 Units/hr, Last Rate: 2 Units/hr (09/04/20 1620)  norepinephrine, 1-30 mcg/min, Last Rate: 4 mcg/min (09/04/20 1754)  propofol, 5-50 mcg/kg/min, Last Rate: 15 mcg/kg/min (09/04/20 1034)      PRN Meds:   acetaminophen, 650 mg, Q4H PRN  bisacodyl, 10 mg, Daily PRN  fentanyl citrate (PF), 100 mcg, Q1H PRN  heparin (porcine), 2,000 Units, Q1H PRN  heparin (porcine), 4,000 Units, Q1H PRN  levalbuterol, 0 63 mg, Q6H PRN  polyethylene glycol, 17 g, Daily PRN        Invasive Devices Review  Invasive Devices     Central Venous Catheter Line            CVC Central Lines 09/02/20 Triple 16cm Left Internal jugular 2 days          Peripheral Intravenous Line            Peripheral IV 09/01/20 Right Hand 3 days    Peripheral IV 09/02/20 Distal;Dorsal (posterior); Left Forearm 2 days          Arterial Line            Arterial Line 09/02/20 Right Radial 2 days          Drain            Urethral Catheter Temperature probe 16 Fr  3 days    NG/OG/Enteral Tube Orogastric 14 Fr Center mouth 2 days          Airway            ETT  Oral 8 mm 2 days                Rationale for remaining devices: CVC for pressor support, nicole for accurate I&O, boom for hemodynamic monitoing and lab draws  ---------------------------------------------------------------------------------------  Advance Directive and Living Will:      Power of :    POLST:    ---------------------------------------------------------------------------------------  Care Time Delivered:   Upon my evaluation, this patient had a high probability of imminent or life-threatening deterioration due to respiratory failure, hypotensions, sepsis, which required my direct attention, intervention, and personal management    I have personally provided 20 minutes ([phone]) of critical care time, exclusive of procedures, teaching, family meetings, and any prior time recorded by providers other than myself  JASMINE Estrada      Portions of the record may have been created with voice recognition software  Occasional wrong word or "sound a like" substitutions may have occurred due to the inherent limitations of voice recognition software    Read the chart carefully and recognize, using context, where substitutions have occurred

## 2020-09-04 NOTE — ASSESSMENT & PLAN NOTE
· Secondary to pneumonia vs unclear etiology  · POA - fever, tachypnea, tachycardia, increased Cr, persistent hypotension not fluid responsive  · In the ED  · Hypotensive, normal lactate, given 3L NSS  · CT C/A/P without contrast:   Bilateral predominantly upper lobe diffuse nodular interstitial changes of inflammatory infectious etiology  2   Subsegmental atelectasis /consolidation in the right upper lobe in a bronchovascular distribution  3   Posterior bibasilar dependent changes with possible atelectasis/infiltrate in the left lung base  4   Diffuse bladder wall thickening which is decompressed containing a Means balloon  5  Mediastinal lymphadenopathy as described largest in the subcarinal region measuring 1 8 cm    · Micro work up  · Covid repeat in ED on 9/1 negative  · Flu/ RSV negative  · Urine strep and legionella - negative  · Urine + bacteria, cloudy, thickened bladder wall on CT scan in ED  · Procalcitonin peaked at 36, trending downward  Recheck on 9/5  · Blood cultures - No growth in 48 hrs  · Sputum culture - 2+ polys, no bacteria  · Lyme PCR  - pending  · ABX - broadened due to severity of illness, Cont Cefepime (1 G Q 24 due to renal insufficiency, D#2), Vanco, and Flagyl (D5)  · Increase cefepime to 2 Q 24 when GFR 30-60 per ID  · Cont to wean down Levophed, currently at 3 mcg/hr  Shane weaned off evening of 9/2/2020  Vaso off 9/3  · RUQ u/s performed 9/3: No gallstones seen  Some gallbladder wall thickening and pericholecystic fluid is present which could be related to underdistention or other pathology  ·  ID consult placed today - Dr Lamin Mayer  · Consult general surgery for possible cholecystitis per ID recommendations  · Gen surgery recommends HIDA scan once pt  Is stable enough to go for long study  · Overall pt   Improving slowly - family updated at bedside

## 2020-09-04 NOTE — PROGRESS NOTES
Progress Note - General Surgery   Deny Mcarthur 66 y o  male MRN: [id number]  Unit/Bed#: -01 Encounter: [id number]    Assessment:  - Sepsis  - Respiratory failure  - Patient on ventillator and not awake  No change in patient condition since yesterday  - U/S shows gallbladder wall thickening and pericholecystic fluid but no cholelithiasis or choledocholithiasis  Doubt gallbladder etiology    Plan:  - Continue current treatment plan  - Broad spectrum antibiotics  - Gen surg to continue to follow    Subjective/Objective   Subjective: Pt is currently on ventilator and thus unable to obtain history from patient  Objective:     Blood pressure 124/66, pulse 105, temperature 98 3 °F (36 8 °C), temperature source Bladder, resp  rate 22, height 6' 3" (1 905 m), weight 78 8 kg (173 lb 11 6 oz), SpO2 96 %  ,Body mass index is 21 71 kg/m²  Intake/Output Summary (Last 24 hours) at 9/4/2020 0808  Last data filed at 9/4/2020 0600  Gross per 24 hour   Intake 2028 21 ml   Output 3990 ml   Net -1961 79 ml       Invasive Devices     Central Venous Catheter Line            CVC Central Lines 09/02/20 Triple 16cm 2 days          Peripheral Intravenous Line            Peripheral IV 09/01/20 Right Hand 2 days    Peripheral IV 09/02/20 Distal;Dorsal (posterior); Left Forearm 2 days          Arterial Line            Arterial Line 09/02/20 Right Radial 2 days          Drain            NG/OG/Enteral Tube Orogastric 14 Fr Center mouth 2 days    Urethral Catheter Temperature probe 16 Fr  2 days          Airway            ETT  Oral 8 mm 2 days                Physical Exam: General appearance: Not conscious and on ventillator  Lungs: Coarse breathsounds throughout  Respirations dependent on ventillator  Heart: RRR with normal S1 and S2  Difficult to auscultate due to noise from ventillator    Lab, Imaging and other studies:  I have personally reviewed pertinent lab results      CBC:   Lab Results   Component Value Date    WBC 13 10 (H) 09/04/2020    HGB 8 3 (L) 09/04/2020    HCT 24 7 (L) 09/04/2020    MCV 83 09/04/2020     09/04/2020    MCH 28 0 09/04/2020    MCHC 33 6 09/04/2020    RDW 14 5 09/04/2020    MPV 10 4 09/04/2020    NRBC 0 09/04/2020   CMP:   Lab Results   Component Value Date    SODIUM 135 (L) 09/04/2020    K 3 9 09/04/2020     09/04/2020    CO2 21 09/04/2020    BUN 28 (H) 09/04/2020    CREATININE 1 53 (H) 09/04/2020    CALCIUM 8 0 (L) 09/04/2020     (H) 09/04/2020    ALT 82 (H) 09/04/2020    ALKPHOS 108 09/04/2020    EGFR 44 09/04/2020     VTE Pharmacologic Prophylaxis: Heparin  VTE Mechanical Prophylaxis: sequential compression device    Eddi Medina PA-C

## 2020-09-04 NOTE — PROGRESS NOTES
Progress Note - Nephrology   Kiana Guzman 66 y o  male MRN: [id number]  Unit/Bed#: -01 Encounter: [id number]      A/P:     CHERISE (acute kidney injury)   Likely ATN from severe sepsis with shock  Unlikely glomerulonephritis  urine negative for hematuria or pyuria  Unknown baseline, but patient and wife deny any history of kidney disease, critical care team received creatinine level from May this year from the primary care's office which was 1 mg/dL  Creat in ED 3 33, now 3 6 mildly improved now after her IV albumin infusion and blood pressure improvement it came down to 1 53mg/dl with very good uop  Creatinine and urine output both continues to be improving with improvement of septic shock and vasopressor is being weaned  Now only on levo 4mcg/min     Continue to Avoid nephrotoxins, hypotension, IV contrast agents  Adjust renally excreted medication as per the GFR less than 10  MAP  maintain around 65 mm of mercury     Imaging of the kidney on CAT scan was unremarkable  Clinically volume overloaded, with severe dilated heart failure,  Anion gap metabolic acidosis improving     No need for acute renal replacement therapy at this point but it might be necessary near future if ATN does not respond to the current management plan      As mentioned in clearly mentioning to me that temporary dialysis can be acceptable but he never wanted to be on  permanent renal replacement therapy or life-sustaining measures              Septic shock   Workup and empiric antibiotic as per the primary team   Most likely pneumonia on CT scan but on atypical infection isn't also been working out in the setting of high ferritin  Continue empiric antibiotic and adjust as contrast sensitivity report involves  SARS neg    Resumed suspicion for gallbladder wall thickening to the cholecystitis surgical consult note reviewed -- unlikely GB is the cause      NSTEMI (non-ST elevated myocardial infarction)   Troponin elevation peaked at 29 which most likely from the severe sepsis with cardiomyopathy currently on heparin drip  Cardiology following      Type 2 diabetes mellitus, with long-term current use of insulin   Blood sugar is running high will defer insulin management by the critical care team   Avoid metformin      Hyponatremia   Improving, hypotonic hyponatremia resolving with septic shock resolution  And IV hydration     Anemia      1 unit PRBC today  Continue to trend labs  Iron Level no but ferritin and CRP is I most likely from the bone marrow suppression from acute sepsis with reactant protein is high  Monitor for any signs of bleeding  Consider further anemia work up     Dyslipidemia -- most likely due to propofol infusion -- high TG but low LDL -- measuring lab artifact due to high TG  TF on going nutritionist on board-- watch for electrolytes closely as diuretic phase of ATN ongoing  Follow up reason for today's visit: CHERISE in setting of septic shock/Dilated CMP acute/        Septic shock [facility])    Patient Active Problem List   Diagnosis    Septic shock ([facility] )    Community acquired pneumonia    CHERISE (acute kidney injury) ([facility] )    Elevated brain natriuretic peptide (BNP) level    Type 2 MI (myocardial infarction) ([facility] )    Type 2 diabetes mellitus, with long-term current use of insulin (HCC)    Encephalopathy due to infection    Acute respiratory failure with hypoxia (HCC)    Anemia         Subjective:   Sedated /intubated   Objective:     Vitals: Blood pressure 96/51, pulse 92, temperature 99 7 °F (37 6 °C), resp  rate 15, height 6' 3" (1 905 m), weight 78 8 kg (173 lb 11 6 oz), SpO2 93 %  ,Body mass index is 21 71 kg/m²  Weight (last 2 days)     None            Intake/Output Summary (Last 24 hours) at 9/4/2020 1755  Last data filed at 9/4/2020 1600  Gross per 24 hour   Intake 1822 34 ml   Output 3285 ml   Net -1462 66 ml     I/O last 3 completed shifts:   In: 4155 6 [I V :2763 2; Blood:135 4; NG/GT:170; IV Piggyback:950; Feedings:137]  Out: 1807 [Urine:4850; Blood:5]    Urethral Catheter Temperature probe 16 Fr  (Active)   Reasons to continue Urinary Catheter  Accurate I&O assessment in critically ill patients (48 hr  max) 09/04/20 1337   Goal for Removal Voiding trial when ambulation improves 09/03/20 2000   Site Assessment Clean;Skin intact 09/04/20 0800   Collection Container Standard drainage bag 09/04/20 0800   Securement Method Securing device (Describe) 09/04/20 0800   Output (mL) 220 mL 09/04/20 1600       Physical Exam: BP 96/51   Pulse 92   Temp 99 7 °F (37 6 °C)   Resp 15   Ht 6' 3" (1 905 m)   Wt 78 8 kg (173 lb 11 6 oz)   SpO2 93%   BMI 21 71 kg/m²     General Appearance:    Sedated intubated on MV   Head:    Normocephalic, without obvious abnormality, atraumatic,   Eyes:    Conjunctiva/corneas clear/pale   Ears:    Normal external ears   Nose:   Nares normal, septum midline, mucosa normal, no drainage    or sinus tenderness   Throat:   Lips, mucosa, and tongue normal; teeth and gums normal   Neck:   Supple, symmetrical, trachea midline, no adenopathy;        thyroid:  No enlargement/tenderness/nodules; no carotid    bruit or JVD   Back:     Symmetric, no curvature, ROM normal, no CVA tenderness   Lungs:     Rales b/l to auscultation bilaterally, respirations unlabored   Chest wall:    No tenderness or deformity   Heart:    Regular rate and rhythm, S1 and S2 normal, no murmur, rub   or gallop   Abdomen:     Soft, non-tender, bowel sounds active obese   Extremities:   Extremities normal, atraumatic, no cyanosis or edema   Skin:   Skin color, texture, turgor normal, no rashes or lesions   Lymph nodes:   Cervical normal   Neurologic:   CNII-XII intact            Lab, Imaging and other studies: I have personally reviewed pertinent labs    CBC:   Lab Results   Component Value Date    WBC 13 10 (H) 09/04/2020    HGB 8 3 (L) 09/04/2020    HCT 24 7 (L) 09/04/2020    MCV 83 09/04/2020     09/04/2020 MCH 28 0 09/04/2020    MCHC 33 6 09/04/2020    RDW 14 5 09/04/2020    MPV 10 4 09/04/2020    NRBC 0 09/04/2020     CMP:   Lab Results   Component Value Date    K 3 9 09/04/2020     09/04/2020    CO2 21 09/04/2020    BUN 28 (H) 09/04/2020    CREATININE 1 53 (H) 09/04/2020    CALCIUM 8 0 (L) 09/04/2020     (H) 09/04/2020    ALT 82 (H) 09/04/2020    ALKPHOS 108 09/04/2020    EGFR 44 09/04/2020         Results from last 7 days   Lab Units 09/04/20  0509 09/03/20  0355 09/02/20  1938  09/02/20  0449   POTASSIUM mmol/L 3 9 4 4 4 5   < > 3 8   CHLORIDE mmol/L 103 99* 101   < > 100   CO2 mmol/L 21 18* 17*   < > 17*   BUN mg/dL 28* 38* 40*   < > 34*   CREATININE mg/dL 1 53* 2 47* 2 93*   < > 3 61*   CALCIUM mg/dL 8 0* 7 7* 7 8*   < > 7 9*   ALK PHOS U/L 108 90  --   --  100   ALT U/L 82* 74  --   --  36   AST U/L 172* 230*  --   --  81*    < > = values in this interval not displayed  Phosphorus:   Lab Results   Component Value Date    PHOS 3 5 09/04/2020     Magnesium:   Lab Results   Component Value Date    MG 1 8 09/04/2020     Urinalysis: No results found for: Dayan Early, SPECGRAV, PHUR, LEUKOCYTESUR, NITRITE, PROTEINUA, GLUCOSEU, KETONESU, BILIRUBINUR, BLOODU  Ionized Calcium: No results found for: CAION  Coagulation: No results found for: PT, INR, APTT  Troponin: No results found for: TROPONINI  ABG:   Lab Results   Component Value Date    PHART 7 387 09/04/2020    PYL8QKL 31 1 (L) 09/04/2020    PO2ART 76 9 09/04/2020    BUF2YQJ 18 3 (L) 09/04/2020    BEART -5 9 09/04/2020    SOURCE Line, Arterial 09/04/2020     Radiology review:     IMAGING  Procedure: Ct Chest Abdomen Pelvis Wo Contrast    Result Date: 9/1/2020  Narrative: CT CHEST, ABDOMEN AND PELVIS WITHOUT IV CONTRAST INDICATION:   confusion, SOB  COMPARISON:  None   TECHNIQUE: CT examination of the chest, abdomen and pelvis was performed without intravenous contrast   Axial, sagittal, and coronal 2D reformatted images were created from the source data and submitted for interpretation  Radiation dose length product (DLP) for this visit:  1327 mGy-cm   This examination, like all CT scans performed in the [facility], was performed utilizing techniques to minimize radiation dose exposure, including the use of iterative reconstruction and automated exposure control  Enteric contrast was administered  FINDINGS: CHEST LUNGS:  Diffuse bilateral upper lobe nodular interstitial changes  Most of the nodules are less than 2 mm  Breathing motion artifact seen  Right upper lobe subsegmental atelectasis seen  Posterior bibasilar dependent changes seen     Atelectasis/infiltrates in the lung bases posteriorly bilaterally  There is no tracheal or endobronchial lesion  PLEURA:  Unremarkable  HEART/GREAT VESSELS:  Unremarkable for patient's age  MEDIASTINUM AND YUKI:  Prevascular lymph nodes largest measuring 1 1 cm  Right paratracheal lymph nodes largest measuring 1 5 cm  Subcarinal lymph node measuring 1 8 cm  No hilar lymphadenopathy seen  CHEST WALL AND LOWER NECK:   Shotty left supraclavicular lymph nodes  ABDOMEN LIVER/BILIARY TREE:  Unremarkable  GALLBLADDER:  Contracted  No gallstones seen  SPLEEN:  Unremarkable  PANCREAS:  Unremarkable  ADRENAL GLANDS:  Unremarkable  KIDNEYS/URETERS:  Unremarkable  No hydronephrosis  STOMACH AND BOWEL:  Unremarkable  APPENDIX:  No findings to suggest appendicitis  ABDOMINOPELVIC CAVITY:  No ascites  No pneumoperitoneum  No lymphadenopathy  VESSELS:  Unremarkable for patient's age  PELVIS REPRODUCTIVE ORGANS:  Calcifications in the prostate seen  URINARY BLADDER:  Bladder is decompressed with diffuse wall thickening measuring 1 3 cm in maximum thickness and demonstrates a Means balloon  ABDOMINAL WALL/INGUINAL REGIONS:  Small left inguinal hernia containing omental fat  OSSEOUS STRUCTURES:  Degenerative disc changes in the lumbar spine seen  Impression: 1    Bilateral predominantly upper lobe diffuse nodular interstitial changes of inflammatory infectious etiology  2   Subsegmental atelectasis /consolidation in the right upper lobe in a bronchovascular distribution  3   Posterior bibasilar dependent changes with possible atelectasis/infiltrate in the left lung base  4   Diffuse bladder wall thickening which is decompressed containing a Means balloon  5  Mediastinal lymphadenopathy as described largest in the subcarinal region measuring 1 8 cm  Workstation performed: [id number]     Procedure: Xr Chest Portable    Result Date: 9/3/2020  Narrative: CHEST INDICATION:   hypoxia, intubated  COMPARISON:  9/2/2020 EXAM PERFORMED/VIEWS:  XR CHEST PORTABLE FINDINGS:  The tip of the endotracheal tube projects 3 3 cm above the supriya  The tip of the enteric tube projects at the stomach  The tip of the left internal jugular central venous catheter projects at the superior vena cava  Cardiomediastinal silhouette appears unremarkable  Redemonstrated are bilateral parahilar and diffuse interstitial opacities throughout both lungs, which have improved  No new lung opacity  No evidence of a pleural effusion or pneumothorax  Osseous structures appear within normal limits for patient age  Impression: Improved pulmonary edema  Support devices as described Workstation performed: [id number]     Procedure: Xr Chest Portable    Result Date: 9/2/2020  Narrative: CHEST INDICATION:   CVC insertion  COMPARISON:  Chest x-ray from September 2, 2020  EXAM PERFORMED/VIEWS:  XR CHEST PORTABLE FINDINGS: Endotracheal tube tip has advanced with its tip now measuring 4 cm from the supriya  Interval level introduction of a left IJ central venous catheter with its tip in the in the superior vena cava  Orogastric catheter is identified with its tip distal to  the hemidiaphragm  Stable cardiomediastinal silhouette with prominence of the hilar shadows and central more than peripheral vascular markings    Interval increase in the hazy opacities in the midlung fields likely represent alveolar edema  Suggestion of right mid lung platelike atelectasis  No pneumothorax or pleural effusion  No consolidation  The lungs are clear  No pneumothorax or pleural effusion  Osseous structures appear within normal limits for patient age  Impression: Interval advancement of the endotracheal catheter with its tip now residing 4 cm from the supriya  Left IJ central venous catheter identified with its tip in the superior vena cava  No pneumothorax  Increasing interstitial and alveolar edema  Workstation performed: [id number]     Procedure: Xr Chest Portable    Result Date: 9/2/2020  Narrative: CHEST INDICATION:   ETT placement  Sepsis COMPARISON:  09/01/2020 EXAM PERFORMED/VIEWS:  XR CHEST PORTABLE Images: 4 FINDINGS: Cardiomediastinal silhouette appears unremarkable  Endotracheal tube is present with its tip lying approximately 7 6 cm above the supriya  This can be advanced 2 to 3 cm  Diffuse increased interstitial lung markings are noted  No pneumothorax or pleural effusion  Osseous structures appear within normal limits for patient age  Nasogastric tube extends into the proximal stomach below the left hemidiaphragm  Impression: 1  The endotracheal tube is present with its tip lying approximately 7 6 cm above the supriya  This can be advanced 2 to 3 cm  2   Increased interstitial lung markings  Consider infectious etiologies  The study was marked in [facility] for immediate notification  Workstation performed: [id number]     Procedure: Ct Head Without Contrast    Result Date: 9/1/2020  Narrative: CT BRAIN - WITHOUT CONTRAST INDICATION:   confusion  COMPARISON:  None  TECHNIQUE:  CT examination of the brain was performed  In addition to axial images, sagittal and coronal 2D reformatted images were created and submitted for interpretation  Radiation dose length product (DLP) for this visit:  999 mGy-cm     This examination, like all CT scans performed in the [facility], was performed utilizing techniques to minimize radiation dose exposure, including the use of iterative reconstruction and automated exposure control  IMAGE QUALITY:  Diagnostic  FINDINGS: PARENCHYMA: Decreased attenuation is noted in periventricular and subcortical white matter demonstrating an appearance that is statistically most likely to represent mild microangiopathic change  No CT signs of acute infarction  No intracranial mass, mass effect or midline shift  No acute parenchymal hemorrhage  VENTRICLES AND EXTRA-AXIAL SPACES:  Normal for the patient's age  VISUALIZED ORBITS AND PARANASAL SINUSES:  Unremarkable  CALVARIUM AND EXTRACRANIAL SOFT TISSUES:  Normal      Impression: No acute intracranial abnormality  Minor microangiopathic changes  Workstation performed: [id number]     Procedure: Ct Cervical Spine Without Contrast    Result Date: 9/1/2020  Narrative: CT CERVICAL SPINE - WITHOUT CONTRAST INDICATION:   confusion  COMPARISON:  None  TECHNIQUE:  CT examination of the cervical spine was performed without intravenous contrast   Contiguous axial images were obtained  Sagittal and coronal reconstructions were performed  Radiation dose length product (DLP) for this visit:  490 mGy-cm   This examination, like all CT scans performed in the [facility], was performed utilizing techniques to minimize radiation dose exposure, including the use of iterative reconstruction and automated exposure control  IMAGE QUALITY:  Diagnostic  FINDINGS: ALIGNMENT:  Normal alignment of the cervical spine  No subluxation  VERTEBRAL BODIES:  No fracture  DEGENERATIVE CHANGES:  Mild multilevel cervical degenerative changes are noted without critical central canal stenosis  Partial fusion of the left C2-3 facet and right C4-5 facet   PREVERTEBRAL AND PARASPINAL SOFT TISSUES:  Normal  THORACIC INLET:  Numerous small nodules are seen within the lung apices bilaterally, too numerous to count  Mild right apical scarring  See separate CT chest, abdomen and pelvis report  Impression: Mild cervical degenerative change with no acute osseous abnormality  Innumerable small pulmonary nodules noted within the lung apices with mild right apical scarring  See separate CT chest, abdomen and pelvis report performed today  Workstation performed: [id number]     Procedure: Us Abdomen Limited    Result Date: 9/3/2020  Narrative: RIGHT UPPER QUADRANT ULTRASOUND INDICATION:    sepsis  COMPARISON:  CT from 9/1/2020 TECHNIQUE:   Real-time ultrasound of the right upper quadrant was performed with a curvilinear transducer with both volumetric sweeps and still imaging techniques  FINDINGS: PANCREAS:  The pancreas is partially obscured by bowel gas  AORTA AND IVC:  Visualized portions are normal for patient age  LIVER: Size:  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  The liver measures 20 7 cm in the midclavicular line  Contour:  Surface contour is smooth  Parenchyma:  Echogenicity and echotexture are within normal limits  No evidence of suspicious mass  Limited imaging of the main portal vein shows it to be patent and hepatopetal  Prominent vessel extends from the liver towards the abdominal wall with faint Doppler flow and may represent a recannulated periumbilical vein  No obvious nodularity in the liver is seen  BILIARY: No gallstones are seen  The gallbladder wall is mildly thickened at 3 5 mm  Trace pericholecystic fluid is present  No gallstone is seen  Sonographic Yaneth Newness sign could not be obtained on this limited patient No intrahepatic biliary dilatation  CBD measures 2 4 mm  No choledocholithiasis  KIDNEY: Right kidney measures 11 0 cm  Within normal limits  ASCITES:   None  Impression: No gallstones seen  Some gallbladder wall thickening and pericholecystic fluid is present which could be related to underdistention or other pathology    Gallbladder inflammation is somewhat less likely given the lack of distention unless early and should be correlated clinically   The common bile duct is normal in caliber Workstation performed: [id number]       Current Facility-Administered Medications   Medication Dose Route Frequency    acetaminophen (TYLENOL) oral suspension 650 mg  650 mg Per NG Tube Q4H PRN    bisacodyl (DULCOLAX) rectal suppository 10 mg  10 mg Rectal Daily PRN    [START ON 9/5/2020] cefepime (MAXIPIME) IVPB (premix) 2,000 mg 50 mL  2,000 mg Intravenous Q24H    chlorhexidine (PERIDEX) 0 12 % oral rinse 15 mL  15 mL Swish & Spit Q12H [address]    famotidine (PEPCID) injection 20 mg  20 mg Intravenous Q24H JAVID    fentaNYL 1000 mcg in sodium chloride 0 9% 100mL infusion  50 mcg/hr Intravenous Continuous    fentanyl citrate (PF) 100 MCG/2ML 100 mcg  100 mcg Intravenous Q1H PRN    guaiFENesin (ROBITUSSIN) oral solution 200 mg  200 mg Oral Q4H    heparin (porcine) 25,000 units in 0 45% NaCl 250 mL infusion (premix)  3-20 Units/kg/hr (Order-Specific) Intravenous Titrated    heparin (porcine) injection 2,000 Units  2,000 Units Intravenous Q1H PRN    heparin (porcine) injection 4,000 Units  4,000 Units Intravenous Q1H PRN    insulin regular (HumuLIN R,NovoLIN R) 1 Units/mL in sodium chloride 0 9 % 100 mL infusion  0 3-21 Units/hr Intravenous Titrated    ipratropium (ATROVENT) 0 02 % inhalation solution 0 5 mg  0 5 mg Nebulization Q6H    levalbuterol (XOPENEX) inhalation solution 0 63 mg  0 63 mg Nebulization Q6H PRN    levalbuterol (XOPENEX) inhalation solution 1 25 mg  1 25 mg Nebulization Q6H    metroNIDAZOLE (FLAGYL) IVPB (premix) 500 mg 100 mL  500 mg Intravenous Q8H    norepinephrine (LEVOPHED) 4 mg (STANDARD CONCENTRATION) IV in sodium chloride 0 9% 250 mL  1-30 mcg/min Intravenous Titrated    polyethylene glycol (MIRALAX) packet 17 g  17 g Oral Daily PRN    propofol (DIPRIVAN) 1000 mg in 100 mL infusion (premix)  5-50 mcg/kg/min Intravenous Titrated    senna 8 8 mg/5 mL oral syrup 8 8 mg  8 8 mg Oral HS    vancomycin (VANCOCIN) IVPB (premix) 1,000 mg 200 mL  12 5 mg/kg Intravenous Q24H     Medications Discontinued During This Encounter   Medication Reason    sodium chloride 0 9 % bolus 1,000 mL     sodium chloride 0 9 % bolus 1,000 mL     lisinopril (ZESTRIL) 10 mg tablet     linaGLIPtin-metFORMIN HCl (JENTADUETO XR PO) Discontinued by another clinician    heparin (porcine) subcutaneous injection 5,000 Units     cefTRIAXone (ROCEPHIN) IVPB (premix) 1,000 mg 50 mL     heparin (ACS LOW) Duplicate order    pravastatin (PRAVACHOL) tablet 40 mg     acetaminophen (TYLENOL) tablet 650 mg     azithromycin (ZITHROMAX) 500 mg in sodium chloride 0 9 % 250 mL IVPB     insulin lispro (HumaLOG) 100 units/mL subcutaneous injection 1-6 Units     phenylephrine (REG-SYNEPHRINE) 50 mg (STANDARD CONCENTRATION) in sodium chloride 0 9% 250 mL     ipratropium (ATROVENT) 0 02 % inhalation solution 0 5 mg     levalbuterol (XOPENEX) inhalation solution 1 25 mg     cefepime (MAXIPIME) IVPB (premix) 1,000 mg 50 mL     vasopressin (PITRESSIN) 20 Units in sodium chloride 0 9 % 100 mL infusion     cefepime (MAXIPIME) IVPB (premix) 1,000 mg 50 mL        Gale Ling MD      This progress note was produced in part using a dictation device which may document imprecise wording from author's original intent

## 2020-09-04 NOTE — ASSESSMENT & PLAN NOTE
Lab Results   Component Value Date    HGBA1C 8 2 (H) 09/03/2020       Recent Labs     09/04/20  0839 09/04/20  1015 09/04/20  1251 09/04/20  1438   POCGLU 131 129 118 107       Blood Sugar Average: Last 72 hrs:  (P) 401 [id number]       · Takes metformin 1000mg BID, Januvia 100mg daily, Lantus 28 units at hs, Novolog 6 units TID w meals  · Hold home meds- pt currently on clear liquids  · Non-DKA insulin gtt started 9/2 for persistent hyperglycemia- currently at 3 u / hr

## 2020-09-05 ENCOUNTER — APPOINTMENT (INPATIENT)
Dept: RADIOLOGY | Facility: HOSPITAL | Age: 75
DRG: 870 | End: 2020-09-05
Payer: COMMERCIAL

## 2020-09-05 LAB
ABO GROUP BLD BPU: NORMAL
ALBUMIN SERPL BCP-MCNC: 2 G/DL (ref 3.5–5)
ALP SERPL-CCNC: 140 U/L (ref 46–116)
ALT SERPL W P-5'-P-CCNC: 74 U/L (ref 12–78)
ANION GAP SERPL CALCULATED.3IONS-SCNC: 7 MMOL/L (ref 4–13)
APTT PPP: 70 SECONDS (ref 23–37)
AST SERPL W P-5'-P-CCNC: 129 U/L (ref 5–45)
BACTERIA SPT RESP CULT: NO GROWTH
BASOPHILS # BLD AUTO: 0.08 THOUSANDS/ΜL (ref 0–0.1)
BASOPHILS NFR BLD AUTO: 1 % (ref 0–1)
BILIRUB SERPL-MCNC: 0.58 MG/DL (ref 0.2–1)
BPU ID: NORMAL
BUN SERPL-MCNC: 26 MG/DL (ref 5–25)
CA-I BLD-SCNC: 1.14 MMOL/L (ref 1.12–1.32)
CALCIUM SERPL-MCNC: 8.4 MG/DL (ref 8.3–10.1)
CHLORIDE SERPL-SCNC: 110 MMOL/L (ref 100–108)
CO2 SERPL-SCNC: 24 MMOL/L (ref 21–32)
CREAT SERPL-MCNC: 1.35 MG/DL (ref 0.6–1.3)
CROSSMATCH: NORMAL
EOSINOPHIL # BLD AUTO: 0.36 THOUSAND/ΜL (ref 0–0.61)
EOSINOPHIL NFR BLD AUTO: 3 % (ref 0–6)
ERYTHROCYTE [DISTWIDTH] IN BLOOD BY AUTOMATED COUNT: 14.8 % (ref 11.6–15.1)
GFR SERPL CREATININE-BSD FRML MDRD: 51 ML/MIN/1.73SQ M
GLUCOSE SERPL-MCNC: 105 MG/DL (ref 65–140)
GLUCOSE SERPL-MCNC: 113 MG/DL (ref 65–140)
GLUCOSE SERPL-MCNC: 122 MG/DL (ref 65–140)
GLUCOSE SERPL-MCNC: 128 MG/DL (ref 65–140)
GLUCOSE SERPL-MCNC: 135 MG/DL (ref 65–140)
GLUCOSE SERPL-MCNC: 136 MG/DL (ref 65–140)
GLUCOSE SERPL-MCNC: 141 MG/DL (ref 65–140)
GLUCOSE SERPL-MCNC: 159 MG/DL (ref 65–140)
GLUCOSE SERPL-MCNC: 177 MG/DL (ref 65–140)
GRAM STN SPEC: NORMAL
GRAM STN SPEC: NORMAL
HCT VFR BLD AUTO: 26.8 % (ref 36.5–49.3)
HGB BLD-MCNC: 8.9 G/DL (ref 12–17)
IMM GRANULOCYTES # BLD AUTO: 0.15 THOUSAND/UL (ref 0–0.2)
IMM GRANULOCYTES NFR BLD AUTO: 1 % (ref 0–2)
LYMPHOCYTES # BLD AUTO: 1.44 THOUSANDS/ΜL (ref 0.6–4.47)
LYMPHOCYTES NFR BLD AUTO: 14 % (ref 14–44)
MAGNESIUM SERPL-MCNC: 2.4 MG/DL (ref 1.6–2.6)
MCH RBC QN AUTO: 28.6 PG (ref 26.8–34.3)
MCHC RBC AUTO-ENTMCNC: 33.2 G/DL (ref 31.4–37.4)
MCV RBC AUTO: 86 FL (ref 82–98)
MONOCYTES # BLD AUTO: 0.63 THOUSAND/ΜL (ref 0.17–1.22)
MONOCYTES NFR BLD AUTO: 6 % (ref 4–12)
NEUTROPHILS # BLD AUTO: 7.9 THOUSANDS/ΜL (ref 1.85–7.62)
NEUTS SEG NFR BLD AUTO: 75 % (ref 43–75)
NRBC BLD AUTO-RTO: 0 /100 WBCS
PHOSPHATE SERPL-MCNC: 2.8 MG/DL (ref 2.3–4.1)
PLATELET # BLD AUTO: 284 THOUSANDS/UL (ref 149–390)
PMV BLD AUTO: 10.2 FL (ref 8.9–12.7)
POTASSIUM SERPL-SCNC: 4.1 MMOL/L (ref 3.5–5.3)
PROCALCITONIN SERPL-MCNC: 8.16 NG/ML
PROT SERPL-MCNC: 6.7 G/DL (ref 6.4–8.2)
RBC # BLD AUTO: 3.11 MILLION/UL (ref 3.88–5.62)
SODIUM SERPL-SCNC: 141 MMOL/L (ref 136–145)
UNIT DISPENSE STATUS: NORMAL
UNIT PRODUCT CODE: NORMAL
UNIT RH: NORMAL
VANCOMYCIN TROUGH SERPL-MCNC: 10.3 UG/ML (ref 10–20)
WBC # BLD AUTO: 10.56 THOUSAND/UL (ref 4.31–10.16)

## 2020-09-05 PROCEDURE — 94770 HB EXHALED CARBON DIOXIDE TEST: CPT

## 2020-09-05 PROCEDURE — 82948 REAGENT STRIP/BLOOD GLUCOSE: CPT

## 2020-09-05 PROCEDURE — 85730 THROMBOPLASTIN TIME PARTIAL: CPT | Performed by: STUDENT IN AN ORGANIZED HEALTH CARE EDUCATION/TRAINING PROGRAM

## 2020-09-05 PROCEDURE — 82330 ASSAY OF CALCIUM: CPT | Performed by: PHYSICIAN ASSISTANT

## 2020-09-05 PROCEDURE — 83735 ASSAY OF MAGNESIUM: CPT | Performed by: PHYSICIAN ASSISTANT

## 2020-09-05 PROCEDURE — 84100 ASSAY OF PHOSPHORUS: CPT | Performed by: PHYSICIAN ASSISTANT

## 2020-09-05 PROCEDURE — 80053 COMPREHEN METABOLIC PANEL: CPT | Performed by: PHYSICIAN ASSISTANT

## 2020-09-05 PROCEDURE — 94640 AIRWAY INHALATION TREATMENT: CPT

## 2020-09-05 PROCEDURE — 94760 N-INVAS EAR/PLS OXIMETRY 1: CPT

## 2020-09-05 PROCEDURE — 84145 PROCALCITONIN (PCT): CPT | Performed by: PHYSICIAN ASSISTANT

## 2020-09-05 PROCEDURE — 85025 COMPLETE CBC W/AUTO DIFF WBC: CPT | Performed by: PHYSICIAN ASSISTANT

## 2020-09-05 PROCEDURE — 99232 SBSQ HOSP IP/OBS MODERATE 35: CPT | Performed by: INTERNAL MEDICINE

## 2020-09-05 PROCEDURE — 94003 VENT MGMT INPAT SUBQ DAY: CPT

## 2020-09-05 PROCEDURE — 99291 CRITICAL CARE FIRST HOUR: CPT | Performed by: STUDENT IN AN ORGANIZED HEALTH CARE EDUCATION/TRAINING PROGRAM

## 2020-09-05 PROCEDURE — 71045 X-RAY EXAM CHEST 1 VIEW: CPT

## 2020-09-05 PROCEDURE — 80202 ASSAY OF VANCOMYCIN: CPT | Performed by: NURSE PRACTITIONER

## 2020-09-05 RX ORDER — HEPARIN SODIUM 5000 [USP'U]/ML
5000 INJECTION, SOLUTION INTRAVENOUS; SUBCUTANEOUS EVERY 8 HOURS SCHEDULED
Status: DISCONTINUED | OUTPATIENT
Start: 2020-09-05 | End: 2020-09-12 | Stop reason: HOSPADM

## 2020-09-05 RX ORDER — PRAVASTATIN SODIUM 20 MG
20 TABLET ORAL
Status: DISCONTINUED | OUTPATIENT
Start: 2020-09-05 | End: 2020-09-12 | Stop reason: HOSPADM

## 2020-09-05 RX ORDER — ASPIRIN 81 MG/1
81 TABLET, CHEWABLE ORAL DAILY
Status: DISCONTINUED | OUTPATIENT
Start: 2020-09-05 | End: 2020-09-12 | Stop reason: HOSPADM

## 2020-09-05 RX ORDER — BISACODYL 10 MG
10 SUPPOSITORY, RECTAL RECTAL DAILY PRN
Status: DISCONTINUED | OUTPATIENT
Start: 2020-09-05 | End: 2020-09-09

## 2020-09-05 RX ORDER — POLYETHYLENE GLYCOL 3350 17 G/17G
17 POWDER, FOR SOLUTION ORAL DAILY
Status: DISCONTINUED | OUTPATIENT
Start: 2020-09-05 | End: 2020-09-06

## 2020-09-05 RX ADMIN — GUAIFENESIN 200 MG: 100 SOLUTION ORAL at 21:55

## 2020-09-05 RX ADMIN — HEPARIN SODIUM 5000 UNITS: 5000 INJECTION INTRAVENOUS; SUBCUTANEOUS at 21:55

## 2020-09-05 RX ADMIN — GUAIFENESIN 200 MG: 100 SOLUTION ORAL at 13:42

## 2020-09-05 RX ADMIN — CHLORHEXIDINE GLUCONATE 0.12% ORAL RINSE 15 ML: 1.2 LIQUID ORAL at 21:55

## 2020-09-05 RX ADMIN — CEFEPIME HYDROCHLORIDE 2000 MG: 2 INJECTION, SOLUTION INTRAVENOUS at 13:43

## 2020-09-05 RX ADMIN — FENTANYL CITRATE 100 MCG: 50 INJECTION INTRAMUSCULAR; INTRAVENOUS at 14:58

## 2020-09-05 RX ADMIN — LEVALBUTEROL HYDROCHLORIDE 1.25 MG: 1.25 SOLUTION, CONCENTRATE RESPIRATORY (INHALATION) at 20:12

## 2020-09-05 RX ADMIN — LEVALBUTEROL HYDROCHLORIDE 1.25 MG: 1.25 SOLUTION, CONCENTRATE RESPIRATORY (INHALATION) at 02:56

## 2020-09-05 RX ADMIN — GUAIFENESIN 200 MG: 100 SOLUTION ORAL at 02:06

## 2020-09-05 RX ADMIN — METRONIDAZOLE 500 MG: 500 INJECTION, SOLUTION INTRAVENOUS at 18:03

## 2020-09-05 RX ADMIN — ASPIRIN 81 MG 81 MG: 81 TABLET ORAL at 13:42

## 2020-09-05 RX ADMIN — METRONIDAZOLE 500 MG: 500 INJECTION, SOLUTION INTRAVENOUS at 00:59

## 2020-09-05 RX ADMIN — GUAIFENESIN 200 MG: 100 SOLUTION ORAL at 05:49

## 2020-09-05 RX ADMIN — IPRATROPIUM BROMIDE 0.5 MG: 0.5 SOLUTION RESPIRATORY (INHALATION) at 13:46

## 2020-09-05 RX ADMIN — PRAVASTATIN SODIUM 20 MG: 20 TABLET ORAL at 18:02

## 2020-09-05 RX ADMIN — IPRATROPIUM BROMIDE 0.5 MG: 0.5 SOLUTION RESPIRATORY (INHALATION) at 02:56

## 2020-09-05 RX ADMIN — ACETAMINOPHEN 650 MG: 650 SUSPENSION ORAL at 21:54

## 2020-09-05 RX ADMIN — FENTANYL CITRATE 100 MCG: 50 INJECTION INTRAMUSCULAR; INTRAVENOUS at 10:12

## 2020-09-05 RX ADMIN — HEPARIN SODIUM 5000 UNITS: 5000 INJECTION INTRAVENOUS; SUBCUTANEOUS at 13:43

## 2020-09-05 RX ADMIN — FENTANYL CITRATE 100 MCG: 50 INJECTION INTRAMUSCULAR; INTRAVENOUS at 03:42

## 2020-09-05 RX ADMIN — VANCOMYCIN HYDROCHLORIDE 1000 MG: 1 INJECTION, SOLUTION INTRAVENOUS at 03:48

## 2020-09-05 RX ADMIN — POLYETHYLENE GLYCOL 3350 17 G: 17 POWDER, FOR SOLUTION ORAL at 18:03

## 2020-09-05 RX ADMIN — VANCOMYCIN HYDROCHLORIDE 1750 MG: 1 INJECTION, POWDER, LYOPHILIZED, FOR SOLUTION INTRAVENOUS at 19:13

## 2020-09-05 RX ADMIN — CHLORHEXIDINE GLUCONATE 0.12% ORAL RINSE 15 ML: 1.2 LIQUID ORAL at 08:37

## 2020-09-05 RX ADMIN — METRONIDAZOLE 500 MG: 500 INJECTION, SOLUTION INTRAVENOUS at 09:13

## 2020-09-05 RX ADMIN — IPRATROPIUM BROMIDE 0.5 MG: 0.5 SOLUTION RESPIRATORY (INHALATION) at 07:44

## 2020-09-05 RX ADMIN — GUAIFENESIN 200 MG: 100 SOLUTION ORAL at 09:15

## 2020-09-05 RX ADMIN — IPRATROPIUM BROMIDE 0.5 MG: 0.5 SOLUTION RESPIRATORY (INHALATION) at 20:12

## 2020-09-05 RX ADMIN — FENTANYL CITRATE 100 MCG: 50 INJECTION INTRAMUSCULAR; INTRAVENOUS at 08:30

## 2020-09-05 RX ADMIN — GUAIFENESIN 200 MG: 100 SOLUTION ORAL at 18:02

## 2020-09-05 RX ADMIN — Medication 50 MCG/HR: at 18:02

## 2020-09-05 RX ADMIN — PROPOFOL 15 MCG/KG/MIN: 10 INJECTION, EMULSION INTRAVENOUS at 06:40

## 2020-09-05 RX ADMIN — ACETAMINOPHEN 650 MG: 650 SUSPENSION ORAL at 05:49

## 2020-09-05 RX ADMIN — LEVALBUTEROL HYDROCHLORIDE 1.25 MG: 1.25 SOLUTION, CONCENTRATE RESPIRATORY (INHALATION) at 13:46

## 2020-09-05 RX ADMIN — FAMOTIDINE 20 MG: 10 INJECTION, SOLUTION INTRAVENOUS at 08:32

## 2020-09-05 RX ADMIN — LEVALBUTEROL HYDROCHLORIDE 1.25 MG: 1.25 SOLUTION, CONCENTRATE RESPIRATORY (INHALATION) at 07:44

## 2020-09-05 RX ADMIN — Medication 8.8 MG: at 22:37

## 2020-09-05 RX ADMIN — PROPOFOL 17.5 MCG/KG/MIN: 10 INJECTION, EMULSION INTRAVENOUS at 18:03

## 2020-09-05 NOTE — ASSESSMENT & PLAN NOTE
Lab Results   Component Value Date    HGBA1C 8 2 (H) 09/03/2020       Recent Labs     09/05/20  0555 09/05/20  0816 09/05/20  1026 09/05/20  1206   POCGLU 135 177* 159* 113       Blood Sugar Average: Last 72 hrs:  (P) 160 [id number]       · Takes metformin 1000mg BID, Januvia 100mg daily, Lantus 28 units at hs, Novolog 6 units TID w meals  · Hold home meds-Tube feeds started 9/5  · Non-DKA insulin gtt started 9/2 for persistent hyperglycemia- currently at 1 5 u / hr  · Received total of approx 43 units from gtt 9/5 @0200 through 9/6 @ 0200

## 2020-09-05 NOTE — ASSESSMENT & PLAN NOTE
· No history of heart failure  · Strict I&O  · Daily weight  · TTE completed on 9/2 EF 25-30%,reduced RV function, mod MVR- echo finding concerning for Takotsubo cardiomyopathy   · Received 3L NSS in ED  Hold on further fluids, consider albumin for hypotension     · Received 750ml albumin overnight 9/2  · 250ml 5% albumin given 9/5 and 9/6 for soft BP  · Dosing with prn lasix if pt  is exhibiting anasarca on exam  · Last dose of lasix 40 mg on 9/3

## 2020-09-05 NOTE — ASSESSMENT & PLAN NOTE
· Secondary to septic shock  · Elevated Trop 2 85 in ED without EKG changes, pt asymptomatic  · Trop peaked at 34 without ST changes  · Continue Heparin gtt- D/C 9/5 and start ASA and statin  · ECHO - EF 17-15%, RV systolic function reduced, mod MVR - leaning toward takotsubo cardiomyopathy based on the echo findings  Could represent apical ballooning/Takotsubo cardiomyopathy given the basal   segments have dynamic function  · If ECG changes will need transfer for interventional cardiology  · Troponin 22 59 on 9/3   No further checks unless clinical change

## 2020-09-05 NOTE — ASSESSMENT & PLAN NOTE
· Increase oxygenation requirements with worsening encephalopathy  · 9/2 Intubated 8 0 ETT  · Vent day #5 ASV 85%, 50% fiO2 +7  · Propofol and Fentanyl for sedation  · Continue to wean down propofol  · PRN Fentanyl and versed  · Atrovent/Xopenex Q 6  · Mucinex added 9/4 for secretion clearance

## 2020-09-05 NOTE — PROGRESS NOTES
Progress Note - General Surgery   Rere Flor 66 y o  male MRN: [id number]  Unit/Bed#: -01 Encounter: [id number]    Assessment:  57-year-old male who was currently admitted due to septic shock secondary to pneumonia versus unclear etiology  He remains on the ventilator  He continues on Levophed, his requirement has been fluctuating  We have been following for possible acute cholecystitis  Currently the patient does not have any tenderness on abdominal exam   The abdomen is distended    Plan:  Recommend continuing broad-spectrum antibiotics  The patient's white blood cell count is improving  HIDA scan could be completed once the patient is more stable however it will unlikely change treatment at that point  There continues to be low suspicion for acute cholecystitis as the patient does not have any abdominal pain  If the patient does develop abdominal pain, worsening fever, or any other symptoms that would raise suspicion of acute cholecystitis, he would require cholecystostomy tube  Will continue to monitor    Subjective/Objective     Subjective: The patient is on the ventilator  Per nursing his abdomen is slightly distended  He has not had a bowel movement  He is tolerating a slow rate of tube feeds  When asked if he has abdominal pain, the patient is able to shake his head no    Objective:     Blood pressure 101/61, pulse 98, temperature 100 2 °F (37 9 °C), temperature source Rectal, resp  rate (!) 10, height 6' 3" (1 905 m), weight 90 3 kg (199 lb 1 2 oz), SpO2 100 %  ,Body mass index is 24 88 kg/m²  Intake/Output Summary (Last 24 hours) at 9/5/2020 1003  Last data filed at 9/5/2020 0947  Gross per 24 hour   Intake 2048  38 ml   Output 3210 ml   Net -1161 62 ml       Invasive Devices     Central Venous Catheter Line            CVC Central Lines 09/02/20 Triple 16cm Left Internal jugular 3 days          Peripheral Intravenous Line            Peripheral IV 09/01/20 Right Hand 3 days Peripheral IV 09/02/20 Distal;Dorsal (posterior); Left Forearm 3 days          Arterial Line            Arterial Line 09/02/20 Right Radial 3 days          Drain            NG/OG/Enteral Tube Orogastric 14 Fr Center mouth 3 days    Urethral Catheter Temperature probe 16 Fr  3 days          Airway            ETT  Oral 8 mm 3 days                Physical Exam: General appearance:  Patient is intubated and on the ventilator  Abdomen: Soft, positive softly distended, nontender, no rebound or guarding  Skin: Skin color, texture, turgor normal  No rashes or lesions  Neurologic: Mental status:  Patient is on the ventilator however when questioned he does answer by shaking his head    Lab, Imaging and other studies:  CBC:   Lab Results   Component Value Date    WBC 10 56 (H) 09/05/2020    HGB 8 9 (L) 09/05/2020    HCT 26 8 (L) 09/05/2020    MCV 86 09/05/2020     09/05/2020    MCH 28 6 09/05/2020    MCHC 33 2 09/05/2020    RDW 14 8 09/05/2020    MPV 10 2 09/05/2020    NRBC 0 09/05/2020   , CMP:   Lab Results   Component Value Date    SODIUM 141 09/05/2020    K 4 1 09/05/2020     (H) 09/05/2020    CO2 24 09/05/2020    BUN 26 (H) 09/05/2020    CREATININE 1 35 (H) 09/05/2020    CALCIUM 8 4 09/05/2020     (H) 09/05/2020    ALT 74 09/05/2020    ALKPHOS 140 (H) 09/05/2020    EGFR 51 09/05/2020     VTE Pharmacologic Prophylaxis: Sequential compression device (Venodyne)   VTE Mechanical Prophylaxis: sequential compression device

## 2020-09-05 NOTE — PROGRESS NOTES
Vancomycin Assessment    Ethyl Denis is a 66 y o  male who is currently receiving vancomycin 1000 mg IV every 24 hours for Pneumonia     Relevant clinical data and objective history reviewed:  Creatinine   Date Value Ref Range Status   09/05/2020 1 35 (H) 0 60 - 1 30 mg/dL Final     Comment:     Standardized to IDMS reference method   09/04/2020 1 53 (H) 0 60 - 1 30 mg/dL Final     Comment:     Standardized to IDMS reference method   09/03/2020 2 47 (H) 0 60 - 1 30 mg/dL Final     Comment:     Standardized to IDMS reference method     /61 (BP Location: Left arm)   Pulse 98   Temp 100 2 °F (37 9 °C) (Rectal)   Resp (!) 10   Ht 6' 3" (1 905 m)   Wt 90 3 kg (199 lb 1 2 oz)   SpO2 100%   BMI 24 88 kg/m²   I/O last 3 completed shifts: In: 2904 [I V :1746 4; Blood:339 6; NG/GT:250; IV Piggyback:400; Feedings:168]  Out: 5550 [Urine:5550]  Lab Results   Component Value Date/Time    BUN 26 (H) 09/05/2020 03:11 AM    WBC 10 56 (H) 09/05/2020 03:11 AM    HGB 8 9 (L) 09/05/2020 03:11 AM    HCT 26 8 (L) 09/05/2020 03:11 AM    MCV 86 09/05/2020 03:11 AM     09/05/2020 03:11 AM     Temp Readings from Last 3 Encounters:   09/05/20 100 2 °F (37 9 °C) (Rectal)     Vancomycin Days of Therapy: 4    Trough: 10 3    Assessment/Plan  The patient is currently on vancomycin utilizing scheduled dosing  The patient is receiving 1000 mg IV every 24 hours with the most recent vancomycin level being at steady-state and sub-therapeutic based on a goal of 15-20 (appropriate for most indications) ; therefore, after clinical evaluation will be changed to 1750 mg IV every 24 hours   Pharmacy will continue to follow closely for s/sx of nephrotoxicity, infusion reactions, and appropriateness of therapy  BMP and CBC will be ordered per protocol  Plan for trough as patient approaches steady state, prior to the 4th  dose at approximately 1630 on 9/8/20   Pharmacy will continue to follow the patients culture results and clinical progress daily      Rajeev Marie, Pharmacist

## 2020-09-05 NOTE — ASSESSMENT & PLAN NOTE
· Secondary to pneumonia vs unclear etiology  · POA - fever, tachypnea, tachycardia, increased Cr, persistent hypotension not fluid responsive  · In the ED  · Hypotensive, normal lactate, given 3L NSS  · CT C/A/P without contrast:   Bilateral predominantly upper lobe diffuse nodular interstitial changes of inflammatory infectious etiology  2   Subsegmental atelectasis /consolidation in the right upper lobe in a bronchovascular distribution  3   Posterior bibasilar dependent changes with possible atelectasis/infiltrate in the left lung base  4   Diffuse bladder wall thickening which is decompressed containing a Means balloon  5  Mediastinal lymphadenopathy as described largest in the subcarinal region measuring 1 8 cm    · Micro work up  · Covid repeat in ED on 9/1 negative  · Flu/ RSV negative  · Urine strep and legionella - negative  · Urine + bacteria, cloudy, thickened bladder wall on CT scan in ED  · Procalcitonin peaked at 36, trending downward  · Blood cultures - No growth in 72 hrs  · Sputum culture - 2+ polys, no bacteria  · Lyme PCR  - pending  · ABX - broadened due to severity of illness, Cont Cefepime (1 G Q 24 due to renal insufficiency) D#5, Vanco D#5, and Flagyl D#5  · Increase cefepime to 2 Q 24 when GFR 30-60 per ID  · 9/5 Renal ok with increasing to 2gm q 12   · Cont to wean down Levophed  Shane weaned off evening of 9/2/2020  Vaso off 9/3  · Levo weaned of 9/5 @ 2120  · RUQ u/s performed 9/3: No gallstones seen  Some gallbladder wall thickening and pericholecystic fluid is present which could be related to underdistention or other pathology  ·  ID consult placed - Dr Darcy Osorio  · Consult general surgery for possible cholecystitis per ID recommendations  · Gen surgery recommends HIDA scan once pt  Is stable enough to go for long study  · Overall pt   Improving slowly - wife updated at bedside  · BP soft 9/6 overnight, given 250ml 5% albumin with improvment

## 2020-09-05 NOTE — PLAN OF CARE
Problem: Potential for Falls  Goal: Patient will remain free of falls  Description: INTERVENTIONS:  - Assess patient frequently for physical needs  -  Identify cognitive and physical deficits and behaviors that affect risk of falls  -  [place] fall precautions as indicated by assessment   - Educate patient/family on patient safety including physical limitations  - Instruct patient to call for assistance with activity based on assessment  - Modify environment to reduce risk of injury  - Consider OT/PT consult to assist with strengthening/mobility  Outcome: Progressing     Problem: Prexisting or High Potential for Compromised Skin Integrity  Goal: Skin integrity is maintained or improved  Description: INTERVENTIONS:  - Identify patients at risk for skin breakdown  - Assess and monitor skin integrity  - Assess and monitor nutrition and hydration status  - Monitor labs   - Assess for incontinence   - Turn and reposition patient  - Assist with mobility/ambulation  - Relieve pressure over bony prominences  - Avoid friction and shearing  - Provide appropriate hygiene as needed including keeping skin clean and dry  - Evaluate need for skin moisturizer/barrier cream  - Collaborate with interdisciplinary team   - Patient/family teaching  - Consider wound care consult   Outcome: Progressing     Problem: Nutrition/Hydration-ADULT  Goal: Nutrient/Hydration intake appropriate for improving, restoring or maintaining nutritional needs  Description: Monitor and assess patient's nutrition/hydration status for malnutrition  Collaborate with interdisciplinary team and initiate plan and interventions as ordered  Monitor patient's weight and dietary intake as ordered or per policy  Utilize nutrition screening tool and intervene as necessary  Determine patient's food preferences and provide high-protein, high-caloric foods as appropriate       INTERVENTIONS:  - Monitor oral intake, urinary output, labs, and treatment plans  - Assess nutrition and hydration status and recommend course of action  - Evaluate amount of meals eaten  - Assist patient with eating if necessary   - Allow adequate time for meals  - Recommend/ encourage appropriate diets, oral nutritional supplements, and vitamin/mineral supplements  - Order, calculate, and assess calorie counts as needed  - Recommend, monitor, and adjust tube feedings and TPN/PPN based on assessed needs  - Assess need for intravenous fluids  - Provide specific nutrition/hydration education as appropriate  - Include patient/family/caregiver in decisions related to nutrition  Outcome: Progressing     Problem: PAIN - ADULT  Goal: Verbalizes/displays adequate comfort level or baseline comfort level  Description: Interventions:  - Encourage patient to monitor pain and request assistance  - Assess pain using appropriate pain scale  - Administer analgesics based on type and severity of pain and evaluate response  - Implement non-pharmacological measures as appropriate and evaluate response  - Consider cultural and social influences on pain and pain management  - Notify physician/advanced practitioner if interventions unsuccessful or patient reports new pain  Outcome: Progressing     Problem: INFECTION - ADULT  Goal: Absence or prevention of progression during hospitalization  Description: INTERVENTIONS:  - Assess and monitor for signs and symptoms of infection  - Monitor lab/diagnostic results  - Monitor all insertion sites, i e  indwelling lines, tubes, and drains  - Monitor endotracheal if appropriate and nasal secretions for changes in amount and color  - [place] appropriate cooling/warming therapies per order  - Administer medications as ordered  - Instruct and encourage patient and family to use good hand hygiene technique  - Identify and instruct in appropriate isolation precautions for identified infection/condition  Outcome: Progressing     Problem: SAFETY ADULT  Goal: Patient will remain free of falls  Description: INTERVENTIONS:  - Assess patient frequently for physical needs  -  Identify cognitive and physical deficits and behaviors that affect risk of falls    -  [place] fall precautions as indicated by assessment   - Educate patient/family on patient safety including physical limitations  - Instruct patient to call for assistance with activity based on assessment  - Modify environment to reduce risk of injury  - Consider OT/PT consult to assist with strengthening/mobility  Outcome: Progressing  Goal: Maintain or return to baseline ADL function  Description: INTERVENTIONS:  -  Assess patient's ability to carry out ADLs; assess patient's baseline for ADL function and identify physical deficits which impact ability to perform ADLs (bathing, care of mouth/teeth, toileting, grooming, dressing, etc )  - Assess/evaluate cause of self-care deficits   - Assess range of motion  - Assess patient's mobility; develop plan if impaired  - Assess patient's need for assistive devices and provide as appropriate  - Encourage maximum independence but intervene and supervise when necessary  - Involve family in performance of ADLs  - Assess for home care needs following discharge   - Consider OT consult to assist with ADL evaluation and planning for discharge  - Provide patient education as appropriate  Outcome: Progressing  Goal: Maintain or return mobility status to optimal level  Description: INTERVENTIONS:  - Assess patient's baseline mobility status (ambulation, transfers, stairs, etc )    - Identify cognitive and physical deficits and behaviors that affect mobility  - Identify mobility aids required to assist with transfers and/or ambulation (gait belt, sit-to-stand, lift, walker, cane, etc )  - [place] fall precautions as indicated by assessment  - Record patient progress and toleration of activity level on Mobility SBAR; progress patient to next Phase/Stage  - Instruct patient to call for assistance with activity based on assessment  - Consider rehabilitation consult to assist with strengthening/weightbearing, etc   Outcome: Progressing     Problem: DISCHARGE PLANNING  Goal: Discharge to home or other facility with appropriate resources  Description: INTERVENTIONS:  - Identify barriers to discharge w/patient and caregiver  - Arrange for needed discharge resources and transportation as appropriate  - Identify discharge learning needs (meds, wound care, etc )  - Arrange for interpretive services to assist at discharge as needed  - Refer to Case Management Department for coordinating discharge planning if the patient needs post-hospital services based on physician/advanced practitioner order or complex needs related to functional status, cognitive ability, or social support system  Outcome: Progressing     Problem: Knowledge Deficit  Goal: Patient/family/caregiver demonstrates understanding of disease process, treatment plan, medications, and discharge instructions  Description: Complete learning assessment and assess knowledge base    Interventions:  - Provide teaching at level of understanding  - Provide teaching via preferred learning methods  Outcome: Progressing     Problem: NEUROSENSORY - ADULT  Goal: Achieves stable or improved neurological status  Description: INTERVENTIONS  - Monitor and report changes in neurological status  - Monitor vital signs such as temperature, blood pressure, glucose, and any other labs ordered   - Initiate measures to prevent increased intracranial pressure  - Monitor for seizure activity and implement precautions if appropriate      Outcome: Progressing     Problem: CARDIOVASCULAR - ADULT  Goal: Maintains optimal cardiac output and hemodynamic stability  Description: INTERVENTIONS:  - Monitor I/O, vital signs and rhythm  - Monitor for S/S and trends of decreased cardiac output  - Administer and titrate ordered vasoactive medications to optimize hemodynamic stability  - Assess quality of pulses, skin color and temperature  - Assess for signs of decreased coronary artery perfusion  - Instruct patient to report change in severity of symptoms  Outcome: Progressing  Goal: Absence of cardiac dysrhythmias or at baseline rhythm  Description: INTERVENTIONS:  - Continuous cardiac monitoring, vital signs, obtain 12 lead EKG if ordered  - Administer antiarrhythmic and heart rate control medications as ordered  - Monitor electrolytes and administer replacement therapy as ordered  Outcome: Progressing     Problem: RESPIRATORY - ADULT  Goal: Achieves optimal ventilation and oxygenation  Description: INTERVENTIONS:  - Assess for changes in respiratory status  - Assess for changes in mentation and behavior  - Position to facilitate oxygenation and minimize respiratory effort  - Oxygen administered by appropriate delivery if ordered  - Initiate smoking cessation education as indicated  - Encourage broncho-pulmonary hygiene including cough, deep breathe, Incentive Spirometry  - Assess the need for suctioning and aspirate as needed  - Assess and instruct to report SOB or any respiratory difficulty  - Respiratory Therapy support as indicated  Outcome: Progressing     Problem: GENITOURINARY - ADULT  Goal: Maintains or returns to baseline urinary function  Description: INTERVENTIONS:  - Assess urinary function  - Encourage oral fluids to ensure adequate hydration if ordered  - Administer IV fluids as ordered to ensure adequate hydration  - Administer ordered medications as needed  - Offer frequent toileting  - Follow urinary retention protocol if ordered  Outcome: Progressing     Problem: METABOLIC, FLUID AND ELECTROLYTES - ADULT  Goal: Electrolytes maintained within normal limits  Description: INTERVENTIONS:  - Monitor labs and assess patient for signs and symptoms of electrolyte imbalances  - Administer electrolyte replacement as ordered  - Monitor response to electrolyte replacements, including repeat lab results as appropriate  - Instruct patient on fluid and nutrition as appropriate  Outcome: Progressing  Goal: Fluid balance maintained  Description: INTERVENTIONS:  - Monitor labs   - Monitor I/O and WT  - Instruct patient on fluid and nutrition as appropriate  - Assess for signs & symptoms of volume excess or deficit  Outcome: Progressing  Goal: Glucose maintained within target range  Description: INTERVENTIONS:  - Monitor Blood Glucose as ordered  - Assess for signs and symptoms of hyperglycemia and hypoglycemia  - Administer ordered medications to maintain glucose within target range  - Assess nutritional intake and initiate nutrition service referral as needed  Outcome: Progressing

## 2020-09-05 NOTE — RESPIRATORY THERAPY NOTE
RT Ventilator Management Note  Zeinab Nina 66 y o  male MRN: [id number]  Unit/Bed#: -01 Encounter: [id number]      Daily Screen       9/4/2020  0755 9/4/2020 2022          Patient safety screen outcome[de-identified]    Failed      Not Ready for Weaning due to[de-identified]  Underline problem not resolved  PEEP > 8cmH2O;Underline problem not resolved              Physical Exam:   Assessment Type: Pre-treatment  General Appearance: Sedated  Respiratory Pattern: Assisted, Spontaneous  Chest Assessment: Chest expansion symmetrical  Bilateral Breath Sounds: Diminished  Cough: None  Suction: ET Tube  O2 Device: vent      Pt Fio2 increased to 60% on ventilator  Spo2 82% with good wave form when RT entered room  Pt suctioned several times sent msg to NP about Spo2 no coming above 86%  Large tan/bloody sputum suctioned out  Pt able to recoop to 95% Spo2  ASV remains 85%, +8 peep  No other changes made at this time  BS diminished  Tolerating vent well

## 2020-09-05 NOTE — PROGRESS NOTES
Progress Note - Nephrology   Molly Ax 66 y o  male MRN: [id number]  Unit/Bed#: -01 Encounter: [id number]    A/P:  1  Acute kidney injury due to septic shock   Creatinine continues to improve with volume resuscitation, normalization of hemodynamics with the assistance of vasopressors and avoidance of nephrotoxins  Continue with this care  Goal mean arterial pressure greater than or equal to 60 mmHg  2  Chronic kidney disease with unclear baseline creatinine  3  Possible underlying diabetic nephropathy   Patient has known of controlled diabetes, he had a microalbumin creatinine ratio of 60 mg/g  Will defer further workup at this time  4  Proteinuria   Patient may benefit from further evaluation with respect to rule out of monoclonal gammopathy  Will defer at this time until the patient's usual state of health as an outpatient  5  Septic shock   Continue with antibiotic treatment for pneumonia  Remains on broad-spectrum antibiotics with cefepime, vancomycin, and Flagyl  The cefepime dosing can be increased according to normal frequency due to improvement in kidney function  Current dosing is 2 g acute 24, if dosing of 2 g q 12 is indicated according to critical care, this is okay from the renal standpoint at this time  Further evaluation being given for potential gallbladder involvement  6  Ventilator dependent respiratory failure due to community-acquired pneumonia  7  Hyponatremia-resolved  8   Transaminitis   Appears to be improving, further workup as indicated by intensivist     Follow up reason for today's visit:  Acute kidney injury/chronic kidney disease/proteinuria    Septic shock [facility])    Patient Active Problem List   Diagnosis    Septic shock ([facility] )    Community acquired pneumonia    CHERISE (acute kidney injury) ([facility] )    Elevated brain natriuretic peptide (BNP) level    Type 2 MI (myocardial infarction) ([facility] )    Type 2 diabetes mellitus, with long-term current use of insulin ([facility] )    Encephalopathy due to infection    Acute respiratory failure with hypoxia ([facility] )    Anemia         Subjective:   No acute events overnight, patient remains intubated and sedated  Objective:     Vitals: Blood pressure 101/61, pulse 98, temperature 100 2 °F (37 9 °C), temperature source Rectal, resp  rate (!) 10, height 6' 3" (1 905 m), weight 90 3 kg (199 lb 1 2 oz), SpO2 100 %  ,Body mass index is 24 88 kg/m²  Weight (last 2 days)     Date/Time   Weight    09/05/20 0600   90 3 (199 08)                Intake/Output Summary (Last 24 hours) at 9/5/2020 0958  Last data filed at 9/5/2020 0947  Gross per 24 hour   Intake 2154 38 ml   Output 3560 ml   Net -1405 62 ml     I/O last 3 completed shifts: In: 2904 [I V :1746 4; Blood:339 6; NG/GT:250; IV Piggyback:400; Feedings:168]  Out: 5550 [Urine:5550]    Urethral Catheter Temperature probe 16 Fr   (Active)   Reasons to continue Urinary Catheter  Accurate I&O assessment in critically ill patients (48 hr  max) 09/04/20 1926   Goal for Removal Remove after 48 hrs of I/O monitoring 09/04/20 1926   Site Assessment Clean;Skin intact 09/04/20 1926   Collection Container Standard drainage bag 09/04/20 1926   Securement Method Securing device (Describe) 09/04/20 1926   Output (mL) 175 mL 09/05/20 0800       Physical Exam: /61 (BP Location: Left arm)   Pulse 98   Temp 100 2 °F (37 9 °C) (Rectal)   Resp (!) 10   Ht 6' 3" (1 905 m)   Wt 90 3 kg (199 lb 1 2 oz)   SpO2 100%   BMI 24 88 kg/m²     General Appearance:    Intubated and sedated   Head:    Normocephalic, without obvious abnormality, atraumatic   Eyes:    Conjunctiva/corneas clear   Ears:    Normal external ears   Nose:   Nares normal, septum midline, mucosa normal, no drainage    or sinus tenderness   Throat:   Lips, mucosa, and tongue normal; teeth and gums normal   Neck:   Supple   Back:     Symmetric, no curvature, ROM normal, no CVA tenderness   Lungs:     Upper airway sounds bilaterally Chest wall:    No tenderness or deformity   Heart:    Regular rate and rhythm, S1 and S2 normal, no murmur, rub   or gallop   Abdomen:     Soft, non-tender, bowel sounds active   Extremities:   Extremities normal, atraumatic, no cyanosis, trace bilateral lower extremity edema with mild presacral edema   Skin:   Skin color, texture, turgor normal, no rashes or lesions   Lymph nodes:   Cervical normal   Neurologic:   CNII-XII intact            Lab, Imaging and other studies: I have personally reviewed pertinent labs  CBC:   Lab Results   Component Value Date    WBC 10 56 (H) 09/05/2020    HGB 8 9 (L) 09/05/2020    HCT 26 8 (L) 09/05/2020    MCV 86 09/05/2020     09/05/2020    MCH 28 6 09/05/2020    MCHC 33 2 09/05/2020    RDW 14 8 09/05/2020    MPV 10 2 09/05/2020    NRBC 0 09/05/2020     CMP:   Lab Results   Component Value Date    K 4 1 09/05/2020     (H) 09/05/2020    CO2 24 09/05/2020    BUN 26 (H) 09/05/2020    CREATININE 1 35 (H) 09/05/2020    CALCIUM 8 4 09/05/2020     (H) 09/05/2020    ALT 74 09/05/2020    ALKPHOS 140 (H) 09/05/2020    EGFR 51 09/05/2020           Results from last 7 days   Lab Units 09/05/20  0311 09/04/20  0509 09/03/20  0355   POTASSIUM mmol/L 4 1 3 9 4 4   CHLORIDE mmol/L 110* 103 99*   CO2 mmol/L 24 21 18*   BUN mg/dL 26* 28* 38*   CREATININE mg/dL 1 35* 1 53* 2 47*   CALCIUM mg/dL 8 4 8 0* 7 7*   ALK PHOS U/L 140* 108 90   ALT U/L 74 82* 74   AST U/L 129* 172* 230*         Phosphorus:   Lab Results   Component Value Date    PHOS 2 8 09/05/2020     Magnesium:   Lab Results   Component Value Date    MG 2 4 09/05/2020     Urinalysis: No results found for: COLORU, CLARITYU, SPECGRAV, PHUR, LEUKOCYTESUR, NITRITE, PROTEINUA, GLUCOSEU, KETONESU, BILIRUBINUR, BLOODU  Ionized Calcium: No results found for: CAION  Coagulation: No results found for: PT, INR, APTT  Troponin: No results found for: TROPONINI  ABG: No results found for: PHART, RXZ0UYA, PO2ART, YPQ6PGF, Q2ABTKBX, CYNTHIA, RODGER  Radiology review:     IMAGING  Procedure: Xr Chest Portable    Result Date: 9/5/2020  Narrative: CHEST INDICATION:   intubation  COMPARISON:  Chest radiograph from 9/3/2020 and chest CT from 9/1/2020  EXAM PERFORMED/VIEWS:  XR CHEST PORTABLE FINDINGS:  ET tube 5 cm above the supriya  NG tube below the diaphragm  Left jugular catheter in SVC  Cardiomediastinal silhouette appears unremarkable  Persistent pulmonary edema  No effusion or pneumothorax  Osseous structures appear within normal limits for patient age  Impression: Persistent pulmonary edema  Workstation performed: [id number]     Procedure: Xr Chest Portable    Result Date: 9/3/2020  Narrative: CHEST INDICATION:   hypoxia, intubated  COMPARISON:  9/2/2020 EXAM PERFORMED/VIEWS:  XR CHEST PORTABLE FINDINGS:  The tip of the endotracheal tube projects 3 3 cm above the supriya  The tip of the enteric tube projects at the stomach  The tip of the left internal jugular central venous catheter projects at the superior vena cava  Cardiomediastinal silhouette appears unremarkable  Redemonstrated are bilateral parahilar and diffuse interstitial opacities throughout both lungs, which have improved  No new lung opacity  No evidence of a pleural effusion or pneumothorax  Osseous structures appear within normal limits for patient age  Impression: Improved pulmonary edema  Support devices as described Workstation performed: [id number]     Procedure: Us Abdomen Limited    Result Date: 9/3/2020  Narrative: RIGHT UPPER QUADRANT ULTRASOUND INDICATION:    sepsis  COMPARISON:  CT from 9/1/2020 TECHNIQUE:   Real-time ultrasound of the right upper quadrant was performed with a curvilinear transducer with both volumetric sweeps and still imaging techniques  FINDINGS: PANCREAS:  The pancreas is partially obscured by bowel gas  AORTA AND IVC:  Visualized portions are normal for patient age  LIVER: Size:  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    The liver measures 20 7 cm in the midclavicular line  Contour:  Surface contour is smooth  Parenchyma:  Echogenicity and echotexture are within normal limits  No evidence of suspicious mass  Limited imaging of the main portal vein shows it to be patent and hepatopetal  Prominent vessel extends from the liver towards the abdominal wall with faint Doppler flow and may represent a recannulated periumbilical vein  No obvious nodularity in the liver is seen  BILIARY: No gallstones are seen  The gallbladder wall is mildly thickened at 3 5 mm  Trace pericholecystic fluid is present  No gallstone is seen  Sonographic Margert Drain sign could not be obtained on this limited patient No intrahepatic biliary dilatation  CBD measures 2 4 mm  No choledocholithiasis  KIDNEY: Right kidney measures 11 0 cm  Within normal limits  ASCITES:   None  Impression: No gallstones seen  Some gallbladder wall thickening and pericholecystic fluid is present which could be related to underdistention or other pathology  Gallbladder inflammation is somewhat less likely given the lack of distention unless early and should be correlated clinically   The common bile duct is normal in caliber Workstation performed: [id number]       Current Facility-Administered Medications   Medication Dose Route Frequency    acetaminophen (TYLENOL) oral suspension 650 mg  650 mg Per NG Tube Q4H PRN    bisacodyl (DULCOLAX) rectal suppository 10 mg  10 mg Rectal Daily PRN    cefepime (MAXIPIME) IVPB (premix) 2,000 mg 50 mL  2,000 mg Intravenous Q24H    chlorhexidine (PERIDEX) 0 12 % oral rinse 15 mL  15 mL Swish & Spit Q12H [address]    famotidine (PEPCID) injection 20 mg  20 mg Intravenous Q24H [facility]    fentaNYL 1000 mcg in sodium chloride 0 9% 100mL infusion  50 mcg/hr Intravenous Continuous    fentanyl citrate (PF) 100 MCG/2ML 100 mcg  100 mcg Intravenous Q1H PRN    guaiFENesin (ROBITUSSIN) oral solution 200 mg  200 mg Oral Q4H    heparin (porcine) 25,000 units in 0 45% NaCl 250 mL infusion (premix)  3-20 Units/kg/hr (Order-Specific) Intravenous Titrated    heparin (porcine) injection 2,000 Units  2,000 Units Intravenous Q1H PRN    heparin (porcine) injection 4,000 Units  4,000 Units Intravenous Q1H PRN    insulin regular (HumuLIN R,NovoLIN R) 1 Units/mL in sodium chloride 0 9 % 100 mL infusion  0 3-21 Units/hr Intravenous Titrated    ipratropium (ATROVENT) 0 02 % inhalation solution 0 5 mg  0 5 mg Nebulization Q6H    levalbuterol (XOPENEX) inhalation solution 0 63 mg  0 63 mg Nebulization Q6H PRN    levalbuterol (XOPENEX) inhalation solution 1 25 mg  1 25 mg Nebulization Q6H    metroNIDAZOLE (FLAGYL) IVPB (premix) 500 mg 100 mL  500 mg Intravenous Q8H    norepinephrine (LEVOPHED) 4 mg (STANDARD CONCENTRATION) IV in sodium chloride 0 9% 250 mL  1-30 mcg/min Intravenous Titrated    polyethylene glycol (MIRALAX) packet 17 g  17 g Oral Daily PRN    propofol (DIPRIVAN) 1000 mg in 100 mL infusion (premix)  5-50 mcg/kg/min Intravenous Titrated    senna 8 8 mg/5 mL oral syrup 8 8 mg  8 8 mg Oral HS    vancomycin (VANCOCIN) 1,750 mg in sodium chloride 0 9 % 500 mL IVPB  1,750 mg Intravenous Q24H     Medications Discontinued During This Encounter   Medication Reason    sodium chloride 0 9 % bolus 1,000 mL     sodium chloride 0 9 % bolus 1,000 mL     lisinopril (ZESTRIL) 10 mg tablet     linaGLIPtin-metFORMIN HCl (JENTADUETO XR PO) Discontinued by another clinician    heparin (porcine) subcutaneous injection 5,000 Units     cefTRIAXone (ROCEPHIN) IVPB (premix) 1,000 mg 50 mL     heparin (ACS LOW) Duplicate order    pravastatin (PRAVACHOL) tablet 40 mg     acetaminophen (TYLENOL) tablet 650 mg     azithromycin (ZITHROMAX) 500 mg in sodium chloride 0 9 % 250 mL IVPB     insulin lispro (HumaLOG) 100 units/mL subcutaneous injection 1-6 Units     phenylephrine (REG-SYNEPHRINE) 50 mg (STANDARD CONCENTRATION) in sodium chloride 0 9% 250 mL     ipratropium (ATROVENT) 0 02 % inhalation solution 0 5 mg     levalbuterol (XOPENEX) inhalation solution 1 25 mg     cefepime (MAXIPIME) IVPB (premix) 1,000 mg 50 mL     vasopressin (PITRESSIN) 20 Units in sodium chloride 0 9 % 100 mL infusion     cefepime (MAXIPIME) IVPB (premix) 1,000 mg 50 mL     vancomycin (VANCOCIN) IVPB (premix) 1,000 mg 200 mL        Carmelita Booker, DO      This progress note was produced in part using a dictation device which may document imprecise wording from author's original intent

## 2020-09-06 ENCOUNTER — APPOINTMENT (INPATIENT)
Dept: RADIOLOGY | Facility: HOSPITAL | Age: 75
DRG: 870 | End: 2020-09-06
Payer: COMMERCIAL

## 2020-09-06 LAB
ALBUMIN SERPL BCP-MCNC: 2 G/DL (ref 3.5–5)
ALP SERPL-CCNC: 174 U/L (ref 46–116)
ALT SERPL W P-5'-P-CCNC: 71 U/L (ref 12–78)
ANION GAP SERPL CALCULATED.3IONS-SCNC: 8 MMOL/L (ref 4–13)
AST SERPL W P-5'-P-CCNC: 94 U/L (ref 5–45)
BACTERIA BLD CULT: NORMAL
BACTERIA BLD CULT: NORMAL
BASOPHILS # BLD MANUAL: 0 THOUSAND/UL (ref 0–0.1)
BASOPHILS NFR MAR MANUAL: 0 % (ref 0–1)
BILIRUB SERPL-MCNC: 0.46 MG/DL (ref 0.2–1)
BUN SERPL-MCNC: 26 MG/DL (ref 5–25)
CA-I BLD-SCNC: 1.13 MMOL/L (ref 1.12–1.32)
CALCIUM SERPL-MCNC: 8.2 MG/DL (ref 8.3–10.1)
CHLORIDE SERPL-SCNC: 110 MMOL/L (ref 100–108)
CO2 SERPL-SCNC: 24 MMOL/L (ref 21–32)
CREAT SERPL-MCNC: 1.17 MG/DL (ref 0.6–1.3)
EOSINOPHIL # BLD MANUAL: 0.41 THOUSAND/UL (ref 0–0.4)
EOSINOPHIL NFR BLD MANUAL: 5 % (ref 0–6)
ERYTHROCYTE [DISTWIDTH] IN BLOOD BY AUTOMATED COUNT: 15.4 % (ref 11.6–15.1)
GFR SERPL CREATININE-BSD FRML MDRD: 60 ML/MIN/1.73SQ M
GLUCOSE SERPL-MCNC: 105 MG/DL (ref 65–140)
GLUCOSE SERPL-MCNC: 114 MG/DL (ref 65–140)
GLUCOSE SERPL-MCNC: 116 MG/DL (ref 65–140)
GLUCOSE SERPL-MCNC: 128 MG/DL (ref 65–140)
GLUCOSE SERPL-MCNC: 132 MG/DL (ref 65–140)
GLUCOSE SERPL-MCNC: 137 MG/DL (ref 65–140)
GLUCOSE SERPL-MCNC: 153 MG/DL (ref 65–140)
GLUCOSE SERPL-MCNC: 153 MG/DL (ref 65–140)
GLUCOSE SERPL-MCNC: 158 MG/DL (ref 65–140)
GLUCOSE SERPL-MCNC: 170 MG/DL (ref 65–140)
GLUCOSE SERPL-MCNC: 96 MG/DL (ref 65–140)
HCT VFR BLD AUTO: 26.5 % (ref 36.5–49.3)
HGB BLD-MCNC: 8.5 G/DL (ref 12–17)
IL6 SERPL-MCNC: 576.3 PG/ML (ref 0–15.5)
LG PLATELETS BLD QL SMEAR: PRESENT
LYMPHOCYTES # BLD AUTO: 1.32 THOUSAND/UL (ref 0.6–4.47)
LYMPHOCYTES # BLD AUTO: 16 % (ref 14–44)
MAGNESIUM SERPL-MCNC: 2 MG/DL (ref 1.6–2.6)
MCH RBC QN AUTO: 28.5 PG (ref 26.8–34.3)
MCHC RBC AUTO-ENTMCNC: 32.1 G/DL (ref 31.4–37.4)
MCV RBC AUTO: 89 FL (ref 82–98)
MONOCYTES # BLD AUTO: 0.17 THOUSAND/UL (ref 0–1.22)
MONOCYTES NFR BLD: 2 % (ref 4–12)
NEUTROPHILS # BLD MANUAL: 6.35 THOUSAND/UL (ref 1.85–7.62)
NEUTS BAND NFR BLD MANUAL: 4 % (ref 0–8)
NEUTS SEG NFR BLD AUTO: 73 % (ref 43–75)
NRBC BLD AUTO-RTO: 0 /100 WBCS
PHOSPHATE SERPL-MCNC: 2.4 MG/DL (ref 2.3–4.1)
PLATELET # BLD AUTO: 285 THOUSANDS/UL (ref 149–390)
PLATELET BLD QL SMEAR: ADEQUATE
PMV BLD AUTO: 9.8 FL (ref 8.9–12.7)
POLYCHROMASIA BLD QL SMEAR: PRESENT
POTASSIUM SERPL-SCNC: 3.8 MMOL/L (ref 3.5–5.3)
PROT SERPL-MCNC: 6.6 G/DL (ref 6.4–8.2)
RBC # BLD AUTO: 2.98 MILLION/UL (ref 3.88–5.62)
RBC MORPH BLD: PRESENT
SODIUM SERPL-SCNC: 142 MMOL/L (ref 136–145)
TOTAL CELLS COUNTED SPEC: 100
WBC # BLD AUTO: 8.25 THOUSAND/UL (ref 4.31–10.16)

## 2020-09-06 PROCEDURE — 80053 COMPREHEN METABOLIC PANEL: CPT | Performed by: NURSE PRACTITIONER

## 2020-09-06 PROCEDURE — 94770 HB EXHALED CARBON DIOXIDE TEST: CPT

## 2020-09-06 PROCEDURE — 82330 ASSAY OF CALCIUM: CPT | Performed by: NURSE PRACTITIONER

## 2020-09-06 PROCEDURE — 94640 AIRWAY INHALATION TREATMENT: CPT

## 2020-09-06 PROCEDURE — 99233 SBSQ HOSP IP/OBS HIGH 50: CPT | Performed by: NURSE PRACTITIONER

## 2020-09-06 PROCEDURE — 82948 REAGENT STRIP/BLOOD GLUCOSE: CPT

## 2020-09-06 PROCEDURE — 71045 X-RAY EXAM CHEST 1 VIEW: CPT

## 2020-09-06 PROCEDURE — 83735 ASSAY OF MAGNESIUM: CPT | Performed by: NURSE PRACTITIONER

## 2020-09-06 PROCEDURE — 94760 N-INVAS EAR/PLS OXIMETRY 1: CPT

## 2020-09-06 PROCEDURE — 94003 VENT MGMT INPAT SUBQ DAY: CPT

## 2020-09-06 PROCEDURE — 84100 ASSAY OF PHOSPHORUS: CPT | Performed by: NURSE PRACTITIONER

## 2020-09-06 PROCEDURE — 82272 OCCULT BLD FECES 1-3 TESTS: CPT | Performed by: PHYSICIAN ASSISTANT

## 2020-09-06 PROCEDURE — 85027 COMPLETE CBC AUTOMATED: CPT | Performed by: NURSE PRACTITIONER

## 2020-09-06 PROCEDURE — 85007 BL SMEAR W/DIFF WBC COUNT: CPT | Performed by: NURSE PRACTITIONER

## 2020-09-06 PROCEDURE — 99233 SBSQ HOSP IP/OBS HIGH 50: CPT | Performed by: INTERNAL MEDICINE

## 2020-09-06 RX ORDER — POLYETHYLENE GLYCOL 3350 17 G/17G
17 POWDER, FOR SOLUTION ORAL DAILY PRN
Status: DISCONTINUED | OUTPATIENT
Start: 2020-09-06 | End: 2020-09-09

## 2020-09-06 RX ORDER — SENNOSIDES 8.8 MG/5ML
8.8 LIQUID ORAL
Status: DISCONTINUED | OUTPATIENT
Start: 2020-09-08 | End: 2020-09-09

## 2020-09-06 RX ORDER — POTASSIUM CHLORIDE 20MEQ/15ML
20 LIQUID (ML) ORAL ONCE
Status: COMPLETED | OUTPATIENT
Start: 2020-09-06 | End: 2020-09-06

## 2020-09-06 RX ORDER — FENTANYL CITRATE 50 UG/ML
25 INJECTION, SOLUTION INTRAMUSCULAR; INTRAVENOUS ONCE
Status: COMPLETED | OUTPATIENT
Start: 2020-09-06 | End: 2020-09-06

## 2020-09-06 RX ORDER — FUROSEMIDE 10 MG/ML
40 INJECTION INTRAMUSCULAR; INTRAVENOUS ONCE
Status: COMPLETED | OUTPATIENT
Start: 2020-09-06 | End: 2020-09-06

## 2020-09-06 RX ORDER — ALBUMIN, HUMAN INJ 5% 5 %
12.5 SOLUTION INTRAVENOUS ONCE
Status: COMPLETED | OUTPATIENT
Start: 2020-09-06 | End: 2020-09-06

## 2020-09-06 RX ORDER — LEVALBUTEROL 1.25 MG/.5ML
1.25 SOLUTION, CONCENTRATE RESPIRATORY (INHALATION)
Status: DISCONTINUED | OUTPATIENT
Start: 2020-09-07 | End: 2020-09-10

## 2020-09-06 RX ORDER — METOPROLOL TARTRATE 5 MG/5ML
5 INJECTION INTRAVENOUS EVERY 6 HOURS PRN
Status: DISCONTINUED | OUTPATIENT
Start: 2020-09-06 | End: 2020-09-12 | Stop reason: HOSPADM

## 2020-09-06 RX ADMIN — IPRATROPIUM BROMIDE 0.5 MG: 0.5 SOLUTION RESPIRATORY (INHALATION) at 13:48

## 2020-09-06 RX ADMIN — SODIUM CHLORIDE 3 UNITS/HR: 9 INJECTION, SOLUTION INTRAVENOUS at 00:14

## 2020-09-06 RX ADMIN — CHLORHEXIDINE GLUCONATE 0.12% ORAL RINSE 15 ML: 1.2 LIQUID ORAL at 20:45

## 2020-09-06 RX ADMIN — PROPOFOL 15 MCG/KG/MIN: 10 INJECTION, EMULSION INTRAVENOUS at 09:05

## 2020-09-06 RX ADMIN — FUROSEMIDE 40 MG: 10 INJECTION, SOLUTION INTRAMUSCULAR; INTRAVENOUS at 08:50

## 2020-09-06 RX ADMIN — INSULIN LISPRO 1 UNITS: 100 INJECTION, SOLUTION INTRAVENOUS; SUBCUTANEOUS at 23:26

## 2020-09-06 RX ADMIN — METOPROLOL TARTRATE 5 MG: 5 INJECTION INTRAVENOUS at 19:37

## 2020-09-06 RX ADMIN — HEPARIN SODIUM 5000 UNITS: 5000 INJECTION INTRAVENOUS; SUBCUTANEOUS at 05:47

## 2020-09-06 RX ADMIN — IPRATROPIUM BROMIDE 0.5 MG: 0.5 SOLUTION RESPIRATORY (INHALATION) at 20:34

## 2020-09-06 RX ADMIN — IPRATROPIUM BROMIDE 0.5 MG: 0.5 SOLUTION RESPIRATORY (INHALATION) at 07:45

## 2020-09-06 RX ADMIN — HEPARIN SODIUM 5000 UNITS: 5000 INJECTION INTRAVENOUS; SUBCUTANEOUS at 13:50

## 2020-09-06 RX ADMIN — ASPIRIN 81 MG 81 MG: 81 TABLET ORAL at 08:50

## 2020-09-06 RX ADMIN — GUAIFENESIN 200 MG: 100 SOLUTION ORAL at 01:16

## 2020-09-06 RX ADMIN — INSULIN LISPRO 1 UNITS: 100 INJECTION, SOLUTION INTRAVENOUS; SUBCUTANEOUS at 18:41

## 2020-09-06 RX ADMIN — ALBUMIN (HUMAN) 12.5 G: 12.5 INJECTION, SOLUTION INTRAVENOUS at 01:43

## 2020-09-06 RX ADMIN — PROPOFOL 15 MCG/KG/MIN: 10 INJECTION, EMULSION INTRAVENOUS at 01:06

## 2020-09-06 RX ADMIN — FAMOTIDINE 20 MG: 10 INJECTION, SOLUTION INTRAVENOUS at 08:50

## 2020-09-06 RX ADMIN — IPRATROPIUM BROMIDE 0.5 MG: 0.5 SOLUTION RESPIRATORY (INHALATION) at 02:21

## 2020-09-06 RX ADMIN — GUAIFENESIN 200 MG: 100 SOLUTION ORAL at 09:00

## 2020-09-06 RX ADMIN — CEFEPIME HYDROCHLORIDE 2000 MG: 2 INJECTION, SOLUTION INTRAVENOUS at 13:50

## 2020-09-06 RX ADMIN — FENTANYL CITRATE 100 MCG: 50 INJECTION INTRAMUSCULAR; INTRAVENOUS at 01:01

## 2020-09-06 RX ADMIN — LEVALBUTEROL HYDROCHLORIDE 1.25 MG: 1.25 SOLUTION, CONCENTRATE RESPIRATORY (INHALATION) at 20:34

## 2020-09-06 RX ADMIN — FENTANYL CITRATE 25 MCG: 50 INJECTION INTRAMUSCULAR; INTRAVENOUS at 23:24

## 2020-09-06 RX ADMIN — HEPARIN SODIUM 5000 UNITS: 5000 INJECTION INTRAVENOUS; SUBCUTANEOUS at 22:09

## 2020-09-06 RX ADMIN — METRONIDAZOLE 500 MG: 500 INJECTION, SOLUTION INTRAVENOUS at 16:43

## 2020-09-06 RX ADMIN — ACETAMINOPHEN 325 MG: 325 SUPPOSITORY RECTAL at 18:47

## 2020-09-06 RX ADMIN — METRONIDAZOLE 500 MG: 500 INJECTION, SOLUTION INTRAVENOUS at 00:16

## 2020-09-06 RX ADMIN — GUAIFENESIN 200 MG: 100 SOLUTION ORAL at 05:47

## 2020-09-06 RX ADMIN — LEVALBUTEROL HYDROCHLORIDE 1.25 MG: 1.25 SOLUTION, CONCENTRATE RESPIRATORY (INHALATION) at 07:45

## 2020-09-06 RX ADMIN — METRONIDAZOLE 500 MG: 500 INJECTION, SOLUTION INTRAVENOUS at 08:50

## 2020-09-06 RX ADMIN — LEVALBUTEROL HYDROCHLORIDE 1.25 MG: 1.25 SOLUTION, CONCENTRATE RESPIRATORY (INHALATION) at 02:21

## 2020-09-06 RX ADMIN — FENTANYL CITRATE 100 MCG: 50 INJECTION INTRAMUSCULAR; INTRAVENOUS at 05:48

## 2020-09-06 RX ADMIN — LEVALBUTEROL HYDROCHLORIDE 1.25 MG: 1.25 SOLUTION, CONCENTRATE RESPIRATORY (INHALATION) at 13:48

## 2020-09-06 RX ADMIN — CHLORHEXIDINE GLUCONATE 0.12% ORAL RINSE 15 ML: 1.2 LIQUID ORAL at 08:50

## 2020-09-06 RX ADMIN — VANCOMYCIN HYDROCHLORIDE 1750 MG: 1 INJECTION, POWDER, LYOPHILIZED, FOR SOLUTION INTRAVENOUS at 18:40

## 2020-09-06 RX ADMIN — POTASSIUM CHLORIDE 20 MEQ: 20 SOLUTION ORAL at 08:50

## 2020-09-06 NOTE — ASSESSMENT & PLAN NOTE
· /Increase oxygenation requirements with worsening encephalopathy  · 9/2 Intubated 8 0 ETT  · Vent day #5 ASV 85%, 50% fiO2 +7  · Propofol and Fentanyl for sedation  · Continue to wean down propofol  · PRN Fentanyl and versed  · Atrovent/Xopenex Q 6  · Mucinex added 9/4 for secretion clearance  · Extubated 9/6- wean nasal cannula as tolerated  · Cont chest PT and IS

## 2020-09-06 NOTE — ASSESSMENT & PLAN NOTE
· Baseline Cr 1 0 as of 05/2020  · Creat in ED 3 33  Trending down appropriately  1 17 on 9/6  · Means placed in ED  · Received 3L NS in ED  · Trend labs  · Avoid nephrotoxins, hypotension  · Urine studies ordered     · Nephrology recommendations appreciated  · Dosing lasix prn -  40 mg IV given 9/3/20 and 9/6/20

## 2020-09-06 NOTE — RESPIRATORY THERAPY NOTE
RT Ventilator Management Note  Ethyl Bigger 66 y o  male MRN: [id number]  Unit/Bed#: -01 Encounter: [id number]      Daily Screen       9/4/2020 2022 9/6/2020  1230          Patient safety screen outcome[de-identified]  Failed  Passed      Not Ready for Weaning due to[de-identified]  PEEP > 8cmH2O;Underline problem not resolved        Spont breathing trial outcome[de-identified]    Passed              Physical Exam:         Resp Comments: Extubated pt after vigorous suctioning  Placed pt on 4 L NC  No issues noted during or after extubation   Pt satting well on 4L

## 2020-09-06 NOTE — ASSESSMENT & PLAN NOTE
· Secondary to pneumonia vs unclear etiology  · POA - fever, tachypnea, tachycardia, increased Cr, persistent hypotension not fluid responsive  · In the ED  · Hypotensive, normal lactate, given 3L NSS  · CT C/A/P without contrast:   Bilateral predominantly upper lobe diffuse nodular interstitial changes of inflammatory infectious etiology  2   Subsegmental atelectasis /consolidation in the right upper lobe in a bronchovascular distribution  3   Posterior bibasilar dependent changes with possible atelectasis/infiltrate in the left lung base  4   Diffuse bladder wall thickening which is decompressed containing a Means balloon  5  Mediastinal lymphadenopathy as described largest in the subcarinal region measuring 1 8 cm    · Micro work up  · Covid repeat in ED on 9/1 negative  · Flu/ RSV negative  · Urine strep and legionella - negative  · Urine + bacteria, cloudy, thickened bladder wall on CT scan in ED  · Procalcitonin peaked at 36, trending downward  · Blood cultures - No growth  · Sputum culture - 2+ polys, no bacteria  · Lyme PCR  - pending  · ABX - broadened due to severity of illness, Cont Cefepime (1 G Q 24 due to renal insufficiency) D#6, Vanco D#6, and Flagyl D#6  · Increase cefepime to 2 Q 24 when GFR 30-60 per ID  · 9/5 Renal ok with increasing to 2gm q 12   · Cont to wean down Levophed  Shane weaned off evening of 9/2/2020  Vaso off 9/3  · Levo weaned of 9/5 @ 2120  · RUQ u/s performed 9/3: No gallstones seen  Some gallbladder wall thickening and pericholecystic fluid is present which could be related to underdistention or other pathology  ·  ID consult placed - Dr Armida Avendano  · Consult general surgery for possible cholecystitis per ID recommendations  · Gen surgery recommends HIDA scan once pt  Is stable enough to go for long study  · 9/6 Pt improving, WBC improving  Gen surg no longer rec HIDA  · Overall pt   Improving slowly - wife updated at bedside  · BP soft 9/6 overnight, given 250ml 5% albumin with improvment

## 2020-09-06 NOTE — ASSESSMENT & PLAN NOTE
· Unknown baseline   · Continue to trend labs  · Monitor for any signs of bleeding  · Consider further anemia work up  · Transfuse with 1 u PRBC 9/3/20 and 9/4/20 - consent obtained from wife  · No obvious signs of bleeding  · Hemoccult stool 1/3 negative

## 2020-09-06 NOTE — PLAN OF CARE
Problem: Potential for Falls  Goal: Patient will remain free of falls  Description: INTERVENTIONS:  - Assess patient frequently for physical needs  -  Identify cognitive and physical deficits and behaviors that affect risk of falls  -  [place] fall precautions as indicated by assessment   - Educate patient/family on patient safety including physical limitations  - Instruct patient to call for assistance with activity based on assessment  - Modify environment to reduce risk of injury  - Consider OT/PT consult to assist with strengthening/mobility  Outcome: Progressing     Problem: Prexisting or High Potential for Compromised Skin Integrity  Goal: Skin integrity is maintained or improved  Description: INTERVENTIONS:  - Identify patients at risk for skin breakdown  - Assess and monitor skin integrity  - Assess and monitor nutrition and hydration status  - Monitor labs   - Assess for incontinence   - Turn and reposition patient  - Assist with mobility/ambulation  - Relieve pressure over bony prominences  - Avoid friction and shearing  - Provide appropriate hygiene as needed including keeping skin clean and dry  - Evaluate need for skin moisturizer/barrier cream  - Collaborate with interdisciplinary team   - Patient/family teaching  - Consider wound care consult   Outcome: Progressing     Problem: Nutrition/Hydration-ADULT  Goal: Nutrient/Hydration intake appropriate for improving, restoring or maintaining nutritional needs  Description: Monitor and assess patient's nutrition/hydration status for malnutrition  Collaborate with interdisciplinary team and initiate plan and interventions as ordered  Monitor patient's weight and dietary intake as ordered or per policy  Utilize nutrition screening tool and intervene as necessary  Determine patient's food preferences and provide high-protein, high-caloric foods as appropriate       INTERVENTIONS:  - Monitor oral intake, urinary output, labs, and treatment plans  - Assess nutrition and hydration status and recommend course of action  - Evaluate amount of meals eaten  - Assist patient with eating if necessary   - Allow adequate time for meals  - Recommend/ encourage appropriate diets, oral nutritional supplements, and vitamin/mineral supplements  - Order, calculate, and assess calorie counts as needed  - Recommend, monitor, and adjust tube feedings and TPN/PPN based on assessed needs  - Assess need for intravenous fluids  - Provide specific nutrition/hydration education as appropriate  - Include patient/family/caregiver in decisions related to nutrition  Outcome: Progressing     Problem: PAIN - ADULT  Goal: Verbalizes/displays adequate comfort level or baseline comfort level  Description: Interventions:  - Encourage patient to monitor pain and request assistance  - Assess pain using appropriate pain scale  - Administer analgesics based on type and severity of pain and evaluate response  - Implement non-pharmacological measures as appropriate and evaluate response  - Consider cultural and social influences on pain and pain management  - Notify physician/advanced practitioner if interventions unsuccessful or patient reports new pain  Outcome: Progressing     Problem: INFECTION - ADULT  Goal: Absence or prevention of progression during hospitalization  Description: INTERVENTIONS:  - Assess and monitor for signs and symptoms of infection  - Monitor lab/diagnostic results  - Monitor all insertion sites, i e  indwelling lines, tubes, and drains  - Monitor endotracheal if appropriate and nasal secretions for changes in amount and color  - [place] appropriate cooling/warming therapies per order  - Administer medications as ordered  - Instruct and encourage patient and family to use good hand hygiene technique  - Identify and instruct in appropriate isolation precautions for identified infection/condition  Outcome: Progressing     Problem: SAFETY ADULT  Goal: Patient will remain free of falls  Description: INTERVENTIONS:  - Assess patient frequently for physical needs  -  Identify cognitive and physical deficits and behaviors that affect risk of falls    -  [place] fall precautions as indicated by assessment   - Educate patient/family on patient safety including physical limitations  - Instruct patient to call for assistance with activity based on assessment  - Modify environment to reduce risk of injury  - Consider OT/PT consult to assist with strengthening/mobility  Outcome: Progressing  Goal: Maintain or return to baseline ADL function  Description: INTERVENTIONS:  -  Assess patient's ability to carry out ADLs; assess patient's baseline for ADL function and identify physical deficits which impact ability to perform ADLs (bathing, care of mouth/teeth, toileting, grooming, dressing, etc )  - Assess/evaluate cause of self-care deficits   - Assess range of motion  - Assess patient's mobility; develop plan if impaired  - Assess patient's need for assistive devices and provide as appropriate  - Encourage maximum independence but intervene and supervise when necessary  - Involve family in performance of ADLs  - Assess for home care needs following discharge   - Consider OT consult to assist with ADL evaluation and planning for discharge  - Provide patient education as appropriate  Outcome: Progressing  Goal: Maintain or return mobility status to optimal level  Description: INTERVENTIONS:  - Assess patient's baseline mobility status (ambulation, transfers, stairs, etc )    - Identify cognitive and physical deficits and behaviors that affect mobility  - Identify mobility aids required to assist with transfers and/or ambulation (gait belt, sit-to-stand, lift, walker, cane, etc )  - [place] fall precautions as indicated by assessment  - Record patient progress and toleration of activity level on Mobility SBAR; progress patient to next Phase/Stage  - Instruct patient to call for assistance with activity based on assessment  - Consider rehabilitation consult to assist with strengthening/weightbearing, etc   Outcome: Progressing     Problem: DISCHARGE PLANNING  Goal: Discharge to home or other facility with appropriate resources  Description: INTERVENTIONS:  - Identify barriers to discharge w/patient and caregiver  - Arrange for needed discharge resources and transportation as appropriate  - Identify discharge learning needs (meds, wound care, etc )  - Arrange for interpretive services to assist at discharge as needed  - Refer to Case Management Department for coordinating discharge planning if the patient needs post-hospital services based on physician/advanced practitioner order or complex needs related to functional status, cognitive ability, or social support system  Outcome: Progressing     Problem: Knowledge Deficit  Goal: Patient/family/caregiver demonstrates understanding of disease process, treatment plan, medications, and discharge instructions  Description: Complete learning assessment and assess knowledge base    Interventions:  - Provide teaching at level of understanding  - Provide teaching via preferred learning methods  Outcome: Progressing     Problem: NEUROSENSORY - ADULT  Goal: Achieves stable or improved neurological status  Description: INTERVENTIONS  - Monitor and report changes in neurological status  - Monitor vital signs such as temperature, blood pressure, glucose, and any other labs ordered   - Initiate measures to prevent increased intracranial pressure  - Monitor for seizure activity and implement precautions if appropriate      Outcome: Progressing     Problem: CARDIOVASCULAR - ADULT  Goal: Maintains optimal cardiac output and hemodynamic stability  Description: INTERVENTIONS:  - Monitor I/O, vital signs and rhythm  - Monitor for S/S and trends of decreased cardiac output  - Administer and titrate ordered vasoactive medications to optimize hemodynamic stability  - Assess quality of pulses, skin color and temperature  - Assess for signs of decreased coronary artery perfusion  - Instruct patient to report change in severity of symptoms  Outcome: Progressing  Goal: Absence of cardiac dysrhythmias or at baseline rhythm  Description: INTERVENTIONS:  - Continuous cardiac monitoring, vital signs, obtain 12 lead EKG if ordered  - Administer antiarrhythmic and heart rate control medications as ordered  - Monitor electrolytes and administer replacement therapy as ordered  Outcome: Progressing     Problem: RESPIRATORY - ADULT  Goal: Achieves optimal ventilation and oxygenation  Description: INTERVENTIONS:  - Assess for changes in respiratory status  - Assess for changes in mentation and behavior  - Position to facilitate oxygenation and minimize respiratory effort  - Oxygen administered by appropriate delivery if ordered  - Initiate smoking cessation education as indicated  - Encourage broncho-pulmonary hygiene including cough, deep breathe, Incentive Spirometry  - Assess the need for suctioning and aspirate as needed  - Assess and instruct to report SOB or any respiratory difficulty  - Respiratory Therapy support as indicated  Outcome: Progressing     Problem: GENITOURINARY - ADULT  Goal: Maintains or returns to baseline urinary function  Description: INTERVENTIONS:  - Assess urinary function  - Encourage oral fluids to ensure adequate hydration if ordered  - Administer IV fluids as ordered to ensure adequate hydration  - Administer ordered medications as needed  - Offer frequent toileting  - Follow urinary retention protocol if ordered  Outcome: Progressing     Problem: METABOLIC, FLUID AND ELECTROLYTES - ADULT  Goal: Electrolytes maintained within normal limits  Description: INTERVENTIONS:  - Monitor labs and assess patient for signs and symptoms of electrolyte imbalances  - Administer electrolyte replacement as ordered  - Monitor response to electrolyte replacements, including repeat lab results as appropriate  - Instruct patient on fluid and nutrition as appropriate  Outcome: Progressing  Goal: Fluid balance maintained  Description: INTERVENTIONS:  - Monitor labs   - Monitor I/O and WT  - Instruct patient on fluid and nutrition as appropriate  - Assess for signs & symptoms of volume excess or deficit  Outcome: Progressing  Goal: Glucose maintained within target range  Description: INTERVENTIONS:  - Monitor Blood Glucose as ordered  - Assess for signs and symptoms of hyperglycemia and hypoglycemia  - Administer ordered medications to maintain glucose within target range  - Assess nutritional intake and initiate nutrition service referral as needed  Outcome: Progressing

## 2020-09-06 NOTE — PROGRESS NOTES
Progress Note - Nephrology   Ventura Quispe 66 y o  male MRN: [id number]  Unit/Bed#: -01 Encounter: [id number]    A/P:  1  Acute kidney injury due to septic shock               creatinine continues to improve nicely, continue to offer supportive care and avoidance of nephrotoxins  2  Chronic kidney disease with unclear baseline creatinine  3  Possible underlying diabetic nephropathy               no further workup at this time, patient should have a re-evaluation in the outpatient setting in his usual state of health  4  Proteinuria               as mentioned previously, no further workup at this time, patient would benefit from being ruled out for monoclonal gammopathy in the outpatient setting  5  Septic shock               patient has been successfully weaned off of all vasopressors, remains on Flagyl cefepime Vancomycin for antibiotic coverage  As mentioned previously, may increase cefepime if 2 g Q 12 hours is prefer dosing as this is okay from the renal standpoint  6  Ventilator dependent respiratory failure due to community-acquired pneumonia   Patient continues to be weaned, discussed with critical care and patient will be provided with Lasix in effort to continue to optimize toward extubation  7  Hyponatremia-resolved  8  Transaminitis   AST continues to improve, alkaline phosphatase slightly increased over last 24-48 hours  9  Edema   As mentioned above, patient to be given a dose of Lasix today  Continue to optimize volume status according patient's clinical in hemodynamic needs      Follow up reason for today's visit:  Acute kidney injury/probable underlying chronic kidney disease/proteinuria    Septic shock [facility])    Patient Active Problem List   Diagnosis    [facility]    Community acquired pneumonia    CHERISE (acute kidney [facility]) ([facility] )    Elevated brain natriuretic peptide (BNP) level    Type 2 MI (myocardial infarction) ([facility] )    Type 2 diabetes mellitus, with long-term current use of insulin (HCC)    Encephalopathy due to infection    Acute respiratory failure with hypoxia (HCC)    Anemia         Subjective:   No Acute events overnight, patient remains intubated and sedated    Objective:     Vitals: Blood pressure 148/89, pulse 104, temperature 99 7 °F (37 6 °C), resp  rate 18, height 6' 3" (1 905 m), weight 84 5 kg (186 lb 4 6 oz), SpO2 93 %  ,Body mass index is 23 28 kg/m²  Weight (last 2 days)     Date/Time   Weight    09/06/20 0600   84 5 (186 29)    09/05/20 0600   90 3 (199 08)                Intake/Output Summary (Last 24 hours) at 9/6/2020 0953  Last data filed at 9/6/2020 0945  Gross per 24 hour   Intake 1921 13 ml   Output 3105 ml   Net -1183 87 ml     I/O last 3 completed shifts: In: 3049 2 [I V :1097 2; NG/GT:250; IV Piggyback:1200; Feedings:502]  Out: 4485 [Urine:4485]    Urethral Catheter Temperature probe 16 Fr   (Active)   Reasons to continue Urinary Catheter  Accurate I&O assessment in critically ill patients (48 hr  max) 09/05/20 1244   Goal for Removal Remove after 48 hrs of I/O monitoring 09/05/20 1244   Site Assessment Clean 09/05/20 1244   Collection Container Standard drainage bag 09/05/20 1244   Securement Method Securing device (Describe) 09/05/20 1244   Output (mL) 650 mL 09/06/20 0945       Physical Exam: /89   Pulse 104   Temp 99 7 °F (37 6 °C)   Resp 18   Ht 6' 3" (1 905 m)   Wt 84 5 kg (186 lb 4 6 oz)   SpO2 93%   BMI 23 28 kg/m²     General Appearance:    Intubated sedated   Head:    Normocephalic, without obvious abnormality, atraumatic   Eyes:    Conjunctiva/corneas clear   Ears:    Normal external ears   Nose:   Nares normal, septum midline, mucosa normal, no drainage    or sinus tenderness   Throat:   Lips, mucosa, and tongue normal; teeth and gums normal   Neck:   Supple   Back:     Symmetric, no curvature, ROM normal, no CVA tenderness   Lungs:     Clear to auscultation bilaterally, respirations unlabored   Chest wall:    No tenderness or deformity   Heart:    Regular rate and rhythm, S1 and S2 normal, no murmur, rub   or gallop   Abdomen:     Soft, non-tender, bowel sounds active   Extremities:   Extremities normal, atraumatic, no cyanosis, +2 to +3 bilateral lower extremity edema with comparable presacral edema   Skin:   Skin color, texture, turgor normal, no rashes or lesions   Lymph nodes:   Cervical normal   Neurologic:   CNII-XII intact            Lab, Imaging and other studies: I have personally reviewed pertinent labs  CBC:   Lab Results   Component Value Date    WBC 8 25 09/06/2020    HGB 8 5 (L) 09/06/2020    HCT 26 5 (L) 09/06/2020    MCV 89 09/06/2020     09/06/2020    MCH 28 5 09/06/2020    MCHC 32 1 09/06/2020    RDW 15 4 (H) 09/06/2020    MPV 9 8 09/06/2020    NRBC 0 09/06/2020     CMP:   Lab Results   Component Value Date    K 3 8 09/06/2020     (H) 09/06/2020    CO2 24 09/06/2020    BUN 26 (H) 09/06/2020    CREATININE 1 17 09/06/2020    CALCIUM 8 2 (L) 09/06/2020    AST 94 (H) 09/06/2020    ALT 71 09/06/2020    ALKPHOS 174 (H) 09/06/2020    EGFR 60 09/06/2020           Results from last 7 days   Lab Units 09/06/20  0404 09/05/20  0311 09/04/20  0509   POTASSIUM mmol/L 3 8 4 1 3 9   CHLORIDE mmol/L 110* 110* 103   CO2 mmol/L 24 24 21   BUN mg/dL 26* 26* 28*   CREATININE mg/dL 1 17 1 35* 1 53*   CALCIUM mg/dL 8 2* 8 4 8 0*   ALK PHOS U/L 174* 140* 108   ALT U/L 71 74 82*   AST U/L 94* 129* 172*         Phosphorus:   Lab Results   Component Value Date    PHOS 2 4 09/06/2020     Magnesium:   Lab Results   Component Value Date    MG 2 0 09/06/2020     Urinalysis: No results found for: COLORU, CLARITYU, SPECGRAV, PHUR, LEUKOCYTESUR, NITRITE, PROTEINUA, GLUCOSEU, KETONESU, BILIRUBINUR, BLOODU  Ionized Calcium: No results found for: CAION  Coagulation: No results found for: PT, INR, APTT  Troponin: No results found for: TROPONINI  ABG: No results found for: PHART, XQX0VBY, PO2ART, DNJ5VBG, H9QNTIDL, BEART, SOURCE  Radiology review:     IMAGING  Procedure: Xr Chest Portable    Result Date: 9/5/2020  Narrative: CHEST INDICATION:   intubation  COMPARISON:  Chest radiograph from 9/3/2020 and chest CT from 9/1/2020  EXAM PERFORMED/VIEWS:  XR CHEST PORTABLE FINDINGS:  ET tube 5 cm above the supriya  NG tube below the diaphragm  Left jugular catheter in SVC  Cardiomediastinal silhouette appears unremarkable  Persistent pulmonary edema  No effusion or pneumothorax  Osseous structures appear within normal limits for patient age  Impression: Persistent pulmonary edema  Workstation performed: [id number]     Procedure: Us Abdomen Limited    Result Date: 9/3/2020  Narrative: RIGHT UPPER QUADRANT ULTRASOUND INDICATION:    sepsis  COMPARISON:  CT from 9/1/2020 TECHNIQUE:   Real-time ultrasound of the right upper quadrant was performed with a curvilinear transducer with both volumetric sweeps and still imaging techniques  FINDINGS: PANCREAS:  The pancreas is partially obscured by bowel gas  AORTA AND IVC:  Visualized portions are normal for patient age  LIVER: Size:  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  The liver measures 20 7 cm in the midclavicular line  Contour:  Surface contour is smooth  Parenchyma:  Echogenicity and echotexture are within normal limits  No evidence of suspicious mass  Limited imaging of the main portal vein shows it to be patent and hepatopetal  Prominent vessel extends from the liver towards the abdominal wall with faint Doppler flow and may represent a recannulated periumbilical vein  No obvious nodularity in the liver is seen  BILIARY: No gallstones are seen  The gallbladder wall is mildly thickened at 3 5 mm  Trace pericholecystic fluid is present  No gallstone is seen  Sonographic Roselynn Eddie sign could not be obtained on this limited patient No intrahepatic biliary dilatation  CBD measures 2 4 mm  No choledocholithiasis  KIDNEY: Right kidney measures 11 0 cm  Within normal limits  ASCITES:   None  Impression: No gallstones seen  Some gallbladder wall thickening and pericholecystic fluid is present which could be related to underdistention or other pathology  Gallbladder inflammation is somewhat less likely given the lack of distention unless early and should be correlated clinically   The common bile duct is normal in caliber Workstation performed: [id number]       Current Facility-Administered Medications   Medication Dose Route Frequency    acetaminophen (TYLENOL) oral suspension 650 mg  650 mg Per NG Tube Q4H PRN    aspirin chewable tablet 81 mg  81 mg Oral Daily    bisacodyl (DULCOLAX) rectal suppository 10 mg  10 mg Rectal Daily PRN    cefepime (MAXIPIME) IVPB (premix) 2,000 mg 50 mL  2,000 mg Intravenous Q24H    chlorhexidine (PERIDEX) 0 12 % oral rinse 15 mL  15 mL Swish & Spit Q12H JAVID    famotidine (PEPCID) injection 20 mg  20 mg Intravenous Q24H JAVID    fentaNYL 1000 mcg in sodium chloride 0 9% 100mL infusion  50 mcg/hr Intravenous Continuous    fentanyl citrate (PF) 100 MCG/2ML 100 mcg  100 mcg Intravenous Q1H PRN    guaiFENesin (ROBITUSSIN) oral solution 200 mg  200 mg Oral Q4H    heparin (porcine) subcutaneous injection 5,000 Units  5,000 Units Subcutaneous Q8H [address]    insulin regular (HumuLIN R,NovoLIN R) 1 Units/mL in sodium chloride 0 9 % 100 mL infusion  0 3-21 Units/hr Intravenous Titrated    ipratropium (ATROVENT) 0 02 % inhalation solution 0 5 mg  0 5 mg Nebulization Q6H    levalbuterol (XOPENEX) inhalation solution 0 63 mg  0 63 mg Nebulization Q6H PRN    levalbuterol (XOPENEX) inhalation solution 1 25 mg  1 25 mg Nebulization Q6H    metroNIDAZOLE (FLAGYL) IVPB (premix) 500 mg 100 mL  500 mg Intravenous Q8H    norepinephrine (LEVOPHED) 4 mg (STANDARD CONCENTRATION) IV in sodium chloride 0 9% 250 mL  1-30 mcg/min Intravenous Titrated    polyethylene glycol (MIRALAX) packet 17 g  17 g Oral Daily    pravastatin (PRAVACHOL) tablet 20 mg  20 mg Oral Daily With Dinner    propofol (DIPRIVAN) 1000 mg in 100 mL infusion (premix)  5-50 mcg/kg/min Intravenous Titrated    senna 8 8 mg/5 mL oral syrup 8 8 mg  8 8 mg Oral HS    vancomycin (VANCOCIN) 1,750 mg in sodium chloride 0 9 % 500 mL IVPB  1,750 mg Intravenous Q24H     Medications Discontinued During This Encounter   Medication Reason    sodium chloride 0 9 % bolus 1,000 mL     sodium chloride 0 9 % bolus 1,000 mL     lisinopril (ZESTRIL) 10 mg tablet     linaGLIPtin-metFORMIN HCl (JENTADUETO XR PO) Discontinued by another clinician    heparin (porcine) subcutaneous injection 5,000 Units     cefTRIAXone (ROCEPHIN) IVPB (premix) 1,000 mg 50 mL     heparin (ACS LOW) Duplicate order    pravastatin (PRAVACHOL) tablet 40 mg     acetaminophen (TYLENOL) tablet 650 mg     azithromycin (ZITHROMAX) 500 mg in sodium chloride 0 9 % 250 mL IVPB     insulin lispro (HumaLOG) 100 units/mL subcutaneous injection 1-6 Units     phenylephrine (REG-SYNEPHRINE) 50 mg (STANDARD CONCENTRATION) in sodium chloride 0 9% 250 mL     ipratropium (ATROVENT) 0 02 % inhalation solution 0 5 mg     levalbuterol (XOPENEX) inhalation solution 1 25 mg     cefepime (MAXIPIME) IVPB (premix) 1,000 mg 50 mL     vasopressin (PITRESSIN) 20 Units in sodium chloride 0 9 % 100 mL infusion     cefepime (MAXIPIME) IVPB (premix) 1,000 mg 50 mL     vancomycin (VANCOCIN) IVPB (premix) 1,000 mg 200 mL     heparin (porcine) 25,000 units in 0 45% NaCl 250 mL infusion (premix)     heparin (porcine) injection 4,000 Units     heparin (porcine) injection 2,000 Units     bisacodyl (DULCOLAX) rectal suppository 10 mg     polyethylene glycol (MIRALAX) packet 17 g        Jayne Second, DO      This progress note was produced in part using a dictation device which may document imprecise wording from author's original intent

## 2020-09-06 NOTE — RESPIRATORY THERAPY NOTE
RT Ventilator Management Note  Xiomara Cloud 66 y o  male MRN: [id number]  Unit/Bed#: -01 Encounter: [id number]      Daily Screen       9/4/2020  0755 9/4/2020 2022          Patient safety screen outcome[de-identified]    Failed      Not Ready for Weaning due to[de-identified]  Underline problem not resolved  PEEP > 8cmH2O;Underline problem not resolved              Physical Exam:   Assessment Type: Pre-treatment  General Appearance: Sedated  Respiratory Pattern: Assisted  Chest Assessment: Chest expansion symmetrical  Bilateral Breath Sounds: Diminished  R Breath Sounds: Rhonchi  Cough: None  Suction: ET Tube  Subjective Data: assumed patient at 1900 after reportf rom dayshoft therapist   Patient on ASV mode 85% where he remained stable throughout the night  Was able to wean patient's peep to 6cm H2O and FiO2 to 40%  Patient was suctioned for a large amount of thick sputum at beginning of shift  Bilateral breath sounds equal chest rise, filter changed      Resp Comments: FiO2 decreased to 40% for SpO2 of 98%

## 2020-09-06 NOTE — ASSESSMENT & PLAN NOTE
· No history of heart failure  · Strict I&O  · Daily weight  · TTE completed on 9/2 EF 25-30%,reduced RV function, mod MVR- echo finding concerning for Takotsubo cardiomyopathy   · Received 3L NSS in ED  Hold on further fluids, consider albumin for hypotension     · Received 750ml albumin overnight 9/2  · 250ml 5% albumin given 9/5 and 9/6 for soft BP  · Dosing with prn lasix if pt  is exhibiting anasarca on exam  · Lasix 40 mg IV dose given 9/3 & 9/6

## 2020-09-06 NOTE — ASSESSMENT & PLAN NOTE
Lab Results   Component Value Date    HGBA1C 8 2 (H) 09/03/2020       Recent Labs     09/06/20  0806 09/06/20  1006 09/06/20  1256 09/06/20  1407   POCGLU 96 105 137 132       Blood Sugar Average: Last 72 hrs:  (P) 140 [id number]       · Takes metformin 1000mg BID, Januvia 100mg daily, Lantus 28 units at hs, Novolog 6 units TID w meals  · Hold home meds-Tube feeds started 9/5  · Non-DKA insulin gtt started 9/2 for persistent hyperglycemia- currently at 1 5 u / hr  · Received total of approx 43 units from gtt 9/5 @0200 through 9/6 @ 0200  · Off insulin gtt- cont SSI while NPO

## 2020-09-06 NOTE — PROGRESS NOTES
Vancomycin IV Pharmacy-to-Dose Consultation    Nain Higgins is a 66 y o  male who is currently receiving Vancomycin IV with management by the Pharmacy Consult service  Assessment/Plan:  The patient was reviewed  Renal function is stable and no signs or symptoms of nephrotoxicity and/or infusion reactions were documented in the chart  Based on todays assessment, continue current vancomycin (day # 5) dosing of 1750 mg IV every 24 hours, with a plan for trough to be drawn at 1630 on 9/8/20  We will continue to follow the patients culture results and clinical progress daily      Wong Disla, Pharmacist

## 2020-09-06 NOTE — ASSESSMENT & PLAN NOTE
· Secondary to septic shock  · Elevated Trop 2 85 in ED without EKG changes, pt asymptomatic  · Trop peaked at 34 without ST changes  · Continue Heparin gtt- D/C 9/5 and start ASA and statin  · ECHO - EF 46-53%, RV systolic function reduced, mod MVR - leaning toward takotsubo cardiomyopathy based on the echo findings  Could represent apical ballooning/Takotsubo cardiomyopathy given the basal   segments have dynamic function  · If ECG changes will need transfer for interventional cardiology  · Troponin 22 59 on 9/3   No further checks unless clinical change

## 2020-09-06 NOTE — PROGRESS NOTES
Progress Note - Kris Jones 1945, 66 y o  male MRN: [id number]    Unit/Bed#: -01 Encounter: [id number]    Primary Care Provider: Sarai López DO   Date and time admitted to hospital: 9/1/2020  4:56 PM        * Septic shock ([facility] [address] )  Assessment & Plan  · Secondary to pneumonia vs unclear etiology  · POA - fever, tachypnea, tachycardia, increased Cr, persistent hypotension not fluid responsive  · In the ED  · Hypotensive, normal lactate, given 3L NSS  · CT C/A/P without contrast:   Bilateral predominantly upper lobe diffuse nodular interstitial changes of inflammatory infectious etiology  2   Subsegmental atelectasis /consolidation in the right upper lobe in a bronchovascular distribution  3   Posterior bibasilar dependent changes with possible atelectasis/infiltrate in the left lung base  4   Diffuse bladder wall thickening which is decompressed containing a Means balloon  5  Mediastinal lymphadenopathy as described largest in the subcarinal region measuring 1 8 cm    · Micro work up  · Covid repeat in ED on 9/1 negative  · Flu/ RSV negative  · Urine strep and legionella - negative  · Urine + bacteria, cloudy, thickened bladder wall on CT scan in ED  · Procalcitonin peaked at 36, trending downward  · Blood cultures - No growth in 72 hrs  · Sputum culture - 2+ polys, no bacteria  · Lyme PCR  - pending  · ABX - broadened due to severity of illness, Cont Cefepime (1 G Q 24 due to renal insufficiency) D#5, Vanco D#5, and Flagyl D#5  · Increase cefepime to 2 Q 24 when GFR 30-60 per ID  · 9/5 Renal ok with increasing to 2gm q 12   · Cont to wean down Levophed  Shane weaned off evening of 9/2/2020  Vaso off 9/3  · Levo weaned of 9/5 @ 2120  · RUQ u/s performed 9/3: No gallstones seen    Some gallbladder wall thickening and pericholecystic fluid is present which could be related to underdistention or other pathology  ·  ID consult placed - Dr Suman Alejo  · Consult general surgery for possible cholecystitis per ID recommendations  · Gen surgery recommends HIDA scan once pt  Is stable enough to go for long study  · Overall pt  Improving slowly - wife updated at bedside  · BP soft 9/6 overnight, given 250ml 5% albumin with improvment    Acute respiratory failure with hypoxia (HCC)  Assessment & Plan  · Increase oxygenation requirements with worsening encephalopathy  · 9/2 Intubated 8 0 ETT  · Vent day #5 ASV 85%, 50% fiO2 +7  · Propofol and Fentanyl for sedation  · Continue to wean down propofol  · PRN Fentanyl and versed  · Atrovent/Xopenex Q 6  · Mucinex added 9/4 for secretion clearance  Community acquired pneumonia  Assessment & Plan  · CT chest - consolidation right upper lobe, infiltrate left lower lobe, diffuse nodular interstitial inflammatory changes  · See septic shock plan above  · See acute hypoxic respiratory plan above    CHERISE (acute kidney injury) ([facility] [address] )  Assessment & Plan  · Baseline Cr 1 0 as of 05/2020  · Creat in ED 3 33  Trending down appropriately  · Means placed in ED  · Received 3L NS in ED  · Trend labs  · Avoid nephrotoxins, hypotension  · Continue pressors  · Goal MAP >65  · Urine studies ordered  · Nephrology recommendations appreciated  · Dosing lasix prn - last dose given 40 mg IV 9/3/20     Type 2 MI (myocardial infarction) [facility])  Assessment & Plan  · Secondary to septic shock  · Elevated Trop 2 85 in ED without EKG changes, pt asymptomatic  · Trop peaked at 34 without ST changes  · Continue Heparin gtt- D/C 9/5 and start ASA and statin  · ECHO - EF 78-26%, RV systolic function reduced, mod MVR - leaning toward takotsubo cardiomyopathy based on the echo findings  Could represent apical ballooning/Takotsubo cardiomyopathy given the basal   segments have dynamic function  · If ECG changes will need transfer for interventional cardiology  · Troponin 22 59 on 9/3   No further checks unless clinical change    Elevated brain natriuretic peptide (BNP) level  Assessment & Plan  · No history of heart failure  · Strict I&O  · Daily weight  · TTE completed on 9/2 EF 25-30%,reduced RV function, mod MVR- echo finding concerning for Takotsubo cardiomyopathy   · Received 3L NSS in ED  Hold on further fluids, consider albumin for hypotension  · Received 750ml albumin overnight 9/2  · 250ml 5% albumin given 9/5 and 9/6 for soft BP  · Dosing with prn lasix if pt  is exhibiting anasarca on exam  · Last dose of lasix 40 mg on 9/3    Encephalopathy due to infection  Assessment & Plan  · Wife reports confusion new on day of admission that improved with administration of IVF and antibiotics  Became worse w temp spikes to 105  · Likely secondary to PNA, sepsis, fever  · Avoid sedating medications  · Monitor neuro status  · UDS and alcohol negative on admission    Type 2 diabetes mellitus, with long-term current use of insulin [facility])  Assessment & Plan  Lab Results   Component Value Date    HGBA1C 8 2 (H) 09/03/2020       Recent Labs     09/05/20  0555 09/05/20  0816 09/05/20  1026 09/05/20  1206   POCGLU 135 177* 159* 113       Blood Sugar Average: Last 72 hrs:  (P) 160 [id number]       · Takes metformin 1000mg BID, Januvia 100mg daily, Lantus 28 units at hs, Novolog 6 units TID w meals  · Hold home meds-Tube feeds started 9/5  · Non-DKA insulin gtt started 9/2 for persistent hyperglycemia- currently at 1 5 u / hr  · Received total of approx 43 units from gtt 9/5 @0200 through 9/6 @ 0200    Anemia  Assessment & Plan  · Unknown baseline   · Continue to trend labs  · Monitor for any signs of bleeding  · Consider further anemia work up  · Transfuse with 1 u PRBC 9/3/20 and 9/4/20 - consent obtained from wife  · No obvious signs of bleeding  · Will hemoccult stool         ----------------------------------------------------------------------------------------  HPI/24hr events:No significant events overnight  Levo weaned off  BP soft, MAP less than 65   Given albumin 250ml 5% x1 with improvement in BP  Pt more alert and interactive w staff  Afebrile  Two episodes of desatting which improved with ETT suctioning   Heparin gtt d/c'd and was started on SQ heparin for VTE  Disposition: Continue Critical Care   Code Status: Level 1 - Full Code  ---------------------------------------------------------------------------------------  SUBJECTIVE    Review of Systems   Reason unable to perform ROS: pt intubated and sedated  Can nod to some simple questions  Cardiovascular: Negative for chest pain  Gastrointestinal: Negative for abdominal pain  Musculoskeletal:        Denies general pain       ---------------------------------------------------------------------------------------  OBJECTIVE    Vitals   Vitals:    20 0030 20 0036 20 0045 20 0100   BP: 111/62  110/61 114/60   BP Location:       Pulse: 92  92 96   Resp: 14  15 16   Temp: 99 9 °F (37 7 °C)  99 9 °F (37 7 °C) 99 9 °F (37 7 °C)   TempSrc:       SpO2: 98% 98% 98% 99%   Weight:       Height:         Temp (24hrs), Av 6 °F (37 6 °C), Min:99 1 °F (37 3 °C), Max:100 4 °F (38 °C)  Current: Temperature: 99 9 °F (37 7 °C)  Arterial Line BP: 100/54  Arterial Line MAP (mmHg): 70 mmHg    Respiratory:  SpO2: SpO2: 99 %, SpO2 Activity: SpO2 Activity: At Rest, SpO2 Device: O2 Device: Ventilator, Capnography: Capnography: Yes  Nasal Cannula O2 Flow Rate (L/min): 6 L/min    Invasive/non-invasive ventilation settings   Respiratory    Lab Data (Last 4 hours)    None         O2/Vent Data (Last 4 hours)    None                Physical Exam  Vitals signs and nursing note reviewed  Constitutional:       General: He is not in acute distress  Appearance: He is not toxic-appearing or diaphoretic  HENT:      Head: Normocephalic and atraumatic        Mouth/Throat:      Mouth: Mucous membranes are moist       Comments: ETT, OGT in place  Eyes:      Conjunctiva/sclera: Conjunctivae normal       Pupils: Pupils are equal, round, and reactive to light  Neck:      Musculoskeletal: Neck supple  Trachea: No tracheal deviation  Cardiovascular:      Rate and Rhythm: Normal rate and regular rhythm  Heart sounds: Normal heart sounds  No murmur  No friction rub  Pulmonary:      Effort: No respiratory distress  Breath sounds: No wheezing or rales  Comments: Synchronous w vent, Suctioned for thick white to light tan secretions  Lungs clear, diminished in bases  Abdominal:      General: Bowel sounds are normal  There is no distension  Tenderness: There is no abdominal tenderness  There is no guarding  Comments: Abd appears more distended, semi form compared to yesterday  No pain to questioning or on palpation  Low residuals, siddharth tube feedings  Genitourinary:     Comments: Means in place, draining yellow  Musculoskeletal:         General: Swelling present  Comments: PEREZ spontaneously when more awake and alert  Skin:     General: Skin is warm and dry  Capillary Refill: Capillary refill takes less than 2 seconds  Coloration: Skin is not jaundiced or pale  Neurological:      Comments: Intermittently opens eyes at times  Able to focus on staff, follow simple commands   Nods yes/ no          Laboratory and Diagnostics:  Results from last 7 days   Lab Units 09/05/20 0311 09/04/20  0509 09/03/20  0355 09/02/20 0449 09/01/20  1706   WBC Thousand/uL 10 56* 13 10* 12 02* 11 58* 9 04   HEMOGLOBIN g/dL 8 9* 8 3* 8 2* 9 3* 9 5*   HEMATOCRIT % 26 8* 24 7* 24 1* 28 2* 28 6*   PLATELETS Thousands/uL 284 277 244 279 222   NEUTROS PCT % 75 84* 83*  --   --    MONOS PCT % 6 5 5  --   --    MONO PCT %  --   --   --   --  8     Results from last 7 days   Lab Units 09/05/20  0311 09/04/20  0509 09/03/20  0355 09/02/20  1938 09/02/20  1448 09/02/20  0915 09/02/20 0449 09/01/20  1706   SODIUM mmol/L 141 135* 131* 131* 132* 133* 133*   < > 129*   POTASSIUM mmol/L 4 1 3 9 4 4 4 5 4 5 4 2 3 8   < > 4 0   CHLORIDE mmol/L 110* 103 99* 101 100 100 100   < > 96*   CO2 mmol/L 24 21 18* 17* 20* 20* 17*   < > 20*   ANION GAP mmol/L 7 11 14* 13 12 13 16*   < > 13   BUN mg/dL 26* 28* 38* 40* 36* 34* 34*   < > 33*   CREATININE mg/dL 1 35* 1 53* 2 47* 2 93* 3 21* 3 56* 3 61*   < > 3 33*   CALCIUM mg/dL 8 4 8 0* 7 7* 7 8* 7 9* 8 1* 7 9*   < > 8 6   GLUCOSE RANDOM mg/dL 113 130 219* 203* 254* 245* 253*   < > 304*   ALT U/L 74 82* 74  --   --   --  36  --  34   AST U/L 129* 172* 230*  --   --   --  81*  --  43   ALK PHOS U/L 140* 108 90  --   --   --  100  --  112   ALBUMIN g/dL 2 0* 2 1* 2 6*  --   --   --  2 1*  --  2 1*   TOTAL BILIRUBIN mg/dL 0 58 0 53 0 52  --   --   --  0 71  --  0 73    < > = values in this interval not displayed  Results from last 7 days   Lab Units 09/05/20  0311 09/04/20  0509 09/03/20  0355 09/02/20  0449 09/02/20  0004 09/01/20  1706   MAGNESIUM mg/dL 2 4 1 8 2 0 2 2 1 7 1 5*   PHOSPHORUS mg/dL 2 8 3 5  --  2 7  --   --       Results from last 7 days   Lab Units 09/05/20  0550 09/04/20  0508 09/03/20  2238 09/03/20  1611 09/03/20  1017 09/03/20  0355 09/02/20  1448  09/01/20  1706   INR   --   --   --   --   --   --   --   --  1 28*   PTT seconds 70* 70* 66* 57* 74* 58* 63*   < > 38*    < > = values in this interval not displayed        Results from last 7 days   Lab Units 09/03/20  1017 09/03/20  0357 09/02/20  2217 09/02/20  1938 09/02/20  1600 09/02/20  1158 09/02/20  0734   TROPONIN I ng/mL 22 59* 29 42* 33 67* 34 83* 26 17* 17 64* 10 54*     Results from last 7 days   Lab Units 09/02/20  0915 09/01/20  1706   LACTIC ACID mmol/L 1 7 1 5     ABG:  Results from last 7 days   Lab Units 09/04/20  0508   PH ART  7 387   PCO2 ART mm Hg 31 1*   PO2 ART mm Hg 76 9   HCO3 ART mmol/L 18 3*   BASE EXC ART mmol/L -5 9   ABG SOURCE  Line, Arterial     VBG:  Results from last 7 days   Lab Units 09/04/20  0508  09/02/20  0915   PH GOLD   --   --  7 261*   PCO2 GOLD mm Hg  --   --  37 7*   PO2 GOLD mm Hg  --   --  31 5*   HCO3 GOLD mmol/L --   --  16 6*   BASE EXC GOLD mmol/L  --   --  -9 7   ABG SOURCE  Line, Arterial   < >  --     < > = values in this interval not displayed  Results from last 7 days   Lab Units 09/05/20  0311 09/04/20  0509 09/03/20  0355 09/02/20  0449 09/01/20  1706   PROCALCITONIN ng/ml 8 16* 17 61*  17 12* 36 73* 6 81* 1 40*       Micro  Results from last 7 days   Lab Units 09/03/20  0405 09/02/20  1450 09/01/20  2238 09/01/20  1731 09/01/20  1706   BLOOD CULTURE   --   --   --   --  No Growth After 4 Days  No Growth After 4 Days  SPUTUM CULTURE  No growth  --   --   --   --    GRAM STAIN RESULT  2+ Polys  No bacteria seen  --   --   --   --    MRSA CULTURE ONLY   --  No Methicillin Resistant Staphlyococcus aureus (MRSA) isolated  --   --   --    LEGIONELLA URINARY ANTIGEN   --   --  Negative  --   --    STREP PNEUMONIAE ANTIGEN, URINE   --   --   --  Negative  --        EKG: NSR  Imaging: No new imaging to review    Intake and Output  I/O       09/04 0701 - 09/05 0700 09/05 0701 - 09/06 0700    P  O  0     I V  (mL/kg) 986 2 (10 9) 537 (5 9)    Blood 339 6     NG/ 130    IV Piggyback 300 700    Feedings 168 318    Total Intake(mL/kg) 2043 8 (22 6) 1685 (18 7)    Urine (mL/kg/hr) 3735 (1 7) 1975 (0 9)    Emesis/NG output 0     Stool 0     Total Output 3735 1975    Net -1691 2 -290          Unmeasured Stool Occurrence 0 x           Height and Weights   Height: 6' 3" (190 5 cm)  IBW: 84 5 kg  Body mass index is 24 88 kg/m²    Weight (last 2 days)     Date/Time   Weight    09/05/20 0600   90 3 (199 08)                Nutrition       Diet Orders   (From admission, onward)             Start     Ordered    09/05/20 2228  Diet Enteral/Parenteral; Tube Feeding No Oral Diet; Jevity 1 2 Jefry; Continuous; 20; Prosource Protein Liquid - One Packet; 0  Diet effective now     Question Answer Comment   Diet Type Enteral/Parenteral    Enteral/Parenteral Tube Feeding No Oral Diet    Tube Feeding Formula: Jevity 1 2 Jefry Bolus/Cyclic/Continuous Continuous    Tube Feeding Goal Rate (mL/hr): 20    Prosource Protein Liquid - No Carb Prosource Protein Liquid - One Packet    Tube Feeding water flush (mL): 0    RD to adjust diet per protocol?  No        09/05/20 2228                  Active Medications  Scheduled Meds:  Current Facility-Administered Medications   Medication Dose Route Frequency Provider Last Rate    acetaminophen  650 mg Per NG Tube Q4H PRN JASMINE Nuno      aspirin  81 mg Oral Daily Bre Calvillo PA-C      bisacodyl  10 mg Rectal Daily PRN Dylan Strong PA-C      cefepime  2,000 mg Intravenous Q24H Isael Monte PA-C 2,000 mg (09/05/20 1343)    chlorhexidine  15 mL Swish & Spit Q12H [address] JASMINE Nuno      famotidine  20 mg Intravenous Q24H [address] Isael Monte PA-C      fentaNYL  50 mcg/hr Intravenous Continuous JASMINE Nuno 50 mcg/hr (09/05/20 1802)    fentanyl citrate (PF)  100 mcg Intravenous Q1H PRN JASMINE Nuno      guaiFENesin  200 mg Oral Q4H Nina Monte PA-C      heparin (porcine)  5,000 Units Subcutaneous [facility] Bre Calvillo PA-C      insulin regular (HumuLIN R,NovoLIN R) infusion  0 3-21 Units/hr Intravenous Titrated Ericka ElyellaZIANP 3 Units/hr (09/06/20 0154)    ipratropium  0 5 mg Nebulization Q6H Ranulfo Mcdonald DO      levalbuterol  0 63 mg Nebulization Q6H PRN JASMINE Nuno      levalbuterol  1 25 mg Nebulization Q6H Ranulfo Burns DO      metroNIDAZOLE  500 mg Intravenous Q8H Erickapamella Elyella CRNP 500 mg (09/06/20 0016)    norepinephrine  1-30 mcg/min Intravenous Titrated Ericka A Ramella, CRNP Stopped (09/05/20 2120)    polyethylene glycol  17 g Oral Daily Bre Calvillo PA-C      pravastatin  20 mg Oral Daily With [employer] MaterialsAYLEEN      propofol  5-50 mcg/kg/min Intravenous Titrated AlexandriaaneZIA ShortNP 25 mcg/kg/min (09/06/20 0146)    senna  8 8 mg Oral HS Trisha Hurley, AYLEEN      vancomycin  1,750 mg Intravenous Q24H JASMINE Self 1,750 mg (09/05/20 1913)     Continuous Infusions:  fentaNYL, 50 mcg/hr, Last Rate: 50 mcg/hr (09/05/20 1802)  insulin regular (HumuLIN R,NovoLIN R) infusion, 0 3-21 Units/hr, Last Rate: 3 Units/hr (09/06/20 0154)  norepinephrine, 1-30 mcg/min, Last Rate: Stopped (09/05/20 2120)  propofol, 5-50 mcg/kg/min, Last Rate: 25 mcg/kg/min (09/06/20 0146)      PRN Meds:   acetaminophen, 650 mg, Q4H PRN  bisacodyl, 10 mg, Daily PRN  fentanyl citrate (PF), 100 mcg, Q1H PRN  levalbuterol, 0 63 mg, Q6H PRN        Invasive Devices Review  Invasive Devices     Central Venous Catheter Line            CVC Central Lines 09/02/20 Triple 16cm Left Internal jugular 3 days          Peripheral Intravenous Line            Peripheral IV 09/01/20 Right Hand 4 days    Peripheral IV 09/02/20 Distal;Dorsal (posterior); Left Forearm 4 days          Arterial Line            Arterial Line 09/02/20 Right Radial 4 days          Drain            Urethral Catheter Temperature probe 16 Fr  4 days    NG/OG/Enteral Tube Orogastric 14 Fr Center mouth 3 days          Airway            ETT  Oral 8 mm 3 days                Rationale for remaining devices: Sally for hemodynamic monitoring  CVC for pressors, access ports  Means for accurate I&O  ---------------------------------------------------------------------------------------  Care Time Delivered:   Upon my evaluation, this patient had a high probability of imminent or life-threatening deterioration due to worsening resp failure, hypotension, which required my direct attention, intervention, and personal management  I have personally provided 20 minutes (0145 to 0205) of critical care time, exclusive of procedures, teaching, family meetings, and any prior time recorded by providers other than myself  JASMINE Slef      Portions of the record may have been created with voice recognition software    Occasional wrong word or "sound a like" substitutions may have occurred due to the inherent limitations of voice recognition software    Read the chart carefully and recognize, using context, where substitutions have occurred

## 2020-09-06 NOTE — PROGRESS NOTES
Progress Note - General Surgery   Lavonne Rivas 66 y o  male MRN: [id number]  Unit/Bed#: -01 Encounter: [id number]    Assessment:  42-year-old male who was currently admitted due to septic shock secondary to pneumonia versus unclear etiology  He remains on the ventilator however, extubation is planned for today  He is no longer on pressors  We have been following for possible acute cholecystitis  Currently the patient does not have any tenderness on abdominal exam   The abdominal distention is improved    Plan:  Recommend continuing broad-spectrum antibiotics  The patient's white blood cell count is normalized  At this point with the patient's significant clinical improvement without any intervention for acute cholecystitis, I do not believe HIDA scan is necessary  Subjective/Objective     Subjective: The patient is on the ventilator  The patient is now having watery bowel movements  When asked about the patient shakes his head no  Objective:     Blood pressure 148/89, pulse 104, temperature 99 7 °F (37 6 °C), resp  rate 18, height 6' 3" (1 905 m), weight 84 5 kg (186 lb 4 6 oz), SpO2 93 %  ,Body mass index is 23 28 kg/m²  Intake/Output Summary (Last 24 hours) at 9/6/2020 1240  Last data filed at 9/6/2020 1033  Gross per 24 hour   Intake 1886 98 ml   Output 3305 ml   Net -1418 02 ml       Invasive Devices     Central Venous Catheter Line            CVC Central Lines 09/02/20 Triple 16cm Left Internal jugular 4 days          Peripheral Intravenous Line            Peripheral IV 09/01/20 Right Hand 4 days    Peripheral IV 09/02/20 Distal;Dorsal (posterior); Left Forearm 4 days          Arterial Line            Arterial Line 09/02/20 Right Radial 4 days          Drain            NG/OG/Enteral Tube Orogastric 14 Fr Center mouth 4 days    Urethral Catheter Temperature probe 16 Fr  4 days          Airway            ETT  Oral 8 mm 4 days                Physical Exam: General appearance: Patient is intubated and on the ventilator  Abdomen: Soft, positive softly distended, nontender, no rebound or guarding  Skin: Skin color, texture, turgor normal  No rashes or lesions  Neurologic: Mental status:  Patient is on the ventilator however when questioned he does answer by shaking his head    Lab, Imaging and other studies:  CBC:   Lab Results   Component Value Date    WBC 8 25 09/06/2020    HGB 8 5 (L) 09/06/2020    HCT 26 5 (L) 09/06/2020    MCV 89 09/06/2020     09/06/2020    MCH 28 5 09/06/2020    MCHC 32 1 09/06/2020    RDW 15 4 (H) 09/06/2020    MPV 9 8 09/06/2020    NRBC 0 09/06/2020   , CMP:   Lab Results   Component Value Date    SODIUM 142 09/06/2020    K 3 8 09/06/2020     (H) 09/06/2020    CO2 24 09/06/2020    BUN 26 (H) 09/06/2020    CREATININE 1 17 09/06/2020    CALCIUM 8 2 (L) 09/06/2020    AST 94 (H) 09/06/2020    ALT 71 09/06/2020    ALKPHOS 174 (H) 09/06/2020    EGFR 60 09/06/2020     VTE Pharmacologic Prophylaxis: Sequential compression device (Venodyne)   VTE Mechanical Prophylaxis: sequential compression device

## 2020-09-07 LAB
ANION GAP SERPL CALCULATED.3IONS-SCNC: 13 MMOL/L (ref 4–13)
BUN SERPL-MCNC: 27 MG/DL (ref 5–25)
CALCIUM SERPL-MCNC: 8.8 MG/DL (ref 8.3–10.1)
CHLORIDE SERPL-SCNC: 108 MMOL/L (ref 100–108)
CO2 SERPL-SCNC: 23 MMOL/L (ref 21–32)
CREAT SERPL-MCNC: 1.25 MG/DL (ref 0.6–1.3)
ERYTHROCYTE [DISTWIDTH] IN BLOOD BY AUTOMATED COUNT: 15.1 % (ref 11.6–15.1)
GFR SERPL CREATININE-BSD FRML MDRD: 56 ML/MIN/1.73SQ M
GLUCOSE SERPL-MCNC: 139 MG/DL (ref 65–140)
GLUCOSE SERPL-MCNC: 145 MG/DL (ref 65–140)
GLUCOSE SERPL-MCNC: 151 MG/DL (ref 65–140)
GLUCOSE SERPL-MCNC: 182 MG/DL (ref 65–140)
HCT VFR BLD AUTO: 33.6 % (ref 36.5–49.3)
HGB BLD-MCNC: 10.6 G/DL (ref 12–17)
MAGNESIUM SERPL-MCNC: 1.6 MG/DL (ref 1.6–2.6)
MCH RBC QN AUTO: 28 PG (ref 26.8–34.3)
MCHC RBC AUTO-ENTMCNC: 31.5 G/DL (ref 31.4–37.4)
MCV RBC AUTO: 89 FL (ref 82–98)
PLATELET # BLD AUTO: 371 THOUSANDS/UL (ref 149–390)
PMV BLD AUTO: 9.8 FL (ref 8.9–12.7)
POTASSIUM SERPL-SCNC: 3.9 MMOL/L (ref 3.5–5.3)
RBC # BLD AUTO: 3.79 MILLION/UL (ref 3.88–5.62)
SODIUM SERPL-SCNC: 144 MMOL/L (ref 136–145)
WBC # BLD AUTO: 10.32 THOUSAND/UL (ref 4.31–10.16)

## 2020-09-07 PROCEDURE — 99233 SBSQ HOSP IP/OBS HIGH 50: CPT | Performed by: INTERNAL MEDICINE

## 2020-09-07 PROCEDURE — 80048 BASIC METABOLIC PNL TOTAL CA: CPT | Performed by: NURSE PRACTITIONER

## 2020-09-07 PROCEDURE — 94760 N-INVAS EAR/PLS OXIMETRY 1: CPT

## 2020-09-07 PROCEDURE — 94664 DEMO&/EVAL PT USE INHALER: CPT

## 2020-09-07 PROCEDURE — 82948 REAGENT STRIP/BLOOD GLUCOSE: CPT

## 2020-09-07 PROCEDURE — 99233 SBSQ HOSP IP/OBS HIGH 50: CPT | Performed by: ANESTHESIOLOGY

## 2020-09-07 PROCEDURE — 83735 ASSAY OF MAGNESIUM: CPT | Performed by: NURSE PRACTITIONER

## 2020-09-07 PROCEDURE — 94640 AIRWAY INHALATION TREATMENT: CPT

## 2020-09-07 PROCEDURE — 85027 COMPLETE CBC AUTOMATED: CPT | Performed by: NURSE PRACTITIONER

## 2020-09-07 RX ORDER — MAGNESIUM SULFATE HEPTAHYDRATE 40 MG/ML
2 INJECTION, SOLUTION INTRAVENOUS ONCE
Status: COMPLETED | OUTPATIENT
Start: 2020-09-07 | End: 2020-09-07

## 2020-09-07 RX ORDER — ACETAMINOPHEN 160 MG/1
500 BAR, CHEWABLE ORAL EVERY 4 HOURS PRN
Status: DISCONTINUED | OUTPATIENT
Start: 2020-09-07 | End: 2020-09-08

## 2020-09-07 RX ADMIN — IPRATROPIUM BROMIDE 0.5 MG: 0.5 SOLUTION RESPIRATORY (INHALATION) at 07:33

## 2020-09-07 RX ADMIN — CEFEPIME HYDROCHLORIDE 2000 MG: 2 INJECTION, SOLUTION INTRAVENOUS at 14:14

## 2020-09-07 RX ADMIN — GUAIFENESIN 200 MG: 100 SOLUTION ORAL at 22:03

## 2020-09-07 RX ADMIN — IPRATROPIUM BROMIDE 0.5 MG: 0.5 SOLUTION RESPIRATORY (INHALATION) at 20:23

## 2020-09-07 RX ADMIN — LEVALBUTEROL HYDROCHLORIDE 1.25 MG: 1.25 SOLUTION, CONCENTRATE RESPIRATORY (INHALATION) at 20:23

## 2020-09-07 RX ADMIN — LEVALBUTEROL HYDROCHLORIDE 1.25 MG: 1.25 SOLUTION, CONCENTRATE RESPIRATORY (INHALATION) at 14:27

## 2020-09-07 RX ADMIN — GUAIFENESIN 200 MG: 100 SOLUTION ORAL at 10:47

## 2020-09-07 RX ADMIN — INSULIN LISPRO 1 UNITS: 100 INJECTION, SOLUTION INTRAVENOUS; SUBCUTANEOUS at 12:16

## 2020-09-07 RX ADMIN — VANCOMYCIN HYDROCHLORIDE 1750 MG: 1 INJECTION, POWDER, LYOPHILIZED, FOR SOLUTION INTRAVENOUS at 17:05

## 2020-09-07 RX ADMIN — GUAIFENESIN 200 MG: 100 SOLUTION ORAL at 14:12

## 2020-09-07 RX ADMIN — CHLORHEXIDINE GLUCONATE 0.12% ORAL RINSE 15 ML: 1.2 LIQUID ORAL at 20:41

## 2020-09-07 RX ADMIN — GUAIFENESIN 200 MG: 100 SOLUTION ORAL at 17:05

## 2020-09-07 RX ADMIN — ACETAMINOPHEN 325 MG: 325 SUPPOSITORY RECTAL at 15:49

## 2020-09-07 RX ADMIN — HEPARIN SODIUM 5000 UNITS: 5000 INJECTION INTRAVENOUS; SUBCUTANEOUS at 22:03

## 2020-09-07 RX ADMIN — IPRATROPIUM BROMIDE 0.5 MG: 0.5 SOLUTION RESPIRATORY (INHALATION) at 14:27

## 2020-09-07 RX ADMIN — METRONIDAZOLE 500 MG: 500 INJECTION, SOLUTION INTRAVENOUS at 00:49

## 2020-09-07 RX ADMIN — ACETAMINOPHEN 480 MG: 160 TABLET, CHEWABLE ORAL at 11:41

## 2020-09-07 RX ADMIN — PRAVASTATIN SODIUM 20 MG: 20 TABLET ORAL at 17:05

## 2020-09-07 RX ADMIN — NYSTATIN 500000 UNITS: 100000 SUSPENSION ORAL at 12:37

## 2020-09-07 RX ADMIN — CHLORHEXIDINE GLUCONATE 0.12% ORAL RINSE 15 ML: 1.2 LIQUID ORAL at 10:47

## 2020-09-07 RX ADMIN — FAMOTIDINE 20 MG: 10 INJECTION, SOLUTION INTRAVENOUS at 10:45

## 2020-09-07 RX ADMIN — HEPARIN SODIUM 5000 UNITS: 5000 INJECTION INTRAVENOUS; SUBCUTANEOUS at 14:12

## 2020-09-07 RX ADMIN — HEPARIN SODIUM 5000 UNITS: 5000 INJECTION INTRAVENOUS; SUBCUTANEOUS at 06:06

## 2020-09-07 RX ADMIN — LEVALBUTEROL HYDROCHLORIDE 1.25 MG: 1.25 SOLUTION, CONCENTRATE RESPIRATORY (INHALATION) at 07:33

## 2020-09-07 RX ADMIN — NYSTATIN 500000 UNITS: 100000 SUSPENSION ORAL at 22:03

## 2020-09-07 RX ADMIN — METOPROLOL TARTRATE 5 MG: 5 INJECTION INTRAVENOUS at 02:23

## 2020-09-07 RX ADMIN — INSULIN LISPRO 1 UNITS: 100 INJECTION, SOLUTION INTRAVENOUS; SUBCUTANEOUS at 17:16

## 2020-09-07 RX ADMIN — MAGNESIUM SULFATE HEPTAHYDRATE 2 G: 40 INJECTION, SOLUTION INTRAVENOUS at 06:06

## 2020-09-07 RX ADMIN — NYSTATIN 500000 UNITS: 100000 SUSPENSION ORAL at 17:05

## 2020-09-07 RX ADMIN — METRONIDAZOLE 500 MG: 500 INJECTION, SOLUTION INTRAVENOUS at 09:48

## 2020-09-07 RX ADMIN — ASPIRIN 81 MG 81 MG: 81 TABLET ORAL at 11:45

## 2020-09-07 NOTE — ASSESSMENT & PLAN NOTE
· Secondary to septic shock  · Elevated Trop 2 85 in ED without EKG changes, pt asymptomatic  · Trop peaked at 34 without ST changes  · Continue Heparin gtt- D/C 9/5 and start ASA and statin  · ECHO - EF 74-14%, RV systolic function reduced, mod MVR - leaning toward takotsubo cardiomyopathy based on the echo findings  Could represent apical ballooning/Takotsubo cardiomyopathy given the basal   segments have dynamic function  · If ECG changes will need transfer for interventional cardiology  · Troponin 22 59 on 9/3   No further checks unless clinical change

## 2020-09-07 NOTE — PLAN OF CARE
Problem: Potential for Falls  Goal: Patient will remain free of falls  Description: INTERVENTIONS:  - Assess patient frequently for physical needs  -  Identify cognitive and physical deficits and behaviors that affect risk of falls    -  [place] fall precautions as indicated by assessment   - Educate patient/family on patient safety including physical limitations  - Instruct patient to call for assistance with activity based on assessment  - Modify environment to reduce risk of injury  - Consider OT/PT consult to assist with strengthening/mobility  Outcome: Progressing     Problem: Prexisting or High Potential for Compromised Skin Integrity  Goal: Skin integrity is maintained or improved  Description: INTERVENTIONS:  - Identify patients at risk for skin breakdown  - Assess and monitor skin integrity  - Assess and monitor nutrition and hydration status  - Monitor labs   - Assess for incontinence   - Turn and reposition patient  - Assist with mobility/ambulation  - Relieve pressure over bony prominences  - Avoid friction and shearing  - Provide appropriate hygiene as needed including keeping skin clean and dry  - Evaluate need for skin moisturizer/barrier cream  - Collaborate with interdisciplinary team   - Patient/family teaching  - Consider wound care consult   Outcome: Progressing

## 2020-09-07 NOTE — PROGRESS NOTES
Progress Note - General Surgery   Dionte Arora 66 y o  male MRN: [id number]  Unit/Bed#: -01 Encounter: [id number]    Assessment:  78-year-old male who was currently admitted due to septic shock secondary to pneumonia versus unclear etiology  He is now extubated and off pressors    Plan:  Antibiotics per primary service  Low clinical suspicion for acute cholecystitis, would continue to hold off on HIDA scan at this time  Patient complains of left lower quadrant pain  If this continues and his white blood cell count continues to increase, would consider repeat CT scan abdomen and pelvis      Subjective/Objective     Subjective: The patient is now extubated  When asked about abdominal pain he states he has pain on his lower left side  He denies any nausea  Multiple bowel movements are recorded  Objective:     Blood pressure 137/81, pulse (!) 120, temperature (!) 100 6 °F (38 1 °C), temperature source Axillary, resp  rate (!) 25, height 6' 3" (1 905 m), weight 81 3 kg (179 lb 3 7 oz), SpO2 94 %  ,Body mass index is 22 4 kg/m²  Intake/Output Summary (Last 24 hours) at 9/7/2020 0905  Last data filed at 9/7/2020 0524  Gross per 24 hour   Intake 1128 96 ml   Output 3675 ml   Net -2546 04 ml       Invasive Devices     Central Venous Catheter Line            CVC Central Lines 09/02/20 Triple 16cm Left Internal jugular 5 days          Peripheral Intravenous Line            Peripheral IV 09/07/20 Dorsal (posterior); Right Wrist less than 1 day          Drain            Urethral Catheter Temperature probe 16 Fr  5 days                Physical Exam: General appearance:  Patient is a bit somnolent, no acute distress  Abdomen: Soft, distended, nontender  Skin: Skin color, texture, turgor normal  No rashes or lesions  Neurologic: Mental status:  Awake and alert     Lab, Imaging and other studies:  CBC:   Lab Results   Component Value Date    WBC 10 32 (H) 09/07/2020    HGB 10 6 (L) 09/07/2020    HCT 33 6 (L) 09/07/2020    MCV 89 09/07/2020     09/07/2020    MCH 28 0 09/07/2020    MCHC 31 5 09/07/2020    RDW 15 1 09/07/2020    MPV 9 8 09/07/2020   , CMP:   Lab Results   Component Value Date    SODIUM 144 09/07/2020    K 3 9 09/07/2020     09/07/2020    CO2 23 09/07/2020    BUN 27 (H) 09/07/2020    CREATININE 1 25 09/07/2020    CALCIUM 8 8 09/07/2020    EGFR 56 09/07/2020     VTE Pharmacologic Prophylaxis: Sequential compression device (Venodyne)   VTE Mechanical Prophylaxis: sequential compression device

## 2020-09-07 NOTE — PROGRESS NOTES
Progress Note - Ventura Quispe 1945, 66 y o  male MRN: [id number]    Unit/Bed#: -01 Encounter: [id number]    Primary Care Provider: Paco Rob DO   Date and time admitted to hospital: 9/1/2020  4:56 PM        * Septic shock ([facility] [address] )  Assessment & Plan  · Secondary to pneumonia vs unclear etiology  · POA - fever, tachypnea, tachycardia, increased Cr, persistent hypotension not fluid responsive  · In the ED  · Hypotensive, normal lactate, given 3L NSS  · CT C/A/P without contrast:   Bilateral predominantly upper lobe diffuse nodular interstitial changes of inflammatory infectious etiology  2   Subsegmental atelectasis /consolidation in the right upper lobe in a bronchovascular distribution  3   Posterior bibasilar dependent changes with possible atelectasis/infiltrate in the left lung base  4   Diffuse bladder wall thickening which is decompressed containing a Means balloon  5  Mediastinal lymphadenopathy as described largest in the subcarinal region measuring 1 8 cm    · Micro work up  · Covid repeat in ED on 9/1 negative  · Flu/ RSV negative  · Urine strep and legionella - negative  · Urine + bacteria, cloudy, thickened bladder wall on CT scan in ED  · Procalcitonin peaked at 36, trending downward  · Blood cultures - No growth  · Sputum culture - 2+ polys, no bacteria  · Lyme PCR  - pending  · ABX - broadened due to severity of illness, Cont Cefepime (1 G Q 24 due to renal insufficiency) D#6, Vanco D#6, and Flagyl D#6  · Increase cefepime to 2 Q 24 when GFR 30-60 per ID  · 9/5 Renal ok with increasing to 2gm q 12   · Cont to wean down Levophed  Shane weaned off evening of 9/2/2020  Vaso off 9/3  · Levo weaned of 9/5 @ 2120  · RUQ u/s performed 9/3: No gallstones seen    Some gallbladder wall thickening and pericholecystic fluid is present which could be related to underdistention or other pathology  ·  ID consult placed - Dr Abraham Lemus  · Consult general surgery for possible cholecystitis per ID recommendations  · Gen surgery recommends HIDA scan once pt  Is stable enough to go for long study  · 9/6 Pt improving, WBC improving  Gen surg no longer rec HIDA  · Overall pt  Improving slowly - wife updated at bedside  · BP soft 9/6 overnight, given 250ml 5% albumin with improvment    Acute respiratory failure with hypoxia (HCC)  Assessment & Plan  · /Increase oxygenation requirements with worsening encephalopathy  · 9/2 Intubated 8 0 ETT  · Vent day #5 ASV 85%, 50% fiO2 +7  · Propofol and Fentanyl for sedation  · Continue to wean down propofol  · PRN Fentanyl and versed  · Atrovent/Xopenex Q 6  · Mucinex added 9/4 for secretion clearance  · Extubated 9/6- wean nasal cannula as tolerated  · Cont chest PT and IS    Community acquired pneumonia  Assessment & Plan  · CT chest - consolidation right upper lobe, infiltrate left lower lobe, diffuse nodular interstitial inflammatory changes  · See septic shock plan above  · See acute hypoxic respiratory plan above    CHERISE (acute kidney injury) ([facility] [address] )  Assessment & Plan  · Baseline Cr 1 0 as of 05/2020  · Creat in ED 3 33  Trending down appropriately  1 17 on 9/6  · Means placed in ED  · Received 3L NS in ED  · Trend labs  · Avoid nephrotoxins, hypotension  · Urine studies ordered  · Nephrology recommendations appreciated  · Dosing lasix prn -  40 mg IV given 9/3/20 and 9/6/20     Type 2 MI (myocardial infarction) [facility])  Assessment & Plan  · Secondary to septic shock  · Elevated Trop 2 85 in ED without EKG changes, pt asymptomatic  · Trop peaked at 34 without ST changes  · Continue Heparin gtt- D/C 9/5 and start ASA and statin  · ECHO - EF 74-37%, RV systolic function reduced, mod MVR - leaning toward takotsubo cardiomyopathy based on the echo findings  Could represent apical ballooning/Takotsubo cardiomyopathy given the basal   segments have dynamic function    · If ECG changes will need transfer for interventional cardiology  · Troponin 22 59 on 9/3  No further checks unless clinical change    Elevated brain natriuretic peptide (BNP) level  Assessment & Plan  · No history of heart failure  · Strict I&O  · Daily weight  · TTE completed on 9/2 EF 25-30%,reduced RV function, mod MVR- echo finding concerning for Takotsubo cardiomyopathy   · Received 3L NSS in ED  Hold on further fluids, consider albumin for hypotension  · Received 750ml albumin overnight 9/2  · 250ml 5% albumin given 9/5 and 9/6 for soft BP  · Dosing with prn lasix if pt  is exhibiting anasarca on exam  · Lasix 40 mg IV dose given 9/3 & 9/6    Encephalopathy due to infection  Assessment & Plan  · Wife reports confusion new on day of admission that improved with administration of IVF and antibiotics  Became worse w temp spikes to 105  · Likely secondary to PNA, sepsis, fever  · Avoid sedating medications  · Monitor neuro status     · UDS and alcohol negative on admission    Type 2 diabetes mellitus, with long-term current use of insulin [facility])  Assessment & Plan  Lab Results   Component Value Date    HGBA1C 8 2 (H) 09/03/2020       Recent Labs     09/06/20  0806 09/06/20  1006 09/06/20  1256 09/06/20  1407   POCGLU 96 105 137 132       Blood Sugar Average: Last 72 hrs:  (P) 140 [id number]       · Takes metformin 1000mg BID, Januvia 100mg daily, Lantus 28 units at hs, Novolog 6 units TID w meals  · Hold home meds-Tube feeds started 9/5  · Non-DKA insulin gtt started 9/2 for persistent hyperglycemia- currently at 1 5 u / hr  · Received total of approx 43 units from gtt 9/5 @0200 through 9/6 @ 0200  · Off insulin gtt- cont SSI while NPO    Anemia  Assessment & Plan  · Unknown baseline   · Continue to trend labs  · Monitor for any signs of bleeding  · Consider further anemia work up  · Transfuse with 1 u PRBC 9/3/20 and 9/4/20 - consent obtained from wife  · No obvious signs of bleeding  · Hemoccult stool 1/3 negative      ----------------------------------------------------------------------------------------  HPI/24hr events: Extubated yesterday to nasal cannula  No respiratory issues overnight  Dosed w Lasix w/ appropriate diuresis  Insulin gtt d/c'd and started on SSI  Remains NPO  No acute events overnight  Disposition: Transfer to Med-Surg   Code Status: Level 1 - Full Code  ---------------------------------------------------------------------------------------  SUBJECTIVE    Review of Systems   HENT: Negative for sore throat  Respiratory: Negative for shortness of breath  Cardiovascular: Negative for chest pain  Gastrointestinal: Negative for abdominal pain and nausea  Musculoskeletal: Negative for back pain and neck pain  Denies general pain   Neurological: Negative for headaches        ---------------------------------------------------------------------------------------  OBJECTIVE    Vitals   Vitals:    20 0106 20 0200 20 0334 20 0400   BP: 135/72 144/85  150/85   BP Location:       Pulse: (!) 116 (!) 116  (!) 113   Resp: (!) 23 (!) 26  (!) 31   Temp:   99 °F (37 2 °C)    TempSrc:   Oral    SpO2: 94% 93%  96%   Weight:       Height:         Temp (24hrs), Av 8 °F (37 7 °C), Min:97 5 °F (36 4 °C), Max:100 8 °F (38 2 °C)  Current: Temperature: 99 °F (37 2 °C)  Arterial Line BP: 124/53  Arterial Line MAP (mmHg): 76 mmHg    Respiratory:  SpO2: SpO2: 96 %, SpO2 Activity: SpO2 Activity: At Rest, SpO2 Device: O2 Device: Nasal cannula  Nasal Cannula O2 Flow Rate (L/min): 3 L/min    Invasive/non-invasive ventilation settings   Respiratory    Lab Data (Last 4 hours)    None         O2/Vent Data (Last 4 hours)    None                Physical Exam  Vitals signs reviewed  Constitutional:       General: He is not in acute distress  Appearance: He is not toxic-appearing or diaphoretic  HENT:      Head: Normocephalic and atraumatic  Nose: No congestion  Mouth/Throat:      Mouth: Mucous membranes are moist       Pharynx: Oropharynx is clear  Eyes:      Conjunctiva/sclera: Conjunctivae normal       Pupils: Pupils are equal, round, and reactive to light  Comments: No active drainage  Some crusting noted bilat  Neck:      Musculoskeletal: Neck supple  Trachea: No tracheal deviation  Cardiovascular:      Rate and Rhythm: Regular rhythm  Tachycardia present  Pulses: Normal pulses  Heart sounds: Normal heart sounds  No murmur  No friction rub  Pulmonary:      Effort: Pulmonary effort is normal  No respiratory distress  Breath sounds: No rales  Comments: Faint expiratory wheeze noted left base  Abdominal:      General: Bowel sounds are normal  There is no distension  Palpations: Abdomen is soft  Tenderness: There is no abdominal tenderness  There is no guarding  Comments: Less distended  Genitourinary:     Comments: Means in place, draining clear yellow  Skin:     General: Skin is warm and dry  Capillary Refill: Capillary refill takes less than 2 seconds  Coloration: Skin is not pale  Neurological:      Mental Status: He is alert  Comments: Alert  Oriented to person, time  States he " [place]"  Able to answer most questions appropriately but has some confused conversation  Follows commands  Speech intermittently difficult to understand  Strength equal to all extremities     Psychiatric:         Behavior: Behavior normal          Laboratory and Diagnostics:  Results from last 7 days   Lab Units 09/06/20  0404 09/05/20  0311 09/04/20  0509 09/03/20  0355 09/02/20  0449 09/01/20  1706   WBC Thousand/uL 8 25 10 56* 13 10* 12 02* 11 58* 9 04   HEMOGLOBIN g/dL 8 5* 8 9* 8 3* 8 2* 9 3* 9 5*   HEMATOCRIT % 26 5* 26 8* 24 7* 24 1* 28 2* 28 6*   PLATELETS Thousands/uL 285 284 277 244 279 222   NEUTROS PCT %  --  75 84* 83*  --   --    BANDS PCT % 4  --   --   --   --   --    MONOS PCT %  --  6 5 5 --   --    MONO PCT % 2*  --   --   --   --  8     Results from last 7 days   Lab Units 09/06/20  0404 09/05/20 0311 09/04/20  0509 09/03/20  0355 09/02/20  1938 09/02/20  1448 09/02/20  0915 09/02/20 0449 09/01/20  1706   SODIUM mmol/L 142 141 135* 131* 131* 132* 133* 133*   < > 129*   POTASSIUM mmol/L 3 8 4 1 3 9 4 4 4 5 4 5 4 2 3 8   < > 4 0   CHLORIDE mmol/L 110* 110* 103 99* 101 100 100 100   < > 96*   CO2 mmol/L 24 24 21 18* 17* 20* 20* 17*   < > 20*   ANION GAP mmol/L 8 7 11 14* 13 12 13 16*   < > 13   BUN mg/dL 26* 26* 28* 38* 40* 36* 34* 34*   < > 33*   CREATININE mg/dL 1 17 1 35* 1 53* 2 47* 2 93* 3 21* 3 56* 3 61*   < > 3 33*   CALCIUM mg/dL 8 2* 8 4 8 0* 7 7* 7 8* 7 9* 8 1* 7 9*   < > 8 6   GLUCOSE RANDOM mg/dL 128 113 130 219* 203* 254* 245* 253*   < > 304*   ALT U/L 71 74 82* 74  --   --   --  36  --  34   AST U/L 94* 129* 172* 230*  --   --   --  81*  --  43   ALK PHOS U/L 174* 140* 108 90  --   --   --  100  --  112   ALBUMIN g/dL 2 0* 2 0* 2 1* 2 6*  --   --   --  2 1*  --  2 1*   TOTAL BILIRUBIN mg/dL 0 46 0 58 0 53 0 52  --   --   --  0 71  --  0 73    < > = values in this interval not displayed  Results from last 7 days   Lab Units 09/06/20 0404 09/05/20 0311 09/04/20  0509 09/03/20  0355 09/02/20 0449 09/02/20  0004 09/01/20  1706   MAGNESIUM mg/dL 2 0 2 4 1 8 2 0 2 2 1 7 1 5*   PHOSPHORUS mg/dL 2 4 2 8 3 5  --  2 7  --   --       Results from last 7 days   Lab Units 09/05/20  0550 09/04/20  0508 09/03/20  2238 09/03/20  1611 09/03/20  1017 09/03/20  0355 09/02/20  1448  09/01/20  1706   INR   --   --   --   --   --   --   --   --  1 28*   PTT seconds 70* 70* 66* 57* 74* 58* 63*   < > 38*    < > = values in this interval not displayed        Results from last 7 days   Lab Units 09/03/20  1017 09/03/20  0357 09/02/20  2217 09/02/20  1938 09/02/20  1600 09/02/20  1158 09/02/20  0734   TROPONIN I ng/mL 22 59* 29 42* 33 67* 34 83* 26 17* 17 64* 10 54*     Results from last 7 days   Lab Units 09/02/20  0915 09/01/20  1706   LACTIC ACID mmol/L 1 7 1 5     ABG:  Results from last 7 days   Lab Units 09/04/20  0508   PH ART  7 387   PCO2 ART mm Hg 31 1*   PO2 ART mm Hg 76 9   HCO3 ART mmol/L 18 3*   BASE EXC ART mmol/L -5 9   ABG SOURCE  Line, Arterial     VBG:  Results from last 7 days   Lab Units 09/04/20  0508  09/02/20  0915   PH GOLD   --   --  7 261*   PCO2 GOLD mm Hg  --   --  37 7*   PO2 GOLD mm Hg  --   --  31 5*   HCO3 GOLD mmol/L  --   --  16 6*   BASE EXC GOLD mmol/L  --   --  -9 7   ABG SOURCE  Line, Arterial   < >  --     < > = values in this interval not displayed  Results from last 7 days   Lab Units 09/05/20  0311 09/04/20  0509 09/03/20  0355 09/02/20  0449 09/01/20  1706   PROCALCITONIN ng/ml 8 16* 17 61*  17 12* 36 73* 6 81* 1 40*       Micro  Results from last 7 days   Lab Units 09/03/20  0405 09/02/20  1450 09/01/20  2238 09/01/20  1731 09/01/20  1706   BLOOD CULTURE   --   --   --   --  No Growth After 5 Days  No Growth After 5 Days  SPUTUM CULTURE  No growth  --   --   --   --    GRAM STAIN RESULT  2+ Polys  No bacteria seen  --   --   --   --    MRSA CULTURE ONLY   --  No Methicillin Resistant Staphlyococcus aureus (MRSA) isolated  --   --   --    LEGIONELLA URINARY ANTIGEN   --   --  Negative  --   --    STREP PNEUMONIAE ANTIGEN, URINE   --   --   --  Negative  --        EKG: ST  Imaging: no new imaging to review    Intake and Output  I/O       09/05 0701 - 09/06 0700 09/06 0701 - 09/07 0700    P  O   300    I V  (mL/kg) 615 3 (7 3) 113 8 (1 3)    NG/     IV Piggyback 950 750    Feedings 382 45    Total Intake(mL/kg) 2107 3 (24 9) 1208 8 (14 3)    Urine (mL/kg/hr) 2320 (1 1) 3635 (1 8)    Stool  0    Total Output 2320 3635    Net -212 7 -2426 2          Unmeasured Stool Occurrence  7 x          Height and Weights   Height: 6' 3" (190 5 cm)  IBW: 84 5 kg  Body mass index is 23 28 kg/m²    Weight (last 2 days)     Date/Time   Weight    09/06/20 0600   84 5 (186 29) 09/05/20 0600   90 3 (199 08)                Nutrition       Diet Orders   (From admission, onward)             Start     Ordered    09/06/20 1340  Diet NPO  Diet effective now     Question Answer Comment   Diet Type NPO    RD to adjust diet per protocol?  No        09/06/20 1341                  Active Medications  Scheduled Meds:  Current Facility-Administered Medications   Medication Dose Route Frequency Provider Last Rate    acetaminophen  325 mg Rectal Q4H PRN Cuca Mayfield PA-C      aspirin  81 mg Oral Daily Bre Calvillo PA-C      bisacodyl  10 mg Rectal Daily PRN Cuca Mayfield PA-C      cefepime  2,000 mg Intravenous Q24H Isacc Gorman PA-C Stopped (09/06/20 1420)    chlorhexidine  15 mL Swish & Spit Q12H [facility] Danella Guess, CRNP      famotidine  20 mg Intravenous Q24H [facility] Nina Monte PA-C      guaiFENesin  200 mg Oral Q4H Lola Monte PA-C      heparin (porcine)  5,000 Units Subcutaneous [facility] Bre Calvillo PA-C      insulin lispro  1-6 Units Subcutaneous Q6H [facility] Bre Calvillo PA-C      ipratropium  0 5 mg Nebulization Q6H While awake Danella Guess, CRNP      levalbuterol  0 63 mg Nebulization Q6H PRN Danella Guess, CRNP      levalbuterol  1 25 mg Nebulization Q6H While awake Danella Guess, CRNP      metoprolol  5 mg Intravenous Q6H PRN Bre Calvillo PA-C      metroNIDAZOLE  500 mg Intravenous Q8H ZIA SykesNP 500 mg (09/07/20 0049)    polyethylene glycol  17 g Oral Daily PRN Danella Guess, CRNP      pravastatin  20 mg Oral Daily With Sumanth Gray PA-C      [START ON 9/8/2020] senna  8 8 mg Oral HS Danella Guess, CRNP      vancomycin  1,750 mg Intravenous Q24H Danella Guess, CRNP 1,750 mg (09/06/20 1840)     Continuous Infusions:     PRN Meds:   acetaminophen, 325 mg, Q4H PRN  bisacodyl, 10 mg, Daily PRN  levalbuterol, 0 63 mg, Q6H PRN  metoprolol, 5 mg, Q6H PRN  polyethylene glycol, 17 g, Daily PRN        Invasive Devices Review  Invasive Devices     Central Venous Catheter Line            CVC Central Lines 09/02/20 Triple 16cm Left Internal jugular 4 days          Peripheral Intravenous Line            Peripheral IV 09/01/20 Right Hand 5 days    Peripheral IV 09/02/20 Distal;Dorsal (posterior); Left Forearm 5 days          Drain            Urethral Catheter Temperature probe 16 Fr  5 days                Rationale for remaining devices: CVC in place  Consider d/c if able to get adequate peripheral access  [place] for accurate I&O  ---------------------------------------------------------------------------------------  Care Time Delivered:   No Critical Care time spent       Sadiaell Innocent, CRNP      Portions of the record may have been created with voice recognition software  Occasional wrong word or "sound a like" substitutions may have occurred due to the inherent limitations of voice recognition software    Read the chart carefully and recognize, using context, where substitutions have occurred

## 2020-09-07 NOTE — PROGRESS NOTES
Pt now extubated, on NC  Presently NPO  Once medically appropriate for diet, recommend CCD 3 with addition of glucerna BID given previous TF intolerance/poor intake during intubation

## 2020-09-07 NOTE — UTILIZATION REVIEW
Continued Stay Review    Date: 9/5                      Current Patient Class: IP  Current Level of Care: ICU    HPI:74 y o  male initially admitted on 9/1 for septic shock sec to pneumonia vs unclear etiology     Assessment/Plan: Started on mucinex for secretions  No issues with tolerating tube feedings  Overnight, episode of low grade fever x1, improved w tylenol  Bp low requiring increased in Levo  250ml albumin given w improvement  Weaning levo  Continues w good urine output without diuretics  Remains intubated and sedated   9/5 Nephrology consult   CHERISE s/t septic shock , creatine cont to improve   Cont abx   Ventilator dependent resp failure d/t pneumonia  9/5 Surgery note     We have been following for possible acute cholecystitis  Recommend continuing broad-spectrum antibiotics  The patient's white blood cell count is improving  HIDA scan could be completed once the patient is more stable however it will unlikely change treatment at that point  There continues to be low suspicion for acute cholecystitis as the patient does not have any abdominal pain  Pertinent Labs/Diagnostic Results:   9/5 PCXR Persistent pulmonary edema    Results from last 7 days   Lab Units 09/01/20  1706   SARS-COV-2  Negative     Results from last 7 days   Lab Units 09/05/20  0311 09/04/20  0509 09/03/20  0355   WBC Thousand/uL 10 56* 13 10* 12 02*   HEMOGLOBIN g/dL 8 9* 8 3* 8 2*   HEMATOCRIT % 26 8* 24 7* 24 1*   PLATELETS Thousands/uL 284 277 244   NEUTROS ABS Thousands/µL 7 90* 11 00* 9 92*   BANDS PCT %  --   --   --      Results from last 7 days   Lab Units 09/02/20  1600   RETIC CT ABS  24,500   RETIC CT PCT % 0 76     Results from last 7 days   Lab Units 09/05/20  0311 09/04/20  0509 09/03/20  0355  09/02/20  1034  09/02/20  0449   SODIUM mmol/L 141 135* 131*   < >  --    < > 133*   POTASSIUM mmol/L 4 1 3 9 4 4   < >  --    < > 3 8   CHLORIDE mmol/L 110* 103 99*   < >  --    < > 100   CO2 mmol/L 24 21 18*   < >  -- < > 17*   ANION GAP mmol/L 7 11 14*   < >  --    < > 16*   BUN mg/dL 26* 28* 38*   < >  --    < > 34*   CREATININE mg/dL 1 35* 1 53* 2 47*   < >  --    < > 3 61*   EGFR ml/min/1 73sq m 51 44 24   < >  --    < > 15   CALCIUM mg/dL 8 4 8 0* 7 7*   < >  --    < > 7 9*   CALCIUM, IONIZED mmol/L 1 14 1 08* 1 04*  --  1 10*  --   --    MAGNESIUM mg/dL 2 4 1 8 2 0  --   --   --  2 2   PHOSPHORUS mg/dL 2 8 3 5  --   --   --   --  2 7    < > = values in this interval not displayed       Results from last 7 days   Lab Units 09/05/20  0311 09/04/20  0509 09/03/20  0355 09/02/20  1600 09/02/20  0449   AST U/L 129* 172* 230*  --  81*   ALT U/L 74 82* 74  --  36   ALK PHOS U/L 140* 108 90  --  100   TOTAL PROTEIN g/dL 6 7 6 5 6 8  --  6 4   ALBUMIN g/dL 2 0* 2 1* 2 6*  --  2 1*   TOTAL BILIRUBIN mg/dL 0 58 0 53 0 52  --  0 71   BILIRUBIN DIRECT mg/dL  --   --   --  0 22*  --      Results from last 7 days   Lab Units 09/05/20  2210 09/05/20  1951   POC GLUCOSE mg/dl 114 128     Results from last 7 days   Lab Units 09/05/20  0311 09/04/20  0509 09/03/20  0355 09/02/20  1938 09/02/20  1448 09/02/20  0915 09/02/20  0449 09/02/20  0004 09/01/20  2010 09/01/20  1706   GLUCOSE RANDOM mg/dL 113 130 219* 203* 254* 245* 253* 208* 240* 304*         Results from last 7 days   Lab Units 09/03/20  0355   HEMOGLOBIN A1C % 8 2*   EAG mg/dl 189     No results found for: BETA-HYDROXYBUTYRATE   Results from last 7 days   Lab Units 09/04/20  0508 09/03/20  1103 09/02/20  0734 09/02/20  0219   PH ART  7 387 7 320* 7 300* 7 417   PCO2 ART mm Hg 31 1* 36 9 33 1* 27 1*   PO2 ART mm Hg 76 9 93 4 64 6* 75 5   HCO3 ART mmol/L 18 3* 18 6* 15 9* 17 1*   BASE EXC ART mmol/L -5 9 -6 9 -9 5 -6 2   O2 CONTENT ART mL/dL 12 5* 12 6* 13 3* 95 4*   O2 HGB, ARTERIAL % 94 7 96 4 90 7* 94 9   ABG SOURCE  Line, Arterial Line, Arterial Line, Arterial Line, Arterial   NON VENT TYPE HFNC   --   --   --  HFNC Flow   HFNC FLOW LPM   --   --   --  55     Results from last 7 days   Lab Units 09/02/20  0915   PH GOLD  7 261*   PCO2 GOLD mm Hg 37 7*   PO2 GOLD mm Hg 31 5*   HCO3 GOLD mmol/L 16 6*   BASE EXC GOLD mmol/L -9 7   O2 CONTENT GOLD ml/dL 8 1   O2 HGB, VENOUS % 55 7*         Results from last 7 days   Lab Units 09/03/20  0355 09/01/20  1706   CK TOTAL U/L 3,922* 238   CK MB INDEX % 4 5* 3 6*   CK MB ng/mL 175 5* 8 6*     Results from last 7 days   Lab Units 09/03/20  1017 09/03/20  0357 09/02/20  2217 09/02/20  1938 09/02/20  1600   TROPONIN I ng/mL 22 59* 29 42* 33 67* 34 83* 26 17*     Results from last 7 days   Lab Units 09/05/20  0550 09/04/20  0508 09/03/20  2238  09/01/20  1706   PROTIME seconds  --   --   --   --  15 7*   INR   --   --   --   --  1 28*   PTT seconds 70* 70* 66*   < > 38*    < > = values in this interval not displayed       Results from last 7 days   Lab Units 09/02/20  0734   TSH 3RD GENERATON uIU/mL 0 760     Results from last 7 days   Lab Units 09/05/20  0311 09/04/20  0509 09/03/20  0355 09/02/20  0449 09/01/20  1706   PROCALCITONIN ng/ml 8 16* 17 61*  17 12* 36 73* 6 81* 1 40*     Results from last 7 days   Lab Units 09/02/20  0915 09/01/20  1706   LACTIC ACID mmol/L 1 7 1 5     Results from last 7 days   Lab Units 09/01/20  1706   NT-PRO BNP pg/mL 5,830*     Results from last 7 days   Lab Units 09/02/20  0915 09/01/20  1731   FERRITIN ng/mL 1,150* 958*     Results from last 7 days   Lab Units 09/03/20  0355 09/01/20  1706   LIPASE u/L 61* 245     Results from last 7 days   Lab Units 09/01/20  1706   CRP mg/L 233 6*     Results from last 7 days   Lab Units 09/01/20  1732   CLARITY UA  Slightly Cloudy   COLOR UA  Yellow   SPEC GRAV UA  >=1 030   PH UA  5 0   GLUCOSE UA mg/dl Negative   KETONES UA mg/dl Trace*   BLOOD UA  Negative   PROTEIN UA mg/dl 30 (1+)*   NITRITE UA  Negative   BILIRUBIN UA  Moderate*   UROBILINOGEN UA E U /dl 0 2   LEUKOCYTES UA  Negative   WBC UA /hpf 0-5   RBC UA /hpf None Seen   BACTERIA UA /hpf Moderate*   EPITHELIAL CELLS WET PREP /hpf None Seen   SODIUM UR  49   CREATININE UR mg/dL 282 0  282 0  282 0   PROTEIN UR mg/dL 146   PROT/CREAT RATIO UR  0 52*     Results from last 7 days   Lab Units 09/01/20  2238 09/01/20  1731   STREP PNEUMONIAE ANTIGEN, URINE   --  Negative   LEGIONELLA URINARY ANTIGEN  Negative  --    INFLUENZA A PCR   --  None Detected   INFLUENZA B PCR   --  None Detected   RSV PCR   --  None Detected     Results from last 7 days   Lab Units 09/01/20  1731   AMPH/METH  Negative   BARBITURATE UR  Negative   BENZODIAZEPINE UR  Negative   COCAINE UR  Negative   METHADONE URINE  Negative   OPIATE UR  Negative   PCP UR  Negative   THC UR  Negative     Results from last 7 days   Lab Units 09/01/20  1731   ETHANOL LVL mg/dL <3     Results from last 7 days   Lab Units 09/03/20  0405 09/01/20  1706   BLOOD CULTURE   --  No Growth After 5 Days  No Growth After 5 Days  SPUTUM CULTURE  No growth  --    GRAM STAIN RESULT  2+ Polys  No bacteria seen  --      Results from last 7 days   Lab Units 09/06/20  0404 09/01/20  1706   TOTAL COUNTED  100 100     Vital Signs:  Blood pressure 101/61, pulse 98, temperature 100 2 °F (37 9 °C), temperature source Rectal, resp  rate (!) 10, height 6' 3" (1 905 m), weight 90 3 kg (199 lb 1 2 oz), SpO2 100 %  ,Body mass index is 24 88 kg/m²      Medications:    acetaminophen  650 mg Per NG Tube Q4H PRN      bisacodyl  10 mg Rectal Daily PRN      [START ON 9/5/2020] cefepime  2,000 mg Intravenous Q24H      chlorhexidine  15 mL Swish & Spit Q12H [facility]      famotidine  20 mg Intravenous Q24H [facility]      fentaNYL  50 mcg/hr Intravenous Continuous 50 mcg/hr (09/04/20 1522)    fentanyl citrate (PF)  100 mcg Intravenous Q1H PRN      guaiFENesin  200 mg Oral Q4H      heparin (porcine)  3-20 Units/kg/hr (Order-Specific) Intravenous Titrated 16 Units/kg/hr (09/04/20 0612)    heparin (porcine)  2,000 Units Intravenous Q1H PRN      heparin (porcine)  4,000 Units Intravenous Q1H PRN      insulin regular (HumuLIN R,NovoLIN R) infusion  0 3-21 Units/hr Intravenous Titrated 2 Units/hr (09/04/20 1620)    ipratropium  0 5 mg Nebulization Q6H      levalbuterol  0 63 mg Nebulization Q6H PRN      levalbuterol  1 25 mg Nebulization Q6H      metroNIDAZOLE  500 mg Intravenous Q8H 500 mg (09/04/20 1801)    norepinephrine  1-30 mcg/min Intravenous Titrated 4 mcg/min (09/04/20 1754)    polyethylene glycol  17 g Oral Daily PRN      propofol  5-50 mcg/kg/min Intravenous Titrated 15 mcg/kg/min (09/04/20 1034)    senna  8 8 mg Oral HS      vancomycin  12 5 mg/kg Intravenous Q24H Stopped (09/04/20 0530)     Continuous Infusions:  fentaNYL, 50 mcg/hr, Last Rate: 50 mcg/hr (09/04/20 1522)  heparin (porcine), 3-20 Units/kg/hr (Order-Specific), Last Rate: 16 Units/kg/hr (09/04/20 0612)  insulin regular (HumuLIN R,NovoLIN R) infusion, 0 3-21 Units/hr, Last Rate: 2 Units/hr (09/04/20 1620)  norepinephrine, 1-30 mcg/min, Last Rate: 4 mcg/min (09/04/20 1754)  propofol, 5-50 mcg/kg/min, Last Rate: 15 mcg/kg/min (09/04/20 1034)       PRN Meds:   acetaminophen, 650 mg, Q4H PRN  bisacodyl, 10 mg, Daily PRN  fentanyl citrate (PF), 100 mcg, Q1H PRN  heparin (porcine), 2,000 Units, Q1H PRN  heparin (porcine), 4,000 Units, Q1H PRN  levalbuterol, 0 63 mg, Q6H PRN  polyethylene glycol, 17 g, Daily PRN           Discharge Plan: D     Network Utilization Review Department  [email or web address]  ATTENTION: Please call with any questions or concerns to [phone] and carefully listen to the prompts so that you are directed to the right person  All voicemails are confidential   Shamir Ramirez all requests for admission clinical reviews, approved or denied determinations and any other requests to dedicated fax number below belonging to the campus where the patient is receiving treatment   List of dedicated fax numbers for the Facilities:  FACILITY NAME UR FAX NUMBER   ADMISSION DENIALS (Administrative/Medical Necessity) [address] (Maternity/NICU/Pediatrics) [phone]   [facility] Talamantes [phone]   Micha Burns [phone]   Refugio Velasco [phone]   [address] [phone]   [facility]  [phone]   [address] [facility]  [address] [place] [address] [phone]

## 2020-09-07 NOTE — RESPIRATORY THERAPY NOTE
Placed patient on nebulzier treatment at 2034  Therapist was called to another room for potentially emergent situation  Returning to ICU 6 nurse was removing finished nebulzier from patient  Patient denies any shortness of breath, shakiness, increased heart rate or other post treatment difficulties

## 2020-09-07 NOTE — RESPIRATORY THERAPY NOTE
RT Protocol Note  Favio Benites 66 y o  male MRN: [id number]  Unit/Bed#: -01 Encounter: [id number]    Assessment    Principal Problem:    Septic shock ([place] [address] )  Active Problems:    Community acquired pneumonia    CHERISE (acute kidney injury) ([place] [address] )    Elevated brain natriuretic peptide (BNP) level    Type 2 MI (myocardial infarction) ([place] [address] )    Type 2 diabetes mellitus, with long-term current use of insulin (HCC)    Encephalopathy due to infection    Acute respiratory failure with hypoxia (HCC)    Anemia      Home Pulmonary Medications:  none  Home Devices/Therapy: Ventilator    Past Medical History:   Diagnosis Date    Diabetes mellitus ([place] [address] )     Hyperlipidemia     Hypertension      Social History     Socioeconomic History    Marital status: /Civil Union     Spouse name: None    Number of children: None    Years of education: None    Highest education level: None   Occupational History    None   Social Needs    Financial resource strain: None    Food insecurity     Worry: None     Inability: None    Transportation needs     Medical: None     Non-medical: None   Tobacco Use    Smoking status: Never Smoker    Smokeless tobacco: Never Used   Substance and Sexual Activity    Alcohol use: Not Currently     Frequency: Never    Drug use: Never    Sexual activity: Yes     Partners: Female   Lifestyle    Physical activity     Days per week: None     Minutes per session: None    Stress: None   Relationships    Social connections     Talks on phone: None     Gets together: None     Attends Nondenominational service: None     Active member of club or organization: None     Attends meetings of clubs or organizations: None     Relationship status: None    Intimate partner violence     Fear of current or ex partner: None     Emotionally abused: None     Physically abused: None     Forced sexual activity: None   Other Topics Concern    None   Social History Narrative    None Subjective    Subjective Data: Patient is non smoker who denies use of respiratory medications at home or use of home O2  He denies any hsortness of breath or additional work of breathing  Objective    Physical Exam:   Assessment Type: Pre-treatment  General Appearance: Alert, Awake  Respiratory Pattern: Normal  Chest Assessment: Chest expansion symmetrical  Bilateral Breath Sounds: Diminished  Cough: None    Vitals:  Blood pressure 131/70, pulse 104, temperature 99 8 °F (37 7 °C), temperature source Oral, resp  rate 20, height 6' 3" (1 905 m), weight 84 5 kg (186 lb 4 6 oz), SpO2 94 %  Results from last 7 days   Lab Units 09/04/20  0508   PH ART  7 387   PCO2 ART mm Hg 31 1*   PO2 ART mm Hg 76 9   HCO3 ART mmol/L 18 3*   BASE EXC ART mmol/L -5 9   O2 CONTENT ART mL/dL 12 5*   O2 HGB, ARTERIAL % 94 7   ABG SOURCE  Line, Arterial   BETHANIE TEST  Yes       Imaging and other studies: Persistent pulmonary edema with probable small right effusion  Underlying pneumonia cannot be excluded      O2 Device: vent     Plan    Respiratory Plan: Mild Distress pathway        Resp Comments: removed by nurse

## 2020-09-07 NOTE — ASSESSMENT & PLAN NOTE
Lab Results   Component Value Date    HGBA1C 8 2 (H) 09/03/2020       Recent Labs     09/06/20  1813 09/06/20  2326 09/07/20  0611 09/07/20  1153   POCGLU 170* 153* 139 151*       Blood Sugar Average: Last 72 hrs:  (P) 133       · Takes metformin 1000mg BID, Januvia 100mg daily, Lantus 28 units at hs, Novolog 6 units TID w meals  · Hold home meds-Tube feeds started 9/5  · Non-DKA insulin gtt started 9/2 for persistent hyperglycemia- currently at 1 5 u / hr  · Received total of approx 43 units from gtt 9/5 @0200 through 9/6 @ 0200  · Off insulin gtt- cont SSI while NPO  · Speech consult placed

## 2020-09-07 NOTE — ASSESSMENT & PLAN NOTE
· Secondary to pneumonia vs unclear etiology  · POA - fever, tachypnea, tachycardia, increased Cr, persistent hypotension not fluid responsive  · In the ED  · Hypotensive, normal lactate, given 3L NSS  · CT C/A/P without contrast:   Bilateral predominantly upper lobe diffuse nodular interstitial changes of inflammatory infectious etiology  2   Subsegmental atelectasis /consolidation in the right upper lobe in a bronchovascular distribution  3   Posterior bibasilar dependent changes with possible atelectasis/infiltrate in the left lung base  4   Diffuse bladder wall thickening which is decompressed containing a Means balloon  5  Mediastinal lymphadenopathy as described largest in the subcarinal region measuring 1 8 cm    · Micro work up  · Covid repeat in ED on 9/1 negative  · Flu/ RSV negative  · Urine strep and legionella - negative  · Urine + bacteria, cloudy, thickened bladder wall on CT scan in ED  · Procalcitonin peaked at 36, trending downward  · Blood cultures - No growth  · Sputum culture - 2+ polys, no bacteria  · Lyme PCR  - pending  · ABX - broadened due to severity of illness, Cont Cefepime (1 G Q 24 due to renal insufficiency) D#6, Vanco D#6, and Flagyl D#6  · Increase cefepime to 2 Q 24 when GFR 30-60 per ID  · 9/5 Renal ok with increasing to 2gm q 12   · Cont to wean down Levophed  Shane weaned off evening of 9/2/2020  Vaso off 9/3  · Levo weaned of 9/5 @ 2120  · RUQ u/s performed 9/3: No gallstones seen  Some gallbladder wall thickening and pericholecystic fluid is present which could be related to underdistention or other pathology  ·  ID consult placed - Dr Tru Montero  · Consult general surgery for possible cholecystitis per ID recommendations  · Gen surgery recommends HIDA scan once pt  Is stable enough to go for long study  · 9/6 Pt improving, WBC improving  Gen surg no longer rec HIDA  · Overall pt   Improving slowly - wife updated at bedside  · BP soft 9/6 overnight, given 250ml 5% albumin with improvement  · Check BLE duplex to r/o DVT given fever and edema

## 2020-09-07 NOTE — ASSESSMENT & PLAN NOTE
· CT chest - consolidation right upper lobe, infiltrate left lower lobe, diffuse nodular interstitial inflammatory changes  · See septic shock plan above  · See acute hypoxic respiratory plan above  · Cont Vanco/Cefepime for 10 days of therapy unless AM procal <3, consider D/C tomorrow

## 2020-09-07 NOTE — PLAN OF CARE
Problem: Potential for Falls  Goal: Patient will remain free of falls  Description: INTERVENTIONS:  - Assess patient frequently for physical needs  -  Identify cognitive and physical deficits and behaviors that affect risk of falls  -  [place] fall precautions as indicated by assessment   - Educate patient/family on patient safety including physical limitations  - Instruct patient to call for assistance with activity based on assessment  - Modify environment to reduce risk of injury  - Consider OT/PT consult to assist with strengthening/mobility  Outcome: Progressing     Problem: Prexisting or High Potential for Compromised Skin Integrity  Goal: Skin integrity is maintained or improved  Description: INTERVENTIONS:  - Identify patients at risk for skin breakdown  - Assess and monitor skin integrity  - Assess and monitor nutrition and hydration status  - Monitor labs   - Assess for incontinence   - Turn and reposition patient  - Assist with mobility/ambulation  - Relieve pressure over bony prominences  - Avoid friction and shearing  - Provide appropriate hygiene as needed including keeping skin clean and dry  - Evaluate need for skin moisturizer/barrier cream  - Collaborate with interdisciplinary team   - Patient/family teaching  - Consider wound care consult   Outcome: Progressing     Problem: Nutrition/Hydration-ADULT  Goal: Nutrient/Hydration intake appropriate for improving, restoring or maintaining nutritional needs  Description: Monitor and assess patient's nutrition/hydration status for malnutrition  Collaborate with interdisciplinary team and initiate plan and interventions as ordered  Monitor patient's weight and dietary intake as ordered or per policy  Utilize nutrition screening tool and intervene as necessary  Determine patient's food preferences and provide high-protein, high-caloric foods as appropriate       INTERVENTIONS:  - Monitor oral intake, urinary output, labs, and treatment plans  - Assess nutrition and hydration status and recommend course of action  - Evaluate amount of meals eaten  - Assist patient with eating if necessary   - Allow adequate time for meals  - Recommend/ encourage appropriate diets, oral nutritional supplements, and vitamin/mineral supplements  - Order, calculate, and assess calorie counts as needed  - Recommend, monitor, and adjust tube feedings and TPN/PPN based on assessed needs  - Assess need for intravenous fluids  - Provide specific nutrition/hydration education as appropriate  - Include patient/family/caregiver in decisions related to nutrition  Outcome: Progressing     Problem: PAIN - ADULT  Goal: Verbalizes/displays adequate comfort level or baseline comfort level  Description: Interventions:  - Encourage patient to monitor pain and request assistance  - Assess pain using appropriate pain scale  - Administer analgesics based on type and severity of pain and evaluate response  - Implement non-pharmacological measures as appropriate and evaluate response  - Consider cultural and social influences on pain and pain management  - Notify physician/advanced practitioner if interventions unsuccessful or patient reports new pain  Outcome: Progressing     Problem: INFECTION - ADULT  Goal: Absence or prevention of progression during hospitalization  Description: INTERVENTIONS:  - Assess and monitor for signs and symptoms of infection  - Monitor lab/diagnostic results  - Monitor all insertion sites, i e  indwelling lines, tubes, and drains  - Monitor endotracheal if appropriate and nasal secretions for changes in amount and color  - [place] appropriate cooling/warming therapies per order  - Administer medications as ordered  - Instruct and encourage patient and family to use good hand hygiene technique  - Identify and instruct in appropriate isolation precautions for identified infection/condition  Outcome: Progressing     Problem: SAFETY ADULT  Goal: Patient will remain free of falls  Description: INTERVENTIONS:  - Assess patient frequently for physical needs  -  Identify cognitive and physical deficits and behaviors that affect risk of falls    -  [place] fall precautions as indicated by assessment   - Educate patient/family on patient safety including physical limitations  - Instruct patient to call for assistance with activity based on assessment  - Modify environment to reduce risk of injury  - Consider OT/PT consult to assist with strengthening/mobility  Outcome: Progressing  Goal: Maintain or return to baseline ADL function  Description: INTERVENTIONS:  -  Assess patient's ability to carry out ADLs; assess patient's baseline for ADL function and identify physical deficits which impact ability to perform ADLs (bathing, care of mouth/teeth, toileting, grooming, dressing, etc )  - Assess/evaluate cause of self-care deficits   - Assess range of motion  - Assess patient's mobility; develop plan if impaired  - Assess patient's need for assistive devices and provide as appropriate  - Encourage maximum independence but intervene and supervise when necessary  - Involve family in performance of ADLs  - Assess for home care needs following discharge   - Consider OT consult to assist with ADL evaluation and planning for discharge  - Provide patient education as appropriate  Outcome: Progressing  Goal: Maintain or return mobility status to optimal level  Description: INTERVENTIONS:  - Assess patient's baseline mobility status (ambulation, transfers, stairs, etc )    - Identify cognitive and physical deficits and behaviors that affect mobility  - Identify mobility aids required to assist with transfers and/or ambulation (gait belt, sit-to-stand, lift, walker, cane, etc )  - [place] fall precautions as indicated by assessment  - Record patient progress and toleration of activity level on Mobility SBAR; progress patient to next Phase/Stage  - Instruct patient to call for assistance with activity based on assessment  - Consider rehabilitation consult to assist with strengthening/weightbearing, etc   Outcome: Progressing     Problem: DISCHARGE PLANNING  Goal: Discharge to home or other facility with appropriate resources  Description: INTERVENTIONS:  - Identify barriers to discharge w/patient and caregiver  - Arrange for needed discharge resources and transportation as appropriate  - Identify discharge learning needs (meds, wound care, etc )  - Arrange for interpretive services to assist at discharge as needed  - Refer to Case Management Department for coordinating discharge planning if the patient needs post-hospital services based on physician/advanced practitioner order or complex needs related to functional status, cognitive ability, or social support system  Outcome: Progressing     Problem: Knowledge Deficit  Goal: Patient/family/caregiver demonstrates understanding of disease process, treatment plan, medications, and discharge instructions  Description: Complete learning assessment and assess knowledge base    Interventions:  - Provide teaching at level of understanding  - Provide teaching via preferred learning methods  Outcome: Progressing     Problem: NEUROSENSORY - ADULT  Goal: Achieves stable or improved neurological status  Description: INTERVENTIONS  - Monitor and report changes in neurological status  - Monitor vital signs such as temperature, blood pressure, glucose, and any other labs ordered   - Initiate measures to prevent increased intracranial pressure  - Monitor for seizure activity and implement precautions if appropriate      Outcome: Progressing     Problem: CARDIOVASCULAR - ADULT  Goal: Maintains optimal cardiac output and hemodynamic stability  Description: INTERVENTIONS:  - Monitor I/O, vital signs and rhythm  - Monitor for S/S and trends of decreased cardiac output  - Administer and titrate ordered vasoactive medications to optimize hemodynamic stability  - Assess quality of pulses, skin color and temperature  - Assess for signs of decreased coronary artery perfusion  - Instruct patient to report change in severity of symptoms  Outcome: Progressing  Goal: Absence of cardiac dysrhythmias or at baseline rhythm  Description: INTERVENTIONS:  - Continuous cardiac monitoring, vital signs, obtain 12 lead EKG if ordered  - Administer antiarrhythmic and heart rate control medications as ordered  - Monitor electrolytes and administer replacement therapy as ordered  Outcome: Progressing     Problem: RESPIRATORY - ADULT  Goal: Achieves optimal ventilation and oxygenation  Description: INTERVENTIONS:  - Assess for changes in respiratory status  - Assess for changes in mentation and behavior  - Position to facilitate oxygenation and minimize respiratory effort  - Oxygen administered by appropriate delivery if ordered  - Initiate smoking cessation education as indicated  - Encourage broncho-pulmonary hygiene including cough, deep breathe, Incentive Spirometry  - Assess the need for suctioning and aspirate as needed  - Assess and instruct to report SOB or any respiratory difficulty  - Respiratory Therapy support as indicated  Outcome: Progressing     Problem: GENITOURINARY - ADULT  Goal: Maintains or returns to baseline urinary function  Description: INTERVENTIONS:  - Assess urinary function  - Encourage oral fluids to ensure adequate hydration if ordered  - Administer IV fluids as ordered to ensure adequate hydration  - Administer ordered medications as needed  - Offer frequent toileting  - Follow urinary retention protocol if ordered  Outcome: Progressing     Problem: METABOLIC, FLUID AND ELECTROLYTES - ADULT  Goal: Electrolytes maintained within normal limits  Description: INTERVENTIONS:  - Monitor labs and assess patient for signs and symptoms of electrolyte imbalances  - Administer electrolyte replacement as ordered  - Monitor response to electrolyte replacements, including repeat lab results as appropriate  - Instruct patient on fluid and nutrition as appropriate  Outcome: Progressing  Goal: Fluid balance maintained  Description: INTERVENTIONS:  - Monitor labs   - Monitor I/O and WT  - Instruct patient on fluid and nutrition as appropriate  - Assess for signs & symptoms of volume excess or deficit  Outcome: Progressing  Goal: Glucose maintained within target range  Description: INTERVENTIONS:  - Monitor Blood Glucose as ordered  - Assess for signs and symptoms of hyperglycemia and hypoglycemia  - Administer ordered medications to maintain glucose within target range  - Assess nutritional intake and initiate nutrition service referral as needed  Outcome: Progressing

## 2020-09-07 NOTE — PROGRESS NOTES
Progress Note - Nephrology   Sabrinatrevon Aldrich 66 y o  male MRN: [id number]  Unit/Bed#: -01 Encounter: [id number]    A/P:  1  Acute kidney injury due to septic shock                creatinine slightly higher, but essentially stable  Continue monitor patient's overall clinical status, offer supportive care  Avoid nephrotoxins  May consider further diuresis according clinical needs  2  Chronic kidney disease with unclear baseline creatinine  3  Possible underlying diabetic nephropathy                further workup in the outpatient setting  4  Proteinuria                as mentioned previously, further workup in the outpatient setting  5  Septic shock                patient now extubated off of all vasopressors  Continue with empiric antibiotic treatment, would continue to dose all antibiotics according to GFR greater than 30 mL/minute  6  Ventilator dependent respiratory failure due to community-acquired pneumonia              Patient successfully extubated  7  Hyponatremia-resolved  8  Transaminitis              AST continues to improve, alkaline phosphatase slightly increased over last 24-48 hours  9  Edema   Persists, oxygenation appropriate with nasal cannula  Continue with diuresis according to needs  Yesterday, the patient had nearly 4 L of urine output  Continue monitor over the course of the day, if indicated, patient would benefit from Lasix if urine output under 1 5 L in order to ensure negative fluid balance over 24 hours        Follow up reason for today's visit:  Acute kidney injury/kidney disease/proteinuria    Septic shock [facility])    Patient Active Problem List   Diagnosis    Septic shock ([facility] [place] [facility] )    Community acquired pneumonia    CHERISE (acute kidney injury) ([facility] [place] [facility] )    Elevated brain natriuretic peptide (BNP) level    Type 2 MI (myocardial infarction) ([facility] [place] [facility] )    Type 2 diabetes mellitus, with long-term current use of insulin (HCC)    Encephalopathy due to infection    Acute respiratory failure with hypoxia (HCC)    Anemia         Subjective:   No acute events overnight    Objective:     Vitals: Blood pressure 137/81, pulse (!) 120, temperature (!) 100 6 °F (38 1 °C), temperature source Axillary, resp  rate (!) 25, height 6' 3" (1 905 m), weight 81 3 kg (179 lb 3 7 oz), SpO2 94 %  ,Body mass index is 22 4 kg/m²  Weight (last 2 days)     Date/Time   Weight    09/07/20 0600   81 3 (179 23)    09/06/20 0600   84 5 (186 29)    09/05/20 0600   90 3 (199 08)                Intake/Output Summary (Last 24 hours) at 9/7/2020 0843  Last data filed at 9/7/2020 0524  Gross per 24 hour   Intake 1128 96 ml   Output 3675 ml   Net -2546 04 ml     I/O last 3 completed shifts: In: 2428 8 [P O :300; I V :319 8; NG/GT:60; IV Piggyback:1500; Feedings:249]  Out: 4980 [Urine:4980]    Urethral Catheter Temperature probe 16 Fr   (Active)   Reasons to continue Urinary Catheter  Accurate I&O assessment in critically ill patients (48 hr  max) 09/05/20 1244   Goal for Removal Remove after 48 hrs of I/O monitoring 09/06/20 0800   Site Assessment Clean;Skin intact 09/06/20 0800   Collection Container Standard drainage bag 09/06/20 0800   Securement Method Securing device (Describe) 09/06/20 0800   Output (mL) 350 mL 09/07/20 0524       Physical Exam: /81 (BP Location: Left arm)   Pulse (!) 120   Temp (!) 100 6 °F (38 1 °C) (Axillary)   Resp (!) 25   Ht 6' 3" (1 905 m)   Wt 81 3 kg (179 lb 3 7 oz)   SpO2 94%   BMI 22 40 kg/m²     General Appearance:    Alert, cooperative, no distress, confused, oriented to self   Head:    Normocephalic, without obvious abnormality, atraumatic   Eyes:    Conjunctiva/corneas clear   Ears:    Normal external ears   Nose:   Nares normal, septum midline, mucosa normal, no drainage    or sinus tenderness   Throat:   Lips, mucosa, and tongue normal; teeth and gums normal   Neck:   Supple   Back:     Symmetric, no curvature, ROM normal, no CVA tenderness   Lungs:     Clear to auscultation bilaterally, respirations unlabored   Chest wall:    No tenderness or deformity   Heart:    Regular rate and rhythm, S1 and S2 normal, no murmur, rub   or gallop   Abdomen:     Soft, non-tender, bowel sounds active   Extremities:   Extremities normal, atraumatic, no cyanosis, +3 bilateral lower extremity edema proximal portions with +2 presacral edema   Skin:   Skin color, texture, turgor normal, no rashes or lesions   Lymph nodes:   Cervical normal   Neurologic:   CNII-XII intact            Lab, Imaging and other studies: I have personally reviewed pertinent labs  CBC:   Lab Results   Component Value Date    WBC 10 32 (H) 09/07/2020    HGB 10 6 (L) 09/07/2020    HCT 33 6 (L) 09/07/2020    MCV 89 09/07/2020     09/07/2020    MCH 28 0 09/07/2020    MCHC 31 5 09/07/2020    RDW 15 1 09/07/2020    MPV 9 8 09/07/2020     CMP:   Lab Results   Component Value Date    K 3 9 09/07/2020     09/07/2020    CO2 23 09/07/2020    BUN 27 (H) 09/07/2020    CREATININE 1 25 09/07/2020    CALCIUM 8 8 09/07/2020    EGFR 56 09/07/2020           Results from last 7 days   Lab Units 09/07/20  0455 09/06/20  0404 09/05/20  0311 09/04/20  0509   POTASSIUM mmol/L 3 9 3 8 4 1 3 9   CHLORIDE mmol/L 108 110* 110* 103   CO2 mmol/L 23 24 24 21   BUN mg/dL 27* 26* 26* 28*   CREATININE mg/dL 1 25 1 17 1 35* 1 53*   CALCIUM mg/dL 8 8 8 2* 8 4 8 0*   ALK PHOS U/L  --  174* 140* 108   ALT U/L  --  71 74 82*   AST U/L  --  94* 129* 172*         Phosphorus: No results found for: PHOS  Magnesium:   Lab Results   Component Value Date    MG 1 6 09/07/2020     Urinalysis: No results found for: COLORU, CLARITYU, SPECGRAV, PHUR, LEUKOCYTESUR, NITRITE, PROTEINUA, GLUCOSEU, KETONESU, BILIRUBINUR, BLOODU  Ionized Calcium: No results found for: CAION  Coagulation: No results found for: PT, INR, APTT  Troponin: No results found for: TROPONINI  ABG: No results found for: PHART, VJM6FST, PO2ART, CXB4MTP, P3TDSCZV, BEART, SOURCE  Radiology review: IMAGING  Procedure: Xr Chest Portable    Result Date: 9/6/2020  Narrative: CHEST INDICATION:   pna  COMPARISON:  Chest radiograph from 9/5/2020 and chest CT from 9/1/2020  EXAM PERFORMED/VIEWS:  XR CHEST PORTABLE FINDINGS:  ET tube 5 cm above the supriya  NG tube below the diaphragm  Left jugular catheter in SVC  Cardiomediastinal silhouette appears unremarkable  Persistent pulmonary edema  Question small right effusion  No pneumothorax  Osseous structures appear within normal limits for patient age  Impression: Persistent pulmonary edema with probable small right effusion  Underlying pneumonia cannot be excluded  Workstation performed: [id number]     Procedure: Xr Chest Portable    Result Date: 9/5/2020  Narrative: CHEST INDICATION:   intubation  COMPARISON:  Chest radiograph from 9/3/2020 and chest CT from 9/1/2020  EXAM PERFORMED/VIEWS:  XR CHEST PORTABLE FINDINGS:  ET tube 5 cm above the supriya  NG tube below the diaphragm  Left jugular catheter in SVC  Cardiomediastinal silhouette appears unremarkable  Persistent pulmonary edema  No effusion or pneumothorax  Osseous structures appear within normal limits for patient age  Impression: Persistent pulmonary edema   Workstation performed: [id number]       Current Facility-Administered Medications   Medication Dose Route Frequency    acetaminophen (TYLENOL) rectal suppository 325 mg  325 mg Rectal Q4H PRN    aspirin chewable tablet 81 mg  81 mg Oral Daily    bisacodyl (DULCOLAX) rectal suppository 10 mg  10 mg Rectal Daily PRN    cefepime (MAXIPIME) IVPB (premix) 2,000 mg 50 mL  2,000 mg Intravenous Q24H    chlorhexidine (PERIDEX) 0 12 % oral rinse 15 mL  15 mL Swish & Spit Q12H [address]    famotidine (PEPCID) injection 20 mg  20 mg Intravenous Q24H JAVID    guaiFENesin (ROBITUSSIN) oral solution 200 mg  200 mg Oral Q4H    heparin (porcine) subcutaneous injection 5,000 Units  5,000 Units Subcutaneous Q8H [address]    insulin lispro (HumaLOG) 100 units/mL subcutaneous injection 1-6 Units  1-6 Units Subcutaneous Q6H [facility]    ipratropium (ATROVENT) 0 02 % inhalation solution 0 5 mg  0 5 mg Nebulization Q6H While awake    levalbuterol (XOPENEX) inhalation solution 0 63 mg  0 63 mg Nebulization Q6H PRN    levalbuterol (XOPENEX) inhalation solution 1 25 mg  1 25 mg Nebulization Q6H While awake    metoprolol (LOPRESSOR) injection 5 mg  5 mg Intravenous Q6H PRN    metroNIDAZOLE (FLAGYL) IVPB (premix) 500 mg 100 mL  500 mg Intravenous Q8H    polyethylene glycol (MIRALAX) packet 17 g  17 g Oral Daily PRN    pravastatin (PRAVACHOL) tablet 20 mg  20 mg Oral Daily With Dinner    [START ON 9/8/2020] senna 8 8 mg/5 mL oral syrup 8 8 mg  8 8 mg Oral HS    vancomycin (VANCOCIN) 1,750 mg in sodium chloride 0 9 % 500 mL IVPB  1,750 mg Intravenous Q24H     Medications Discontinued During This Encounter   Medication Reason    sodium chloride 0 9 % bolus 1,000 mL     sodium chloride 0 9 % bolus 1,000 mL     lisinopril (ZESTRIL) 10 mg tablet     linaGLIPtin-metFORMIN HCl (JENTADUETO XR PO) Discontinued by another clinician    heparin (porcine) subcutaneous injection 5,000 Units     cefTRIAXone (ROCEPHIN) IVPB (premix) 1,000 mg 50 mL     heparin (ACS LOW) Duplicate order    pravastatin (PRAVACHOL) tablet 40 mg     acetaminophen (TYLENOL) tablet 650 mg     azithromycin (ZITHROMAX) 500 mg in sodium chloride 0 9 % 250 mL IVPB     insulin lispro (HumaLOG) 100 units/mL subcutaneous injection 1-6 Units     phenylephrine (REG-SYNEPHRINE) 50 mg (STANDARD CONCENTRATION) in sodium chloride 0 9% 250 mL     ipratropium (ATROVENT) 0 02 % inhalation solution 0 5 mg     levalbuterol (XOPENEX) inhalation solution 1 25 mg     cefepime (MAXIPIME) IVPB (premix) 1,000 mg 50 mL     vasopressin (PITRESSIN) 20 Units in sodium chloride 0 9 % 100 mL infusion     cefepime (MAXIPIME) IVPB (premix) 1,000 mg 50 mL     vancomycin (VANCOCIN) IVPB (premix) 1,000 mg 200 mL     heparin (porcine) 25,000 units in 0 45% NaCl 250 mL infusion (premix)     heparin (porcine) injection 4,000 Units     heparin (porcine) injection 2,000 Units     bisacodyl (DULCOLAX) rectal suppository 10 mg     polyethylene glycol (MIRALAX) packet 17 g     propofol (DIPRIVAN) 1000 mg in 100 mL infusion (premix)     fentanyl citrate (PF) 100 MCG/2ML 100 mcg     fentaNYL 1000 mcg in sodium chloride 0 9% 100mL infusion     norepinephrine (LEVOPHED) 4 mg (STANDARD CONCENTRATION) IV in sodium chloride 0 9% 250 mL     insulin regular (HumuLIN R,NovoLIN R) 1 Units/mL in sodium chloride 0 9 % 100 mL infusion     acetaminophen (TYLENOL) oral suspension 650 mg     polyethylene glycol (MIRALAX) packet 17 g     senna 8 8 mg/5 mL oral syrup 8 8 mg     ipratropium (ATROVENT) 0 02 % inhalation solution 0 5 mg     levalbuterol (XOPENEX) inhalation solution 1 25 mg        Lita Morales DO      This progress note was produced in part using a dictation device which may document imprecise wording from author's original intent

## 2020-09-07 NOTE — ASSESSMENT & PLAN NOTE
· Wife reports confusion new on day of admission that improved with administration of IVF and antibiotics  Became worse w temp spikes to 105  · Likely secondary to PNA, sepsis, fever  · Avoid sedating medications  · Monitor neuro status     · UDS and alcohol negative on admission  · 9/7 Improving encephalopathy

## 2020-09-07 NOTE — PROGRESS NOTES
Vancomycin IV Pharmacy-to-Dose Consultation    Nain Higgins is a 66 y o  male who is currently receiving Vancomycin IV with management by the Pharmacy Consult service  Assessment/Plan:  The patient was reviewed  Renal function is stable and no signs or symptoms of nephrotoxicity and/or infusion reactions were documented in the chart  Based on todays assessment, continue current vancomycin (day # 6) dosing of 1750 mg iv q 24 hrs, with a plan for trough to be drawn at 1630 on 9/8/20  We will continue to follow the patients culture results and clinical progress daily      Jh Marie, Pharmacist

## 2020-09-07 NOTE — SPEECH THERAPY NOTE
Speech/Language Pathology Note    Patient Name: Tamara Sadler Date: 9/7/2020     Pt failed RN aspiration risk screen yesterday afternoon  SLP called nursing in ICU, requested if comfortable and if pt appears appropriate for repeat RN aspiration risk screen today  ST will f/u tomorrow 9/8/2020 as needed

## 2020-09-08 ENCOUNTER — APPOINTMENT (INPATIENT)
Dept: NON INVASIVE DIAGNOSTICS | Facility: HOSPITAL | Age: 75
DRG: 870 | End: 2020-09-08
Payer: COMMERCIAL

## 2020-09-08 ENCOUNTER — APPOINTMENT (INPATIENT)
Dept: CT IMAGING | Facility: HOSPITAL | Age: 75
DRG: 870 | End: 2020-09-08
Payer: COMMERCIAL

## 2020-09-08 ENCOUNTER — APPOINTMENT (INPATIENT)
Dept: RADIOLOGY | Facility: HOSPITAL | Age: 75
DRG: 870 | End: 2020-09-08
Payer: COMMERCIAL

## 2020-09-08 PROBLEM — B99.9 ENCEPHALOPATHY DUE TO INFECTION: Status: RESOLVED | Noted: 2020-09-01 | Resolved: 2020-09-08

## 2020-09-08 PROBLEM — N17.9 AKI (ACUTE KIDNEY INJURY) (HCC): Status: RESOLVED | Noted: 2020-09-01 | Resolved: 2020-09-08

## 2020-09-08 PROBLEM — G93.49 ENCEPHALOPATHY DUE TO INFECTION: Status: RESOLVED | Noted: 2020-09-01 | Resolved: 2020-09-08

## 2020-09-08 LAB
ALBUMIN SERPL BCP-MCNC: 2.1 G/DL (ref 3.5–5)
ALP SERPL-CCNC: 159 U/L (ref 46–116)
ALT SERPL W P-5'-P-CCNC: 55 U/L (ref 12–78)
ANION GAP SERPL CALCULATED.3IONS-SCNC: 8 MMOL/L (ref 4–13)
AST SERPL W P-5'-P-CCNC: 39 U/L (ref 5–45)
B BURGDOR DNA SPEC QL NAA+PROBE: NEGATIVE
BACTERIA UR QL AUTO: ABNORMAL /HPF
BILIRUB SERPL-MCNC: 0.58 MG/DL (ref 0.2–1)
BILIRUB UR QL STRIP: NEGATIVE
BUN SERPL-MCNC: 27 MG/DL (ref 5–25)
CALCIUM SERPL-MCNC: 8.7 MG/DL (ref 8.3–10.1)
CHLORIDE SERPL-SCNC: 109 MMOL/L (ref 100–108)
CK MB SERPL-MCNC: 1.2 % (ref 0–2.5)
CK MB SERPL-MCNC: 2.3 NG/ML (ref 0–5)
CK SERPL-CCNC: 189 U/L (ref 39–308)
CLARITY UR: ABNORMAL
CO2 SERPL-SCNC: 26 MMOL/L (ref 21–32)
COLOR UR: YELLOW
CREAT SERPL-MCNC: 1.22 MG/DL (ref 0.6–1.3)
ERYTHROCYTE [DISTWIDTH] IN BLOOD BY AUTOMATED COUNT: 15 % (ref 11.6–15.1)
GFR SERPL CREATININE-BSD FRML MDRD: 57 ML/MIN/1.73SQ M
GLUCOSE SERPL-MCNC: 201 MG/DL (ref 65–140)
GLUCOSE SERPL-MCNC: 207 MG/DL (ref 65–140)
GLUCOSE SERPL-MCNC: 211 MG/DL (ref 65–140)
GLUCOSE SERPL-MCNC: 216 MG/DL (ref 65–140)
GLUCOSE SERPL-MCNC: 395 MG/DL (ref 65–140)
GLUCOSE SERPL-MCNC: 425 MG/DL (ref 65–140)
GLUCOSE UR STRIP-MCNC: NEGATIVE MG/DL
HCT VFR BLD AUTO: 34.8 % (ref 36.5–49.3)
HEMOCCULT STL QL: NEGATIVE
HGB BLD-MCNC: 11 G/DL (ref 12–17)
HGB UR QL STRIP.AUTO: ABNORMAL
KETONES UR STRIP-MCNC: ABNORMAL MG/DL
LEUKOCYTE ESTERASE UR QL STRIP: NEGATIVE
MAGNESIUM SERPL-MCNC: 1.7 MG/DL (ref 1.6–2.6)
MCH RBC QN AUTO: 27.9 PG (ref 26.8–34.3)
MCHC RBC AUTO-ENTMCNC: 31.6 G/DL (ref 31.4–37.4)
MCV RBC AUTO: 88 FL (ref 82–98)
MUCOUS THREADS UR QL AUTO: ABNORMAL
NITRITE UR QL STRIP: NEGATIVE
NON-SQ EPI CELLS URNS QL MICRO: ABNORMAL /HPF
PH UR STRIP.AUTO: 5.5 [PH]
PHOSPHATE SERPL-MCNC: 2.6 MG/DL (ref 2.3–4.1)
PLATELET # BLD AUTO: 429 THOUSANDS/UL (ref 149–390)
PMV BLD AUTO: 9.7 FL (ref 8.9–12.7)
POTASSIUM SERPL-SCNC: 3.9 MMOL/L (ref 3.5–5.3)
PROCALCITONIN SERPL-MCNC: 1.38 NG/ML
PROT SERPL-MCNC: 7.7 G/DL (ref 6.4–8.2)
PROT UR STRIP-MCNC: ABNORMAL MG/DL
RBC # BLD AUTO: 3.94 MILLION/UL (ref 3.88–5.62)
RBC #/AREA URNS AUTO: ABNORMAL /HPF
SODIUM SERPL-SCNC: 143 MMOL/L (ref 136–145)
SP GR UR STRIP.AUTO: 1.02 (ref 1–1.03)
TSH SERPL DL<=0.05 MIU/L-ACNC: 0.42 UIU/ML (ref 0.36–3.74)
URATE CRY URNS QL MICRO: ABNORMAL /HPF
UROBILINOGEN UR QL STRIP.AUTO: 0.2 E.U./DL
WBC # BLD AUTO: 9.84 THOUSAND/UL (ref 4.31–10.16)
WBC #/AREA URNS AUTO: ABNORMAL /HPF

## 2020-09-08 PROCEDURE — G0408 INPT/TELE FOLLOW UP 35: HCPCS | Performed by: INTERNAL MEDICINE

## 2020-09-08 PROCEDURE — 80053 COMPREHEN METABOLIC PANEL: CPT | Performed by: PHYSICIAN ASSISTANT

## 2020-09-08 PROCEDURE — 99291 CRITICAL CARE FIRST HOUR: CPT | Performed by: ANESTHESIOLOGY

## 2020-09-08 PROCEDURE — 82550 ASSAY OF CK (CPK): CPT | Performed by: PHYSICIAN ASSISTANT

## 2020-09-08 PROCEDURE — 85027 COMPLETE CBC AUTOMATED: CPT | Performed by: PHYSICIAN ASSISTANT

## 2020-09-08 PROCEDURE — 82553 CREATINE MB FRACTION: CPT | Performed by: PHYSICIAN ASSISTANT

## 2020-09-08 PROCEDURE — 94640 AIRWAY INHALATION TREATMENT: CPT

## 2020-09-08 PROCEDURE — 84100 ASSAY OF PHOSPHORUS: CPT | Performed by: PHYSICIAN ASSISTANT

## 2020-09-08 PROCEDURE — 93970 EXTREMITY STUDY: CPT | Performed by: SURGERY

## 2020-09-08 PROCEDURE — 82948 REAGENT STRIP/BLOOD GLUCOSE: CPT

## 2020-09-08 PROCEDURE — 71275 CT ANGIOGRAPHY CHEST: CPT

## 2020-09-08 PROCEDURE — 81001 URINALYSIS AUTO W/SCOPE: CPT | Performed by: PHYSICIAN ASSISTANT

## 2020-09-08 PROCEDURE — G1004 CDSM NDSC: HCPCS

## 2020-09-08 PROCEDURE — 83735 ASSAY OF MAGNESIUM: CPT | Performed by: PHYSICIAN ASSISTANT

## 2020-09-08 PROCEDURE — 92610 EVALUATE SWALLOWING FUNCTION: CPT

## 2020-09-08 PROCEDURE — 93970 EXTREMITY STUDY: CPT

## 2020-09-08 PROCEDURE — 71045 X-RAY EXAM CHEST 1 VIEW: CPT

## 2020-09-08 PROCEDURE — 84443 ASSAY THYROID STIM HORMONE: CPT | Performed by: PHYSICIAN ASSISTANT

## 2020-09-08 PROCEDURE — 94760 N-INVAS EAR/PLS OXIMETRY 1: CPT

## 2020-09-08 PROCEDURE — 84145 PROCALCITONIN (PCT): CPT | Performed by: PHYSICIAN ASSISTANT

## 2020-09-08 PROCEDURE — 99232 SBSQ HOSP IP/OBS MODERATE 35: CPT | Performed by: INTERNAL MEDICINE

## 2020-09-08 PROCEDURE — 87040 BLOOD CULTURE FOR BACTERIA: CPT | Performed by: PHYSICIAN ASSISTANT

## 2020-09-08 RX ORDER — ACETAMINOPHEN 160 MG/5ML
650 SUSPENSION, ORAL (FINAL DOSE FORM) ORAL EVERY 4 HOURS PRN
Status: DISCONTINUED | OUTPATIENT
Start: 2020-09-08 | End: 2020-09-12 | Stop reason: HOSPADM

## 2020-09-08 RX ORDER — CEFTRIAXONE 2 G/50ML
2000 INJECTION, SOLUTION INTRAVENOUS EVERY 24 HOURS
Status: DISCONTINUED | OUTPATIENT
Start: 2020-09-08 | End: 2020-09-10

## 2020-09-08 RX ORDER — MAGNESIUM SULFATE HEPTAHYDRATE 40 MG/ML
2 INJECTION, SOLUTION INTRAVENOUS ONCE
Status: COMPLETED | OUTPATIENT
Start: 2020-09-08 | End: 2020-09-08

## 2020-09-08 RX ORDER — DIPHENHYDRAMINE HYDROCHLORIDE 50 MG/ML
50 INJECTION INTRAMUSCULAR; INTRAVENOUS ONCE
Status: COMPLETED | OUTPATIENT
Start: 2020-09-08 | End: 2020-09-08

## 2020-09-08 RX ORDER — CEFTRIAXONE 1 G/50ML
1000 INJECTION, SOLUTION INTRAVENOUS EVERY 24 HOURS
Status: DISCONTINUED | OUTPATIENT
Start: 2020-09-08 | End: 2020-09-08

## 2020-09-08 RX ORDER — INSULIN GLARGINE 100 [IU]/ML
10 INJECTION, SOLUTION SUBCUTANEOUS
Status: DISCONTINUED | OUTPATIENT
Start: 2020-09-08 | End: 2020-09-09

## 2020-09-08 RX ADMIN — HYDROCORTISONE SODIUM SUCCINATE 200 MG: 100 INJECTION, POWDER, FOR SOLUTION INTRAMUSCULAR; INTRAVENOUS at 10:32

## 2020-09-08 RX ADMIN — FAMOTIDINE 20 MG: 10 INJECTION, SOLUTION INTRAVENOUS at 09:24

## 2020-09-08 RX ADMIN — SENNOSIDES 8.8 MG: 8.8 SYRUP ORAL at 21:16

## 2020-09-08 RX ADMIN — ASPIRIN 81 MG 81 MG: 81 TABLET ORAL at 09:24

## 2020-09-08 RX ADMIN — IODIXANOL 85 ML: 320 INJECTION, SOLUTION INTRAVASCULAR at 14:58

## 2020-09-08 RX ADMIN — CHLORHEXIDINE GLUCONATE 0.12% ORAL RINSE 15 ML: 1.2 LIQUID ORAL at 09:24

## 2020-09-08 RX ADMIN — GUAIFENESIN 200 MG: 100 SOLUTION ORAL at 05:41

## 2020-09-08 RX ADMIN — HEPARIN SODIUM 5000 UNITS: 5000 INJECTION INTRAVENOUS; SUBCUTANEOUS at 21:16

## 2020-09-08 RX ADMIN — LEVALBUTEROL HYDROCHLORIDE 1.25 MG: 1.25 SOLUTION, CONCENTRATE RESPIRATORY (INHALATION) at 13:58

## 2020-09-08 RX ADMIN — IPRATROPIUM BROMIDE 0.5 MG: 0.5 SOLUTION RESPIRATORY (INHALATION) at 07:34

## 2020-09-08 RX ADMIN — HEPARIN SODIUM 5000 UNITS: 5000 INJECTION INTRAVENOUS; SUBCUTANEOUS at 05:40

## 2020-09-08 RX ADMIN — GUAIFENESIN 200 MG: 100 SOLUTION ORAL at 18:16

## 2020-09-08 RX ADMIN — MAGNESIUM SULFATE HEPTAHYDRATE 2 G: 40 INJECTION, SOLUTION INTRAVENOUS at 06:32

## 2020-09-08 RX ADMIN — INSULIN LISPRO 2 UNITS: 100 INJECTION, SOLUTION INTRAVENOUS; SUBCUTANEOUS at 00:21

## 2020-09-08 RX ADMIN — INSULIN LISPRO 6 UNITS: 100 INJECTION, SOLUTION INTRAVENOUS; SUBCUTANEOUS at 18:21

## 2020-09-08 RX ADMIN — HEPARIN SODIUM 5000 UNITS: 5000 INJECTION INTRAVENOUS; SUBCUTANEOUS at 15:24

## 2020-09-08 RX ADMIN — GUAIFENESIN 200 MG: 100 SOLUTION ORAL at 21:16

## 2020-09-08 RX ADMIN — NYSTATIN 500000 UNITS: 100000 SUSPENSION ORAL at 18:16

## 2020-09-08 RX ADMIN — ACETAMINOPHEN 650 MG: 650 SUSPENSION ORAL at 00:17

## 2020-09-08 RX ADMIN — CEFTRIAXONE 2000 MG: 2 INJECTION, SOLUTION INTRAVENOUS at 15:24

## 2020-09-08 RX ADMIN — INSULIN LISPRO 2 UNITS: 100 INJECTION, SOLUTION INTRAVENOUS; SUBCUTANEOUS at 12:38

## 2020-09-08 RX ADMIN — PRAVASTATIN SODIUM 20 MG: 20 TABLET ORAL at 15:45

## 2020-09-08 RX ADMIN — NYSTATIN 500000 UNITS: 100000 SUSPENSION ORAL at 21:16

## 2020-09-08 RX ADMIN — LEVALBUTEROL HYDROCHLORIDE 1.25 MG: 1.25 SOLUTION, CONCENTRATE RESPIRATORY (INHALATION) at 07:34

## 2020-09-08 RX ADMIN — IPRATROPIUM BROMIDE 0.5 MG: 0.5 SOLUTION RESPIRATORY (INHALATION) at 19:51

## 2020-09-08 RX ADMIN — CHLORHEXIDINE GLUCONATE 0.12% ORAL RINSE 15 ML: 1.2 LIQUID ORAL at 21:16

## 2020-09-08 RX ADMIN — GUAIFENESIN 200 MG: 100 SOLUTION ORAL at 15:24

## 2020-09-08 RX ADMIN — METRONIDAZOLE 500 MG: 500 INJECTION, SOLUTION INTRAVENOUS at 10:33

## 2020-09-08 RX ADMIN — GUAIFENESIN 200 MG: 100 SOLUTION ORAL at 01:57

## 2020-09-08 RX ADMIN — IPRATROPIUM BROMIDE 0.5 MG: 0.5 SOLUTION RESPIRATORY (INHALATION) at 13:58

## 2020-09-08 RX ADMIN — NYSTATIN 500000 UNITS: 100000 SUSPENSION ORAL at 12:38

## 2020-09-08 RX ADMIN — INSULIN GLARGINE 10 UNITS: 100 INJECTION, SOLUTION SUBCUTANEOUS at 21:15

## 2020-09-08 RX ADMIN — METRONIDAZOLE 500 MG: 500 INJECTION, SOLUTION INTRAVENOUS at 18:16

## 2020-09-08 RX ADMIN — INSULIN LISPRO 2 UNITS: 100 INJECTION, SOLUTION INTRAVENOUS; SUBCUTANEOUS at 05:38

## 2020-09-08 RX ADMIN — GUAIFENESIN 200 MG: 100 SOLUTION ORAL at 09:24

## 2020-09-08 RX ADMIN — LEVALBUTEROL HYDROCHLORIDE 1.25 MG: 1.25 SOLUTION, CONCENTRATE RESPIRATORY (INHALATION) at 19:51

## 2020-09-08 RX ADMIN — NYSTATIN 500000 UNITS: 100000 SUSPENSION ORAL at 09:24

## 2020-09-08 RX ADMIN — DIPHENHYDRAMINE HYDROCHLORIDE 50 MG: 50 INJECTION, SOLUTION INTRAMUSCULAR; INTRAVENOUS at 13:40

## 2020-09-08 NOTE — PLAN OF CARE
Problem: Potential for Falls  Goal: Patient will remain free of falls  Description: INTERVENTIONS:  - Assess patient frequently for physical needs  -  Identify cognitive and physical deficits and behaviors that affect risk of falls  -  [place] fall precautions as indicated by assessment   - Educate patient/family on patient safety including physical limitations  - Instruct patient to call for assistance with activity based on assessment  - Modify environment to reduce risk of injury  - Consider OT/PT consult to assist with strengthening/mobility  Outcome: Progressing     Problem: Prexisting or High Potential for Compromised Skin Integrity  Goal: Skin integrity is maintained or improved  Description: INTERVENTIONS:  - Identify patients at risk for skin breakdown  - Assess and monitor skin integrity  - Assess and monitor nutrition and hydration status  - Monitor labs   - Assess for incontinence   - Turn and reposition patient  - Assist with mobility/ambulation  - Relieve pressure over bony prominences  - Avoid friction and shearing  - Provide appropriate hygiene as needed including keeping skin clean and dry  - Evaluate need for skin moisturizer/barrier cream  - Collaborate with interdisciplinary team   - Patient/family teaching  - Consider wound care consult   Outcome: Progressing     Problem: Nutrition/Hydration-ADULT  Goal: Nutrient/Hydration intake appropriate for improving, restoring or maintaining nutritional needs  Description: Monitor and assess patient's nutrition/hydration status for malnutrition  Collaborate with interdisciplinary team and initiate plan and interventions as ordered  Monitor patient's weight and dietary intake as ordered or per policy  Utilize nutrition screening tool and intervene as necessary  Determine patient's food preferences and provide high-protein, high-caloric foods as appropriate       INTERVENTIONS:  - Monitor oral intake, urinary output, labs, and treatment plans  - Assess nutrition and hydration status and recommend course of action  - Evaluate amount of meals eaten  - Assist patient with eating if necessary   - Allow adequate time for meals  - Recommend/ encourage appropriate diets, oral nutritional supplements, and vitamin/mineral supplements  - Order, calculate, and assess calorie counts as needed  - Recommend, monitor, and adjust tube feedings and TPN/PPN based on assessed needs  - Assess need for intravenous fluids  - Provide specific nutrition/hydration education as appropriate  - Include patient/family/caregiver in decisions related to nutrition  Outcome: Progressing     Problem: PAIN - ADULT  Goal: Verbalizes/displays adequate comfort level or baseline comfort level  Description: Interventions:  - Encourage patient to monitor pain and request assistance  - Assess pain using appropriate pain scale  - Administer analgesics based on type and severity of pain and evaluate response  - Implement non-pharmacological measures as appropriate and evaluate response  - Consider cultural and social influences on pain and pain management  - Notify physician/advanced practitioner if interventions unsuccessful or patient reports new pain  Outcome: Progressing     Problem: INFECTION - ADULT  Goal: Absence or prevention of progression during hospitalization  Description: INTERVENTIONS:  - Assess and monitor for signs and symptoms of infection  - Monitor lab/diagnostic results  - Monitor all insertion sites, i e  indwelling lines, tubes, and drains  - Monitor endotracheal if appropriate and nasal secretions for changes in amount and color  - Administer medications as ordered  - Instruct and encourage patient and family to use good hand hygiene technique  Outcome: Progressing     Problem: SAFETY ADULT  Goal: Patient will remain free of falls  Description: INTERVENTIONS:  - Assess patient frequently for physical needs  -  Identify cognitive and physical deficits and behaviors that affect risk of falls   -  [place] fall precautions as indicated by assessment   - Educate patient/family on patient safety including physical limitations  - Instruct patient to call for assistance with activity based on assessment  - Modify environment to reduce risk of injury  - Consider OT/PT consult to assist with strengthening/mobility  Outcome: Progressing  Goal: Maintain or return to baseline ADL function  Description: INTERVENTIONS:  - Assess patient frequently for physical needs  -  Identify cognitive and physical deficits and behaviors that affect risk of falls    -  [place] fall precautions as indicated by assessment   - Educate patient/family on patient safety including physical limitations  - Instruct patient to call for assistance with activity based on assessment  - Modify environment to reduce risk of injury  - Consider OT/PT consult to assist with strengthening/mobility  Outcome: Progressing  Goal: Maintain or return mobility status to optimal level  Description: INTERVENTIONS:  -  Assess patient's ability to carry out ADLs; assess patient's baseline for ADL function and identify physical deficits which impact ability to perform ADLs (bathing, care of mouth/teeth, toileting, grooming, dressing, etc )  - Assess/evaluate cause of self-care deficits   - Assess range of motion  - Assess patient's mobility; develop plan if impaired  - Assess patient's need for assistive devices and provide as appropriate  - Encourage maximum independence but intervene and supervise when necessary  - Involve family in performance of ADLs  - Assess for home care needs following discharge   - Consider OT consult to assist with ADL evaluation and planning for discharge  - Provide patient education as appropriate  Outcome: Progressing     Problem: DISCHARGE PLANNING  Goal: Discharge to home or other facility with appropriate resources  Description: INTERVENTIONS:  - Identify barriers to discharge w/patient and caregiver  - Arrange for needed discharge resources and transportation as appropriate  - Identify discharge learning needs (meds, wound care, etc )  - Arrange for interpretive services to assist at discharge as needed  - Refer to Case Management Department for coordinating discharge planning if the patient needs post-hospital services based on physician/advanced practitioner order or complex needs related to functional status, cognitive ability, or social support system  Outcome: Progressing     Problem: Knowledge Deficit  Goal: Patient/family/caregiver demonstrates understanding of disease process, treatment plan, medications, and discharge instructions  Description: Complete learning assessment and assess knowledge base    Interventions:  - Provide teaching at level of understanding  - Provide teaching via preferred learning methods  Outcome: Progressing     Problem: NEUROSENSORY - ADULT  Goal: Achieves stable or improved neurological status  Description: INTERVENTIONS  - Monitor and report changes in neurological status  - Monitor vital signs such as temperature, blood pressure, glucose, and any other labs ordered   - Initiate measures to prevent increased intracranial pressure  - Monitor for seizure activity and implement precautions if appropriate      Outcome: Progressing     Problem: CARDIOVASCULAR - ADULT  Goal: Maintains optimal cardiac output and hemodynamic stability  Description: INTERVENTIONS:  - Monitor I/O, vital signs and rhythm  - Monitor for S/S and trends of decreased cardiac output  - Administer and titrate ordered vasoactive medications to optimize hemodynamic stability  - Assess quality of pulses, skin color and temperature  - Assess for signs of decreased coronary artery perfusion  - Instruct patient to report change in severity of symptoms  Outcome: Progressing  Goal: Absence of cardiac dysrhythmias or at baseline rhythm  Description: INTERVENTIONS:  - Continuous cardiac monitoring, vital signs, obtain 12 lead EKG if ordered  - Administer antiarrhythmic and heart rate control medications as ordered  - Monitor electrolytes and administer replacement therapy as ordered  Outcome: Progressing     Problem: RESPIRATORY - ADULT  Goal: Achieves optimal ventilation and oxygenation  Description: INTERVENTIONS:  - Assess for changes in respiratory status  - Assess for changes in mentation and behavior  - Position to facilitate oxygenation and minimize respiratory effort  - Oxygen administered by appropriate delivery if ordered  - Initiate smoking cessation education as indicated  - Encourage broncho-pulmonary hygiene including cough, deep breathe, Incentive Spirometry  - Assess the need for suctioning and aspirate as needed  - Assess and instruct to report SOB or any respiratory difficulty  - Respiratory Therapy support as indicated  Outcome: Progressing     Problem: GENITOURINARY - ADULT  Goal: Maintains or returns to baseline urinary function  Description: INTERVENTIONS:  - Assess urinary function  - Encourage oral fluids to ensure adequate hydration if ordered  - Administer IV fluids as ordered to ensure adequate hydration  - Administer ordered medications as needed  - Offer frequent toileting  - Follow urinary retention protocol if ordered  Outcome: Progressing     Problem: METABOLIC, FLUID AND ELECTROLYTES - ADULT  Goal: Electrolytes maintained within normal limits  Description: INTERVENTIONS:  - Monitor labs and assess patient for signs and symptoms of electrolyte imbalances  - Administer electrolyte replacement as ordered  - Monitor response to electrolyte replacements, including repeat lab results as appropriate  - Instruct patient on fluid and nutrition as appropriate  Outcome: Progressing  Goal: Fluid balance maintained  Description: INTERVENTIONS:  - Monitor labs   - Monitor I/O and WT  - Instruct patient on fluid and nutrition as appropriate  - Assess for signs & symptoms of volume excess or deficit  Outcome: Progressing  Goal: Glucose maintained within target range  Description: INTERVENTIONS:  - Monitor Blood Glucose as ordered  - Assess for signs and symptoms of hyperglycemia and hypoglycemia  - Administer ordered medications to maintain glucose within target range  - Assess nutritional intake and initiate nutrition service referral as needed  Outcome: Progressing

## 2020-09-08 NOTE — PROGRESS NOTES
Progress Note - Nephrology   Deny Mcarthur 66 y o  male MRN: [id number]  Unit/Bed#: -01 Encounter: [id number]    A/P:  1  Acute kidney injury  Admitted with a creatinine of 3 61 mg/dl on 9/2 -> 1 22 mg/dl today  Nonoliguric: 530/3140 in 24 hours (-2852 ml since admission)  Continue to monitor daily  He has an unknown baseline creatinine  2  Proteinuria  Has a MAC of 60 mg/g - possibly due to underlying diabetes  Will be followed in outpatient setting  Etiology most probably ATN due to septic shock and renal hypoperfusion  3  Septic shock with VDRF  Extubated and off pressors  Receiving cefepime, vancomycin and metronidazole   CXR improved (personally reviewed as well)  4  Insulin dependent diabetes with CHERISE  Unknown baseline creatinine  Sugars being covered  5  Tachycardia  CT- PE protocol pending  Follow up reason for today's visit: Acute kidney injury    Septic shock [facility])    Patient Active Problem List   Diagnosis    Septic shock ([facility] )    Community acquired pneumonia    CHERISE (acute kidney injury) ([facility] )    Elevated brain natriuretic peptide (BNP) level    Type 2 MI (myocardial infarction) ([place] [address] )    Type 2 diabetes mellitus, with long-term current use of insulin (HCC)    Encephalopathy due to infection    Acute respiratory failure with hypoxia (HCC)    Anemia         Subjective:   No chest pain, dyspnea or abdominal pain or dysuria  Complaining of thirst and asking for water  Will have a speech evaluation    Objective:     Vitals: Blood pressure 133/73, pulse (!) 112, temperature 100 4 °F (38 °C), resp  rate (!) 26, height 6' 3" (1 905 m), weight 82 8 kg (182 lb 8 7 oz), SpO2 94 %  ,Body mass index is 22 82 kg/m²      Weight (last 2 days)     Date/Time   Weight    09/08/20 0547   82 8 (182 54)    09/07/20 0600   81 3 (179 23)    09/06/20 0600   84 5 (186 29)                Intake/Output Summary (Last 24 hours) at 9/8/2020 1328  Last data filed at 9/8/2020 1200  Gross per 24 hour Intake 630 ml   Output 3140 ml   Net -2510 ml     I/O last 3 completed shifts: In: 1030 [P O :480; IV Piggyback:550]  Out: 4540 [Urine:4540]    Urethral Catheter Temperature probe 16 Fr  (Active)   Reasons to continue Urinary Catheter  Accurate I&O assessment in critically ill patients (48 hr  max) 09/08/20 1000   Goal for Removal Remove after 48 hrs of I/O monitoring 09/08/20 1000   Site Assessment Clean;Skin intact 09/08/20 1000   Collection Container Standard drainage bag 09/08/20 1000   Securement Method Securing device (Describe) 09/08/20 1000   Output (mL) 250 mL 09/08/20 1200       Physical Exam: /73   Pulse (!) 112   Temp 100 4 °F (38 °C)   Resp (!) 26   Ht 6' 3" (1 905 m)   Wt 82 8 kg (182 lb 8 7 oz)   SpO2 94%   BMI 22 82 kg/m²     General Appearance:    Alert, cooperative, no distress, appears stated age   Head:    Normocephalic, without obvious abnormality, atraumatic   Eyes:    Conjunctiva/corneas clear   Ears:    Normal external ears   Nose:   Nares normal, septum midline, mucosa normal, no drainage    or sinus tenderness   Throat:   Lips, mucosa, and tongue normal; teeth and gums normal   Neck:   Supple, symmetrical, trachea midline, no adenopathy;        thyroid:  No enlargement/tenderness/nodules; no carotid    bruit or JVD   Back:     Symmetric, no curvature, ROM normal, no CVA tenderness   Lungs:     Clear to auscultation bilaterally, respirations unlabored   Chest wall:    No tenderness or deformity   Heart:    Regular rate and rhythm, S1 and S2 normal, no murmur, rub   or gallop   Abdomen:     Soft, non-tender, bowel sounds active   Extremities:   Extremities normal, atraumatic, no cyanosis or edema   Skin:   Skin color, texture, turgor normal, no rashes or lesions   Lymph nodes:   Cervical normal   Neurologic:   CNII-XII intact            Lab, Imaging and other studies: I have personally reviewed pertinent labs    CBC:   Lab Results   Component Value Date    WBC 9 84 09/08/2020 HGB 11 0 (L) 09/08/2020    HCT 34 8 (L) 09/08/2020    MCV 88 09/08/2020     (H) 09/08/2020    MCH 27 9 09/08/2020    MCHC 31 6 09/08/2020    RDW 15 0 09/08/2020    MPV 9 7 09/08/2020     CMP:   Lab Results   Component Value Date    K 3 9 09/08/2020     (H) 09/08/2020    CO2 26 09/08/2020    BUN 27 (H) 09/08/2020    CREATININE 1 22 09/08/2020    CALCIUM 8 7 09/08/2020    AST 39 09/08/2020    ALT 55 09/08/2020    ALKPHOS 159 (H) 09/08/2020    EGFR 57 09/08/2020         Results from last 7 days   Lab Units 09/08/20  0533 09/07/20  0455 09/06/20  0404 09/05/20  0311   POTASSIUM mmol/L 3 9 3 9 3 8 4 1   CHLORIDE mmol/L 109* 108 110* 110*   CO2 mmol/L 26 23 24 24   BUN mg/dL 27* 27* 26* 26*   CREATININE mg/dL 1 22 1 25 1 17 1 35*   CALCIUM mg/dL 8 7 8 8 8 2* 8 4   ALK PHOS U/L 159*  --  174* 140*   ALT U/L 55  --  71 74   AST U/L 39  --  94* 129*         Phosphorus:   Lab Results   Component Value Date    PHOS 2 6 09/08/2020     Magnesium:   Lab Results   Component Value Date    MG 1 7 09/08/2020     Urinalysis:   Lab Results   Component Value Date    COLORU Yellow 09/08/2020    CLARITYU Slightly Cloudy 09/08/2020    SPECGRAV 1 025 09/08/2020    PHUR 5 5 09/08/2020    LEUKOCYTESUR Negative 09/08/2020    NITRITE Negative 09/08/2020    GLUCOSEU Negative 09/08/2020    KETONESU 15 (1+) (A) 09/08/2020    BILIRUBINUR Negative 09/08/2020    BLOODU Moderate (A) 09/08/2020     Ionized Calcium: No results found for: CAION  Coagulation: No results found for: PT, INR, APTT  Troponin: No results found for: TROPONINI  ABG: No results found for: PHART, ASA6RQY, PO2ART, ZUA2IXX, Z1BPLIOF, BEART, SOURCE  Radiology review:     IMAGING  Procedure: Xr Chest Portable    Result Date: 9/8/2020  Narrative: CHEST INDICATION:   fever  COMPARISON:  Chest radiograph from 9/6/2020 and 9/5/2020  Chest CT from 9/1/2020  EXAM PERFORMED/VIEWS:  XR CHEST PORTABLE FINDINGS:  Lines and tubes removed   Cardiomediastinal silhouette appears unremarkable  Improving edema  No effusion or pneumothorax  Nodularity in the upper lobes better shown on CT  Osseous structures appear within normal limits for patient age  Impression: Improving edema  Underlying pneumonia cannot be excluded  Workstation performed: [id number]     Procedure: Xr Chest Portable    Result Date: 9/6/2020  Narrative: CHEST INDICATION:   pna  COMPARISON:  Chest radiograph from 9/5/2020 and chest CT from 9/1/2020  EXAM PERFORMED/VIEWS:  XR CHEST PORTABLE FINDINGS:  ET tube 5 cm above the supriya  NG tube below the diaphragm  Left jugular catheter in SVC  Cardiomediastinal silhouette appears unremarkable  Persistent pulmonary edema  Question small right effusion  No pneumothorax  Osseous structures appear within normal limits for patient age  Impression: Persistent pulmonary edema with probable small right effusion  Underlying pneumonia cannot be excluded  Workstation performed: [id number]     Procedure: Vas Lower Limb Venous Duplex Study, Complete Bilateral    Result Date: 9/8/2020  Narrative:  [facility] REPORT [facility]INICAL: Indications: Patient presents with bilateral lower extremity edema x 3 days and shortness of breath  Operative History: Denies cardiovascular intervention Risk Factors The patient has history of Diabetes (IDDM) and AKD  FINDINGS:  Segment  Right            Left              Impression       Impression       CFV      Normal (Patent)  Normal (Patent)     CONCLUSION: RIGHT LOWER LIMB: No evidence of acute or chronic deep vein thrombosis  No evidence of superficial thrombophlebitis noted  Doppler evaluation shows a normal response to augmentation maneuvers  Popliteal, posterior tibial and anterior tibial arterial Doppler waveforms are triphasic  LEFT LOWER LIMB: No evidence of acute or chronic deep vein thrombosis  No evidence of superficial thrombophlebitis noted  Doppler evaluation shows a normal response to augmentation maneuvers   Popliteal, posterior tibial and anterior tibial arterial Doppler waveforms are triphasic    Technical findings were given to Ghanshyam Chacon      Current Facility-Administered Medications   Medication Dose Route Frequency    acetaminophen (TYLENOL) oral suspension 650 mg  650 mg Oral Q4H PRN    acetaminophen (TYLENOL) rectal suppository 325 mg  325 mg Rectal Q4H PRN    aspirin chewable tablet 81 mg  81 mg Oral Daily    bisacodyl (DULCOLAX) rectal suppository 10 mg  10 mg Rectal Daily PRN    cefepime (MAXIPIME) IVPB (premix) 2,000 mg 50 mL  2,000 mg Intravenous Q24H    chlorhexidine (PERIDEX) 0 12 % oral rinse 15 mL  15 mL Swish & Spit Q12H [address]    diphenhydrAMINE (BENADRYL) injection 50 mg  50 mg Intravenous Once    famotidine (PEPCID) injection 20 mg  20 mg Intravenous Q24H JAVID    guaiFENesin (ROBITUSSIN) oral solution 200 mg  200 mg Oral Q4H    heparin (porcine) subcutaneous injection 5,000 Units  5,000 Units Subcutaneous Q8H [address]    insulin lispro (HumaLOG) 100 units/mL subcutaneous injection 1-6 Units  1-6 Units Subcutaneous Q6H [address]    ipratropium (ATROVENT) 0 02 % inhalation solution 0 5 mg  0 5 mg Nebulization Q6H While awake    levalbuterol (XOPENEX) inhalation solution 0 63 mg  0 63 mg Nebulization Q6H PRN    levalbuterol (XOPENEX) inhalation solution 1 25 mg  1 25 mg Nebulization Q6H While awake    metoprolol (LOPRESSOR) injection 5 mg  5 mg Intravenous Q6H PRN    metroNIDAZOLE (FLAGYL) IVPB (premix) 500 mg 100 mL  500 mg Intravenous Q8H    nystatin (MYCOSTATIN) oral suspension 500,000 Units  500,000 Units Swish & Swallow 4x Daily    polyethylene glycol (MIRALAX) packet 17 g  17 g Oral Daily PRN    pravastatin (PRAVACHOL) tablet 20 mg  20 mg Oral Daily With Dinner    senna 8 8 mg/5 mL oral syrup 8 8 mg  8 8 mg Oral HS    vancomycin (VANCOCIN) 1,750 mg in sodium chloride 0 9 % 500 mL IVPB  1,750 mg Intravenous Q24H     Medications Discontinued During This Encounter   Medication Reason    sodium chloride 0 9 % bolus 1,000 mL     sodium chloride 0 9 % bolus 1,000 mL     lisinopril (ZESTRIL) 10 mg tablet     linaGLIPtin-metFORMIN HCl (JENTADUETO XR PO) Discontinued by another clinician    heparin (porcine) subcutaneous injection 5,000 Units     cefTRIAXone (ROCEPHIN) IVPB (premix) 1,000 mg 50 mL     heparin (ACS LOW) Duplicate order    pravastatin (PRAVACHOL) tablet 40 mg     acetaminophen (TYLENOL) tablet 650 mg     azithromycin (ZITHROMAX) 500 mg in sodium chloride 0 9 % 250 mL IVPB     insulin lispro (HumaLOG) 100 units/mL subcutaneous injection 1-6 Units     phenylephrine (REG-SYNEPHRINE) 50 mg (STANDARD CONCENTRATION) in sodium chloride 0 9% 250 mL     ipratropium (ATROVENT) 0 02 % inhalation solution 0 5 mg     levalbuterol (XOPENEX) inhalation solution 1 25 mg     cefepime (MAXIPIME) IVPB (premix) 1,000 mg 50 mL     vasopressin (PITRESSIN) 20 Units in sodium chloride 0 9 % 100 mL infusion     cefepime (MAXIPIME) IVPB (premix) 1,000 mg 50 mL     vancomycin (VANCOCIN) IVPB (premix) 1,000 mg 200 mL     heparin (porcine) 25,000 units in 0 45% NaCl 250 mL infusion (premix)     heparin (porcine) injection 4,000 Units     heparin (porcine) injection 2,000 Units     bisacodyl (DULCOLAX) rectal suppository 10 mg     polyethylene glycol (MIRALAX) packet 17 g     propofol (DIPRIVAN) 1000 mg in 100 mL infusion (premix)     fentanyl citrate (PF) 100 MCG/2ML 100 mcg     fentaNYL 1000 mcg in sodium chloride 0 9% 100mL infusion     norepinephrine (LEVOPHED) 4 mg (STANDARD CONCENTRATION) IV in sodium chloride 0 9% 250 mL     insulin regular (HumuLIN R,NovoLIN R) 1 Units/mL in sodium chloride 0 9 % 100 mL infusion     acetaminophen (TYLENOL) oral suspension 650 mg     polyethylene glycol (MIRALAX) packet 17 g     senna 8 8 mg/5 mL oral syrup 8 8 mg     ipratropium (ATROVENT) 0 02 % inhalation solution 0 5 mg     levalbuterol (XOPENEX) inhalation solution 1 25 mg     metroNIDAZOLE (FLAGYL) IVPB (premix) 500 mg 100 mL     acetaminophen (TYLENOL) chewable tablet 480 mg        Lai Mckenzie MD      This progress note was produced in part using a dictation device which may document imprecise wording from author's original intent

## 2020-09-08 NOTE — CASE MANAGEMENT
Chart reviewed aware of medical management  Pt admit with septic shock/PNA - is now extubated on 2 liters of 02 with 2 IV anabiotics  Therapy is ordered and is pending  Anticipate patient may need STR, will follow up post therapy evaluation

## 2020-09-08 NOTE — PROGRESS NOTES
Progress Note - General Surgery   Katharine Race 66 y o  male MRN: [id number]  Unit/Bed#: -01 Encounter: [id number]    Assessment:  - Septic shock  - Somnolent today, not providing detailed history  - Denies abdominal pain and nontender to palpation  Distension present  - Multiple BMs recorded in chart  - WBC 9 84 (10 32)  - Alk phos 159 (was 174 on 9/6/20)  - VS last night: temp up to 101 3, pulse up to 120, and resp up to 31    Plan:  - Continue IV abx per primary  - Medicate prn for pain  - Manage medical conditions per primary    Subjective/Objective   Subjective: Pt is somnolent this AM and not providing detailed history  Answers in either the affirmative or negative only today  Denies abdominal pain, nausea, vomiting, diarrhea, constipation  Objective:     Blood pressure 133/77, pulse (!) 109, temperature 100 °F (37 8 °C), temperature source Probe, resp  rate (!) 25, height 6' 3" (1 905 m), weight 82 8 kg (182 lb 8 7 oz), SpO2 95 %  ,Body mass index is 22 82 kg/m²  Intake/Output Summary (Last 24 hours) at 9/8/2020 0736  Last data filed at 9/8/2020 0601  Gross per 24 hour   Intake 530 ml   Output 3140 ml   Net -2610 ml       Invasive Devices     Peripheral Intravenous Line            Peripheral IV 09/07/20 Dorsal (posterior); Right Wrist 1 day    Peripheral IV 09/07/20 Dorsal (posterior); Left Hand less than 1 day    Peripheral IV 09/07/20 Right;Ventral (anterior) Wrist less than 1 day          Drain            Urethral Catheter Temperature probe 16 Fr  6 days                Physical Exam: General appearance: Very somnolent  Responses are limited to only affirmative or negative when asked questions  Lungs: clear to auscultation bilaterally  Heart: RRR, tachycardic  Abdomen: Abdomen distended  Normal BS x4  Tympanic percussion x4  Soft and nontender x4  Lab, Imaging and other studies:  I have personally reviewed pertinent lab results      CBC:   Lab Results   Component Value Date    WBC 9 84 09/08/2020    HGB 11 0 (L) 09/08/2020    HCT 34 8 (L) 09/08/2020    MCV 88 09/08/2020     (H) 09/08/2020    MCH 27 9 09/08/2020    MCHC 31 6 09/08/2020    RDW 15 0 09/08/2020    MPV 9 7 09/08/2020   CMP:   Lab Results   Component Value Date    SODIUM 143 09/08/2020    K 3 9 09/08/2020     (H) 09/08/2020    CO2 26 09/08/2020    BUN 27 (H) 09/08/2020    CREATININE 1 22 09/08/2020    CALCIUM 8 7 09/08/2020    AST 39 09/08/2020    ALT 55 09/08/2020    ALKPHOS 159 (H) 09/08/2020    EGFR 57 09/08/2020     VTE Pharmacologic Prophylaxis: Sequential compression device (Venodyne)  and Heparin  VTE Mechanical Prophylaxis: sequential compression device    Dinora Fernandez PA-C

## 2020-09-08 NOTE — ASSESSMENT & PLAN NOTE
· Secondary to pneumonia vs unclear etiology  · POA - fever, tachypnea, tachycardia, increased Cr, persistent hypotension not fluid responsive  · In the ED  · Hypotensive, normal lactate, given 3L NSS  · CT C/A/P without contrast:   Bilateral predominantly upper lobe diffuse nodular interstitial changes of inflammatory infectious etiology  2   Subsegmental atelectasis /consolidation in the right upper lobe in a bronchovascular distribution  3   Posterior bibasilar dependent changes with possible atelectasis/infiltrate in the left lung base  4   Diffuse bladder wall thickening which is decompressed containing a Means balloon  5  Mediastinal lymphadenopathy as described largest in the subcarinal region measuring 1 8 cm    · Micro work up  · Covid repeat in ED on 9/1 negative  · Flu/ RSV negative  · Urine strep and legionella - negative  · Urine + bacteria, cloudy, thickened bladder wall on CT scan in ED  · Procalcitonin peaked at 36, trending downward  · Blood cultures - No growth  · Sputum culture - 2+ polys, no bacteria  · Lyme PCR  - pending  · ABX - broadened due to severity of illness, Cont Cefepime (1 G Q 24 due to renal insufficiency) D#6, Vanco D#6, and Flagyl D#6  · Increase cefepime to 2 Q 24 when GFR 30-60 per ID  · 9/5 Renal ok with increasing to 2gm q 12   · Cont to wean down Levophed  Shane weaned off evening of 9/2/2020  Vaso off 9/3  · Levo weaned of 9/5 @ 2120  · RUQ u/s performed 9/3: No gallstones seen  Some gallbladder wall thickening and pericholecystic fluid is present which could be related to underdistention or other pathology  ·  ID consult placed - Dr Jones Solid  · Consult general surgery for possible cholecystitis per ID recommendations  · Gen surgery recommends HIDA scan once pt  Is stable enough to go for long study  · 9/6 Pt improving, WBC improving  Gen surg no longer rec HIDA  · Overall pt   Improving slowly - wife updated at bedside  · BP soft 9/6 overnight, given 250ml 5% albumin with improvement  · BLE duplex to r/o DVT given fever and edema, negative  · ID consulted 9/8 and will D/C Vanco/Cefepime and change to Rocephin  Cont Flagyl    · HIDA scan ordered  · CTA of Chest to r/o PE  · Repeat BC sent, UA negative for infection

## 2020-09-08 NOTE — ASSESSMENT & PLAN NOTE
· CT chest - consolidation right upper lobe, infiltrate left lower lobe, diffuse nodular interstitial inflammatory changes  · See septic shock plan above  · See acute hypoxic respiratory plan above  · D/C Vanco/Cefepime per ID 9/8  Change to Rocephin and cont Flagyl  · CTA of chest given fevers- no evidence of pulmonary embolism  Upper lobe patchy opacities, greater on the right, consistent with pneumonia, similar  Moderate pleural effusions, increased    Mediastinal lymphadenopathy

## 2020-09-08 NOTE — RESPIRATORY THERAPY NOTE
RT Protocol Note  Sabrina Aldrich 66 y o  male MRN: [id number]  Unit/Bed#: -01 Encounter: [id number]    Assessment    Principal Problem:    Septic shock ([place] [address] )  Active Problems:    Community acquired pneumonia    CHERISE (acute kidney injury) ([place] [address] )    Elevated brain natriuretic peptide (BNP) level    Type 2 MI (myocardial infarction) ([place] [address] )    Type 2 diabetes mellitus, with long-term current use of insulin (HCC)    Encephalopathy due to infection    Acute respiratory failure with hypoxia (HCC)    Anemia      Home Pulmonary Medications:        Past Medical History:   Diagnosis Date    Diabetes mellitus ([place] [address] )     Hyperlipidemia     Hypertension      Social History     Socioeconomic History    Marital status: /Civil Union     Spouse name: None    Number of children: None    Years of education: None    Highest education level: None   Occupational History    None   Social Needs    Financial resource strain: None    Food insecurity     Worry: None     Inability: None    Transportation needs     Medical: None     Non-medical: None   Tobacco Use    Smoking status: Never Smoker    Smokeless tobacco: Never Used   Substance and Sexual Activity    Alcohol use: Not Currently     Frequency: Never    Drug use: Never    Sexual activity: Yes     Partners: Female   Lifestyle    Physical activity     Days per week: None     Minutes per session: None    Stress: None   Relationships    Social connections     Talks on phone: None     Gets together: None     Attends Jainism service: None     Active member of club or organization: None     Attends meetings of clubs or organizations: None     Relationship status: None    Intimate partner violence     Fear of current or ex partner: None     Emotionally abused: None     Physically abused: None     Forced sexual activity: None   Other Topics Concern    None   Social History Narrative    None       Subjective    Subjective Data: Patient is non smoker who denies use of respiratory medications at home or use of home O2  He denies any hsortness of breath or additional work of breathing  Objective    Physical Exam:       Vitals:  Blood pressure 120/72, pulse (!) 108, temperature 99 1 °F (37 3 °C), temperature source Bladder, resp  rate 22, height 6' 3" (1 905 m), weight 82 8 kg (182 lb 8 7 oz), SpO2 96 %  Results from last 7 days   Lab Units 09/04/20  0508   PH ART  7 387   PCO2 ART mm Hg 31 1*   PO2 ART mm Hg 76 9   HCO3 ART mmol/L 18 3*   BASE EXC ART mmol/L -5 9   O2 CONTENT ART mL/dL 12 5*   O2 HGB, ARTERIAL % 94 7   ABG SOURCE  Line, Arterial   BETHANIE TEST  Yes       Imaging and other studies: I have personally reviewed pertinent reports        O2 Device: 3 L NC     Plan    Respiratory Plan: Mild Distress pathway        Resp Comments: Pt comfortable on 2 L

## 2020-09-08 NOTE — ASSESSMENT & PLAN NOTE
· Secondary to septic shock  · Elevated Trop 2 85 in ED without EKG changes, pt asymptomatic  · Trop peaked at 34 without ST changes  · Continue Heparin gtt- D/C 9/5 and start ASA and statin  · ECHO - EF 36-02%, RV systolic function reduced, mod MVR - leaning toward takotsubo cardiomyopathy based on the echo findings  Could represent apical ballooning/Takotsubo cardiomyopathy given the basal   segments have dynamic function  · If ECG changes will need transfer for interventional cardiology  · Troponin 22 59 on 9/3   No further checks unless clinical change

## 2020-09-08 NOTE — PLAN OF CARE
Speech-Language Pathology Bedside Swallow Evaluation      Patient Name: Tolu Moon Date: 9/8/2020     Problem List  Principal Problem:    Septic shock ([facility] )  Active Problems:    Community acquired pneumonia    [facility] )    Elevated brain natriuretic peptide (BNP) level    Type 2 MI (myocardial infarction) ([facility] )    Type 2 diabetes mellitus, with long-term current use of insulin (HCC)    Encephalopathy due to infection    Acute respiratory failure with hypoxia (HCC)    Anemia      Past Medical History  Past Medical History:   Diagnosis Date    Diabetes mellitus ([facility] )     Hyperlipidemia     Hypertension        Past Surgical History  History reviewed  No pertinent surgical history  Summary   Pt presented with s/s suggestive of moderate oral and suspected moderate pharyngeal dysphagia  Symptoms or concerns included  reduced bolus formation, decreased mastication abilities, suspected pharyngeal swallow delay, suspected decreased hyolaryngeal elevation upon palpation and multiple swallows  Pt was lethargic requiring arousals, + response  Puree trials tolerated with no significant difficulty  Expectoration of mech soft due to breakdown inability  Thin yielding anterior loss with bolus monitoring and suspected delayed swallow worsening in comparison to NT of approx >5s  No s/s with NT  Recommend level 1 puree and NTLs @ this time, medication crushed in applesauce  Will monitor for need of VBS       Risk/s for Aspiration: Yes, fatigue    Recommended Diet: puree/level 1 diet and nectar thick liquids   Recommended Form of Meds: crushed with puree   Aspiration precautions and swallowing strategies: upright posture, only feed when fully alert, slow rate of feeding, small bites/sips and alternating bites and sips  Other Recommendations/Considerations: feed dependence, frequent oral care, dentures inserted for meals      Current Medical Status  Betty Knox is a 66 y o  male w PMH of DM, HTN, hyperlipidemia, who presented to  ED with 4 day history of feeling run down  Yesterday his wife states that he complained feeling cold and had decreased appetite  Blood sugars yesterday were per his norm in the mid 100 range  He had outpt Covid testing which was negative  Today his wife states he unsteady gait generally was not feeling continue with intermittent dry cough, and  confusion  EMS was called  Sats were 93% on air that he was placed on 6 L via nasal cannula EN route for his shortness of breath  He denies any chest pain, palpitations, productive cough, nausea/vomiting/diarrhea  Denies any sick contacts or recent trauma  No lightheadedness, dizziness, ear pain/pressure or any nasal congestion      In the ED, chest x-ray was concerning for right middle lobe pneumonia  CT of the chest was done and showed right-sided atelectasis/consolidation, /atelectasis/infiltrate in left lung base  CT of the abdomen and pelvis were also /completed and showed some gallbladder wall thickening  Lactic was negative  Pro BNP was 5800  UDS negative, alcohol negative  Rectal temp 105°  Sodium 129, corrected to 132 for a glucose of 304  Initial troponin was 2 85 without EKG changes, thought to be due to demand  Systolic blood pressure in the 70s and 80s at times  He received a total of 3 L of normal saline, magnesium replacement, given Azithromycin and ceftriaxone  Admitted to the critical care service  Current Precautions:  Fall  Aspiration      Allergies:  No known food allergies    Past medical history:  Please see H&P for details    Special Studies:  9/8/20: Improving edema  Underlying pneumonia cannot be excluded  Social/Education/Vocational Hx:  Pt lives with wife  Was working approx 50hrs a week  Enjoys golfing  Swallow Information   Current Risks for Dysphagia & Aspiration: AMS, reduced alertness  Current Symptoms/Concerns: intubation on 9/2 extubation on 9/6   Currently NPO   Current Diet: NPO   Baseline Diet: regular diet and thin liquids      Baseline Assessment   Behavior/Cognition: lethargic, waxing and waning arousal level and decreased attention  Oriented to month and year  Binary choice oriented to facility name  Speech/Language Status: able to participate in basic conversation  Patient Positioning: upright in bed  Pain Status/Interventions/Response to Interventions:  No report of or nonverbal indications of pain  Swallow Mechanism Exam  Facial: symmetrical  Labial: bilateral decreased ROM, decreased strength and decreased coordination  Lingual: decreased ROM and bilateral decreased strength  Velum: symmetrical  Mandible:  decreased ROM  Dentition: upper and lower dentures inserted @ start of evaluation  Vocal quality:weak   Volitional Cough: weak     Consistencies Assessed and Performance   Consistencies Administered: thin liquids, nectar thick, puree and mechanical soft solids  Materials administered included water via cup, NT apple juice via cup, puree/applesauce small and enlarged bites, and single mech soft cracker    Oral Stage: moderate  Oral manipulation and containment functional for puree  Attempted mastication with verbal prompts on technique with mech soft however unsuccessful with whole bolus remaining intact in right buccal region  Pt able to move bolus anteriorly and expectorate when cued  Thin liquids yielded 50% instance of unintended expectoration due to poor labial seal with small bites  Adequate oral control with NTLs  Pharyngeal Stage: moderate  Swallow Mechanics:  Swallowing initiation appeared delayed which worsened with thin vs NT of approx 5s  Laryngeal rise was palpated and judged to be weak and intermittent double swallow  Respiratory status remained stable @94% with all trials      Esophageal Concerns: none reported    Strategies and Efficacy: small bites/sips, slow rate for allowance of swallow, oral care prior to trials, feed dependence    Summary and Recommendations (see above)    Results Reviewed with: patient, RN and MD     Caregiver Education: initiated  I educated pt/RN/MD in basics re: swallowing A/P and means of minimizing aspiration risk (eg benefit of upright position c use of pillow/s to ensure head in neutral or slightly flexed position, use of stimulation to maximize engagement, use of slow rate/small bites and sips, ensuring laryngeal rise/swallow initiation prior to administering the next tsp etc)  Caregivers would benefit from continued education and support  Treatment Recommended: dysphagia tx to be initiated  ?VBS in future depending  Will continue to reassess need  Frequency of treatment: 1-5x week      Dysphagia LTG  -Patient will demonstrate safe and effective oral intake (without overt s/s significant oral/pharyngeal dysphagia including s/s penetration or aspiration) for the highest appropriate diet level       Short Term Goals:  -Pt will tolerate Dysphagia 1/pureed diet and nectar thick thin liquid with no significant s/s oral or pharyngeal dysphagia across 1-3 diagnostic session/s    -Patient will tolerate trials of upgraded food and/or liquid texture with no significant s/s of oral or pharyngeal dysphagia including aspiration across 1-3 diagnostic sessions           Speech Therapy Prognosis   Prognosis: good    Prognosis Considerations: age, medical status, prior medical history and cognitive status    Isabel Motley CCC-SLP

## 2020-09-08 NOTE — CONSULTS
Vancomycin IV Pharmacy-to-Dose Consultation  Emeli Hudson is a 66 y o  male who was receiving Vancomycin IV with management by the Pharmacy Consult service for treatment of Pneumonia    The patients Vancomycin therapy has been completed / discontinued  Thank you for allowing us to take part in this patient's care  Pharmacy will sign-off now; please call or re-consult if there are any questions       Orlando Cordova PharmD  Pharmacist

## 2020-09-08 NOTE — PROGRESS NOTES
Progress Note - Emeli Hudson 1945, 66 y o  male MRN: [id number]    Unit/Bed#: -01 Encounter: [id number]    Primary Care Provider: Shawn Tony DO   Date and time admitted to hospital: 9/1/2020  4:56 PM        * Septic shock ([facility] [address] )  Assessment & Plan  · Secondary to pneumonia vs unclear etiology  · POA - fever, tachypnea, tachycardia, increased Cr, persistent hypotension not fluid responsive  · In the ED  · Hypotensive, normal lactate, given 3L NSS  · CT C/A/P without contrast:   Bilateral predominantly upper lobe diffuse nodular interstitial changes of inflammatory infectious etiology  2   Subsegmental atelectasis /consolidation in the right upper lobe in a bronchovascular distribution  3   Posterior bibasilar dependent changes with possible atelectasis/infiltrate in the left lung base  4   Diffuse bladder wall thickening which is decompressed containing a Means balloon  5  Mediastinal lymphadenopathy as described largest in the subcarinal region measuring 1 8 cm    · Micro work up  · Covid repeat in ED on 9/1 negative  · Flu/ RSV negative  · Urine strep and legionella - negative  · Urine + bacteria, cloudy, thickened bladder wall on CT scan in ED  · Procalcitonin peaked at 36, trending downward  · Blood cultures - No growth  · Sputum culture - 2+ polys, no bacteria  · Lyme PCR  - pending  · ABX - broadened due to severity of illness, Cont Cefepime (1 G Q 24 due to renal insufficiency) D#6, Vanco D#6, and Flagyl D#6  · Increase cefepime to 2 Q 24 when GFR 30-60 per ID  · 9/5 Renal ok with increasing to 2gm q 12   · Cont to wean down Levophed  Shane weaned off evening of 9/2/2020  Vaso off 9/3  · Levo weaned of 9/5 @ 2120  · RUQ u/s performed 9/3: No gallstones seen    Some gallbladder wall thickening and pericholecystic fluid is present which could be related to underdistention or other pathology  ·  ID consult placed - Dr Margaret Luke  · Consult general surgery for possible cholecystitis per ID recommendations  · Gen surgery recommends HIDA scan once pt  Is stable enough to go for long study  · 9/6 Pt improving, WBC improving  Gen surg no longer rec HIDA  · Overall pt  Improving slowly - wife updated at bedside  · BP soft 9/6 overnight, given 250ml 5% albumin with improvement  · Check BLE duplex to r/o DVT given fever and edema    Acute respiratory failure with hypoxia (HCC)  Assessment & Plan  · /Increase oxygenation requirements with worsening encephalopathy  · 9/2 Intubated 8 0 ETT  · Vent day #5 ASV 85%, 50% fiO2 +7  · Propofol and Fentanyl for sedation  · Continue to wean down propofol  · PRN Fentanyl and versed  · Atrovent/Xopenex Q 6  · Mucinex added 9/4 for secretion clearance  · Extubated 9/6- wean nasal cannula as tolerated  · Cont chest PT and IS    Community acquired pneumonia  Assessment & Plan  · CT chest - consolidation right upper lobe, infiltrate left lower lobe, diffuse nodular interstitial inflammatory changes  · See septic shock plan above  · See acute hypoxic respiratory plan above  · Cont Vanco/Cefepime for 10 days of therapy unless AM procal <3, consider D/C tomorrow    CHERISE (acute kidney injury) ([facility] [address] )  Assessment & Plan  · Baseline Cr 1 0 as of 05/2020  · Creat in ED 3 33  Trending down appropriately  1 17 on 9/6  · Means placed in ED  · Received 3L NS in ED  · Trend labs  · Avoid nephrotoxins, hypotension  · Urine studies ordered  · Nephrology recommendations appreciated  · Dosing lasix prn -  40 mg IV given 9/3/20 and 9/6/20     Type 2 MI (myocardial infarction) [facility])  Assessment & Plan  · Secondary to septic shock  · Elevated Trop 2 85 in ED without EKG changes, pt asymptomatic  · Trop peaked at 34 without ST changes  · Continue Heparin gtt- D/C 9/5 and start ASA and statin  · ECHO - EF 89-84%, RV systolic function reduced, mod MVR - leaning toward takotsubo cardiomyopathy based on the echo findings      Could represent apical ballooning/Takotsubo cardiomyopathy given the basal   segments have dynamic function  · If ECG changes will need transfer for interventional cardiology  · Troponin 22 59 on 9/3  No further checks unless clinical change    Elevated brain natriuretic peptide (BNP) level  Assessment & Plan  · No history of heart failure  · Strict I&O  · Daily weight  · TTE completed on 9/2 EF 25-30%,reduced RV function, mod MVR- echo finding concerning for Takotsubo cardiomyopathy   · Received 3L NSS in ED  Hold on further fluids, consider albumin for hypotension  · Received 750ml albumin overnight 9/2  · 250ml 5% albumin given 9/5 and 9/6 for soft BP  · Dosing with prn lasix if pt  is exhibiting anasarca on exam  · Lasix 40 mg IV dose given 9/3 & 9/6    Encephalopathy due to infection  Assessment & Plan  · Wife reports confusion new on day of admission that improved with administration of IVF and antibiotics  Became worse w temp spikes to 105  · Likely secondary to PNA, sepsis, fever  · Avoid sedating medications  · Monitor neuro status     · UDS and alcohol negative on admission  · 9/7 Improving encephalopathy    Type 2 diabetes mellitus, with long-term current use of insulin [facility])  Assessment & Plan  Lab Results   Component Value Date    HGBA1C 8 2 (H) 09/03/2020       Recent Labs     09/06/20  1813 09/06/20  2326 09/07/20  0611 09/07/20  1153   POCGLU 170* 153* 139 151*       Blood Sugar Average: Last 72 hrs:  (P) 133       · Takes metformin 1000mg BID, Januvia 100mg daily, Lantus 28 units at hs, Novolog 6 units TID w meals  · Hold home meds-Tube feeds started 9/5  · Non-DKA insulin gtt started 9/2 for persistent hyperglycemia- currently at 1 5 u / hr  · Received total of approx 43 units from gtt 9/5 @0200 through 9/6 @ 0200  · Off insulin gtt- cont SSI while NPO  · Speech consult placed     Anemia  Assessment & Plan  · Unknown baseline   · Continue to trend labs  · Monitor for any signs of bleeding  · Consider further anemia work up  · Transfuse with 1 u PRBC 9/3/20 and 20 - consent obtained from wife  · No obvious signs of bleeding  · Hemoccult stool 1/3 negative        ----------------------------------------------------------------------------------------  HPI/24hr events: Remains NPO, Speech eval pending  Tmax 101 3 overnight  No acute events overnight  Disposition: Transfer to Med-Surg   Code Status: Level 1 - Full Code  ---------------------------------------------------------------------------------------  SUBJECTIVE    Review of Systems   HENT: Negative for sore throat  Respiratory: Positive for cough  Negative for chest tightness and shortness of breath  Cardiovascular: Negative for chest pain and palpitations  Gastrointestinal: Negative for abdominal pain and nausea  Neurological: Negative for headaches      ---------------------------------------------------------------------------------------  OBJECTIVE    Vitals   Vitals:    20 0100 20 0200 20 0300 20 0400   BP: 106/63 113/57 123/60 122/76   BP Location: Left arm      Pulse: (!) 119 (!) 116 (!) 110 (!) 112   Resp: (!) 31 (!) 29 (!) 25 (!) 27   Temp: (!) 100 9 °F (38 3 °C) (!) 100 6 °F (38 1 °C) 98 8 °F (37 1 °C) 97 7 °F (36 5 °C)   TempSrc: Bladder  Bladder Bladder   SpO2: 92% 94% 95% 95%   Weight:       Height:         Temp (24hrs), Av 6 °F (37 6 °C), Min:97 7 °F (36 5 °C), Max:101 3 °F (38 5 °C)  Current: Temperature: 97 7 °F (36 5 °C)  Arterial Line BP: 124/53  Arterial Line MAP (mmHg): 76 mmHg    Respiratory:  Nasal Cannula O2 Flow Rate (L/min): 3 L/min    Invasive/non-invasive ventilation settings   Respiratory    Lab Data (Last 4 hours)    None         O2/Vent Data (Last 4 hours)    None                Physical Exam  Vitals signs and nursing note reviewed  Constitutional:       General: He is not in acute distress  Appearance: He is not toxic-appearing or diaphoretic  HENT:      Head: Normocephalic and atraumatic        Mouth/Throat: Mouth: Mucous membranes are moist    Eyes:      Conjunctiva/sclera: Conjunctivae normal       Pupils: Pupils are equal, round, and reactive to light  Neck:      Musculoskeletal: Neck supple  Trachea: No tracheal deviation  Cardiovascular:      Rate and Rhythm: Regular rhythm  Tachycardia present  Pulses: Normal pulses  Heart sounds: Normal heart sounds  No murmur  No friction rub  Comments: Intermittent PAC's  Pulmonary:      Effort: Pulmonary effort is normal  No respiratory distress  Breath sounds: Normal breath sounds  No wheezing or rales  Abdominal:      General: Bowel sounds are normal  There is no distension  Palpations: Abdomen is soft  Tenderness: There is no abdominal tenderness  There is no guarding  Genitourinary:     Comments: Means in place, draining clear yellow  Musculoskeletal:      Comments: Swelling improving  Skin:     General: Skin is warm and dry  Capillary Refill: Capillary refill takes less than 2 seconds  Coloration: Skin is not pale  Neurological:      Mental Status: He is alert  Comments: Awake and alert  Oriented to person, knows year  Follows command  Mumbles at times, speech difficult to understand  Moves all extremities      Psychiatric:         Behavior: Behavior normal          Laboratory and Diagnostics:  Results from last 7 days   Lab Units 09/07/20 0455 09/06/20  0404 09/05/20  0311 09/04/20  0509 09/03/20  0355 09/02/20  0449 09/01/20  1706   WBC Thousand/uL 10 32* 8 25 10 56* 13 10* 12 02* 11 58* 9 04   HEMOGLOBIN g/dL 10 6* 8 5* 8 9* 8 3* 8 2* 9 3* 9 5*   HEMATOCRIT % 33 6* 26 5* 26 8* 24 7* 24 1* 28 2* 28 6*   PLATELETS Thousands/uL 371 285 284 277 244 279 222   NEUTROS PCT %  --   --  75 84* 83*  --   --    BANDS PCT %  --  4  --   --   --   --   --    MONOS PCT %  --   --  6 5 5  --   --    MONO PCT %  --  2*  --   --   --   --  8     Results from last 7 days   Lab Units 09/07/20 0455 09/06/20  0404 09/05/20 2358 09/04/20  0509 09/03/20 0355 09/02/20  1938 09/02/20  1448  09/02/20 0449 09/01/20  1706   SODIUM mmol/L 144 142 141 135* 131* 131* 132*   < > 133*   < > 129*   POTASSIUM mmol/L 3 9 3 8 4 1 3 9 4 4 4 5 4 5   < > 3 8   < > 4 0   CHLORIDE mmol/L 108 110* 110* 103 99* 101 100   < > 100   < > 96*   CO2 mmol/L 23 24 24 21 18* 17* 20*   < > 17*   < > 20*   ANION GAP mmol/L 13 8 7 11 14* 13 12   < > 16*   < > 13   BUN mg/dL 27* 26* 26* 28* 38* 40* 36*   < > 34*   < > 33*   CREATININE mg/dL 1 25 1 17 1 35* 1 53* 2 47* 2 93* 3 21*   < > 3 61*   < > 3 33*   CALCIUM mg/dL 8 8 8 2* 8 4 8 0* 7 7* 7 8* 7 9*   < > 7 9*   < > 8 6   GLUCOSE RANDOM mg/dL 145* 128 113 130 219* 203* 254*   < > 253*   < > 304*   ALT U/L  --  71 74 82* 74  --   --   --  36  --  34   AST U/L  --  94* 129* 172* 230*  --   --   --  81*  --  43   ALK PHOS U/L  --  174* 140* 108 90  --   --   --  100  --  112   ALBUMIN g/dL  --  2 0* 2 0* 2 1* 2 6*  --   --   --  2 1*  --  2 1*   TOTAL BILIRUBIN mg/dL  --  0 46 0 58 0 53 0 52  --   --   --  0 71  --  0 73    < > = values in this interval not displayed  Results from last 7 days   Lab Units 09/07/20 0455 09/06/20 0404 09/05/20 0311 09/04/20 0509 09/03/20 0355 09/02/20 0449 09/02/20  0004   MAGNESIUM mg/dL 1 6 2 0 2 4 1 8 2 0 2 2 1 7   PHOSPHORUS mg/dL  --  2 4 2 8 3 5  --  2 7  --       Results from last 7 days   Lab Units 09/05/20  0550 09/04/20  0508 09/03/20  2238 09/03/20  1611 09/03/20  1017 09/03/20  0355 09/02/20  1448  09/01/20  1706   INR   --   --   --   --   --   --   --   --  1 28*   PTT seconds 70* 70* 66* 57* 74* 58* 63*   < > 38*    < > = values in this interval not displayed        Results from last 7 days   Lab Units 09/03/20  1017 09/03/20  0357 09/02/20  2217 09/02/20  1938 09/02/20  1600 09/02/20  1158 09/02/20  0734   TROPONIN I ng/mL 22 59* 29 42* 33 67* 34 83* 26 17* 17 64* 10 54*     Results from last 7 days   Lab Units 09/02/20  0915 09/01/20  1706   LACTIC ACID mmol/L 1 7 1 5     ABG:  Results from last 7 days   Lab Units 09/04/20  0508   PH ART  7 387   PCO2 ART mm Hg 31 1*   PO2 ART mm Hg 76 9   HCO3 ART mmol/L 18 3*   BASE EXC ART mmol/L -5 9   ABG SOURCE  Line, Arterial     VBG:  Results from last 7 days   Lab Units 09/04/20  0508  09/02/20  0915   PH GOLD   --   --  7 261*   PCO2 GOLD mm Hg  --   --  37 7*   PO2 GOLD mm Hg  --   --  31 5*   HCO3 GOLD mmol/L  --   --  16 6*   BASE EXC GOLD mmol/L  --   --  -9 7   ABG SOURCE  Line, Arterial   < >  --     < > = values in this interval not displayed  Results from last 7 days   Lab Units 09/05/20  0311 09/04/20  0509 09/03/20  0355 09/02/20  0449 09/01/20  1706   PROCALCITONIN ng/ml 8 16* 17 61*  17 12* 36 73* 6 81* 1 40*       Micro  Results from last 7 days   Lab Units 09/03/20  0405 09/02/20  1450 09/01/20  2238 09/01/20  1731 09/01/20  1706   BLOOD CULTURE   --   --   --   --  No Growth After 5 Days  No Growth After 5 Days  SPUTUM CULTURE  No growth  --   --   --   --    GRAM STAIN RESULT  2+ Polys  No bacteria seen  --   --   --   --    MRSA CULTURE ONLY   --  No Methicillin Resistant Staphlyococcus aureus (MRSA) isolated  --   --   --    LEGIONELLA URINARY ANTIGEN   --   --  Negative  --   --    STREP PNEUMONIAE ANTIGEN, URINE   --   --   --  Negative  --        EKG: ST  Imaging: No new imaging to review    Intake and Output  I/O       09/06 0701 - 09/07 0700 09/07 0701 - 09/08 0700    P  O  300 360    I V  (mL/kg) 113 8 (1 4)     IV Piggyback 750 50    Feedings 45     Total Intake(mL/kg) 1208 8 (14 9) 410 (5)    Urine (mL/kg/hr) 3985 (2) 2840 (1 5)    Stool 0 0    Total Output 3985 2840    Net -2776 2 -2430          Unmeasured Stool Occurrence 8 x 4 x          Height and Weights   Height: 6' 3" (190 5 cm)  IBW: 84 5 kg  Body mass index is 22 4 kg/m²    Weight (last 2 days)     Date/Time   Weight    09/07/20 0600   81 3 (179 23)    09/06/20 0600   84 5 (186 29)                Nutrition       Diet Orders (From admission, onward)             Start     Ordered    09/06/20 1340  Diet NPO  Diet effective now     Question Answer Comment   Diet Type NPO    RD to adjust diet per protocol?  No        09/06/20 1341                  Active Medications  Scheduled Meds:  Current Facility-Administered Medications   Medication Dose Route Frequency Provider Last Rate    acetaminophen  650 mg Oral Q4H PRN JASMINE Christian      acetaminophen  325 mg Rectal Q4H PRN Vicenta Perez PA-C      aspirin  81 mg Oral Daily Bre Calvillo PA-C      bisacodyl  10 mg Rectal Daily PRN Vicenta Perez PA-C      cefepime  2,000 mg Intravenous Q24H Sommer Schmid PA-C Stopped (09/07/20 1444)    chlorhexidine  15 mL Swish & Spit Q12H [facility] JASMINE Christian      famotidine  20 mg Intravenous Q24H [facility] Nina Monte PA-C      guaiFENesin  200 mg Oral Q4H Mitesh Monte PA-C      heparin (porcine)  5,000 Units Subcutaneous [facility] Bre Calvillo PA-C      insulin lispro  1-6 Units Subcutaneous Q6H [facility] Bre Calvillo PA-C      ipratropium  0 5 mg Nebulization Q6H While awake JASMINE Christian      levalbuterol  0 63 mg Nebulization Q6H PRN JASMINE Christian      levalbuterol  1 25 mg Nebulization Q6H While awake JASMINE Christian      metoprolol  5 mg Intravenous Q6H PRN Vicenta Perez PA-C      nystatin  500,000 Units Swish & Swallow 4x Daily Reji Lee MD      polyethylene glycol  17 g Oral Daily PRN JASMINE Christian      pravastatin  20 mg Oral Daily With Sumanth Cortez PA-C      senna  8 8 mg Oral HS JASMINE Christian      vancomycin  1,750 mg Intravenous Q24H JASMINE Christian 1,750 mg (09/07/20 1705)     Continuous Infusions:     PRN Meds:   acetaminophen, 650 mg, Q4H PRN  acetaminophen, 325 mg, Q4H PRN  bisacodyl, 10 mg, Daily PRN  levalbuterol, 0 63 mg, Q6H PRN  metoprolol, 5 mg, Q6H PRN  polyethylene glycol, 17 g, Daily PRN        Invasive Devices Review  Invasive Devices     Peripheral Intravenous Line            Peripheral IV 09/07/20 Dorsal (posterior); Left Hand less than 1 day    Peripheral IV 09/07/20 Dorsal (posterior); Right Wrist less than 1 day    Peripheral IV 09/07/20 Right;Ventral (anterior) Wrist less than 1 day          Drain            Urethral Catheter Temperature probe 16 Fr  6 days                Rationale for remaining devices: Means-for accurate I&O  Consider removal later today for void trial   ---------------------------------------------------------------------------------------  Care Time Delivered:   No Critical Care time spent       JASMINE Ayala      Portions of the record may have been created with voice recognition software  Occasional wrong word or "sound a like" substitutions may have occurred due to the inherent limitations of voice recognition software    Read the chart carefully and recognize, using context, where substitutions have occurred

## 2020-09-08 NOTE — ASSESSMENT & PLAN NOTE
· /Increase oxygenation requirements with worsening encephalopathy  · 9/2 Intubated 8 0 ETT  · Vent day #5 ASV 85%, 50% fiO2 +7  · Propofol and Fentanyl for sedation  · Continue to wean down propofol  · PRN Fentanyl and versed  · Atrovent/Xopenex Q 6  · Mucinex added 9/4 for secretion clearance  · Extubated 9/6- weaned to nasal cannula as tolerated  · Cont chest PT and IS

## 2020-09-08 NOTE — ASSESSMENT & PLAN NOTE
Lab Results   Component Value Date    HGBA1C 8 2 (H) 09/03/2020       Recent Labs     09/08/20  1235 09/08/20  1819 09/08/20  2342 09/09/20  0205   POCGLU 201* 425* 395* 357*       Blood Sugar Average: Last 72 hrs:  (P) 144 [id number]       · Takes metformin 1000mg BID, Januvia 100mg daily, Lantus 28 units at hs, Novolog 6 units TID w meals  · Hold home meds-Tube feeds started 9/5  · Non-DKA insulin gtt started 9/2 for persistent hyperglycemia- currently at 1 5 u / hr  · Received total of approx 43 units from gtt 9/5 @0200 through 9/6 @ 0200  · Off insulin gtt- cont SSI while NPO  · Speech consulted and cleared for a pureed diet with nectar thick  · 9/8: Glucose elevated after eating > 400  Lantus 10 units administered as well as SSI and an additional 10 units of regular insulin  Hyperglycemia persisted  Non-DKA insulin drip restarted

## 2020-09-08 NOTE — CONSULTS
Progress Note - Infectious Disease   Pippa Schneider 66 y o  male MRN: [id number]  Unit/Bed#: -01 Encounter: [id number]    REQUIRED DOCUMENTATION:   1  This service was provided via Telemedicine  2  Provider located at home  3  TeleMed provider: Fred Siegel DO   4  Identify all parties in room with patient during tele consult: GABRIEL Cortes  5  After connecting through [employer], patient was identified by name and date of birth and assistant checked wristband  Patient was then informed that this was a Telemedicine visit and that the exam was being conducted confidentially over secure lines  Patient acknowledged consent and understanding of privacy and security of the Telemedicine visit, and gave us permission to have the assistant stay in the room in order to assist with the history and to conduct the exam   I informed the patient that I have reviewed their record in Epic and presented the opportunity for them to ask any questions regarding the visit today  The patient agreed to participate  Impression:   · Septic shock: With persistent fever (105 F), tachypnea, persistent tachycardia, CHERISE, hypotension (off pressors), acute hypoxic respiratory failure  Septic workup has been negative to date including blood culture, sputum culture and MRSA nares screen  Venous duplex from today is negative for DVT lower extremities  · Source of septic shock may be pneumonia or biliary:  Noted COVID-19 has been negative x2, flu/RSV negative, urine strep and Legionella antigen negative  Blood cultures no growth to date  Procalcitonin with downward trend  · Distended abdomen, transaminitis (AST>ALT), abnormal CT abd, RUQ abd US: consider the possibility of acute cholecystitis  Cholestasis is not evident however, with total bilirubin of 0 52 and  mildly elevated alkaline phosphatase  Patient appears to be clinically improving  His abdomen is soft and nontender today    He denies abdominal pain at this time   · Acute hypoxic respiratory failure:  Requiring mechanical ventilation  Patient was intubated on September 2nd  Clinically improving as patient was extubated on 9/06  · Takotsubo cardiomyopathy  · NSTEMI: noted peak troponin of 34, which is trending down  · Acute encephalopathy in the setting of sepsis: mental status appears to be improving  · CHERISE:  Baseline creatinine 1 0 in May 2020  Creatinine is trending down towards baseline      RECOMMENDATIONS:  · Patient appears to be clinically improving other than persistent fevers  He denies abdominal pain and does not have tenderness or distention on exam today  However, due to his persistent fevers this may be an opportune time to do HIDA scan as the patient is off pressors now  · CTA chest ordered by critical care team to assess for PE given persistent fever and tachycardia  · Discontinue vancomycin IV as no evidence of MRSA infection to date  · Narrow cefepime to ceftriaxone 2g IV q24 hours   · Continue metronidazole 500 mg IV q 8 hours (this can be changed to po if pt can safely swallow)  · Monitor clinical response, temperature curve, WBC count, creatinine, LFTs    My recommendations were discussed with the patient in detail who verbalized understanding  My recommendations were discussed with Dr Fabrice Ch, from the primary service  Thank you for allowing me to participate in the care of this patient  The ID service will follow  History   Subjective: I was requested by Dr Fabrice Ch to re-assess the pt due to persistent fevers  Pt is very lethargic, but arousable  He continues to have fever but denies chills  He says he is feeling well today  He says his breathing is ok  He denies cough or phlegm production  He denies abdominal pain and vomiting  He had 1 loose BM today per RN      Antibiotics: vanco, cefepime, metronidazole  Scheduled Meds:  Current Facility-Administered Medications   Medication Dose Route Frequency Provider Last Rate    acetaminophen 650 mg Oral Q4H PRN Phil Harris, CRNP      acetaminophen  325 mg Rectal Q4H PRN [facility] AYLEEN Hernandez      aspirin  81 mg Oral Daily [place], [place]      bisacodyl  10 mg Rectal Daily PRN [facility] AYLEEN Hernandez      cefepime  2,000 mg Intravenous Q24H Earl Reyna PA-C Stopped (20 1444)    chlorhexidine  15 mL Swish & Spit Q12H [facility] Phil Harris, CRNP      diphenhydrAMINE  50 mg Intravenous Once [facility] AYLEEN Hernandez      famotidine  20 mg Intravenous Q24H [facility]      guaiFENesin  200 mg Oral Q4H [facility]      heparin (porcine)  5,000 Units Subcutaneous [facility] Bre Calvillo [place]      insulin lispro  1-6 Units Subcutaneous Q6H [facility] Bre Calvillo PA-C      ipratropium  0 5 mg Nebulization Q6H While awake Phil Harris, CRNP      levalbuterol  0 63 mg Nebulization Q6H PRN Phil Harris, CRVALDEZ      levalbuterol  1 25 mg Nebulization Q6H While awake Karwilber Harris, CRNP      metoprolol  5 mg Intravenous Q6H PRN Marcinadiel Hernandez PA-C      metroNIDAZOLE  500 mg Intravenous Q8H [place], [place] Stopped (20 1103)    nystatin  500,000 Units Swish & Swallow 4x Daily Tamera Staples MD      polyethylene glycol  17 g Oral Daily PRN Karle Harris, CRNP      pravastatin  20 mg Oral Daily With Sumanth Whiting PA-C      senna  8 8 mg Oral HS Karwilber Harris, CRNP      vancomycin  1,750 mg Intravenous Q24H Phil Harris, CRNP 1,750 mg (20 1705)     Continuous Infusions:   PRN Meds:   acetaminophen    acetaminophen    bisacodyl    levalbuterol    metoprolol    polyethylene glycol  Physical Exam   Temp:  [97 7 °F (36 5 °C)-101 3 °F (38 5 °C)] 100 4 °F (38 °C)  HR:  [108-120] 112  Resp:  [21-31] 26  BP: (101-136)/(57-82) 133/73  SpO2:  [91 %-96 %] 94 %  Temp (24hrs), Av 5 °F (37 5 °C), Min:97 7 °F (36 5 °C), Max:101 3 °F (38 5 °C)  Current: Temperature: 100 4 °F (38 °C)    Intake/Output Summary (Last 24 hours) at 9/8/2020 1321  Last data filed at 9/8/2020 1200  Gross per 24 hour   Intake 630 ml   Output 3140 ml   Net -2510 ml     Limited exam due to tele health visit  Partial exam done that was corroborated with patient's RN  General Appearance:  Nontoxic, and in no distress   Head:  Normocephalic, atraumatic   Eyes:  EOMI   Throat: Oropharynx slightly dry, O2 2L via NC intact per RN   Lungs:   Coarse breath sounds, otherwise CTA b/l per RN   Heart:  Fast rate and rhythm, S1, S2 per RN  Monitor shows sinus tachycardia with heart rate 100-110   Abdomen:   Soft, non-distended, non-tender per RN    Means catheter present per RN   Extremities: No distal leg edema b/l per RN   Skin: No rash per RN   Neurologic: Lethargic, but arousable, more responsive per RN     Invasive Devices:   Peripheral IV 09/07/20 Dorsal (posterior); Right Wrist (Active)   Site Assessment Clean;Dry; Intact 09/08/20 1200   Dressing Type Transparent 09/08/20 1200   Line Status Blood return noted; Flushed;Saline locked 09/08/20 1200   Dressing Status Clean;Dry; Intact 09/08/20 1200       Peripheral IV 09/07/20 Right;Ventral (anterior) Wrist (Active)   Site Assessment Clean;Dry; Intact 09/08/20 1200   Dressing Type Transparent 09/08/20 1200   Line Status Blood return noted; Flushed;Saline locked 09/08/20 1200   Dressing Status Clean;Dry; Intact 09/08/20 1200       Peripheral IV 09/07/20 Dorsal (posterior); Left Hand (Active)   Site Assessment Clean;Dry; Intact 09/08/20 1200   Dressing Type Transparent 09/08/20 1200   Line Status Blood return noted; Flushed;Saline locked 09/08/20 1200   Dressing Status Clean;Dry; Intact 09/08/20 1200       Urethral Catheter Temperature probe 16 Fr   (Active)   Reasons to continue Urinary Catheter  Accurate I&O assessment in critically ill patients (48 hr  max) 09/08/20 1000   Goal for Removal Remove after 48 hrs of I/O monitoring 09/08/20 1000   Site Assessment Clean;Skin intact 09/08/20 1000   Collection Container Standard drainage bag 09/08/20 1000   Securement Method Securing device (Describe) 09/08/20 1000   Output (mL) 250 mL 09/08/20 1200     Labs, Imaging, & Other Studies   Lab Results: I have personally reviewed pertinent labs  Results from last 7 days   Lab Units 09/08/20  0533 09/07/20  0455 09/06/20  0404   WBC Thousand/uL 9 84 10 32* 8 25   HEMOGLOBIN g/dL 11 0* 10 6* 8 5*   PLATELETS Thousands/uL 429* 371 285     Results from last 7 days   Lab Units 09/08/20  0533  09/06/20  0404 09/05/20  0311   POTASSIUM mmol/L 3 9   < > 3 8 4 1   CHLORIDE mmol/L 109*   < > 110* 110*   CO2 mmol/L 26   < > 24 24   BUN mg/dL 27*   < > 26* 26*   CREATININE mg/dL 1 22   < > 1 17 1 35*   EGFR ml/min/1 73sq m 57   < > 60 51   CALCIUM mg/dL 8 7   < > 8 2* 8 4   AST U/L 39  --  94* 129*   ALT U/L 55  --  71 74   ALK PHOS U/L 159*  --  174* 140*    < > = values in this interval not displayed  Results from last 7 days   Lab Units 09/03/20  0405 09/02/20  1450 09/01/20  2238 09/01/20  1706   BLOOD CULTURE   --   --   --  No Growth After 5 Days  No Growth After 5 Days  SPUTUM CULTURE  No growth  --   --   --    GRAM STAIN RESULT  2+ Polys  No bacteria seen  --   --   --    MRSA CULTURE ONLY   --  No Methicillin Resistant Staphlyococcus aureus (MRSA) isolated  --   --    LEGIONELLA URINARY ANTIGEN   --   --  Negative  --      Imaging Studies:   9/8 CXR: Improving edema  Underlying pneumonia cannot be excluded  I have personally reviewed pertinent imaging study reports  Counseling/Coordination of care: Total 30 minutes communication with the patient including telehealth encounter, chart review, and coordination of care  Labs, medical tests and imaging studies were independently reviewed by me as noted above  VIRTUAL VISIT DISCLAIMER  The patient has consented to an online visit or consultation   The patient understands that the online visit is based solely on information provided by them, and that, in the absence of a face-to-face physical evaluation by the physician, the diagnosis they receive are both limited and provisional in terms of accuracy and completeness  This is not intended to replace a full medical face-to-face evaluation by the physician  The patient understands and accepts these terms

## 2020-09-09 ENCOUNTER — APPOINTMENT (INPATIENT)
Dept: NUCLEAR MEDICINE | Facility: HOSPITAL | Age: 75
DRG: 870 | End: 2020-09-09
Payer: COMMERCIAL

## 2020-09-09 LAB
ALBUMIN SERPL BCP-MCNC: 2 G/DL (ref 3.5–5)
ALP SERPL-CCNC: 132 U/L (ref 46–116)
ALT SERPL W P-5'-P-CCNC: 44 U/L (ref 12–78)
ANION GAP SERPL CALCULATED.3IONS-SCNC: 6 MMOL/L (ref 4–13)
ANION GAP SERPL CALCULATED.3IONS-SCNC: 8 MMOL/L (ref 4–13)
ANISOCYTOSIS BLD QL SMEAR: PRESENT
AST SERPL W P-5'-P-CCNC: 22 U/L (ref 5–45)
BASOPHILS # BLD MANUAL: 0.1 THOUSAND/UL (ref 0–0.1)
BASOPHILS NFR MAR MANUAL: 1 % (ref 0–1)
BILIRUB DIRECT SERPL-MCNC: 0.1 MG/DL (ref 0–0.2)
BILIRUB SERPL-MCNC: 0.24 MG/DL (ref 0.2–1)
BUN SERPL-MCNC: 36 MG/DL (ref 5–25)
BUN SERPL-MCNC: 37 MG/DL (ref 5–25)
CALCIUM SERPL-MCNC: 8.7 MG/DL (ref 8.3–10.1)
CALCIUM SERPL-MCNC: 8.8 MG/DL (ref 8.3–10.1)
CHLORIDE SERPL-SCNC: 107 MMOL/L (ref 100–108)
CHLORIDE SERPL-SCNC: 111 MMOL/L (ref 100–108)
CO2 SERPL-SCNC: 28 MMOL/L (ref 21–32)
CO2 SERPL-SCNC: 28 MMOL/L (ref 21–32)
CREAT SERPL-MCNC: 1.3 MG/DL (ref 0.6–1.3)
CREAT SERPL-MCNC: 1.42 MG/DL (ref 0.6–1.3)
EOSINOPHIL # BLD MANUAL: 0 THOUSAND/UL (ref 0–0.4)
EOSINOPHIL NFR BLD MANUAL: 0 % (ref 0–6)
ERYTHROCYTE [DISTWIDTH] IN BLOOD BY AUTOMATED COUNT: 14.8 % (ref 11.6–15.1)
GFR SERPL CREATININE-BSD FRML MDRD: 48 ML/MIN/1.73SQ M
GFR SERPL CREATININE-BSD FRML MDRD: 53 ML/MIN/1.73SQ M
GLUCOSE SERPL-MCNC: 142 MG/DL (ref 65–140)
GLUCOSE SERPL-MCNC: 159 MG/DL (ref 65–140)
GLUCOSE SERPL-MCNC: 259 MG/DL (ref 65–140)
GLUCOSE SERPL-MCNC: 265 MG/DL (ref 65–140)
GLUCOSE SERPL-MCNC: 272 MG/DL (ref 65–140)
GLUCOSE SERPL-MCNC: 292 MG/DL (ref 65–140)
GLUCOSE SERPL-MCNC: 357 MG/DL (ref 65–140)
GLUCOSE SERPL-MCNC: 73 MG/DL (ref 65–140)
GLUCOSE SERPL-MCNC: 83 MG/DL (ref 65–140)
GLUCOSE SERPL-MCNC: 88 MG/DL (ref 65–140)
GLUCOSE SERPL-MCNC: 91 MG/DL (ref 65–140)
GLUCOSE SERPL-MCNC: 96 MG/DL (ref 65–140)
HCT VFR BLD AUTO: 33.2 % (ref 36.5–49.3)
HGB BLD-MCNC: 10.5 G/DL (ref 12–17)
LYMPHOCYTES # BLD AUTO: 1.75 THOUSAND/UL (ref 0.6–4.47)
LYMPHOCYTES # BLD AUTO: 18 % (ref 14–44)
MAGNESIUM SERPL-MCNC: 2 MG/DL (ref 1.6–2.6)
MCH RBC QN AUTO: 28.2 PG (ref 26.8–34.3)
MCHC RBC AUTO-ENTMCNC: 31.6 G/DL (ref 31.4–37.4)
MCV RBC AUTO: 89 FL (ref 82–98)
METAMYELOCYTES NFR BLD MANUAL: 2 % (ref 0–1)
MONOCYTES # BLD AUTO: 0.1 THOUSAND/UL (ref 0–1.22)
MONOCYTES NFR BLD: 1 % (ref 4–12)
MYELOCYTES NFR BLD MANUAL: 1 % (ref 0–1)
NEUTROPHILS # BLD MANUAL: 7.49 THOUSAND/UL (ref 1.85–7.62)
NEUTS BAND NFR BLD MANUAL: 3 % (ref 0–8)
NEUTS SEG NFR BLD AUTO: 74 % (ref 43–75)
NRBC BLD AUTO-RTO: 0 /100 WBCS
PHOSPHATE SERPL-MCNC: 2.6 MG/DL (ref 2.3–4.1)
PLATELET # BLD AUTO: 492 THOUSANDS/UL (ref 149–390)
PLATELET BLD QL SMEAR: ABNORMAL
PMV BLD AUTO: 9.9 FL (ref 8.9–12.7)
POLYCHROMASIA BLD QL SMEAR: PRESENT
POTASSIUM SERPL-SCNC: 3.5 MMOL/L (ref 3.5–5.3)
POTASSIUM SERPL-SCNC: 3.6 MMOL/L (ref 3.5–5.3)
PROT SERPL-MCNC: 7.6 G/DL (ref 6.4–8.2)
RBC # BLD AUTO: 3.73 MILLION/UL (ref 3.88–5.62)
RBC MORPH BLD: PRESENT
SODIUM SERPL-SCNC: 143 MMOL/L (ref 136–145)
SODIUM SERPL-SCNC: 145 MMOL/L (ref 136–145)
TOTAL CELLS COUNTED SPEC: 100
WBC # BLD AUTO: 9.73 THOUSAND/UL (ref 4.31–10.16)

## 2020-09-09 PROCEDURE — NC001 PR NO CHARGE: Performed by: INTERNAL MEDICINE

## 2020-09-09 PROCEDURE — 99233 SBSQ HOSP IP/OBS HIGH 50: CPT | Performed by: ANESTHESIOLOGY

## 2020-09-09 PROCEDURE — 82948 REAGENT STRIP/BLOOD GLUCOSE: CPT

## 2020-09-09 PROCEDURE — 83735 ASSAY OF MAGNESIUM: CPT | Performed by: PHYSICIAN ASSISTANT

## 2020-09-09 PROCEDURE — 85007 BL SMEAR W/DIFF WBC COUNT: CPT | Performed by: PHYSICIAN ASSISTANT

## 2020-09-09 PROCEDURE — 80076 HEPATIC FUNCTION PANEL: CPT | Performed by: PHYSICIAN ASSISTANT

## 2020-09-09 PROCEDURE — 94640 AIRWAY INHALATION TREATMENT: CPT

## 2020-09-09 PROCEDURE — 84100 ASSAY OF PHOSPHORUS: CPT | Performed by: PHYSICIAN ASSISTANT

## 2020-09-09 PROCEDURE — 80048 BASIC METABOLIC PNL TOTAL CA: CPT | Performed by: PHYSICIAN ASSISTANT

## 2020-09-09 PROCEDURE — G1004 CDSM NDSC: HCPCS

## 2020-09-09 PROCEDURE — 85027 COMPLETE CBC AUTOMATED: CPT | Performed by: PHYSICIAN ASSISTANT

## 2020-09-09 PROCEDURE — 94760 N-INVAS EAR/PLS OXIMETRY 1: CPT

## 2020-09-09 PROCEDURE — 78227 HEPATOBIL SYST IMAGE W/DRUG: CPT

## 2020-09-09 PROCEDURE — A9537 TC99M MEBROFENIN: HCPCS

## 2020-09-09 RX ORDER — AMOXICILLIN 250 MG
1 CAPSULE ORAL
Status: DISCONTINUED | OUTPATIENT
Start: 2020-09-09 | End: 2020-09-09

## 2020-09-09 RX ORDER — POTASSIUM CHLORIDE 20 MEQ/1
40 TABLET, EXTENDED RELEASE ORAL ONCE
Status: COMPLETED | OUTPATIENT
Start: 2020-09-09 | End: 2020-09-09

## 2020-09-09 RX ORDER — POTASSIUM CHLORIDE 14.9 MG/ML
20 INJECTION INTRAVENOUS ONCE
Status: COMPLETED | OUTPATIENT
Start: 2020-09-09 | End: 2020-09-09

## 2020-09-09 RX ORDER — INSULIN GLARGINE 100 [IU]/ML
14 INJECTION, SOLUTION SUBCUTANEOUS
Status: DISCONTINUED | OUTPATIENT
Start: 2020-09-09 | End: 2020-09-10

## 2020-09-09 RX ORDER — DEXTROSE, SODIUM CHLORIDE, SODIUM LACTATE, POTASSIUM CHLORIDE, AND CALCIUM CHLORIDE 5; .6; .31; .03; .02 G/100ML; G/100ML; G/100ML; G/100ML; G/100ML
75 INJECTION, SOLUTION INTRAVENOUS CONTINUOUS
Status: DISCONTINUED | OUTPATIENT
Start: 2020-09-09 | End: 2020-09-09

## 2020-09-09 RX ORDER — MORPHINE SULFATE 4 MG/ML
3 INJECTION, SOLUTION INTRAMUSCULAR; INTRAVENOUS ONCE
Status: COMPLETED | OUTPATIENT
Start: 2020-09-09 | End: 2020-09-09

## 2020-09-09 RX ORDER — GUAIFENESIN 100 MG/5ML
200 SOLUTION ORAL EVERY 4 HOURS
Status: DISCONTINUED | OUTPATIENT
Start: 2020-09-09 | End: 2020-09-11

## 2020-09-09 RX ADMIN — POTASSIUM CHLORIDE 40 MEQ: 1500 TABLET, EXTENDED RELEASE ORAL at 05:52

## 2020-09-09 RX ADMIN — INSULIN LISPRO 3 UNITS: 100 INJECTION, SOLUTION INTRAVENOUS; SUBCUTANEOUS at 17:38

## 2020-09-09 RX ADMIN — HEPARIN SODIUM 5000 UNITS: 5000 INJECTION INTRAVENOUS; SUBCUTANEOUS at 21:45

## 2020-09-09 RX ADMIN — POTASSIUM CHLORIDE 20 MEQ: 14.9 INJECTION, SOLUTION INTRAVENOUS at 05:53

## 2020-09-09 RX ADMIN — METRONIDAZOLE 500 MG: 500 INJECTION, SOLUTION INTRAVENOUS at 02:23

## 2020-09-09 RX ADMIN — GUAIFENESIN 200 MG: 100 SOLUTION ORAL at 09:16

## 2020-09-09 RX ADMIN — HEPARIN SODIUM 5000 UNITS: 5000 INJECTION INTRAVENOUS; SUBCUTANEOUS at 15:00

## 2020-09-09 RX ADMIN — NYSTATIN 500000 UNITS: 100000 SUSPENSION ORAL at 09:16

## 2020-09-09 RX ADMIN — HEPARIN SODIUM 5000 UNITS: 5000 INJECTION INTRAVENOUS; SUBCUTANEOUS at 05:52

## 2020-09-09 RX ADMIN — IPRATROPIUM BROMIDE 0.5 MG: 0.5 SOLUTION RESPIRATORY (INHALATION) at 19:52

## 2020-09-09 RX ADMIN — DEXTROSE, SODIUM CHLORIDE, SODIUM LACTATE, POTASSIUM CHLORIDE, AND CALCIUM CHLORIDE 50 ML/HR: 5; .6; .31; .03; .02 INJECTION, SOLUTION INTRAVENOUS at 09:15

## 2020-09-09 RX ADMIN — METRONIDAZOLE 500 MG: 500 INJECTION, SOLUTION INTRAVENOUS at 17:20

## 2020-09-09 RX ADMIN — CEFTRIAXONE 2000 MG: 2 INJECTION, SOLUTION INTRAVENOUS at 14:58

## 2020-09-09 RX ADMIN — NYSTATIN 500000 UNITS: 100000 SUSPENSION ORAL at 21:49

## 2020-09-09 RX ADMIN — LEVALBUTEROL HYDROCHLORIDE 1.25 MG: 1.25 SOLUTION, CONCENTRATE RESPIRATORY (INHALATION) at 19:52

## 2020-09-09 RX ADMIN — POTASSIUM CHLORIDE 20 MEQ: 14.9 INJECTION, SOLUTION INTRAVENOUS at 09:33

## 2020-09-09 RX ADMIN — METRONIDAZOLE 500 MG: 500 INJECTION, SOLUTION INTRAVENOUS at 09:15

## 2020-09-09 RX ADMIN — NYSTATIN 500000 UNITS: 100000 SUSPENSION ORAL at 17:12

## 2020-09-09 RX ADMIN — PRAVASTATIN SODIUM 20 MG: 20 TABLET ORAL at 17:12

## 2020-09-09 RX ADMIN — MORPHINE SULFATE 3 MG: 4 INJECTION INTRAVENOUS at 13:45

## 2020-09-09 RX ADMIN — LEVALBUTEROL HYDROCHLORIDE 1.25 MG: 1.25 SOLUTION, CONCENTRATE RESPIRATORY (INHALATION) at 07:58

## 2020-09-09 RX ADMIN — INSULIN LISPRO 3 UNITS: 100 INJECTION, SOLUTION INTRAVENOUS; SUBCUTANEOUS at 21:45

## 2020-09-09 RX ADMIN — INSULIN LISPRO 10 UNITS: 100 INJECTION, SOLUTION INTRAVENOUS; SUBCUTANEOUS at 00:03

## 2020-09-09 RX ADMIN — GUAIFENESIN 200 MG: 100 SOLUTION ORAL at 17:12

## 2020-09-09 RX ADMIN — Medication 12 UNITS/HR: at 02:25

## 2020-09-09 RX ADMIN — IPRATROPIUM BROMIDE 0.5 MG: 0.5 SOLUTION RESPIRATORY (INHALATION) at 07:58

## 2020-09-09 RX ADMIN — INSULIN GLARGINE 14 UNITS: 100 INJECTION, SOLUTION SUBCUTANEOUS at 21:46

## 2020-09-09 RX ADMIN — FAMOTIDINE 20 MG: 10 INJECTION, SOLUTION INTRAVENOUS at 09:16

## 2020-09-09 RX ADMIN — GUAIFENESIN 200 MG: 100 SOLUTION ORAL at 20:42

## 2020-09-09 RX ADMIN — POTASSIUM CHLORIDE 20 MEQ: 14.9 INJECTION, SOLUTION INTRAVENOUS at 14:58

## 2020-09-09 RX ADMIN — GUAIFENESIN 200 MG: 100 SOLUTION ORAL at 04:14

## 2020-09-09 RX ADMIN — ASPIRIN 81 MG 81 MG: 81 TABLET ORAL at 09:16

## 2020-09-09 NOTE — PHYSICAL THERAPY NOTE
PHYSICAL THERAPY UNAVAILABLE NOTE          Patient Name: Matilde Crain Date: 9/9/2020     Received order for PT consult  Chart reviewed  Patient unavailable at this time off unit at nuclear medicine  Will continue to follow and see patient as medically appropriate at a later time      Alley Arguello, PT,DPT

## 2020-09-09 NOTE — ASSESSMENT & PLAN NOTE
· Secondary to pneumonia vs unclear etiology  · POA - fever, tachypnea, tachycardia, increased Cr, persistent hypotension not fluid responsive  · In the ED  · Hypotensive, normal lactate, given 3L NSS  · CT C/A/P without contrast:   Bilateral predominantly upper lobe diffuse nodular interstitial changes of inflammatory infectious etiology  2   Subsegmental atelectasis /consolidation in the right upper lobe in a bronchovascular distribution  3   Posterior bibasilar dependent changes with possible atelectasis/infiltrate in the left lung base  4   Diffuse bladder wall thickening which is decompressed containing a Means balloon  5  Mediastinal lymphadenopathy as described largest in the subcarinal region measuring 1 8 cm    · Micro work up  · Covid repeat in ED on 9/1 negative  · Flu/ RSV negative  · Urine strep and legionella - negative  · Urine + bacteria, cloudy, thickened bladder wall on CT scan in ED  · Procalcitonin peaked at 36, trending downward  · Blood cultures - No growth  · Sputum culture - 2+ polys, no bacteria  · Lyme PCR  - pending  · ABX - broadened due to severity of illness, Cont Cefepime (1 G Q 24 due to renal insufficiency) D#6, Vanco D#6, and Flagyl D#6  · Increase cefepime to 2 Q 24 when GFR 30-60 per ID  · 9/5 Renal ok with increasing to 2gm q 12   · Cont to wean down Levophed  Shane weaned off evening of 9/2/2020  Vaso off 9/3  · Levo weaned off 9/5 @ 2120  · RUQ u/s performed 9/3: No gallstones seen  Some gallbladder wall thickening and pericholecystic fluid is present which could be related to underdistention or other pathology  ·  ID consult placed - Dr Abraham Lemus  · Consult general surgery for possible cholecystitis per ID recommendations  · Gen surgery recommends HIDA scan once pt  Is stable enough to go for long study  · 9/6 Pt improving, WBC improving  Gen surg no longer rec HIDA  · Overall pt   Improving slowly - wife updated at bedside  · BP soft 9/6 overnight, given 250ml 5% albumin with improvement  · BLE duplex to r/o DVT given fever and edema, negative  · ID consulted 9/8 and will D/C Vanco/Cefepime and change to Rocephin  Cont Flagyl  · HIDA scan completed 09/09 - pending results  Spoke with ID - if HIDA negative, would discontinue antibiotics    · CTA of Chest to r/o PE  · Repeat BC sent, UA negative for infection

## 2020-09-09 NOTE — PLAN OF CARE
Problem: Potential for Falls  Goal: Patient will remain free of falls  Description: INTERVENTIONS:  - Assess patient frequently for physical needs  -  Identify cognitive and physical deficits and behaviors that affect risk of falls  -  [place] fall precautions as indicated by assessment   - Educate patient/family on patient safety including physical limitations  - Instruct patient to call for assistance with activity based on assessment  - Modify environment to reduce risk of injury  - Consider OT/PT consult to assist with strengthening/mobility  Outcome: Progressing     Problem: Prexisting or High Potential for Compromised Skin Integrity  Goal: Skin integrity is maintained or improved  Description: INTERVENTIONS:  - Identify patients at risk for skin breakdown  - Assess and monitor skin integrity  - Assess and monitor nutrition and hydration status  - Monitor labs   - Assess for incontinence   - Turn and reposition patient  - Assist with mobility/ambulation  - Relieve pressure over bony prominences  - Avoid friction and shearing  - Provide appropriate hygiene as needed including keeping skin clean and dry  - Evaluate need for skin moisturizer/barrier cream  - Collaborate with interdisciplinary team   - Patient/family teaching  - Consider wound care consult   Outcome: Progressing     Problem: Nutrition/Hydration-ADULT  Goal: Nutrient/Hydration intake appropriate for improving, restoring or maintaining nutritional needs  Description: Monitor and assess patient's nutrition/hydration status for malnutrition  Collaborate with interdisciplinary team and initiate plan and interventions as ordered  Monitor patient's weight and dietary intake as ordered or per policy  Utilize nutrition screening tool and intervene as necessary  Determine patient's food preferences and provide high-protein, high-caloric foods as appropriate       INTERVENTIONS:  - Monitor oral intake, urinary output, labs, and treatment plans  - Assess nutrition and hydration status and recommend course of action  - Evaluate amount of meals eaten  - Assist patient with eating if necessary   - Allow adequate time for meals  - Recommend/ encourage appropriate diets, oral nutritional supplements, and vitamin/mineral supplements  - Order, calculate, and assess calorie counts as needed  - Recommend, monitor, and adjust tube feedings and TPN/PPN based on assessed needs  - Assess need for intravenous fluids  - Provide specific nutrition/hydration education as appropriate  - Include patient/family/caregiver in decisions related to nutrition  Outcome: Progressing     Problem: PAIN - ADULT  Goal: Verbalizes/displays adequate comfort level or baseline comfort level  Description: Interventions:  - Encourage patient to monitor pain and request assistance  - Assess pain using appropriate pain scale  - Administer analgesics based on type and severity of pain and evaluate response  - Implement non-pharmacological measures as appropriate and evaluate response  - Consider cultural and social influences on pain and pain management  - Notify physician/advanced practitioner if interventions unsuccessful or patient reports new pain  Outcome: Progressing     Problem: INFECTION - ADULT  Goal: Absence or prevention of progression during hospitalization  Description: INTERVENTIONS:  - Assess and monitor for signs and symptoms of infection  - Monitor lab/diagnostic results  - Monitor all insertion sites, i e  indwelling lines, tubes, and drains  - Monitor endotracheal if appropriate and nasal secretions for changes in amount and color  - Administer medications as ordered  - Instruct and encourage patient and family to use good hand hygiene technique  Outcome: Progressing     Problem: SAFETY ADULT  Goal: Patient will remain free of falls  Description: INTERVENTIONS:  - Assess patient frequently for physical needs  -  Identify cognitive and physical deficits and behaviors that affect risk of falls   -  [place] fall precautions as indicated by assessment   - Educate patient/family on patient safety including physical limitations  - Instruct patient to call for assistance with activity based on assessment  - Modify environment to reduce risk of injury  - Consider OT/PT consult to assist with strengthening/mobility  Outcome: Progressing  Goal: Maintain or return to baseline ADL function  Description: INTERVENTIONS:  -  Assess patient's ability to carry out ADLs; assess patient's baseline for ADL function and identify physical deficits which impact ability to perform ADLs (bathing, care of mouth/teeth, toileting, grooming, dressing, etc )  - Assess/evaluate cause of self-care deficits   - Assess range of motion  - Assess patient's mobility; develop plan if impaired  - Assess patient's need for assistive devices and provide as appropriate  - Encourage maximum independence but intervene and supervise when necessary  - Involve family in performance of ADLs  - Assess for home care needs following discharge   - Consider OT consult to assist with ADL evaluation and planning for discharge  - Provide patient education as appropriate  Outcome: Progressing  Goal: Maintain or return mobility status to optimal level  Description: INTERVENTIONS:  - Assess patient's baseline mobility status (ambulation, transfers, stairs, etc )    - Identify cognitive and physical deficits and behaviors that affect mobility  - Identify mobility aids required to assist with transfers and/or ambulation (gait belt, sit-to-stand, lift, walker, cane, etc )  - [place] fall precautions as indicated by assessment  - Record patient progress and toleration of activity level on Mobility SBAR; progress patient to next Phase/Stage  - Instruct patient to call for assistance with activity based on assessment  - Consider rehabilitation consult to assist with strengthening/weightbearing, etc   Outcome: Progressing     Problem: DISCHARGE PLANNING  Goal: Discharge to home or other facility with appropriate resources  Description: INTERVENTIONS:  - Identify barriers to discharge w/patient and caregiver  - Arrange for needed discharge resources and transportation as appropriate  - Identify discharge learning needs (meds, wound care, etc )  - Arrange for interpretive services to assist at discharge as needed  - Refer to Case Management Department for coordinating discharge planning if the patient needs post-hospital services based on physician/advanced practitioner order or complex needs related to functional status, cognitive ability, or social support system  Outcome: Progressing     Problem: Knowledge Deficit  Goal: Patient/family/caregiver demonstrates understanding of disease process, treatment plan, medications, and discharge instructions  Description: Complete learning assessment and assess knowledge base    Interventions:  - Provide teaching at level of understanding  - Provide teaching via preferred learning methods  Outcome: Progressing     Problem: NEUROSENSORY - ADULT  Goal: Achieves stable or improved neurological status  Description: INTERVENTIONS  - Monitor and report changes in neurological status  - Monitor vital signs such as temperature, blood pressure, glucose, and any other labs ordered   - Initiate measures to prevent increased intracranial pressure  - Monitor for seizure activity and implement precautions if appropriate      Outcome: Progressing     Problem: CARDIOVASCULAR - ADULT  Goal: Maintains optimal cardiac output and hemodynamic stability  Description: INTERVENTIONS:  - Monitor I/O, vital signs and rhythm  - Monitor for S/S and trends of decreased cardiac output  - Administer and titrate ordered vasoactive medications to optimize hemodynamic stability  - Assess quality of pulses, skin color and temperature  - Assess for signs of decreased coronary artery perfusion  - Instruct patient to report change in severity of symptoms  Outcome: Progressing  Goal: Absence of cardiac dysrhythmias or at baseline rhythm  Description: INTERVENTIONS:  - Continuous cardiac monitoring, vital signs, obtain 12 lead EKG if ordered  - Administer antiarrhythmic and heart rate control medications as ordered  - Monitor electrolytes and administer replacement therapy as ordered  Outcome: Progressing     Problem: RESPIRATORY - ADULT  Goal: Achieves optimal ventilation and oxygenation  Description: INTERVENTIONS:  - Assess for changes in respiratory status  - Assess for changes in mentation and behavior  - Position to facilitate oxygenation and minimize respiratory effort  - Oxygen administered by appropriate delivery if ordered  - Initiate smoking cessation education as indicated  - Encourage broncho-pulmonary hygiene including cough, deep breathe, Incentive Spirometry  - Assess the need for suctioning and aspirate as needed  - Assess and instruct to report SOB or any respiratory difficulty  - Respiratory Therapy support as indicated  Outcome: Progressing     Problem: GENITOURINARY - ADULT  Goal: Maintains or returns to baseline urinary function  Description: INTERVENTIONS:  - Assess urinary function  - Encourage oral fluids to ensure adequate hydration if ordered  - Administer IV fluids as ordered to ensure adequate hydration  - Administer ordered medications as needed  - Offer frequent toileting  - Follow urinary retention protocol if ordered  Outcome: Progressing     Problem: METABOLIC, FLUID AND ELECTROLYTES - ADULT  Goal: Electrolytes maintained within normal limits  Description: INTERVENTIONS:  - Monitor labs and assess patient for signs and symptoms of electrolyte imbalances  - Administer electrolyte replacement as ordered  - Monitor response to electrolyte replacements, including repeat lab results as appropriate  - Instruct patient on fluid and nutrition as appropriate  Outcome: Progressing  Goal: Fluid balance maintained  Description: INTERVENTIONS:  - Monitor labs   - Monitor I/O and WT  - Instruct patient on fluid and nutrition as appropriate  - Assess for signs & symptoms of volume excess or deficit  Outcome: Progressing  Goal: Glucose maintained within target range  Description: INTERVENTIONS:  - Monitor Blood Glucose as ordered  - Assess for signs and symptoms of hyperglycemia and hypoglycemia  - Administer ordered medications to maintain glucose within target range  - Assess nutritional intake and initiate nutrition service referral as needed  Outcome: Progressing

## 2020-09-09 NOTE — PLAN OF CARE
Pt awake most of shift  Pt would drift off to sleep then need to be awakened for care  Pt's BP remained above 300 despite Lantus and extra Humalog  Pt started on Insulin gtt to manage blood sugars  Pt tolerating nectar thick liquids without coughing or signs of aspiration  Pt incontinent of small brown/green stools  Pt continues to pass gas  Pt for HIDA scan in nuc med  Time still to be determined  Pt's O2 sats WDL on 2L NC  Pt encouraged to cough, deep breathe, and utilize IS  K replaced this am per AP order  Pt tolerating protocols and slowly moving towards expected outcomes  Problem: Potential for Falls  Goal: Patient will remain free of falls  Description: INTERVENTIONS:  - Assess patient frequently for physical needs  -  Identify cognitive and physical deficits and behaviors that affect risk of falls    -  [place] fall precautions as indicated by assessment   - Educate patient/family on patient safety including physical limitations  - Instruct patient to call for assistance with activity based on assessment  - Modify environment to reduce risk of injury  - Consider OT/PT consult to assist with strengthening/mobility  Outcome: Progressing     Problem: Prexisting or High Potential for Compromised Skin Integrity  Goal: Skin integrity is maintained or improved  Description: INTERVENTIONS:  - Identify patients at risk for skin breakdown  - Assess and monitor skin integrity  - Assess and monitor nutrition and hydration status  - Monitor labs   - Assess for incontinence   - Turn and reposition patient  - Assist with mobility/ambulation  - Relieve pressure over bony prominences  - Avoid friction and shearing  - Provide appropriate hygiene as needed including keeping skin clean and dry  - Evaluate need for skin moisturizer/barrier cream  - Collaborate with interdisciplinary team   - Patient/family teaching  - Consider wound care consult   Outcome: Progressing     Problem: Nutrition/Hydration-ADULT  Goal: Nutrient/Hydration intake appropriate for improving, restoring or maintaining nutritional needs  Description: Monitor and assess patient's nutrition/hydration status for malnutrition  Collaborate with interdisciplinary team and initiate plan and interventions as ordered  Monitor patient's weight and dietary intake as ordered or per policy  Utilize nutrition screening tool and intervene as necessary  Determine patient's food preferences and provide high-protein, high-caloric foods as appropriate       INTERVENTIONS:  - Monitor oral intake, urinary output, labs, and treatment plans  - Assess nutrition and hydration status and recommend course of action  - Evaluate amount of meals eaten  - Assist patient with eating if necessary   - Allow adequate time for meals  - Recommend/ encourage appropriate diets, oral nutritional supplements, and vitamin/mineral supplements  - Order, calculate, and assess calorie counts as needed  - Recommend, monitor, and adjust tube feedings and TPN/PPN based on assessed needs  - Assess need for intravenous fluids  - Provide specific nutrition/hydration education as appropriate  - Include patient/family/caregiver in decisions related to nutrition  Outcome: Progressing     Problem: PAIN - ADULT  Goal: Verbalizes/displays adequate comfort level or baseline comfort level  Description: Interventions:  - Encourage patient to monitor pain and request assistance  - Assess pain using appropriate pain scale  - Administer analgesics based on type and severity of pain and evaluate response  - Implement non-pharmacological measures as appropriate and evaluate response  - Consider cultural and social influences on pain and pain management  - Notify physician/advanced practitioner if interventions unsuccessful or patient reports new pain  Outcome: Progressing     Problem: INFECTION - ADULT  Goal: Absence or prevention of progression during hospitalization  Description: INTERVENTIONS:  - Assess and monitor for signs and symptoms of infection  - Monitor lab/diagnostic results  - Monitor all insertion sites, i e  indwelling lines, tubes, and drains  - Monitor endotracheal if appropriate and nasal secretions for changes in amount and color  - Administer medications as ordered  - Instruct and encourage patient and family to use good hand hygiene technique  Outcome: Progressing     Problem: SAFETY ADULT  Goal: Patient will remain free of falls  Description: INTERVENTIONS:  - Assess patient frequently for physical needs  -  Identify cognitive and physical deficits and behaviors that affect risk of falls    -  [place] fall precautions as indicated by assessment   - Educate patient/family on patient safety including physical limitations  - Instruct patient to call for assistance with activity based on assessment  - Modify environment to reduce risk of injury  - Consider OT/PT consult to assist with strengthening/mobility  Outcome: Progressing  Goal: Maintain or return to baseline ADL function  Description: INTERVENTIONS:  -  Assess patient's ability to carry out ADLs; assess patient's baseline for ADL function and identify physical deficits which impact ability to perform ADLs (bathing, care of mouth/teeth, toileting, grooming, dressing, etc )  - Assess/evaluate cause of self-care deficits   - Assess range of motion  - Assess patient's mobility; develop plan if impaired  - Assess patient's need for assistive devices and provide as appropriate  - Encourage maximum independence but intervene and supervise when necessary  - Involve family in performance of ADLs  - Assess for home care needs following discharge   - Consider OT consult to assist with ADL evaluation and planning for discharge  - Provide patient education as appropriate  Outcome: Progressing  Goal: Maintain or return mobility status to optimal level  Description: INTERVENTIONS:  - Assess patient's baseline mobility status (ambulation, transfers, stairs, etc )    - Identify cognitive and physical deficits and behaviors that affect mobility  - Identify mobility aids required to assist with transfers and/or ambulation (gait belt, sit-to-stand, lift, walker, cane, etc )  - [place] fall precautions as indicated by assessment  - Record patient progress and toleration of activity level on Mobility SBAR; progress patient to next Phase/Stage  - Instruct patient to call for assistance with activity based on assessment  - Consider rehabilitation consult to assist with strengthening/weightbearing, etc   Outcome: Progressing     Problem: DISCHARGE PLANNING  Goal: Discharge to home or other facility with appropriate resources  Description: INTERVENTIONS:  - Identify barriers to discharge w/patient and caregiver  - Arrange for needed discharge resources and transportation as appropriate  - Identify discharge learning needs (meds, wound care, etc )  - Arrange for interpretive services to assist at discharge as needed  - Refer to Case Management Department for coordinating discharge planning if the patient needs post-hospital services based on physician/advanced practitioner order or complex needs related to functional status, cognitive ability, or social support system  Outcome: Progressing     Problem: Knowledge Deficit  Goal: Patient/family/caregiver demonstrates understanding of disease process, treatment plan, medications, and discharge instructions  Description: Complete learning assessment and assess knowledge base    Interventions:  - Provide teaching at level of understanding  - Provide teaching via preferred learning methods  Outcome: Progressing     Problem: NEUROSENSORY - ADULT  Goal: Achieves stable or improved neurological status  Description: INTERVENTIONS  - Monitor and report changes in neurological status  - Monitor vital signs such as temperature, blood pressure, glucose, and any other labs ordered   - Initiate measures to prevent increased intracranial pressure  - Monitor for seizure activity and implement precautions if appropriate      Outcome: Progressing     Problem: CARDIOVASCULAR - ADULT  Goal: Maintains optimal cardiac output and hemodynamic stability  Description: INTERVENTIONS:  - Monitor I/O, vital signs and rhythm  - Monitor for S/S and trends of decreased cardiac output  - Administer and titrate ordered vasoactive medications to optimize hemodynamic stability  - Assess quality of pulses, skin color and temperature  - Assess for signs of decreased coronary artery perfusion  - Instruct patient to report change in severity of symptoms  Outcome: Progressing  Goal: Absence of cardiac dysrhythmias or at baseline rhythm  Description: INTERVENTIONS:  - Continuous cardiac monitoring, vital signs, obtain 12 lead EKG if ordered  - Administer antiarrhythmic and heart rate control medications as ordered  - Monitor electrolytes and administer replacement therapy as ordered  Outcome: Progressing     Problem: RESPIRATORY - ADULT  Goal: Achieves optimal ventilation and oxygenation  Description: INTERVENTIONS:  - Assess for changes in respiratory status  - Assess for changes in mentation and behavior  - Position to facilitate oxygenation and minimize respiratory effort  - Oxygen administered by appropriate delivery if ordered  - Initiate smoking cessation education as indicated  - Encourage broncho-pulmonary hygiene including cough, deep breathe, Incentive Spirometry  - Assess the need for suctioning and aspirate as needed  - Assess and instruct to report SOB or any respiratory difficulty  - Respiratory Therapy support as indicated  Outcome: Progressing     Problem: GENITOURINARY - ADULT  Goal: Maintains or returns to baseline urinary function  Description: INTERVENTIONS:  - Assess urinary function  - Encourage oral fluids to ensure adequate hydration if ordered  - Administer IV fluids as ordered to ensure adequate hydration  - Administer ordered medications as needed  - Offer frequent toileting  - Follow urinary retention protocol if ordered  Outcome: Progressing     Problem: METABOLIC, FLUID AND ELECTROLYTES - ADULT  Goal: Electrolytes maintained within normal limits  Description: INTERVENTIONS:  - Monitor labs and assess patient for signs and symptoms of electrolyte imbalances  - Administer electrolyte replacement as ordered  - Monitor response to electrolyte replacements, including repeat lab results as appropriate  - Instruct patient on fluid and nutrition as appropriate  Outcome: Progressing  Goal: Fluid balance maintained  Description: INTERVENTIONS:  - Monitor labs   - Monitor I/O and WT  - Instruct patient on fluid and nutrition as appropriate  - Assess for signs & symptoms of volume excess or deficit  Outcome: Progressing  Goal: Glucose maintained within target range  Description: INTERVENTIONS:  - Monitor Blood Glucose as ordered  - Assess for signs and symptoms of hyperglycemia and hypoglycemia  - Administer ordered medications to maintain glucose within target range  - Assess nutritional intake and initiate nutrition service referral as needed  Outcome: Progressing

## 2020-09-09 NOTE — PROGRESS NOTES
Progress Note - Jonatan Quiñonez 1945, 66 y o  male MRN: [id number]    Unit/Bed#: -01 Encounter: [id number]    Primary Care Provider: Andres Trujillo DO   Date and time admitted to hospital: 9/1/2020  4:56 PM        * Septic shock ([facility] [address] )  Assessment & Plan  · Secondary to pneumonia vs unclear etiology  · POA - fever, tachypnea, tachycardia, increased Cr, persistent hypotension not fluid responsive  · In the ED  · Hypotensive, normal lactate, given 3L NSS  · CT C/A/P without contrast:   Bilateral predominantly upper lobe diffuse nodular interstitial changes of inflammatory infectious etiology  2   Subsegmental atelectasis /consolidation in the right upper lobe in a bronchovascular distribution  3   Posterior bibasilar dependent changes with possible atelectasis/infiltrate in the left lung base  4   Diffuse bladder wall thickening which is decompressed containing a Means balloon  5  Mediastinal lymphadenopathy as described largest in the subcarinal region measuring 1 8 cm    · Micro work up  · Covid repeat in ED on 9/1 negative  · Flu/ RSV negative  · Urine strep and legionella - negative  · Urine + bacteria, cloudy, thickened bladder wall on CT scan in ED  · Procalcitonin peaked at 36, trending downward  · Blood cultures - No growth  · Sputum culture - 2+ polys, no bacteria  · Lyme PCR  - pending  · ABX - broadened due to severity of illness, Cont Cefepime (1 G Q 24 due to renal insufficiency) D#6, Vanco D#6, and Flagyl D#6  · Increase cefepime to 2 Q 24 when GFR 30-60 per ID  · 9/5 Renal ok with increasing to 2gm q 12   · Cont to wean down Levophed  Shane weaned off evening of 9/2/2020  Vaso off 9/3  · Levo weaned of 9/5 @ 2120  · RUQ u/s performed 9/3: No gallstones seen    Some gallbladder wall thickening and pericholecystic fluid is present which could be related to underdistention or other pathology  ·  ID consult placed - Dr Freya Paula  · Consult general surgery for possible cholecystitis per ID recommendations  · Gen surgery recommends HIDA scan once pt  Is stable enough to go for long study  · 9/6 Pt improving, WBC improving  Gen surg no longer rec HIDA  · Overall pt  Improving slowly - wife updated at bedside  · BP soft 9/6 overnight, given 250ml 5% albumin with improvement  · BLE duplex to r/o DVT given fever and edema, negative  · ID consulted 9/8 and will D/C Vanco/Cefepime and change to Rocephin  Cont Flagyl  · HIDA scan ordered  · CTA of Chest to r/o PE  · Repeat BC sent, UA negative for infection    Acute respiratory failure with hypoxia (HCC)  Assessment & Plan  · /Increase oxygenation requirements with worsening encephalopathy  · 9/2 Intubated 8 0 ETT  · Vent day #5 ASV 85%, 50% fiO2 +7  · Propofol and Fentanyl for sedation  · Continue to wean down propofol  · PRN Fentanyl and versed  · Atrovent/Xopenex Q 6  · Mucinex added 9/4 for secretion clearance  · Extubated 9/6- weaned to nasal cannula as tolerated  · Cont chest PT and IS    Community acquired pneumonia  Assessment & Plan  · CT chest - consolidation right upper lobe, infiltrate left lower lobe, diffuse nodular interstitial inflammatory changes  · See septic shock plan above  · See acute hypoxic respiratory plan above  · D/C Vanco/Cefepime per ID 9/8  Change to Rocephin and cont Flagyl  · CTA of chest given fevers- no evidence of pulmonary embolism  Upper lobe patchy opacities, greater on the right, consistent with pneumonia, similar  Moderate pleural effusions, increased  Mediastinal lymphadenopathy    CHERISE (acute kidney injury) (HCC)resolved as of 9/8/2020  Assessment & Plan  · Baseline Cr 1 0 as of 05/2020  · Creat in ED 3 33  Trending down appropriately  1 17 on 9/6  · Means placed in ED  · Received 3L NS in ED  · Trend labs  · Avoid nephrotoxins, hypotension  · Urine studies ordered     · Nephrology recommendations appreciated  · Dosing lasix prn -  40 mg IV given 9/3/20 and 9/6/20     Type 2 MI (myocardial infarction) [facility])  Assessment & Plan  · Secondary to septic shock  · Elevated Trop 2 85 in ED without EKG changes, pt asymptomatic  · Trop peaked at 34 without ST changes  · Continue Heparin gtt- D/C 9/5 and start ASA and statin  · ECHO - EF 81-11%, RV systolic function reduced, mod MVR - leaning toward takotsubo cardiomyopathy based on the echo findings  Could represent apical ballooning/Takotsubo cardiomyopathy given the basal   segments have dynamic function  · If ECG changes will need transfer for interventional cardiology  · Troponin 22 59 on 9/3  No further checks unless clinical change    Elevated brain natriuretic peptide (BNP) level  Assessment & Plan  · No history of heart failure  · Strict I&O  · Daily weight  · TTE completed on 9/2 EF 25-30%,reduced RV function, mod MVR- echo finding concerning for Takotsubo cardiomyopathy   · Received 3L NSS in ED  Hold on further fluids, consider albumin for hypotension  · Received 750ml albumin overnight 9/2  · 250ml 5% albumin given 9/5 and 9/6 for soft BP  · Dosing with prn lasix if pt  is exhibiting anasarca on exam  · Lasix 40 mg IV dose given 9/3 & 9/6    Encephalopathy due to infectionresolved as of 9/8/2020  Assessment & Plan  · Wife reports confusion new on day of admission that improved with administration of IVF and antibiotics  Became worse w temp spikes to 105  · Likely secondary to PNA, sepsis, fever  · Avoid sedating medications  · Monitor neuro status     · UDS and alcohol negative on admission  · 9/7 Improving encephalopathy    Type 2 diabetes mellitus, with long-term current use of insulin [facility])  Assessment & Plan  Lab Results   Component Value Date    HGBA1C 8 2 (H) 09/03/2020       Recent Labs     09/08/20  1235 09/08/20  1819 09/08/20  2342 09/09/20  0205   POCGLU 201* 425* 395* 357*       Blood Sugar Average: Last 72 hrs:  (P) 144 [id number]       · Takes metformin 1000mg BID, Januvia 100mg daily, Lantus 28 units at hs, Novolog 6 units TID w meals  · Hold home meds-Tube feeds started 9/5  · Non-DKA insulin gtt started 9/2 for persistent hyperglycemia- currently at 1 5 u / hr  · Received total of approx 43 units from gtt 9/5 @0200 through 9/6 @ 0200  · Off insulin gtt- cont SSI while NPO  · Speech consulted and cleared for a pureed diet with nectar thick  · 9/8: Glucose elevated after eating > 400  Lantus 10 units administered as well as SSI and an additional 10 units of regular insulin  Hyperglycemia persisted  Non-DKA insulin drip restarted  Anemia  Assessment & Plan  · Unknown baseline   · Continue to trend labs  · Monitor for any signs of bleeding  · Consider further anemia work up  · Transfuse with 1 u PRBC 9/3/20 and 9/4/20 - consent obtained from wife  · No obvious signs of bleeding  · Hemoccult stool 1/3 negative    ----------------------------------------------------------------------------------------  HPI/24hr events:  Maintaining O2 SAT in 90s with NC  Speech improving  Hyperglycemia persisted despite Lantus 10 units, coverage with SSI and an additional 10 units of regular insulin  Non-DKA insulin drip started  Patient denies SOB, headache, chest pain, abdominal pain, nausea, and diarrhea       Disposition: Transfer to Med-Surg   Code Status: Level 1 - Full Code  ---------------------------------------------------------------------------------------    Review of Systems  Review of systems was reviewed and negative unless stated above in HPI/24-hour events   ---------------------------------------------------------------------------------------  OBJECTIVE    Vitals   Vitals:    09/08/20 2000 09/08/20 2355 09/09/20 0000 09/09/20 0415   BP: 104/65 123/69  108/64   BP Location: Right arm Right arm  Right arm   Pulse: 102 94 92 92   Resp: 22 19 18 18   Temp: 98 3 °F (36 8 °C) 98 7 °F (37 1 °C)  98 2 °F (36 8 °C)   TempSrc: Oral Oral  Oral   SpO2: 93% 94% 94% 94%   Weight:    78 5 kg (173 lb 1 oz)   Height:         Temp (24hrs), Av 2 °F (37 3 °C), Min:97 9 °F (36 6 °C), Max:100 4 °F (38 °C)  Current: Temperature: 98 2 °F (36 8 °C)  Arterial Line BP: 124/53  Arterial Line MAP (mmHg): 76 mmHg    Respiratory:  SpO2: SpO2: 94 %  Nasal Cannula O2 Flow Rate (L/min): 2 L/min    Invasive/non-invasive ventilation settings   Respiratory    Lab Data (Last 4 hours)    None         O2/Vent Data (Last 4 hours)    None                Physical Exam  Constitutional:       General: He is not in acute distress  Appearance: He is obese  He is not diaphoretic  HENT:      Head: Normocephalic and atraumatic  Right Ear: External ear normal       Left Ear: External ear normal       Nose: Nose normal       Mouth/Throat:      Mouth: Mucous membranes are moist       Pharynx: Oropharynx is clear  Eyes:      General: No scleral icterus  Right eye: No discharge  Left eye: No discharge  Extraocular Movements: Extraocular movements intact  Conjunctiva/sclera: Conjunctivae normal       Pupils: Pupils are equal, round, and reactive to light  Neck:      Musculoskeletal: Normal range of motion and neck supple  No neck rigidity  Cardiovascular:      Rate and Rhythm: Normal rate and regular rhythm  Pulses: Normal pulses  Heart sounds: No friction rub  No gallop  Pulmonary:      Effort: Pulmonary effort is normal  No respiratory distress  Breath sounds: No stridor  No wheezing, rhonchi or rales  Comments: NC in place  Chest:      Chest wall: No tenderness  Abdominal:      General: Bowel sounds are normal  There is no distension  Palpations: Abdomen is soft  There is no mass  Tenderness: There is no abdominal tenderness  There is no guarding  Musculoskeletal: Normal range of motion  General: No swelling, tenderness, deformity or signs of injury  Skin:     General: Skin is warm and dry  Capillary Refill: Capillary refill takes less than 2 seconds        Coloration: Skin is not jaundiced or pale  Findings: No rash  Neurological:      General: No focal deficit present  Mental Status: He is alert and oriented to person, place, and time  Cranial Nerves: No cranial nerve deficit  Sensory: No sensory deficit  Comments: Speech improving            Laboratory and Diagnostics:  Results from last 7 days   Lab Units 09/09/20  0421 09/08/20  0533 09/07/20  0455 09/06/20  0404 09/05/20  0311 09/04/20  0509 09/03/20  0355   WBC Thousand/uL 9 73 9 84 10 32* 8 25 10 56* 13 10* 12 02*   HEMOGLOBIN g/dL 10 5* 11 0* 10 6* 8 5* 8 9* 8 3* 8 2*   HEMATOCRIT % 33 2* 34 8* 33 6* 26 5* 26 8* 24 7* 24 1*   PLATELETS Thousands/uL 492* 429* 371 285 284 277 244   NEUTROS PCT %  --   --   --   --  75 84* 83*   BANDS PCT %  --   --   --  4  --   --   --    MONOS PCT %  --   --   --   --  6 5 5   MONO PCT %  --   --   --  2*  --   --   --      Results from last 7 days   Lab Units 09/09/20  0421 09/08/20  0533 09/07/20  0455 09/06/20  0404 09/05/20  0311 09/04/20  0509 09/03/20  0355   SODIUM mmol/L 143 143 144 142 141 135* 131*   POTASSIUM mmol/L 3 5 3 9 3 9 3 8 4 1 3 9 4 4   CHLORIDE mmol/L 107 109* 108 110* 110* 103 99*   CO2 mmol/L 28 26 23 24 24 21 18*   ANION GAP mmol/L 8 8 13 8 7 11 14*   BUN mg/dL 36* 27* 27* 26* 26* 28* 38*   CREATININE mg/dL 1 42* 1 22 1 25 1 17 1 35* 1 53* 2 47*   CALCIUM mg/dL 8 7 8 7 8 8 8 2* 8 4 8 0* 7 7*   GLUCOSE RANDOM mg/dL 259* 211* 145* 128 113 130 219*   ALT U/L  --  55  --  71 74 82* 74   AST U/L  --  39  --  94* 129* 172* 230*   ALK PHOS U/L  --  159*  --  174* 140* 108 90   ALBUMIN g/dL  --  2 1*  --  2 0* 2 0* 2 1* 2 6*   TOTAL BILIRUBIN mg/dL  --  0 58  --  0 46 0 58 0 53 0 52     Results from last 7 days   Lab Units 09/09/20  0421 09/08/20  0533 09/07/20  0455 09/06/20  0404 09/05/20  0311 09/04/20  0509 09/03/20  0355   MAGNESIUM mg/dL 2 0 1 7 1 6 2 0 2 4 1 8 2 0   PHOSPHORUS mg/dL 2 6 2 6  --  2 4 2 8 3 5  --       Results from last 7 days   Lab Units 09/05/20  0550 09/04/20  0508 09/03/20  2238 09/03/20  1611 09/03/20  1017 09/03/20  0355 09/02/20  1448   PTT seconds 70* 70* 66* 57* 74* 58* 63*      Results from last 7 days   Lab Units 09/03/20  1017 09/03/20  0357 09/02/20  2217 09/02/20  1938 09/02/20  1600 09/02/20  1158 09/02/20  0734   TROPONIN I ng/mL 22 59* 29 42* 33 67* 34 83* 26 17* 17 64* 10 54*     Results from last 7 days   Lab Units 09/02/20  0915   LACTIC ACID mmol/L 1 7     ABG:  Results from last 7 days   Lab Units 09/04/20  0508   PH ART  7 387   PCO2 ART mm Hg 31 1*   PO2 ART mm Hg 76 9   HCO3 ART mmol/L 18 3*   BASE EXC ART mmol/L -5 9   ABG SOURCE  Line, Arterial     VBG:  Results from last 7 days   Lab Units 09/04/20  0508  09/02/20  0915   PH GOLD   --   --  7 261*   PCO2 GOLD mm Hg  --   --  37 7*   PO2 GOLD mm Hg  --   --  31 5*   HCO3 GOLD mmol/L  --   --  16 6*   BASE EXC GOLD mmol/L  --   --  -9 7   ABG SOURCE  Line, Arterial   < >  --     < > = values in this interval not displayed  Results from last 7 days   Lab Units 09/08/20  0533 09/05/20  0311 09/04/20  0509 09/03/20  0355   PROCALCITONIN ng/ml 1 38* 8 16* 17 61*  17 12* 36 73*       Micro  Results from last 7 days   Lab Units 09/08/20  1050 09/08/20  1049 09/03/20  0405 09/02/20  1450   BLOOD CULTURE  Received in Microbiology Lab  Culture in Progress  Received in Microbiology Lab  Culture in Progress  --   --    SPUTUM CULTURE   --   --  No growth  --    GRAM STAIN RESULT   --   --  2+ Polys  No bacteria seen  --    MRSA CULTURE ONLY   --   --   --  No Methicillin Resistant Staphlyococcus aureus (MRSA) isolated       EKG: NSR on telemetry  Imaging: I have personally reviewed pertinent reports  Intake and Output  I/O       09/07 0701 - 09/08 0700 09/08 0701 - 09/09 0700    P  O  480 360    I V  (mL/kg)      IV Piggyback 50 300    Feedings      Total Intake(mL/kg) 530 (6 4) 660 (8)    Urine (mL/kg/hr) 3140 (1 6) 1050 (0 8)    Stool 0 0    Total Output 3140 1050 Net -2610 -390          Unmeasured Stool Occurrence 4 x 1 x          Height and Weights   Height: 6' 3" (190 5 cm)  IBW: 84 5 kg  Body mass index is 21 63 kg/m²  Weight (last 2 days)     Date/Time   Weight    09/09/20 0415   78 5 (173 06)    09/08/20 0547   82 8 (182 54)    09/07/20 0600   81 3 (179 23)                Nutrition       Diet Orders   (From admission, onward)             Start     Ordered    09/08/20 1434  Diet Dysphagia/Modified Consistency; Dysphagia 1-Pureed; Nectar Thick Liquid  Diet effective now     Question Answer Comment   Diet Type Dysphagia/Modified Consistency    Dysphagia/Modified Consistency Dysphagia 1-Pureed    Liquid Modifier Nectar Thick Liquid    RD to adjust diet per protocol?  No        09/08/20 1433                  Active Medications  Scheduled Meds:  Current Facility-Administered Medications   Medication Dose Route Frequency Provider Last Rate    acetaminophen  650 mg Oral Q4H PRN JASMINE Lim      acetaminophen  325 mg Rectal Q4H PRN Dejah Uribe PA-C      aspirin  81 mg Oral Daily Edgar Singh      bisacodyl  10 mg Rectal Daily PRN Dejah Uribe PA-C      cefTRIAXone  2,000 mg Intravenous Q24H Dejah Uribe PA-C Stopped (09/08/20 1554)    famotidine  20 mg Intravenous Q24H [facility] & intermediate Nina Monte PA-C      guaiFENesin  200 mg Oral Q4H Zeina Deluna Jr, PA-C      heparin (porcine)  5,000 Units Subcutaneous [facility] Edgar Prince      insulin regular (HumuLIN R,NovoLIN R) infusion  0 3-21 Units/hr Intravenous Titrated Zeina Deluna Jr, PA-C 9 Units/hr (09/09/20 0404)    ipratropium  0 5 mg Nebulization Q6H While awake JASMINE Lim      levalbuterol  0 63 mg Nebulization Q6H PRN JASMINE Lim      levalbuterol  1 25 mg Nebulization Q6H While awake NiniJASMINE Mcdonald      metoprolol  5 mg Intravenous Q6H PRN Bre Calvillo PA-C      metroNIDAZOLE  500 mg Intravenous Q8H Dejah Uribe PA-C Stopped (09/09/20 0253)    nystatin  500,000 Units Swish & Swallow 4x Daily Fei France MD      polyethylene glycol  17 g Oral Daily PRN JASMINE Nuno      potassium chloride  40 mEq Oral Once Gordon Segovia , AYLEEN      potassium chloride  20 mEq Intravenous Once Gordon Segovia PA-C      pravastatin  20 mg Oral Daily With [employer], AYLEEN      senna  8 8 mg Oral HS JASMINE Nuno       Continuous Infusions:  insulin regular (HumuLIN R,NovoLIN R) infusion, 0 3-21 Units/hr, Last Rate: 9 Units/hr (09/09/20 0404)      PRN Meds:   acetaminophen, 650 mg, Q4H PRN  acetaminophen, 325 mg, Q4H PRN  bisacodyl, 10 mg, Daily PRN  levalbuterol, 0 63 mg, Q6H PRN  metoprolol, 5 mg, Q6H PRN  polyethylene glycol, 17 g, Daily PRN        Invasive Devices Review  Invasive Devices     Peripheral Intravenous Line            Peripheral IV 09/07/20 Dorsal (posterior); Left Hand 1 day    Peripheral IV 09/07/20 Dorsal (posterior); Right Wrist 1 day    Peripheral IV 09/07/20 Right;Ventral (anterior) Wrist 1 day    Peripheral IV 09/08/20 Distal;Right;Upper;Ventral (anterior) Arm less than 1 day                Rationale for remaining devices: NA  ---------------------------------------------------------------------------------------  Advance Directive and Living Will:      Power of :    POLST:    ---------------------------------------------------------------------------------------  Care Time Delivered:   No Critical Care time spent       Gordon Segovia PA-C      Portions of the record may have been created with voice recognition software  Occasional wrong word or "sound a like" substitutions may have occurred due to the inherent limitations of voice recognition software    Read the chart carefully and recognize, using context, where substitutions have occurred

## 2020-09-09 NOTE — ASSESSMENT & PLAN NOTE
Lab Results   Component Value Date    HGBA1C 8 2 (H) 09/03/2020       Recent Labs     09/09/20  0849 09/09/20  1020 09/09/20  1111 09/09/20  1450   POCGLU 83 73 91 142*       Blood Sugar Average: Last 72 hrs:  (P) 176 08       · Takes metformin 1000mg BID, Januvia 100mg daily, Lantus 28 units at hs, Novolog 6 units TID w meals  · Hold home meds-Tube feeds started 9/5  · Non-DKA insulin gtt started 9/2 for persistent hyperglycemia- currently at 1 5 u / hr  · Received total of approx 43 units from gtt 9/5 @0200 through 9/6 @ 0200  · Off insulin gtt  · Speech consulted and cleared for a pureed diet with nectar thick  · 9/8: Glucose elevated after eating > 400  Lantus 10 units administered as well as SSI and an additional 10 units of regular insulin  Hyperglycemia persisted  Non-DKA insulin drip restarted  · 09/09, pt was asymptomatic with glucose in 70s  Insulin gtt stopped and glucose monitored closely  Glucose persisted in the 70s and was NPO for HIDA scan  Pt was started on D5LR till he was able to resume diet

## 2020-09-09 NOTE — PROGRESS NOTES
Renal Note    Patient currently in nuclear medicine for hepatobiliary scan  Kidney function fluctuating but improved from yesterday    Will continue to monitor daily

## 2020-09-10 PROBLEM — I42.9 CARDIOMYOPATHY (HCC): Status: ACTIVE | Noted: 2020-09-10

## 2020-09-10 LAB
ANION GAP SERPL CALCULATED.3IONS-SCNC: 8 MMOL/L (ref 4–13)
BUN SERPL-MCNC: 33 MG/DL (ref 5–25)
CALCIUM SERPL-MCNC: 8.5 MG/DL (ref 8.3–10.1)
CHLORIDE SERPL-SCNC: 109 MMOL/L (ref 100–108)
CO2 SERPL-SCNC: 23 MMOL/L (ref 21–32)
CREAT SERPL-MCNC: 1.14 MG/DL (ref 0.6–1.3)
ERYTHROCYTE [DISTWIDTH] IN BLOOD BY AUTOMATED COUNT: 15.1 % (ref 11.6–15.1)
GFR SERPL CREATININE-BSD FRML MDRD: 62 ML/MIN/1.73SQ M
GLUCOSE SERPL-MCNC: 273 MG/DL (ref 65–140)
GLUCOSE SERPL-MCNC: 278 MG/DL (ref 65–140)
GLUCOSE SERPL-MCNC: 301 MG/DL (ref 65–140)
GLUCOSE SERPL-MCNC: 304 MG/DL (ref 65–140)
HCT VFR BLD AUTO: 33.6 % (ref 36.5–49.3)
HGB BLD-MCNC: 10.4 G/DL (ref 12–17)
MCH RBC QN AUTO: 27.6 PG (ref 26.8–34.3)
MCHC RBC AUTO-ENTMCNC: 31 G/DL (ref 31.4–37.4)
MCV RBC AUTO: 89 FL (ref 82–98)
PLATELET # BLD AUTO: 502 THOUSANDS/UL (ref 149–390)
PMV BLD AUTO: 9.5 FL (ref 8.9–12.7)
POTASSIUM SERPL-SCNC: 4.2 MMOL/L (ref 3.5–5.3)
RBC # BLD AUTO: 3.77 MILLION/UL (ref 3.88–5.62)
SODIUM SERPL-SCNC: 140 MMOL/L (ref 136–145)
WBC # BLD AUTO: 8.31 THOUSAND/UL (ref 4.31–10.16)

## 2020-09-10 PROCEDURE — 92526 ORAL FUNCTION THERAPY: CPT

## 2020-09-10 PROCEDURE — 82948 REAGENT STRIP/BLOOD GLUCOSE: CPT

## 2020-09-10 PROCEDURE — 85027 COMPLETE CBC AUTOMATED: CPT | Performed by: NURSE PRACTITIONER

## 2020-09-10 PROCEDURE — G0407 INPT/TELE FOLLOW UP 25: HCPCS | Performed by: INTERNAL MEDICINE

## 2020-09-10 PROCEDURE — 99233 SBSQ HOSP IP/OBS HIGH 50: CPT | Performed by: INTERNAL MEDICINE

## 2020-09-10 PROCEDURE — 80048 BASIC METABOLIC PNL TOTAL CA: CPT | Performed by: NURSE PRACTITIONER

## 2020-09-10 PROCEDURE — 97163 PT EVAL HIGH COMPLEX 45 MIN: CPT

## 2020-09-10 PROCEDURE — 97167 OT EVAL HIGH COMPLEX 60 MIN: CPT

## 2020-09-10 PROCEDURE — 99232 SBSQ HOSP IP/OBS MODERATE 35: CPT | Performed by: INTERNAL MEDICINE

## 2020-09-10 PROCEDURE — 97530 THERAPEUTIC ACTIVITIES: CPT

## 2020-09-10 PROCEDURE — 94760 N-INVAS EAR/PLS OXIMETRY 1: CPT

## 2020-09-10 PROCEDURE — 94640 AIRWAY INHALATION TREATMENT: CPT

## 2020-09-10 RX ORDER — INSULIN GLARGINE 100 [IU]/ML
16 INJECTION, SOLUTION SUBCUTANEOUS
Status: DISCONTINUED | OUTPATIENT
Start: 2020-09-10 | End: 2020-09-12 | Stop reason: HOSPADM

## 2020-09-10 RX ORDER — ALBUTEROL SULFATE 2.5 MG/3ML
2.5 SOLUTION RESPIRATORY (INHALATION) EVERY 4 HOURS PRN
Status: DISCONTINUED | OUTPATIENT
Start: 2020-09-10 | End: 2020-09-12 | Stop reason: HOSPADM

## 2020-09-10 RX ORDER — LEVALBUTEROL 1.25 MG/.5ML
1.25 SOLUTION, CONCENTRATE RESPIRATORY (INHALATION)
Status: DISCONTINUED | OUTPATIENT
Start: 2020-09-10 | End: 2020-09-12 | Stop reason: HOSPADM

## 2020-09-10 RX ORDER — METOPROLOL SUCCINATE 50 MG/1
50 TABLET, EXTENDED RELEASE ORAL DAILY
Status: DISCONTINUED | OUTPATIENT
Start: 2020-09-10 | End: 2020-09-12 | Stop reason: HOSPADM

## 2020-09-10 RX ADMIN — LEVALBUTEROL HYDROCHLORIDE 1.25 MG: 1.25 SOLUTION, CONCENTRATE RESPIRATORY (INHALATION) at 20:08

## 2020-09-10 RX ADMIN — PRAVASTATIN SODIUM 20 MG: 20 TABLET ORAL at 16:10

## 2020-09-10 RX ADMIN — INSULIN LISPRO 4 UNITS: 100 INJECTION, SOLUTION INTRAVENOUS; SUBCUTANEOUS at 12:02

## 2020-09-10 RX ADMIN — GUAIFENESIN 200 MG: 100 SOLUTION ORAL at 08:19

## 2020-09-10 RX ADMIN — METRONIDAZOLE 500 MG: 500 INJECTION, SOLUTION INTRAVENOUS at 17:32

## 2020-09-10 RX ADMIN — HEPARIN SODIUM 5000 UNITS: 5000 INJECTION INTRAVENOUS; SUBCUTANEOUS at 14:55

## 2020-09-10 RX ADMIN — INSULIN LISPRO 4 UNITS: 100 INJECTION, SOLUTION INTRAVENOUS; SUBCUTANEOUS at 16:11

## 2020-09-10 RX ADMIN — HEPARIN SODIUM 5000 UNITS: 5000 INJECTION INTRAVENOUS; SUBCUTANEOUS at 05:18

## 2020-09-10 RX ADMIN — NYSTATIN 500000 UNITS: 100000 SUSPENSION ORAL at 21:42

## 2020-09-10 RX ADMIN — GUAIFENESIN 200 MG: 100 SOLUTION ORAL at 00:18

## 2020-09-10 RX ADMIN — HEPARIN SODIUM 5000 UNITS: 5000 INJECTION INTRAVENOUS; SUBCUTANEOUS at 21:40

## 2020-09-10 RX ADMIN — METOPROLOL SUCCINATE 50 MG: 50 TABLET, EXTENDED RELEASE ORAL at 16:10

## 2020-09-10 RX ADMIN — METRONIDAZOLE 500 MG: 500 INJECTION, SOLUTION INTRAVENOUS at 01:57

## 2020-09-10 RX ADMIN — IPRATROPIUM BROMIDE 0.5 MG: 0.5 SOLUTION RESPIRATORY (INHALATION) at 13:55

## 2020-09-10 RX ADMIN — INSULIN LISPRO 3 UNITS: 100 INJECTION, SOLUTION INTRAVENOUS; SUBCUTANEOUS at 12:02

## 2020-09-10 RX ADMIN — GUAIFENESIN 200 MG: 100 SOLUTION ORAL at 03:19

## 2020-09-10 RX ADMIN — IPRATROPIUM BROMIDE 0.5 MG: 0.5 SOLUTION RESPIRATORY (INHALATION) at 20:08

## 2020-09-10 RX ADMIN — NYSTATIN 500000 UNITS: 100000 SUSPENSION ORAL at 09:23

## 2020-09-10 RX ADMIN — INSULIN LISPRO 4 UNITS: 100 INJECTION, SOLUTION INTRAVENOUS; SUBCUTANEOUS at 08:11

## 2020-09-10 RX ADMIN — METRONIDAZOLE 500 MG: 500 INJECTION, SOLUTION INTRAVENOUS at 09:36

## 2020-09-10 RX ADMIN — LEVALBUTEROL HYDROCHLORIDE 1.25 MG: 1.25 SOLUTION, CONCENTRATE RESPIRATORY (INHALATION) at 08:00

## 2020-09-10 RX ADMIN — IPRATROPIUM BROMIDE 0.5 MG: 0.5 SOLUTION RESPIRATORY (INHALATION) at 08:00

## 2020-09-10 RX ADMIN — ASPIRIN 81 MG 81 MG: 81 TABLET ORAL at 08:19

## 2020-09-10 RX ADMIN — INSULIN LISPRO 3 UNITS: 100 INJECTION, SOLUTION INTRAVENOUS; SUBCUTANEOUS at 08:11

## 2020-09-10 RX ADMIN — INSULIN LISPRO 1 UNITS: 100 INJECTION, SOLUTION INTRAVENOUS; SUBCUTANEOUS at 21:38

## 2020-09-10 RX ADMIN — CEFTRIAXONE 2000 MG: 2 INJECTION, SOLUTION INTRAVENOUS at 14:56

## 2020-09-10 RX ADMIN — INSULIN GLARGINE 16 UNITS: 100 INJECTION, SOLUTION SUBCUTANEOUS at 21:40

## 2020-09-10 RX ADMIN — NYSTATIN 500000 UNITS: 100000 SUSPENSION ORAL at 17:42

## 2020-09-10 RX ADMIN — LEVALBUTEROL HYDROCHLORIDE 1.25 MG: 1.25 SOLUTION, CONCENTRATE RESPIRATORY (INHALATION) at 13:55

## 2020-09-10 NOTE — ASSESSMENT & PLAN NOTE
Acute respiratory failure which was present on admission most likely secondary to pneumonia  Resolved now, comfortably breathing well on room air

## 2020-09-10 NOTE — RESPIRATORY THERAPY NOTE
RT Protocol Note  Macy Craft 66 y o  male MRN: [id number]  Unit/Bed#: -01 Encounter: [id number]    Assessment    Principal Problem:    Septic shock ([place] [address] )  Active Problems:    Community acquired pneumonia    Elevated brain natriuretic peptide (BNP) level    Type 2 MI (myocardial infarction) ([place] [address] )    Type 2 diabetes mellitus, with long-term current use of insulin (HCC)    Acute respiratory failure with hypoxia (HCC)    Anemia      Home Pulmonary Medications:    Home Devices/Therapy: Ventilator    Past Medical History:   Diagnosis Date    Diabetes mellitus ([place] [address] )     Hyperlipidemia     Hypertension      Social History     Socioeconomic History    Marital status: /Civil Union     Spouse name: None    Number of children: None    Years of education: None    Highest education level: None   Occupational History    None   Social Needs    Financial resource strain: None    Food insecurity     Worry: None     Inability: None    Transportation needs     Medical: None     Non-medical: None   Tobacco Use    Smoking status: Never Smoker    Smokeless tobacco: Never Used   Substance and Sexual Activity    Alcohol use: Not Currently     Frequency: Never    Drug use: Never    Sexual activity: Yes     Partners: Female   Lifestyle    Physical activity     Days per week: None     Minutes per session: None    Stress: None   Relationships    Social connections     Talks on phone: None     Gets together: None     Attends Zoroastrianism service: None     Active member of club or organization: None     Attends meetings of clubs or organizations: None     Relationship status: None    Intimate partner violence     Fear of current or ex partner: None     Emotionally abused: None     Physically abused: None     Forced sexual activity: None   Other Topics Concern    None   Social History Narrative    None       Subjective    Subjective Data: Patient is non smoker who denies use of respiratory medications at home or use of home O2  He denies any hsortness of breath or additional work of breathing  Objective    Physical Exam:   Assessment Type: During-treatment  General Appearance: Alert, Awake  Respiratory Pattern: Normal  Chest Assessment: Chest expansion symmetrical  Bilateral Breath Sounds: Clear, Diminished  Cough: Non-productive, Strong  O2 Device: RA    Vitals:  Blood pressure 137/76, pulse 87, temperature 98 °F (36 7 °C), temperature source Oral, resp  rate 18, height 6' 3" (1 905 m), weight 79 3 kg (174 lb 13 2 oz), SpO2 98 %  Results from last 7 days   Lab Units 09/04/20  0508   PH ART  7 387   PCO2 ART mm Hg 31 1*   PO2 ART mm Hg 76 9   HCO3 ART mmol/L 18 3*   BASE EXC ART mmol/L -5 9   O2 CONTENT ART mL/dL 12 5*   O2 HGB, ARTERIAL % 94 7   ABG SOURCE  Line, Arterial   BETHANIE TEST  Yes       Imaging and other studies: I have personally reviewed pertinent reports  O2 Device: RA     Plan    Respiratory Plan: Mild Distress pathway        Resp Comments: Pt found on 2LPM NC, placed on RA, RN aware  Pt doing well with udn tx, IS and coughing

## 2020-09-10 NOTE — ASSESSMENT & PLAN NOTE
Lab Results   Component Value Date    HGBA1C 8 2 (H) 09/03/2020       Recent Labs     09/09/20  1738 09/09/20  2144 09/10/20  0708 09/10/20  1134   POCGLU 292* 272* 273* 301*       Blood Sugar Average: Last 72 hrs:  (P) 216   Insulin dose adjusted  Check blood glucose 4 times daily with corrective insulin

## 2020-09-10 NOTE — PROGRESS NOTES
Patient refused guaifenesin and nystantin at this time  "Lets hold off for now and see what happens  "

## 2020-09-10 NOTE — SPEECH THERAPY NOTE
Speech/Language Pathology Progress Note    Patient Name: Basilio Melara Date: 9/10/2020    Subjective:  "My wife knows what kind of foods to make me "    Objective:  Pt seen for diagnostic swallow therapy  Current diet is puree with nectar thick liquids  Pt declined to try solid/soft solid foods  He reported he is happy with the pureed food and appetite is limited  Pt had no complaint about NTL consistency drinks, but was agreeable to trials of thin liquid  He drank 4 oz regular water via cup, and approx 6 oz gatorade that his wife brought in via straw  Swallow appeared mildly delayed with mildly reduced hyolaryngeal elevation, however overall functional without s/s aspiration and with no anterior spillage  Assessment:  Swallow is adequate for advancement back to thin liquidss    Plan/Recommendations:  Continue puree for now, will f/u with solid trials as able  Advance liquids to thin  ST will continue to follow

## 2020-09-10 NOTE — PLAN OF CARE
Problem: PHYSICAL THERAPY ADULT  Goal: Performs mobility at highest level of function for planned discharge setting  See evaluation for individualized goals  Description: Treatment/Interventions: Functional transfer training, LE strengthening/ROM, Therapeutic exercise, Endurance training, Cognitive reorientation, Patient/family training, Equipment eval/education, Bed mobility, Gait training, Compensatory technique education, Spoke to nursing, Spoke to case management, OT  Equipment Recommended: (TBD by rehab)       See flowsheet documentation for full assessment, interventions and recommendations  Note: Prognosis: Excellent  Problem List: Decreased strength, Decreased endurance, Impaired balance, Decreased mobility, Decreased cognition, Impaired judgement, Decreased safety awareness  Assessment: Pt is a 66 y o  male seen for PT evaluation s/p admission to [address] [place] [address] on 9/1/2020 with Septic shock ([facility] [address] )  Pt was intubated on 9/2/2020 with increased tachypnea and community acquired PNA R lung  Extubated 9/6/2020  Order placed for PT services    Upon evaluation: Pt is presenting with impaired functional mobility due to decreased strength, decreased endurance, impaired balance, gait deviations, impaired cognition, decreased safety awareness, impaired judgment and fall risk requiring maximal assistance for transfers and maximal assistance for ambulation with RW  Pt's clinical presentation is currently unstable/unpredictable given the functional mobility deficits above, especially weakness, decreased endurance, gait deviations, decreased activity tolerance, decreased functional mobility tolerance, decreased safety awareness, impaired judgement and decreased cognition, coupled with fall risks as indicated by AM-PAC 6-Clicks: 2/07 as well as impaired balance, polypharmacy, impaired judgement, decreased safety awareness and decreased cognition and combined with medical complications of abnormal renal lab values, abnormal H&H, abnormal blood sugars, abnormal sodium values, fear/retreat, need for input for mobility technique/safety and abnormal platelets, hypotensive, abnormal BNP, MI type II, tachycardia, moderate pleural effusion  Pt's PMHx and comorbidities that may affect physical performance and progress include: DM, HTN and HLD  Personal factors affecting pt at time of IE include: inaccessible home environment, limited home support, inability to perform current job functions, inability to perform IADLs, inability to perform ADLs, inability to navigate level surfaces without external assistance, inability to ambulate household distances and limited insight into impairments  Pt will benefit from continued skilled PT services to address deficits as defined above and to maximize level of functional mobility to facilitate return toward PLOF and improved QOL  From PT/mobility standpoint, recommendation at time of d/c would be Short term rehab pending progress in order to reduce fall risk and maximize pt's functional independence and consistency with mobility in order to facilitate return to PLOF  Recommend ther ex next 1-2 sessions  Barriers to Discharge: Decreased caregiver support, Inaccessible home environment  Barriers to Discharge Comments: requires increased assistance to complete mobility  PT Discharge Recommendation: Post-Acute Rehabilitation Services     PT - OK to Discharge: (when medically cleared to rehab)    See flowsheet documentation for full assessment

## 2020-09-10 NOTE — ASSESSMENT & PLAN NOTE
Septic shock, POA secondary to pneumonia  Successfully extubated sepsis has improved now  Adequately treated with IV antibiotics

## 2020-09-10 NOTE — OCCUPATIONAL THERAPY NOTE
Occupational Therapy Evaluation     Patient Name: Tuan Cruz  Today's Date: 9/10/2020  Problem List  Principal Problem:    Septic shock ([facility] [address] )  Active Problems:    Community acquired pneumonia    Elevated brain natriuretic peptide (BNP) level    Type 2 MI (myocardial infarction) ([facility] [address] )    Type 2 diabetes mellitus, with long-term current use of insulin ([facility] [address] )    Acute respiratory failure with hypoxia ([facility] [address] )    Anemia    Past Medical History  Past Medical History:   Diagnosis Date    Diabetes mellitus ([facility] [address] )     Hyperlipidemia     Hypertension      Past Surgical History  History reviewed  No pertinent surgical history  09/10/20 1051   Note Type   Note type Eval only   Restrictions/Precautions   Other Precautions Chair Alarm; Bed Alarm; Fall Risk   Pain Assessment   Pain Assessment Tool 0-10   Pain Score No Pain   Home Living   Type of Home House  (0 NHUNG)   Home Layout One level   Bathroom Shower/Tub Tub/shower unit   Bathroom Toilet Standard   Bathroom Equipment Grab bars in shower;Grab bars around toilet   Additional Comments Pt reports living in a 1 story home with 0 NHUNG  Pt ambulates with no AD PTA   Prior Function   Level of [place] Independent with ADLs and functional mobility   Lives With Spouse   Receives Help From Family   ADL Assistance Independent   IADLs Independent   Falls in the last 6 months 0   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")   Comments Pt reports completing ADLs, IADLs, functional ambulation, community mobility + driving and working full time @ [employer]  Subjective   Subjective "everyone has taken very good care of me while i've been here"   ADL   Where Assessed Chair   Grooming Assistance 5  Supervision/Setup   Grooming Deficit Setup   UB Dressing Assistance 5  Supervision/Setup   UB Dressing Deficit Setup;Verbal cueing;Supervision/safety; Increased time to complete   LB Dressing Assistance 2  Maximal Assistance   LB Dressing Deficit Steadying; Requires assistive device for steadying;Setup;Verbal cueing;Supervision/safety; Increased time to complete; Don/doff R sock; Don/doff L sock; Thread RLE into pants; Thread LLE into pants;Pull up over hips   Additional Comments Pt completing ADLs while seated OOB in recliner chair  UB dressing and grooming tasks @ S after set-up  Pt donning socks while seated in recliner @ Min A  donning pants around feet @ SBA with increased time  Max A for CM around waist while standing d/t Pt requiring BUE fully supported on RW to maintain balance and safety  Bed Mobility   Additional Comments Pt seated OOB in recliner chair at start and end of session   Transfers   Sit to Stand 2  Maximal assistance   Additional items Assist x 1; Increased time required;Verbal cues   Stand to Sit 4  Minimal assistance   Additional items Assist x 1; Increased time required;Verbal cues   Stand pivot 2  Maximal assistance   Additional items Assist x 1; Increased time required;Verbal cues   Additional Comments Pt completing functional transfers with use of RW for UB support  Max A for STS and SPT with increased time, vc'ing for sequencing and RW management  Balance   Static Sitting Good   Dynamic Sitting Fair +   Static Standing Fair -   Dynamic Standing Poor +   Activity Tolerance   Activity Tolerance Patient limited by fatigue   Medical Staff Made Aware Spoke with PTMari   Nurse Made Aware Spoke with RN, Nick and Jackie TRAN Assessment   RUE Assessment WFL   LUE Assessment   LUE Assessment WFL   Hand Function   Gross Motor Coordination Functional   Fine Motor Coordination Functional   Sensation   Light Touch No apparent deficits   Cognition   Overall Cognitive Status Impaired   Arousal/Participation Alert; Responsive; Cooperative   Attention Attends with cues to redirect   Orientation Level Oriented X4   Following Commands Follows one step commands with increased time or repetition   Comments Pt A&Ox4 although slow to respond at times  follows commands with increased time and repitition  Assessment   Limitation Decreased ADL status; Decreased UE strength;Decreased Safe judgement during ADL;Decreased cognition;Decreased endurance;Decreased self-care trans;Decreased high-level ADLs   Prognosis Good   Assessment Pt is a 67 y/o M admitted to [address] [place] [address] 9/1/2020 d/t experiencing "feeling cold with decreased appetite", confusion, and unsteady gait  Dx: septic shock  Pt was intubated 9/2/2020 for respiratory failure and extubating 9/6/2020  Pt with PMHx impacting performance during functional tasks including: DM, hyperlipidemia, HTN  Pt reports living in a 1 story home with 0 NHUNG with his wife  Pt reports being completely independent PTA including ADLs, IADLs, functional ambulation, community mobility + driving and working full time as a trunk   On evaluation, Pt A&Ox4  Pt completing UB dressing and grooming tasks @ S after set-up  Donning socks @ Min A  Max A for CM around waist d/t requiring BUE supported on RW at all times  STS and SPT with use of RW @ Max A  Pt BUE ROM and MS WFL  Pt's cognition is slowed and requires multiple repetitions for Pt to accurately follow  Pt scoring 45/100 on Barthel Index  Pt presents with decrease activity tolerance, decrease standing balance, decrease performance during ADL tasks, decrease cognition, decrease safety awareness , increase impulsiveness, decrease UB MS, decrease generalized strength, decrease activity engagement and decrease performance during functional transfers  Pt would benefit from continued acute OT services to address deficits as well as post acute rehab services upon d/c from [address] [place] [address]  Plan   Treatment Interventions ADL retraining;Functional transfer training;UE strengthening/ROM; Endurance training;Cognitive reorientation;Patient/family training;Neuromuscular reeducation;Equipment evaluation/education; Compensatory technique education; Energy conservation; Activityengagement;Continued evaluation   Goal Expiration Date 09/20/20   OT Frequency 3-5x/wk   Recommendation   Recommendation   (post acute rehab)   OT Discharge Recommendation Post-Acute Rehabilitation Services   Barthel Index   Feeding 10   Bathing 0   Grooming Score 5   Dressing Score 5   Bladder Score 5   Bowels Score 5   Toilet Use Score 5   Transfers (Bed/Chair) Score 10   Mobility (Level Surface) Score 0   Stairs Score 0   Barthel Index Score 45      Pt goals to be met by 9/20/2020    1  Pt will demonstrate ability to complete LB dressing @ S in order to increase safety and independence during meaningful tasks  2  Pt will demonstrate ability to jung/doff socks/shoes while sitting EOB @ S in order to increase safety and independence during meaningful tasks  3  Pt will demonstrate ability to complete toileting tasks including CM and pericare @ S in order to increase safety and independence during meaningful tasks  4  Pt will demonstrate ability to complete EOB, chair, toilet/commode transfers @ S in order to increase performance and participation during functional tasks  5  Pt will demonstrate ability to stand for 7-8 minutes while maintaining G balance with use of RW for UB support PRN  6  Pt will demonstrate ability to tolerate 30-35 minute OT session with no vc'ing for deep breathing or use of energy conservation techniques in order to increase activity tolerance during functional tasks  7  Pt will demonstrate Good carryover of use of energy conservation/compensatory strategies during ADLs and functional tasks in order to increase safety and reduce risk for falls  8  Pt will demonstrate Good attention and participation in continued evaluation of functional ambulation house hold distances in order to assist with safe d/c planning  9  Pt will attend to continued cognitive assessments 100% of the time in order to provide most appropriate d/c recommendations     10  Pt will follow 100% simple 2-step commands and be A&O x4 consistently with environmental cues to increase participation in functional activities  11  Pt will identify 3 areas of interest/hobbies and 1 intervention on how to incorporate into daily life in order to increase interaction with environment and peers as well as increase participation in meaningful tasks  12  Pt will demonstrate 100% carryover of BUE HEP in order to increase BUE MS and increase performance during functional tasks upon d/c home      Theresa Crum OTR/JIM

## 2020-09-10 NOTE — PLAN OF CARE
Problem: PHYSICAL THERAPY ADULT  Goal: Performs mobility at highest level of function for planned discharge setting  See evaluation for individualized goals  Description: Treatment/Interventions: Functional transfer training, LE strengthening/ROM, Therapeutic exercise, Endurance training, Cognitive reorientation, Patient/family training, Equipment eval/education, Bed mobility, Gait training, Compensatory technique education, Spoke to nursing, Spoke to case management, OT  Equipment Recommended: (TBD by rehab)       See flowsheet documentation for full assessment, interventions and recommendations  9/28/3022 [id number] by Sunny García PT  Outcome: Progressing  Note: Prognosis: Excellent  Problem List: Decreased strength, Decreased endurance, Impaired balance, Decreased mobility, Decreased cognition, Impaired judgement, Decreased safety awareness  Pt requires increased assistance to complete mobility  He requires min cues for proper completion of LE therapeutic exercise to facilitate increased strength  He was able to initiate transfer with decreased assistance, but continues to require increased assistance for transition of hands to RW  He is limited by decreased strength, balance, endurance, safety with decreased insight to deficits and need for assistance  He is motivated to return to home  He will continue to benefit from PT services to maximize LOF  At end of session  pt sitting on toilet with nursing present to assist when finished  Barriers to Discharge: Decreased caregiver support, Inaccessible home environment  Barriers to Discharge Comments: requires increased assistance to complete mobility  PT Discharge Recommendation: Post-Acute Rehabilitation Services     PT - OK to Discharge: (when medically cleared to rehab)    See flowsheet documentation for full assessment

## 2020-09-10 NOTE — CASE MANAGEMENT
Discussed in rounds:  Patient will need STR  CM met with patient and wife at the bedside  A post acute care recommendation was made by your care team for STR  Discussed Freedom of Choice with both patient and caregiver  List of facilities given to both patient and caregiver via in person  both patient and caregiver aware the list is custom filtered for them by zip code location and that [facility] post acute providers are designated  Mrs Meliza Sandhu will be taking the list home with her to talk to a family member  She was provided my phone number to call back with choices  Also made a referral to Acute Rehab at [facility] to see if they will be able to accommodate But Darol Cleverly will be needed  Will continue to follow    STR

## 2020-09-10 NOTE — CONSULTS
Consultation - Cardiology   Betty Knox 66 y o  male MRN: [id number]  Unit/Bed#: -01 Encounter: [id number]    Assessment/Plan     Assessment:  Sepsis pneumonia  CHERISE- resolved  Cardiomyopathy- appearing takotsubo related, EF 25-30%, not currently on BB  DM    Plan:  1  Start metoprolol now that BP and HR can tolerate  2  Will fu as an outpatient with plan to re-echo in 1 month  3  Could add ace or arb as an outpatient if kidney function continues to be stable and BP elevated  4  If echo shows resolved EF then no further work up indicated but if still abnormal he will require LHC  5  Will sign off  History of Present Illness   Physician Requesting Consult: Manuel Weston MD  Reason for Consult / Principal Problem: cardiomyopathy   HPI: Betty Knox is a 66y o  year old male who is currently admitted for septic shock  He was found confused at home with sob  COVID testing was negative  CT showed evidence of pneumonia and pt was started on abx for community acquired pneumonia  Troponin elevated to 2 85 but pt had no ekg changes and was thought to be related to demand ischemia  Echo was done that showed an EF that was reduced at 25-30% concerning for takotsubo cardiomyopathy  troponin peaked in 30s and no further checks needed unless patient had a clinical change  CHERISE found with unknown baseline Creatinine likely due to ATN due to septic shock  At this point patient is doing much better  breathing is much better  He has no cp  He reports at home he has good functional capacity  He never has sob or CP at his baseline  He is diabetic and is on insulin  Inpatient consult to Cardiology  Consult performed by: Pham More PA-C  Consult ordered by: Manuel Weston MD          Review of Systems   Constitutional: Negative for chills, fatigue and unexpected weight change  Respiratory: Negative for chest tightness, shortness of breath and wheezing      Cardiovascular: Negative for chest pain and leg swelling  Genitourinary: Negative for difficulty urinating  Skin: Negative for color change, pallor and rash  Neurological: Negative for dizziness, syncope and light-headedness  Psychiatric/Behavioral: Negative for agitation, behavioral problems and confusion  Historical Information   Past Medical History:   Diagnosis Date    Diabetes mellitus ([facility] [address] )     Hyperlipidemia     Hypertension      History reviewed  No pertinent surgical history  Social History     Substance and Sexual Activity   Alcohol Use Not Currently    Frequency: Never     Social History     Substance and Sexual Activity   Drug Use Never     E-Cigarette/Vaping     E-Cigarette/Vaping Substances     Social History     Tobacco Use   Smoking Status Never Smoker   Smokeless Tobacco Never Used     Family History: non-contributory    Meds/Allergies   all current active meds have been reviewed  Allergies   Allergen Reactions    Iodinated Diagnostic Agents Hives       Objective   Vitals: Blood pressure 137/76, pulse 101, temperature 98 °F (36 7 °C), temperature source Oral, resp  rate 18, height 6' 3" (1 905 m), weight 79 3 kg (174 lb 13 2 oz), SpO2 96 %  Orthostatic Blood Pressures      Most Recent Value   Blood Pressure  137/76 filed at 09/10/2020 0710   Patient Position - Orthostatic VS  Lying filed at 09/10/2020 0710            Intake/Output Summary (Last 24 hours) at 9/10/2020 1446  Last data filed at 9/10/2020 1301  Gross per 24 hour   Intake 2105 6 ml   Output 1735 ml   Net 370 6 ml       Invasive Devices     Peripheral Intravenous Line            Peripheral IV 09/07/20 Dorsal (posterior); Right Wrist 3 days    Peripheral IV 09/07/20 Right;Ventral (anterior) Wrist 3 days    Peripheral IV 09/07/20 Dorsal (posterior); Left Hand 2 days                Physical Exam  Constitutional:       Appearance: Normal appearance  HENT:      Head: Normocephalic and atraumatic  Neck:      Musculoskeletal: Neck supple     Cardiovascular: Rate and Rhythm: Normal rate  Heart sounds: No murmur  No gallop  Pulmonary:      Effort: Pulmonary effort is normal       Breath sounds: Normal breath sounds  No wheezing  Abdominal:      Palpations: Abdomen is soft  Musculoskeletal:         General: No swelling  Skin:     General: Skin is warm and dry  Capillary Refill: Capillary refill takes less than 2 seconds  Neurological:      General: No focal deficit present  Mental Status: He is alert and oriented to person, place, and time  Psychiatric:         Mood and Affect: Mood normal          Thought Content: Thought content normal          Lab Results:   I have personally reviewed pertinent lab results  CBC with diff:   Results from last 7 days   Lab Units 09/10/20  0517   WBC Thousand/uL 8 31   RBC Million/uL 3 77*   HEMOGLOBIN g/dL 10 4*   HEMATOCRIT % 33 6*   MCV fL 89   MCH pg 27 6   MCHC g/dL 31 0*   RDW % 15 1   MPV fL 9 5   PLATELETS Thousands/uL 502*     CMP:   Results from last 7 days   Lab Units 09/10/20  0517  09/09/20  0421   SODIUM mmol/L 140   < > 143   POTASSIUM mmol/L 4 2   < > 3 5   CHLORIDE mmol/L 109*   < > 107   CO2 mmol/L 23   < > 28   BUN mg/dL 33*   < > 36*   CREATININE mg/dL 1 14   < > 1 42*   CALCIUM mg/dL 8 5   < > 8 7   AST U/L  --   --  22   ALT U/L  --   --  44   ALK PHOS U/L  --   --  132*   EGFR ml/min/1 73sq m 62   < > 48    < > = values in this interval not displayed       Troponin:   0   Lab Value Date/Time    TROPONINI 22 59 (H) 09/03/2020 1017    TROPONINI 29 42 (H) 09/03/2020 0357    TROPONINI 33 67 (H) 09/02/2020 2217    TROPONINI 34 83 (H) 09/02/2020 1938    TROPONINI 26 17 (H) 09/02/2020 1600    TROPONINI 17 64 (H) 09/02/2020 1158    TROPONINI 10 54 (H) 09/02/2020 0734    TROPONINI 8 25 (H) 09/02/2020 0449    TROPONINI 4 55 (H) 09/02/2020 0004    TROPONINI 2 47 (H) 09/01/2020 2010    TROPONINI 2 85 (H) 09/01/2020 1706     BNP:   Results from last 7 days   Lab Units 09/10/20  0517   POTASSIUM mmol/L 4 2   CHLORIDE mmol/L 109*   CO2 mmol/L 23   BUN mg/dL 33*   CREATININE mg/dL 1 14   CALCIUM mg/dL 8 5   EGFR ml/min/1 73sq m 62     Coags:   Results from last 7 days   Lab Units 09/05/20  0550   PTT seconds 70*     TSH:   Results from last 7 days   Lab Units 09/08/20  0533   TSH 3RD GENERATON uIU/mL 0 422     Imaging: I have personally reviewed pertinent reports  Echo 9/2/20:  LEFT VENTRICLE:  Apical akinesis with apical inferior akinesis, hypokinesis of the mid to distal anterolateral wall as well as the mid to distal septum  Anterior wall hypokinesis  Could represent apical ballooning/Takotsubo cardiomyopathy given the basal  segments have dynamic function  Systolic function was markedly reduced  Ejection fraction was estimated in the range of 25 % to 30 %  Wall thickness was mildly increased  RIGHT VENTRICLE:  The size was normal   Systolic function was reduced  LEFT ATRIUM:  The atrium was mildly dilated  MITRAL VALVE:  There was mild to moderate regurgitation  AORTIC VALVE:  Aortic valve is heavily calcified  There was mild stenosis with peak velocity of 2 2 m/s with a mean gradient of 10 mmHg  Visually more mild to moderate  TRICUSPID VALVE:  There was mild regurgitation  IVC, HEPATIC VEINS:  The inferior vena cava was mildly dilated  Respirophasic changes in dimension were absent (pt on ventilator)    PERICARDIUM:  A small pericardial effusion was identified      EKG: Baseline artifact  Sinus tachycardia  Possible Septal infarct (cited on or before 02-SEP-2020)  Nonspecific ST and T wave abnormality  Abnormal ECG  When compared with ECG of 02-SEP-2020 12:34, (unconfirmed)  No significant change was found  Confirmed by Ruy Araujo ([id number]) on 9/4/2020 10:21:11 PM

## 2020-09-10 NOTE — PROGRESS NOTES
Progress Note - Yulia Able 1945, 66 y o  male MRN: [id number]    Unit/Bed#: -01 Encounter: [id number]    Primary Care Provider: Stephanie Gonsalez DO   Date and time admitted to hospital: 9/1/2020  4:56 PM    * Septic shock [facility])  Assessment & Plan  Septic shock which has now resolved, POA secondary to pneumonia  Successfully extubated   Adequately treated with IV antibiotics    Acute respiratory failure with hypoxia [facility])  Assessment & Plan  Acute respiratory failure which was present on admission most likely secondary to pneumonia  Resolved now, SaO2 > 95% on RA    Community acquired pneumonia  Assessment & Plan  Treated with broad-spectrum IV antibiotics for 8 days  Significant clinical improvement, procalcitonin trending down    Cardiomyopathy [facility])  Assessment & Plan  Euvolemic on exam  Echo: Apical akinesis with apical inferior akinesis, hypokinesis of the mid to distal anterolateral wall as well as the mid to distal septum  Anterior wall hypokinesis  Could represent apical ballooning/Takotsubo cardiomyopathy given the basal  segments have dynamic function  Systolic function was markedly reduced  Ejection fraction was estimated in the range of 25 % to 30 %      Anemia  Assessment & Plan  Normocytic anemia  Hemoglobin stable    Type 2 diabetes mellitus, with long-term current use of insulin [facility])  Assessment & Plan  Lab Results   Component Value Date    HGBA1C 8 2 (H) 09/03/2020       Recent Labs     09/09/20  1738 09/09/20  2144 09/10/20  0708 09/10/20  1134   POCGLU 292* 272* 273* 301*     Blood Sugar Average: Last 72 hrs:  (P) 216   Insulin dose adjusted  Check blood glucose 4 times daily with corrective insulin    VTE Pharmacologic Prophylaxis:   Pharmacologic: Heparin    Patient Centered Rounds: I have performed bedside rounds with nursing staff today    Discussions with Specialists or Other Care Team Provider:     Education and Discussions with Family / Patient:     Current Length of Stay: 9 day(s)    Current Patient Status: Inpatient   Certification Statement: The patient will continue to require additional inpatient hospital stay due to Pneumonia    Discharge Plan:  Probably in 1-2 days    Code Status: Level 1 - Full Code      Subjective:   No complaints    Objective:     Vitals:   Temp (24hrs), Av 9 °F (36 6 °C), Min:97 6 °F (36 4 °C), Max:98 °F (36 7 °C)    Temp:  [97 6 °F (36 4 °C)-98 °F (36 7 °C)] 98 °F (36 7 °C)  HR:  [] 101  Resp:  [14-20] 18  BP: (116-145)/(66-92) 137/76  SpO2:  [92 %-98 %] 96 %  Body mass index is 21 85 kg/m²  Input and Output Summary (last 24 hours): Intake/Output Summary (Last 24 hours) at 9/10/2020 1349  Last data filed at 9/10/2020 1301  Gross per 24 hour   Intake 2105 6 ml   Output 1735 ml   Net 370 6 ml       Physical Exam:     General appearance: alert, appears stated age and cooperative  Head: Normocephalic, without obvious abnormality, atraumatic  Lungs: clear to auscultation bilaterally  Heart: regular rate and rhythm  Abdomen: soft, non-tender, positive bowel sounds   Back: negative  Extremities: extremities atraumatic, no cyanosis or edema  Neurologic: Alert and oriented X 3, normal strength and tone  Normal symmetric reflexes  Normal coordination and gait    Additional Data:     Labs:    Results from last 7 days   Lab Units 09/10/20  0517 09/09/20  0421  20  0311   WBC Thousand/uL 8 31 9 73   < > 10 56*   HEMOGLOBIN g/dL 10 4* 10 5*   < > 8 9*   HEMATOCRIT % 33 6* 33 2*   < > 26 8*   PLATELETS Thousands/uL 502* 492*   < > 284   BANDS PCT %  --  3   < >  --    NEUTROS PCT %  --   --   --  75   LYMPHS PCT %  --   --   --  14   LYMPHO PCT %  --  18   < >  --    MONOS PCT %  --   --   --  6   MONO PCT %  --  1*   < >  --    EOS PCT %  --  0   < > 3    < > = values in this interval not displayed       Results from last 7 days   Lab Units 09/10/20  0520  0421   SODIUM mmol/L 140   < > 143   POTASSIUM mmol/L 4 2   < > 3 5 CHLORIDE mmol/L 109*   < > 107   CO2 mmol/L 23   < > 28   BUN mg/dL 33*   < > 36*   CREATININE mg/dL 1 14   < > 1 42*   ANION GAP mmol/L 8   < > 8   CALCIUM mg/dL 8 5   < > 8 7   ALBUMIN g/dL  --   --  2 0*   TOTAL BILIRUBIN mg/dL  --   --  0 24   ALK PHOS U/L  --   --  132*   ALT U/L  --   --  44   AST U/L  --   --  22   GLUCOSE RANDOM mg/dL 278*   < > 259*    < > = values in this interval not displayed  Results from last 7 days   Lab Units 09/10/20  1134 09/10/20  0708 09/09/20  2144 09/09/20  1738 09/09/20  1450 09/09/20  1111 09/09/20  1020 09/09/20  0849 09/09/20  0802 09/09/20  0600 09/09/20  0403 09/09/20  0205   POC GLUCOSE mg/dl 301* 273* 272* 292* 142* 91 73 83 96 159* 265* 357*         Results from last 7 days   Lab Units 09/08/20  0533 09/05/20  0311 09/04/20  0509   PROCALCITONIN ng/ml 1 38* 8 16* 17 61*  17 12*           * I Have Reviewed All Lab Data Listed Above  * Additional Pertinent Lab Tests Reviewed: Max 66 Admission Reviewed    Imaging:    Imaging Reports Reviewed Today Include: images reviewed    Recent Cultures (last 7 days):     Results from last 7 days   Lab Units 09/08/20  1050 09/08/20  1049   BLOOD CULTURE  No Growth at 24 hrs  No Growth at 24 hrs         Last 24 Hours Medication List:   Current Facility-Administered Medications   Medication Dose Route Frequency Provider Last Rate    acetaminophen  650 mg Oral Q4H PRN JASMINE Collins      acetaminophen  325 mg Rectal Q4H PRN JASMINE Collins      aspirin  81 mg Oral Daily JASMINE Collins      cefTRIAXone  2,000 mg Intravenous Q24H JASMINE Collins 2,000 mg (09/09/20 1458)    guaiFENesin  200 mg Oral Q4H JASMINE Collins      heparin (porcine)  5,000 Units Subcutaneous Q8H [address] JASMINE Collins      insulin glargine  16 Units Subcutaneous HS Danne Mcburney, MD      insulin lispro  1-5 Units Subcutaneous HS JASMINE Collins      insulin lispro  1-6 Units Subcutaneous TID AC JASMINE Collins      insulin lispro  7 Units Subcutaneous TID With Meals Danne Mcburney, MD      ipratropium  0 5 mg Nebulization Q6H While awake JASMINE Collins      levalbuterol  0 63 mg Nebulization Q6H PRN JASMINE Collins      levalbuterol  1 25 mg Nebulization Q6H While awake JASMINE Collins      metoprolol  5 mg Intravenous Q6H PRN JASMINE Collins      metroNIDAZOLE  500 mg Intravenous Q8H JASMINE Collins 500 mg (09/10/20 0936)    nystatin  500,000 Units Swish & Swallow 4x Daily JASMINE Collins      pravastatin  20 mg Oral Daily With Ronel Wagner PA-C          Today, Patient Was Seen By: Danne Mcburney, MD    ** Please Note: Dictation voice to text software may have been used in the creation of this document   **

## 2020-09-10 NOTE — PHYSICAL THERAPY NOTE
PHYSICAL THERAPY EVALUATION  NAME:  Ross Alan: 09/10/20    AGE:   66 y o  Mrn:   [id number]  ADMIT DX:  Altered mental status [R41 82]  Pneumonia [J18 9]  Elevated troponin [R79 89]  Sepsis ([facility] ) [A41 9]  Altered mental status, unspecified altered mental status type [R41 82]    Past Medical History:   Diagnosis Date    Diabetes mellitus ([facility] )     Hyperlipidemia     Hypertension      Length Of Stay: 9  Performed at least 2 patient identifiers during session: Name and Birthday  PHYSICAL THERAPY EVALUATION :      09/10/20 1052   PT Last Visit   PT Visit Date 09/10/20   Note Type   Note type Eval/Treat   Pain Assessment   Pain Assessment Tool 0-10   Pain Score No Pain   Home Living   Type of Home House  (no NHUNG)   Home Layout One level   Bathroom Shower/Tub Tub/shower unit   Bathroom Toilet Standard   Bathroom Equipment Grab bars in shower;Grab bars around toilet   Additional Comments Reports being indepdnent PTA without AD  Prior Function   Level of [place] Independent with ADLs and functional mobility   Lives With Spouse   Receives Help From Family   ADL Assistance Independent   IADLs Independent   Falls in the last 6 months 0   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")   Restrictions/Precautions   Other Precautions Chair Alarm; Bed Alarm; Fall Risk;Cognitive   General   Additional Pertinent History Pt was intubated 9/2/2020 and extubated 9/6/2020  Pt with community acquired PNA R lung   Cognition   Orientation Level Oriented X4   Following Commands Follows one step commands with increased time or repetition   Comments slow to respond  decreased awareness of deficits     RLE Overall AROM   R Hip Flexion just > 90 degrees   R Hip ABduction 10 degrees   R Knee Flexion WFL   R Knee Extension -10 degrees   R Ankle Dorsiflexion WFL   R Ankle Plantar Flexion WFL   Strength RLE   RLE Overall Strength 2+/5  (except hip abd 2-/5)   LLE Overall AROM   L Hip Flexion to 90 degrees   L Hip ABduction < 10 degrees   L Knee Flexion WFL   L Knee Extension -10 degrees   L Ankle Dorsiflexion WFL   L Ankle Plantar Flexion WFL   Strength LLE   LLE Overall Strength 2/5  (hip abduction 2-/5)   Coordination   Movements are Fluid and Coordinated 1   Light Touch   RLE Light Touch Grossly intact   LLE Light Touch Grossly intact   Bed Mobility   Additional Comments Pt sitting OOB in recliner chair at start of session  Transfers   Sit to Stand 2  Maximal assistance   Additional items Assist x 1; Increased time required;Verbal cues   Stand to Sit 3  Moderate assistance   Additional items Assist x 1; Increased time required;Verbal cues   Stand pivot 2  Maximal assistance   Additional items Assist x 1; Increased time required;Verbal cues   Additional Comments manual cues for upright posture upon standing  sit to stand with RW with mod cues fo rhand placement for safety with RW  cues for technique and sequence  manual cues for anterior wt shift  achieved standing with RW with maxAx1 with increased  time and effort  spt with RW and maxAx1 with increased time with increased knee and hip flexion and short shuffled steps  stand to sit wiht modAx2 with manual cues for controlled descent stand to sit 2nd trial with minAx1   Ambulation/Elevation   Gait pattern Wide KEVIN; Decreased foot clearance;Shuffling; Short stride; Excessively slow   Gait Assistance 2  Maximal assist   Additional items Assist x 1   Assistive Device Rolling walker   Distance completed advance/retreat with maxAx1 and steadying assistance of another  with R LE advance/retreat, L knee buckled  required maxAx1 to recover  then ambulated 18'x1 with RW and maxAx1 with short shuffled steps, wide KEVIN and increased knee and hip flexion  manual and verbal cues for upright posture and wt shifting to facilitate LE advancement  manual management of RW     Balance   Static Sitting Good   Dynamic Sitting Fair +   Static Standing Poor   Dynamic Standing Poor -   Ambulatory Poor -   Activity Tolerance   Activity Tolerance Patient limited by fatigue   Medical Staff Made Aware OT, [place]   Nurse Made Aware RN, Nick   Assessment   Prognosis Excellent   Problem List Decreased strength;Decreased endurance; Impaired balance;Decreased mobility; Decreased cognition; Impaired judgement;Decreased safety awareness   Barriers to Discharge Decreased caregiver support; Inaccessible home environment   Barriers to Discharge Comments requires increased assistance to complete mobility   Goals   Patient Goals "Go home"   STG Expiration Date 09/20/20   PT Treatment Day 1   Plan   Treatment/Interventions Functional transfer training;LE strengthening/ROM; Therapeutic exercise; Endurance training;Cognitive reorientation;Patient/family training;Equipment eval/education; Bed mobility;Gait training; Compensatory technique education;Spoke to nursing;Spoke to case management;OT   PT Frequency 5x/wk   Recommendation   PT Discharge Recommendation Post-Acute Rehabilitation Services   Equipment Recommended   (TBD by rehab)   PT - OK to Discharge   (when medically cleared to rehab)   Additional Comments [facility] 6 clicks 9/18     (Please find full objective findings from PT assessment regarding body systems outlined above)  Assessment: Pt is a 66 y o  male seen for PT evaluation s/p admission to [address] [place] [address] on 9/1/2020 with Septic shock ([facility] )  Pt was intubated on 9/2/2020 with increased tachypnea and community acquired PNA R lung  Extubated 9/6/2020  Order placed for PT services    Upon evaluation: Pt is presenting with impaired functional mobility due to decreased strength, decreased endurance, impaired balance, gait deviations, impaired cognition, decreased safety awareness, impaired judgment and fall risk requiring maximal assistance for transfers and maximal assistance for ambulation with RW  Pt's clinical presentation is currently unstable/unpredictable given the functional mobility deficits above, especially weakness, decreased endurance, gait deviations, decreased activity tolerance, decreased functional mobility tolerance, decreased safety awareness, impaired judgement and decreased cognition, coupled with fall risks as indicated by AM-PAC 6-Clicks: 0/29 as well as impaired balance, polypharmacy, impaired judgement, decreased safety awareness and decreased cognition and combined with medical complications of abnormal renal lab values, abnormal H&H, abnormal blood sugars, abnormal sodium values, fear/retreat, need for input for mobility technique/safety and abnormal platelets, hypotensive, abnormal BNP, MI type II, tachycardia, moderate pleural effusion  Pt's PMHx and comorbidities that may affect physical performance and progress include: DM, HTN and HLD  Personal factors affecting pt at time of IE include: inaccessible home environment, limited home support, inability to perform current job functions, inability to perform IADLs, inability to perform ADLs, inability to navigate level surfaces without external assistance, inability to ambulate household distances and limited insight into impairments  Pt will benefit from continued skilled PT services to address deficits as defined above and to maximize level of functional mobility to facilitate return toward PLOF and improved QOL  From PT/mobility standpoint, recommendation at time of d/c would be Short term rehab pending progress in order to reduce fall risk and maximize pt's functional independence and consistency with mobility in order to facilitate return to PLOF  Recommend ther ex next 1-2 sessions  Goals: Pt will: Perform bed mobility tasks with Supervision to reposition in bed and prepare for transfers  Pt will perform transfers with miNAx1 to increase Indep in home environment, decrease burden of care, decrease risk for falls and improve activity tolerance and prepare for ambulation   Pt will ambulate with RW for >/= 150' with minAx1  to increase Indep in home environment, decrease burden of care, decrease risk for falls, improve activity tolerance and improve gait quality and to access home environment  Trial stairs as appropriate  Pt will increase B LE strength >/=1/2 MMT grade to facilitate functional mobility  Guido Lin, PT,DPT         PHYSICAL THERAPY TREATMENT NOTE  Time In:1110  Time Out:1127  Total Time: 16'      S:  "I could go home with a walker "  O:    Exercises    Side/Reps/sets   Heelslides    Glute Sets    Hip Abduction    Hip Adduction Isometric 1x15   Hip Flexion Sitting 1x15 AROM   Knee AROM Short Arc Quad    Ankle Pumps Sitting 1x15 AROM   Quad sets    Long Arc Quads Sitting 1x15 AROM   Hamstring Stretch    Heel Cord Stretch    Education      Sit to stand from recliner with mod/maxAx1 with improved anterior wt shift and min cues for hand placement  Requires increased assistance to transition UEs to RW form recliner  Ambulated 6'x1 with RW with mod/maxAx1 with wide KEVIN, increased knee flexion and short shuffled steps  Able to increase step length after verbal cues  Spt with RW with maxAx1  Stand to sit to toilet with minAx1  Discussed recommendation for rehab upon D/C  Pt initially with decreased understanding of need for rehab, then verbalized understanding after explanation  A:  Pt requires increased assistance to complete mobility  He requires min cues for proper completion of LE therapeutic exercise to facilitate increased strength  He was able to initiate transfer with decreased assistance, but continues to require increased assistance for transition of hands to RW  He is limited by decreased strength, balance, endurance, safety with decreased insight to deficits and need for assistance  He is motivated to return to home  He will continue to benefit from PT services to maximize LOF  At end of session  pt sitting on toilet with nursing present to assist when finished     P:  Recommend ambulation with RW, LE strengthening, balance       Charles Lopez, PT, DPT

## 2020-09-10 NOTE — ASSESSMENT & PLAN NOTE
Euvolemic on exam  Echo: Apical akinesis with apical inferior akinesis, hypokinesis of the mid to distal anterolateral wall as well as the mid to distal septum  Anterior wall hypokinesis  Could represent apical ballooning/Takotsubo cardiomyopathy given the basal  segments have dynamic function  Systolic function was markedly reduced  Ejection fraction was estimated in the range of 25 % to 30 %

## 2020-09-10 NOTE — TELEMEDICINE
Progress Note - Infectious Disease   Rosalie Araujo 66 y o  male MRN: [id number]  Unit/Bed#: -01 Encounter: [id number]    REQUIRED DOCUMENTATION:   1  This service was provided via Telemedicine  2  Provider located at home  3  TeleMed provider: Cresencio Rodriguez DO   4  Identify all parties in room with patient during tele consult: GABRIEL Andrade  5  After connecting through [employer], patient was identified by name and date of birth and assistant checked wristband  Patient was then informed that this was a Telemedicine visit and that the exam was being conducted confidentially over secure lines  Patient acknowledged consent and understanding of privacy and security of the Telemedicine visit, and gave us permission to have the assistant stay in the room in order to assist with the history and to conduct the exam   I informed the patient that I have reviewed their record in Epic and presented the opportunity for them to ask any questions regarding the visit today  The patient agreed to participate  Impression:   · Septic shock:  With fever (105 F), tachypnea, tachycardia, CHERISE, hypotension (off pressors), acute hypoxic respiratory failure  Septic workup has been negative to date including blood culture, sputum culture and MRSA nares screen  Venous duplex from 9/8 is negative for DVT lower extremities  · Source of septic shock likely is pneumonia:  Noted COVID-19 has been negative x2, flu/RSV negative, urine strep and Legionella antigen negative   Blood cultures no growth to date   Procalcitonin with downward trend  · Distended abdomen, transaminitis (AST>ALT), abnormal CT abd, RUQ abd US with gallstones: Cholestasis is not evident however, with total bilirubin of 0 52 and  mildly elevated alkaline phosphatase  Patient appears to be clinically improving  His abdomen is soft and nontender today  He denies abdominal pain at this time  9/9 HIDA scan is pending    · Acute hypoxic respiratory failure:  Resolved  Patient was intubated on September 2nd and extubated on Sept 6th  · Antibiotic associated diarrhea   · Takotsubo cardiomyopathy  · NSTEMI: noted peak troponin of 34, which is trending down  · Acute encephalopathy in the setting of sepsis: mental status is improving  · CHERISE:  Baseline creatinine 1 0 in May 2020  Creatinine is trending down towards baseline      RECOMMENDATIONS:  · Patient appears to be clinically improving  Fevers have resolved  He denies abdominal pain and does not have tenderness or distention on exam     · If HIDA scan is negative for cholecystitis, suggest stopping ABx (about day 9 of therapy)  · Monitor clinical response, temperature curve, WBC count, creatinine    My recommendations were discussed with the patient in detail who verbalized understanding  My recommendations were discussed with Dr Katie Carlos, from the primary service  Thank you for allowing me to participate in the care of this patient  History   Subjective: RN reports pt to have frequent loose stools that are semi-liquid  He denies fever or chills  He does not report abd pain and says he generally feels well  He is tolerating nectar thick liquids per RN  He denies pain or difficulty swallowing      Antibiotics: ceftriaxone, metronidazole  Scheduled Meds:  Current Facility-Administered Medications   Medication Dose Route Frequency Provider Last Rate    acetaminophen  650 mg Oral Q4H PRN JASMINE Collins      acetaminophen  325 mg Rectal Q4H PRN JASMINE Collins      aspirin  81 mg Oral Daily JASMINE Collins      cefTRIAXone  2,000 mg Intravenous Q24H JASMINE Collins 2,000 mg (09/09/20 1458)    guaiFENesin  200 mg Oral Q4H JASMINE Collins      heparin (porcine)  5,000 Units Subcutaneous Q8H [facility] JASMINE Collins      insulin glargine  14 Units Subcutaneous HS JASMINE Collins      insulin lispro  1-5 Units Subcutaneous HS JASMINE Collins      insulin lispro  1-6 Units Subcutaneous TID AC JASMINE Collins      insulin lispro  3 Units Subcutaneous TID With Meals JASMINE Collins      ipratropium  0 5 mg Nebulization Q6H While awake JASMINE Collins      levalbuterol  0 63 mg Nebulization Q6H PRN JASMINE Collins      levalbuterol  1 25 mg Nebulization Q6H While awake JASMINE Collins      metoprolol  5 mg Intravenous Q6H PRN JASMINE Collins      metroNIDAZOLE  500 mg Intravenous Q8H ZIA CollinsNP 500 mg (09/10/20 0936)    nystatin  500,000 Units Swish & Swallow 4x Daily JASMINE Collins      pravastatin  20 mg Oral Daily With Dinner Bre Calvillo PA-C       Continuous Infusions:   PRN Meds:   acetaminophen    acetaminophen    levalbuterol    metoprolol  Physical Exam   Temp:  [97 6 °F (36 4 °C)-98 °F (36 7 °C)] 98 °F (36 7 °C)  HR:  [80-91] 87  Resp:  [14-20] 18  BP: (116-145)/(65-92) 137/76  SpO2:  [92 %-98 %] 98 %  Temp (24hrs), Av 9 °F (36 6 °C), Min:97 6 °F (36 4 °C), Max:98 °F (36 7 °C)  Current: Temperature: 98 °F (36 7 °C)    Intake/Output Summary (Last 24 hours) at 9/10/2020 1028  Last data filed at 9/10/2020 0800  Gross per 24 hour   Intake 1075 6 ml   Output 1495 ml   Net -419 4 ml     Limited exam due to tele health visit  Partial exam done that was corroborated with patient's RN     General Appearance:  Appearing well, nontoxic, and in no distress   Head:  Normocephalic, atraumatic   Eyes:  EOMI   Nose: Nares normal, mucosa normal, not on supplemental oxygen   Throat: Oropharynx moist    Lungs:   Respirations nonlabored, slightly diminished at the bases, CTA b/l per RN   Heart:  Regular rate and rhythm, S1, S2 per RN   Abdomen:   Normoactive BS, soft, non-tender per RN    No Means catheter present per RN   Extremities: Trace distal leg edema b/l per RN   Skin: No rash per RN   Neurologic: Alert and oriented x4, conversant, fluent speech     Invasive Devices:   Peripheral IV 20 Dorsal (posterior); Right Wrist (Active)   Site Assessment Clean;Dry; Intact 09/10/20 0800   Dressing Type Transparent 09/10/20 0800   Line Status Flushed;Saline locked 09/10/20 0800   Dressing Status Clean;Dry; Intact 09/10/20 0800   Reason Not Rotated Not due 09/09/20 0900       Peripheral IV 09/07/20 Right;Ventral (anterior) Wrist (Active)   Site Assessment Clean;Dry; Intact 09/10/20 0800   Dressing Type Transparent 09/10/20 0800   Line Status Flushed;Saline locked 09/10/20 0800   Dressing Status Clean;Dry; Intact 09/10/20 0800   Reason Not Rotated Not due 09/09/20 0900       Peripheral IV 09/07/20 Dorsal (posterior); Left Hand (Active)   Site Assessment Clean;Dry; Intact 09/10/20 0800   Dressing Type Transparent 09/10/20 0800   Line Status Flushed;Saline locked 09/10/20 0800   Dressing Status Clean;Dry; Intact 09/10/20 0800   Reason Not Rotated Not due 09/09/20 0900     Labs, Imaging, & Other Studies   Lab Results: I have personally reviewed pertinent labs  Results from last 7 days   Lab Units 09/10/20  0517 09/09/20  0421 09/08/20  0533   WBC Thousand/uL 8 31 9 73 9 84   HEMOGLOBIN g/dL 10 4* 10 5* 11 0*   PLATELETS Thousands/uL 502* 492* 429*     Results from last 7 days   Lab Units 09/10/20  0517  09/09/20  0421 09/08/20  0533  09/06/20  0404   POTASSIUM mmol/L 4 2   < > 3 5 3 9   < > 3 8   CHLORIDE mmol/L 109*   < > 107 109*   < > 110*   CO2 mmol/L 23   < > 28 26   < > 24   BUN mg/dL 33*   < > 36* 27*   < > 26*   CREATININE mg/dL 1 14   < > 1 42* 1 22   < > 1 17   EGFR ml/min/1 73sq m 62   < > 48 57   < > 60   CALCIUM mg/dL 8 5   < > 8 7 8 7   < > 8 2*   AST U/L  --   --  22 39  --  94*   ALT U/L  --   --  44 55  --  71   ALK PHOS U/L  --   --  132* 159*  --  174*    < > = values in this interval not displayed  Results from last 7 days   Lab Units 09/08/20  1050 09/08/20  1049   BLOOD CULTURE  No Growth at 24 hrs  No Growth at 24 hrs       Imaging Studies:   9/8 CTA chest: Upper lobe patchy opacities, greater on the right, consistent with pneumonia, similar  Moderate pleural effusions, increased  Mediastinal lymphadenopathy    I have personally reviewed pertinent imaging study reports  Counseling/Coordination of care: Total 25 minutes including chart review, communication with the patient+RN via telehealth, and coordinating care  Labs, medical tests and imaging studies were independently reviewed by me as noted above  VIRTUAL VISIT DISCLAIMER  The patient has consented to an online visit or consultation  The patient understands that the online visit is based solely on information provided by them, and that, in the absence of a face-to-face physical evaluation by the physician, the diagnosis they receive are both limited and provisional in terms of accuracy and completeness  This is not intended to replace a full medical face-to-face evaluation by the physician  The patient understands and accepts these terms

## 2020-09-10 NOTE — PLAN OF CARE
Problem: OCCUPATIONAL THERAPY ADULT  Goal: Performs self-care activities at highest level of function for planned discharge setting  See evaluation for individualized goals  Description: Treatment Interventions: ADL retraining, Functional transfer training, UE strengthening/ROM, Endurance training, Cognitive reorientation, Patient/family training, Neuromuscular reeducation, Equipment evaluation/education, Compensatory technique education, Energy conservation, Activityengagement, Continued evaluation          See flowsheet documentation for full assessment, interventions and recommendations  Note: Limitation: Decreased ADL status, Decreased UE strength, Decreased Safe judgement during ADL, Decreased cognition, Decreased endurance, Decreased self-care trans, Decreased high-level ADLs  Prognosis: Good  Assessment: Pt is a 67 y/o M admitted to [facility] 9/1/2020 d/t experiencing "feeling cold with decreased appetite", confusion, and unsteady gait  Dx: septic shock  Pt was intubated 9/2/2020 for respiratory failure and extubating 9/6/2020  Pt with PMHx impacting performance during functional tasks including: DM, hyperlipidemia, HTN  Pt reports living in a 1 story home with 0 NHUNG with his wife  Pt reports being completely independent PTA including ADLs, IADLs, functional ambulation, community mobility + driving and working full time as a trunk   On evaluation, Pt A&Ox4  Pt completing UB dressing and grooming tasks @ S after set-up  Donning socks @ Min A  Max A for CM around waist d/t requiring BUE supported on RW at all times  STS and SPT with use of RW @ Max A  Pt BUE ROM and MS WFL  Pt's cognition is slowed and requires multiple repetitions for Pt to accurately follow  Pt scoring 45/100 on Barthel Index   Pt presents with decrease activity tolerance, decrease standing balance, decrease performance during ADL tasks, decrease cognition, decrease safety awareness , increase impulsiveness, decrease UB MS, decrease generalized strength, decrease activity engagement and decrease performance during functional transfers  Pt would benefit from continued acute OT services to address deficits as well as post acute rehab services upon d/c from [address] [place] [address] [place]    Recommendation: (post acute rehab)  OT Discharge Recommendation: Post-Acute Rehabilitation Services

## 2020-09-10 NOTE — RESPIRATORY THERAPY NOTE
RT Protocol Note  Leena Garcia 66 y o  male MRN: [id number]  Unit/Bed#: -01 Encounter: [id number]    Assessment    Principal Problem:    Septic shock ([place] [address] )  Active Problems:    Community acquired pneumonia    Elevated brain natriuretic peptide (BNP) level    Type 2 MI (myocardial infarction) ([place] [address] )    Type 2 diabetes mellitus, with long-term current use of insulin (HCC)    Acute respiratory failure with hypoxia (HCC)    Anemia    Cardiomyopathy ([place] [address] )      Home Pulmonary Medications:  None  Home Devices/Therapy: Other (Comment)(None)    Past Medical History:   Diagnosis Date    Diabetes mellitus ([place] [address] )     Hyperlipidemia     Hypertension      Social History     Socioeconomic History    Marital status: /Civil Union     Spouse name: None    Number of children: None    Years of education: None    Highest education level: None   Occupational History    None   Social Needs    Financial resource strain: None    Food insecurity     Worry: None     Inability: None    Transportation needs     Medical: None     Non-medical: None   Tobacco Use    Smoking status: Never Smoker    Smokeless tobacco: Never Used   Substance and Sexual Activity    Alcohol use: Not Currently     Frequency: Never    Drug use: Never    Sexual activity: Yes     Partners: Female   Lifestyle    Physical activity     Days per week: None     Minutes per session: None    Stress: None   Relationships    Social connections     Talks on phone: None     Gets together: None     Attends Caoda[employer] service: None     Active member of club or organization: None     Attends meetings of clubs or organizations: None     Relationship status: None    Intimate partner violence     Fear of current or ex partner: None     Emotionally abused: None     Physically abused: None     Forced sexual activity: None   Other Topics Concern    None   Social History Narrative    None       Subjective    Subjective Data: Patient is non smoker who denies use of respiratory medications at home or use of home O2  He denies any hsortness of breath or additional work of breathing  Objective    Physical Exam:   Assessment Type: Assess only  General Appearance: Alert, Awake  Respiratory Pattern: Normal  Chest Assessment: Chest expansion symmetrical  Bilateral Breath Sounds: Clear, Diminished  Cough: Non-productive  O2 Device: RA    Vitals:  Blood pressure 137/76, pulse 101, temperature 98 °F (36 7 °C), temperature source Oral, resp  rate 18, height 6' 3" (1 905 m), weight 79 3 kg (174 lb 13 2 oz), SpO2 96 %  Results from last 7 days   Lab Units 09/04/20  0508   PH ART  7 387   PCO2 ART mm Hg 31 1*   PO2 ART mm Hg 76 9   HCO3 ART mmol/L 18 3*   BASE EXC ART mmol/L -5 9   O2 CONTENT ART mL/dL 12 5*   O2 HGB, ARTERIAL % 94 7   ABG SOURCE  Line, Arterial   BETHANIE TEST  Yes       Imaging and other studies: I have personally reviewed pertinent reports  O2 Device: RA     Plan    Respiratory Plan: Mild Distress pathway        Resp Comments: Pt s/p extubation x4 days  Pt awake, alert, doing well with IS and sitting out in chair  Udn tx changed from Q6 to TID and prn via respiratory protocol

## 2020-09-10 NOTE — PROGRESS NOTES
Progress Note - Nephrology   Cynthia Platt 66 y o  male MRN: [id number]  Unit/Bed#: -01 Encounter: [id number]    A/P:  1  Acute kidney injury  Admitted with a creatinine of 3 51 mg/dl which has trended downward -> 1 14 mg/dl today  Unknown baseline  Nonoliguric: 576/1375 ml in 24 hours (-2574 since admission)  Etiology is probably resolving acute tubular necrosis due to septic shock and renal hypoperfusion  2  Septic shock with VDRF  He has been extubated and is doing very well, oxygenating adequately  He is receiving ceftriaxone 2 g q 24 hours and metronidazole 500 IV q 8 h  Source of sepsis unclear as cultures negative  He has upper lobe opacities on CTA and had a nuclear hepatobiliary scan yesterday , the results of which are pending at this time  ID consult noted and appreciated  3  Type 2 DM with long term use of insulin  He has mild microalbuminuria (60 mg/g)  Will benefit from ACEI when stable  Will need outpatient follow up with renal           Follow up reason for today's visit: Acute kidney injury     Septic shock [facility])    Patient Active Problem List   Diagnosis    Septic shock ([facility] [address] )    Community acquired pneumonia    Elevated brain natriuretic peptide (BNP) level    Type 2 MI (myocardial infarction) ([facility] )    Type 2 diabetes mellitus, with long-term current use of insulin (HCC)    Acute respiratory failure with hypoxia (HCC)    Anemia         Subjective:   Feels well  Specifically he denies dizziness, headache, chest pain, dyspnea, abdominal pain NV or dysuria  Objective:     Vitals: Blood pressure 137/76, pulse 101, temperature 98 °F (36 7 °C), temperature source Oral, resp  rate 18, height 6' 3" (1 905 m), weight 79 3 kg (174 lb 13 2 oz), SpO2 96 %  ,Body mass index is 21 85 kg/m²      Weight (last 2 days)     Date/Time   Weight    09/10/20 0600   79 3 (174 83)    09/09/20 0415   78 5 (173 06)    09/08/20 0547   82 8 (182 54)                Intake/Output Summary (Last 24 hours) at 9/10/2020 1243  Last data filed at 9/10/2020 1201  Gross per 24 hour   Intake 1705 6 ml   Output 1735 ml   Net -29 4 ml     I/O last 3 completed shifts: In: 1423 4 [P O :640; I V :438 7; IV Piggyback:344 7]  Out: 2025 [Urine:2025]         Physical Exam: /76 (BP Location: Right arm)   Pulse 101   Temp 98 °F (36 7 °C) (Oral)   Resp 18   Ht 6' 3" (1 905 m)   Wt 79 3 kg (174 lb 13 2 oz)   SpO2 96%   BMI 21 85 kg/m²     General Appearance:    Alert, cooperative, no distress, appears stated age   Head:    Normocephalic, without obvious abnormality, atraumatic   Eyes:    Conjunctiva/corneas clear   Ears:    Normal external ears   Nose:   Nares normal, septum midline, mucosa normal, no drainage    or sinus tenderness   Throat:   Lips, mucosa, and tongue normal; teeth and gums normal   Neck:   Supple, symmetrical, trachea midline, no adenopathy;        thyroid:  No enlargement/tenderness/nodules; no carotid    bruit or JVD   Back:     Symmetric, no curvature, ROM normal, no CVA tenderness   Lungs:     Clear to auscultation bilaterally, respirations unlabored   Chest wall:    No tenderness or deformity   Heart:    Regular rate and rhythm, S1 and S2 normal, no murmur, rub   or gallop   Abdomen:     Soft, non-tender, bowel sounds active   Extremities:   Extremities normal, atraumatic, no cyanosis or edema   Skin:   Skin color, texture, turgor normal, no rashes or lesions   Lymph nodes:   Cervical normal   Neurologic:   CNII-XII intact            Lab, Imaging and other studies: I have personally reviewed pertinent labs    CBC:   Lab Results   Component Value Date    WBC 8 31 09/10/2020    HGB 10 4 (L) 09/10/2020    HCT 33 6 (L) 09/10/2020    MCV 89 09/10/2020     (H) 09/10/2020    MCH 27 6 09/10/2020    MCHC 31 0 (L) 09/10/2020    RDW 15 1 09/10/2020    MPV 9 5 09/10/2020     CMP:   Lab Results   Component Value Date    K 4 2 09/10/2020     (H) 09/10/2020    CO2 23 09/10/2020    BUN 33 (H) 09/10/2020    CREATININE 1 14 09/10/2020    CALCIUM 8 5 09/10/2020    EGFR 62 09/10/2020         Results from last 7 days   Lab Units 09/10/20  0517 09/09/20  0808 09/09/20  0421 09/08/20  0533  09/06/20  0404   POTASSIUM mmol/L 4 2 3 6 3 5 3 9   < > 3 8   CHLORIDE mmol/L 109* 111* 107 109*   < > 110*   CO2 mmol/L 23 28 28 26   < > 24   BUN mg/dL 33* 37* 36* 27*   < > 26*   CREATININE mg/dL 1 14 1 30 1 42* 1 22   < > 1 17   CALCIUM mg/dL 8 5 8 8 8 7 8 7   < > 8 2*   ALK PHOS U/L  --   --  132* 159*  --  174*   ALT U/L  --   --  44 55  --  71   AST U/L  --   --  22 39  --  94*    < > = values in this interval not displayed  Phosphorus: No results found for: PHOS  Magnesium: No results found for: MG  Urinalysis: No results found for: Kimberly Bhavya, SPECGRAV, PHUR, LEUKOCYTESUR, NITRITE, PROTEINUA, GLUCOSEU, KETONESU, BILIRUBINUR, BLOODU  Ionized Calcium: No results found for: CAION  Coagulation: No results found for: PT, INR, APTT  Troponin: No results found for: TROPONINI  ABG: No results found for: PHART, RRL1IEJ, PO2ART, RXO9CRW, T3GQRIGT, BEART, SOURCE  Radiology review:     IMAGING  Procedure: Xr Chest Portable    Result Date: 9/8/2020  Narrative: CHEST INDICATION:   fever  COMPARISON:  Chest radiograph from 9/6/2020 and 9/5/2020  Chest CT from 9/1/2020  EXAM PERFORMED/VIEWS:  XR CHEST PORTABLE FINDINGS:  Lines and tubes removed  Cardiomediastinal silhouette appears unremarkable  Improving edema  No effusion or pneumothorax  Nodularity in the upper lobes better shown on CT  Osseous structures appear within normal limits for patient age  Impression: Improving edema  Underlying pneumonia cannot be excluded  Workstation performed: [id number]     Procedure: Cta Chest Pe Study    Result Date: 9/8/2020  Narrative: CTA - CHEST WITH IV CONTRAST - PULMONARY ANGIOGRAM INDICATION:   Shortness of breath   Patient admitted with septic shock possibly from pneumonia COMPARISON: CT chest abdomen pelvis 9/1/20 TECHNIQUE: CTA examination of the chest was performed using angiographic technique according to a protocol specifically tailored to evaluate for pulmonary embolism  Axial, sagittal, and coronal 2D reformatted images were created from the source data and  submitted for interpretation  In addition, coronal 3D MIP postprocessing was performed on the acquisition scanner  Radiation dose length product (DLP) for this visit:  562 mGy-cm   This examination, like all CT scans performed in the [facility], was performed utilizing techniques to minimize radiation dose exposure, including the use of iterative reconstruction and automated exposure control  IV Contrast:  85 mL of iodixanol (VISIPAQUE)  FINDINGS: PULMONARY ARTERIAL TREE:  Exam is limited by respiratory motion artifact causing volume averaging in the middle lobe and the apices  No pulmonary embolism visualized LUNGS:  Again noted are interstitial and airspace opacities in the upper lobes as noted on series 3/43, this appears similar  Diffuse interstitial opacities PLEURA:  Moderate pleural effusions, increased from prior HEART/GREAT VESSELS:  Trace pericardial fluid MEDIASTINUM AND YUKI:  1 7 cm subcarinal lymph node series 2/121 1 1 cm right paratracheal lymph node series 2/69, with additional smaller lymph nodes in this region CHEST WALL AND LOWER NECK:   Focus of air noted within the left sternocleidomastoid seen on series 2/8, and the adjacent left IJ on series 2/33 consistent with recent IJ central catheter VISUALIZED STRUCTURES IN THE UPPER ABDOMEN:  Unremarkable  OSSEOUS STRUCTURES:  No acute fracture or destructive osseous lesion  Impression: 1  Limited examination as described, no evidence of pulmonary embolism 2  Upper lobe patchy opacities, greater on the right, consistent with pneumonia, similar 3  Moderate pleural effusions, increased 4    Mediastinal lymphadenopathy Workstation performed: [id number]     Procedure: Vas Lower Limb Venous Duplex Study, Complete Bilateral    Result Date: 9/8/2020  Narrative:  [facility] REPORT CLINICAL: Indications: Patient presents with bilateral lower extremity edema x 3 days and shortness of breath  Operative History: Denies cardiovascular intervention Risk Factors The patient has history of Diabetes (IDDM) and AKD  FINDINGS:  Segment  Right            Left              Impression       Impression       CFV      Normal (Patent)  Normal (Patent)     CONCLUSION: Impression: RIGHT LOWER LIMB: No evidence of acute or chronic deep vein thrombosis  No evidence of superficial thrombophlebitis noted  Doppler evaluation shows a normal response to augmentation maneuvers  Popliteal, posterior tibial and anterior tibial arterial Doppler waveforms are triphasic  LEFT LOWER LIMB: No evidence of acute or chronic deep vein thrombosis  No evidence of superficial thrombophlebitis noted  Doppler evaluation shows a normal response to augmentation maneuvers  Popliteal, posterior tibial and anterior tibial arterial Doppler waveforms are triphasic    Technical findings were given to Mark Crocker 18: Electronically Signed by: Favio Mann MD, RPVI on 2020-09-08 04:27:39 PM      Current Facility-Administered Medications   Medication Dose Route Frequency    acetaminophen (TYLENOL) oral suspension 650 mg  650 mg Oral Q4H PRN    acetaminophen (TYLENOL) rectal suppository 325 mg  325 mg Rectal Q4H PRN    aspirin chewable tablet 81 mg  81 mg Oral Daily    cefTRIAXone (ROCEPHIN) IVPB (premix) 2,000 mg 50 mL  2,000 mg Intravenous Q24H    guaiFENesin (ROBITUSSIN) oral solution 200 mg  200 mg Oral Q4H    heparin (porcine) subcutaneous injection 5,000 Units  5,000 Units Subcutaneous Q8H [address]    insulin glargine (LANTUS) subcutaneous injection 14 Units 0 14 mL  14 Units Subcutaneous HS    insulin lispro (HumaLOG) 100 units/mL subcutaneous injection 1-5 Units  1-5 Units Subcutaneous HS    insulin lispro (HumaLOG) 100 units/mL subcutaneous injection 1-6 Units  1-6 Units Subcutaneous TID AC    insulin lispro (HumaLOG) 100 units/mL subcutaneous injection 3 Units  3 Units Subcutaneous TID With Meals    ipratropium (ATROVENT) 0 02 % inhalation solution 0 5 mg  0 5 mg Nebulization Q6H While awake    levalbuterol (XOPENEX) inhalation solution 0 63 mg  0 63 mg Nebulization Q6H PRN    levalbuterol (XOPENEX) inhalation solution 1 25 mg  1 25 mg Nebulization Q6H While awake    metoprolol (LOPRESSOR) injection 5 mg  5 mg Intravenous Q6H PRN    metroNIDAZOLE (FLAGYL) IVPB (premix) 500 mg 100 mL  500 mg Intravenous Q8H    nystatin (MYCOSTATIN) oral suspension 500,000 Units  500,000 Units Swish & Swallow 4x Daily    pravastatin (PRAVACHOL) tablet 20 mg  20 mg Oral Daily With Dinner     Medications Discontinued During This Encounter   Medication Reason    sodium chloride 0 9 % bolus 1,000 mL     sodium chloride 0 9 % bolus 1,000 mL     lisinopril (ZESTRIL) 10 mg tablet     linaGLIPtin-metFORMIN HCl (JENTADUETO XR PO) Discontinued by another clinician    heparin (porcine) subcutaneous injection 5,000 Units     cefTRIAXone (ROCEPHIN) IVPB (premix) 1,000 mg 50 mL     heparin (ACS LOW) Duplicate order    pravastatin (PRAVACHOL) tablet 40 mg     acetaminophen (TYLENOL) tablet 650 mg     azithromycin (ZITHROMAX) 500 mg in sodium chloride 0 9 % 250 mL IVPB     insulin lispro (HumaLOG) 100 units/mL subcutaneous injection 1-6 Units     phenylephrine (REG-SYNEPHRINE) 50 mg (STANDARD CONCENTRATION) in sodium chloride 0 9% 250 mL     ipratropium (ATROVENT) 0 02 % inhalation solution 0 5 mg     levalbuterol (XOPENEX) inhalation solution 1 25 mg     cefepime (MAXIPIME) IVPB (premix) 1,000 mg 50 mL     vasopressin (PITRESSIN) 20 Units in sodium chloride 0 9 % 100 mL infusion     cefepime (MAXIPIME) IVPB (premix) 1,000 mg 50 mL     vancomycin (VANCOCIN) IVPB (premix) 1,000 mg 200 mL     heparin (porcine) 25,000 units in 0 45% NaCl 250 mL infusion (premix)     heparin (porcine) injection 4,000 Units     heparin (porcine) injection 2,000 Units     bisacodyl (DULCOLAX) rectal suppository 10 mg     polyethylene glycol (MIRALAX) packet 17 g     propofol (DIPRIVAN) 1000 mg in 100 mL infusion (premix)     fentanyl citrate (PF) 100 MCG/2ML 100 mcg     fentaNYL 1000 mcg in sodium chloride 0 9% 100mL infusion     norepinephrine (LEVOPHED) 4 mg (STANDARD CONCENTRATION) IV in sodium chloride 0 9% 250 mL     insulin regular (HumuLIN R,NovoLIN R) 1 Units/mL in sodium chloride 0 9 % 100 mL infusion     acetaminophen (TYLENOL) oral suspension 650 mg     polyethylene glycol (MIRALAX) packet 17 g     senna 8 8 mg/5 mL oral syrup 8 8 mg     ipratropium (ATROVENT) 0 02 % inhalation solution 0 5 mg     levalbuterol (XOPENEX) inhalation solution 1 25 mg     metroNIDAZOLE (FLAGYL) IVPB (premix) 500 mg 100 mL     acetaminophen (TYLENOL) chewable tablet 480 mg     cefepime (MAXIPIME) IVPB (premix) 2,000 mg 50 mL     vancomycin (VANCOCIN) 1,750 mg in sodium chloride 0 9 % 500 mL IVPB     cefTRIAXone (ROCEPHIN) IVPB (premix) 1,000 mg 50 mL     guaiFENesin (ROBITUSSIN) oral solution 200 mg     chlorhexidine (PERIDEX) 0 12 % oral rinse 15 mL     insulin lispro (HumaLOG) 100 units/mL subcutaneous injection 1-6 Units     insulin glargine (LANTUS) subcutaneous injection 10 Units 0 1 mL     insulin regular (HumuLIN R,NovoLIN R) 1 Units/mL in sodium chloride 0 9 % 100 mL infusion     famotidine (PEPCID) injection 20 mg     dextrose 5 % in lactated Ringer's infusion     senna 8 8 mg/5 mL oral syrup 8 8 mg     senna-docusate sodium (SENOKOT S) 8 6-50 mg per tablet 1 tablet     bisacodyl (DULCOLAX) rectal suppository 10 mg     polyethylene glycol (MIRALAX) packet 17 g        Jessie Salcedo MD      This progress note was produced in part using a dictation device which may document imprecise wording from author's original intent

## 2020-09-11 PROBLEM — R19.7 DIARRHEA: Status: ACTIVE | Noted: 2020-09-11

## 2020-09-11 LAB
ANION GAP SERPL CALCULATED.3IONS-SCNC: 10 MMOL/L (ref 4–13)
BASOPHILS # BLD AUTO: 0.09 THOUSANDS/ΜL (ref 0–0.1)
BASOPHILS NFR BLD AUTO: 1 % (ref 0–1)
BUN SERPL-MCNC: 21 MG/DL (ref 5–25)
CALCIUM SERPL-MCNC: 8.7 MG/DL (ref 8.3–10.1)
CHLORIDE SERPL-SCNC: 108 MMOL/L (ref 100–108)
CO2 SERPL-SCNC: 25 MMOL/L (ref 21–32)
CREAT SERPL-MCNC: 1.01 MG/DL (ref 0.6–1.3)
EOSINOPHIL # BLD AUTO: 0.6 THOUSAND/ΜL (ref 0–0.61)
EOSINOPHIL NFR BLD AUTO: 8 % (ref 0–6)
ERYTHROCYTE [DISTWIDTH] IN BLOOD BY AUTOMATED COUNT: 14.8 % (ref 11.6–15.1)
GFR SERPL CREATININE-BSD FRML MDRD: 72 ML/MIN/1.73SQ M
GLUCOSE SERPL-MCNC: 109 MG/DL (ref 65–140)
GLUCOSE SERPL-MCNC: 111 MG/DL (ref 65–140)
GLUCOSE SERPL-MCNC: 125 MG/DL (ref 65–140)
GLUCOSE SERPL-MCNC: 187 MG/DL (ref 65–140)
GLUCOSE SERPL-MCNC: 198 MG/DL (ref 65–140)
GLUCOSE SERPL-MCNC: 202 MG/DL (ref 65–140)
HCT VFR BLD AUTO: 34.5 % (ref 36.5–49.3)
HGB BLD-MCNC: 10.9 G/DL (ref 12–17)
IMM GRANULOCYTES # BLD AUTO: 0.09 THOUSAND/UL (ref 0–0.2)
IMM GRANULOCYTES NFR BLD AUTO: 1 % (ref 0–2)
LYMPHOCYTES # BLD AUTO: 2.11 THOUSANDS/ΜL (ref 0.6–4.47)
LYMPHOCYTES NFR BLD AUTO: 28 % (ref 14–44)
MCH RBC QN AUTO: 28.2 PG (ref 26.8–34.3)
MCHC RBC AUTO-ENTMCNC: 31.6 G/DL (ref 31.4–37.4)
MCV RBC AUTO: 89 FL (ref 82–98)
MONOCYTES # BLD AUTO: 0.54 THOUSAND/ΜL (ref 0.17–1.22)
MONOCYTES NFR BLD AUTO: 7 % (ref 4–12)
NEUTROPHILS # BLD AUTO: 4.12 THOUSANDS/ΜL (ref 1.85–7.62)
NEUTS SEG NFR BLD AUTO: 55 % (ref 43–75)
NRBC BLD AUTO-RTO: 0 /100 WBCS
PLATELET # BLD AUTO: 504 THOUSANDS/UL (ref 149–390)
PMV BLD AUTO: 9.8 FL (ref 8.9–12.7)
POTASSIUM SERPL-SCNC: 3.9 MMOL/L (ref 3.5–5.3)
PROCALCITONIN SERPL-MCNC: 0.31 NG/ML
RBC # BLD AUTO: 3.87 MILLION/UL (ref 3.88–5.62)
SARS-COV-2 RNA RESP QL NAA+PROBE: NEGATIVE
SODIUM SERPL-SCNC: 143 MMOL/L (ref 136–145)
WBC # BLD AUTO: 7.55 THOUSAND/UL (ref 4.31–10.16)

## 2020-09-11 PROCEDURE — 85025 COMPLETE CBC W/AUTO DIFF WBC: CPT | Performed by: INTERNAL MEDICINE

## 2020-09-11 PROCEDURE — 99233 SBSQ HOSP IP/OBS HIGH 50: CPT | Performed by: INTERNAL MEDICINE

## 2020-09-11 PROCEDURE — 97535 SELF CARE MNGMENT TRAINING: CPT

## 2020-09-11 PROCEDURE — 87635 SARS-COV-2 COVID-19 AMP PRB: CPT | Performed by: INTERNAL MEDICINE

## 2020-09-11 PROCEDURE — 94640 AIRWAY INHALATION TREATMENT: CPT

## 2020-09-11 PROCEDURE — 92526 ORAL FUNCTION THERAPY: CPT

## 2020-09-11 PROCEDURE — 82948 REAGENT STRIP/BLOOD GLUCOSE: CPT

## 2020-09-11 PROCEDURE — 97530 THERAPEUTIC ACTIVITIES: CPT

## 2020-09-11 PROCEDURE — 94760 N-INVAS EAR/PLS OXIMETRY 1: CPT

## 2020-09-11 PROCEDURE — 97116 GAIT TRAINING THERAPY: CPT

## 2020-09-11 PROCEDURE — 80048 BASIC METABOLIC PNL TOTAL CA: CPT | Performed by: INTERNAL MEDICINE

## 2020-09-11 PROCEDURE — 84145 PROCALCITONIN (PCT): CPT | Performed by: INTERNAL MEDICINE

## 2020-09-11 RX ORDER — FERROUS SULFATE 325(65) MG
325 TABLET ORAL
Status: DISCONTINUED | OUTPATIENT
Start: 2020-09-12 | End: 2020-09-12 | Stop reason: HOSPADM

## 2020-09-11 RX ORDER — LISINOPRIL 5 MG/1
5 TABLET ORAL DAILY
Status: DISCONTINUED | OUTPATIENT
Start: 2020-09-11 | End: 2020-09-12 | Stop reason: HOSPADM

## 2020-09-11 RX ADMIN — IPRATROPIUM BROMIDE 0.5 MG: 0.5 SOLUTION RESPIRATORY (INHALATION) at 20:37

## 2020-09-11 RX ADMIN — LEVALBUTEROL HYDROCHLORIDE 1.25 MG: 1.25 SOLUTION, CONCENTRATE RESPIRATORY (INHALATION) at 13:51

## 2020-09-11 RX ADMIN — IPRATROPIUM BROMIDE 0.5 MG: 0.5 SOLUTION RESPIRATORY (INHALATION) at 13:51

## 2020-09-11 RX ADMIN — IPRATROPIUM BROMIDE 0.5 MG: 0.5 SOLUTION RESPIRATORY (INHALATION) at 08:50

## 2020-09-11 RX ADMIN — HEPARIN SODIUM 5000 UNITS: 5000 INJECTION INTRAVENOUS; SUBCUTANEOUS at 21:28

## 2020-09-11 RX ADMIN — INSULIN GLARGINE 16 UNITS: 100 INJECTION, SOLUTION SUBCUTANEOUS at 21:31

## 2020-09-11 RX ADMIN — LEVALBUTEROL HYDROCHLORIDE 1.25 MG: 1.25 SOLUTION, CONCENTRATE RESPIRATORY (INHALATION) at 08:50

## 2020-09-11 RX ADMIN — ASPIRIN 81 MG 81 MG: 81 TABLET ORAL at 08:03

## 2020-09-11 RX ADMIN — LISINOPRIL 5 MG: 5 TABLET ORAL at 14:02

## 2020-09-11 RX ADMIN — HEPARIN SODIUM 5000 UNITS: 5000 INJECTION INTRAVENOUS; SUBCUTANEOUS at 14:02

## 2020-09-11 RX ADMIN — INSULIN LISPRO 1 UNITS: 100 INJECTION, SOLUTION INTRAVENOUS; SUBCUTANEOUS at 21:27

## 2020-09-11 RX ADMIN — LEVALBUTEROL HYDROCHLORIDE 1.25 MG: 1.25 SOLUTION, CONCENTRATE RESPIRATORY (INHALATION) at 20:37

## 2020-09-11 RX ADMIN — METOPROLOL SUCCINATE 50 MG: 50 TABLET, EXTENDED RELEASE ORAL at 08:01

## 2020-09-11 RX ADMIN — HEPARIN SODIUM 5000 UNITS: 5000 INJECTION INTRAVENOUS; SUBCUTANEOUS at 05:35

## 2020-09-11 NOTE — CASE MANAGEMENT
Auth was received from Yisel at [facility] for 2 business days  Information placed in [employer]  Plan is Garden at [facility] for approximate time of 10 AM WC

## 2020-09-11 NOTE — PHYSICAL THERAPY NOTE
PHYSICAL THERAPY TREATMENT NOTE  NAME:  Rere Flor  DATE: 09/11/20    Length Of Stay: 10  Performed at least 2 patient identifiers during session: Name and Birthday    TREATMENT:      09/11/20 1411   PT Last Visit   PT Visit Date 09/11/20   Pain Assessment   Pain Assessment Tool 0-10   Pain Score No Pain   Restrictions/Precautions   Other Precautions Cognitive; Chair Alarm; Bed Alarm; Fall Risk   General   Chart Reviewed Yes   Response to Previous Treatment Patient with no complaints from previous session  Cognition   Following Commands Follows one step commands with increased time or repetition   Subjective   Subjective "My wife is coming up "   Bed Mobility   Additional Comments Pt sitting OOB in recliner upon arrival and at end of session with all needs wihtin reach and posey alarm on  Transfers   Sit to Stand 3  Moderate assistance  (progressing to miNAx1 then to steadying assistance)   Additional items Assist x 1; Increased time required;Verbal cues   Stand to Sit   (steadying assistance)   Additional items Assist x 1; Increased time required;Verbal cues   Stand pivot 3  Moderate assistance   Additional items Assist x 1; Increased time required;Verbal cues  (progressing to miNAx1)   Additional Comments sit to stand from recliner  first trial with modAx1 with increased posterior lean upon standing requiring verbal cues for anterior wt shift and upright posture  modAx1 to maintain standing then progressing to miNAx1  mod cues for hand placement and technique for initial sit to stand transfer  sit to stand from low surface with modAx1 then progressing sit to stand transfers to minAx1 with verbal cues for technique with anterior wt shift, hand placement and then progressing to steadying assistance with improved transition of UEs to RW from chair   improved anterior wt shift with steadying assistance to maintain standing balance upon standing w B UE support on RW  spt with RW with modAx1 with manaul cues for wt shifting  progressed to minAx1 with improved wt shifting  min cues for LE placement prior to sit to stand transfers  stand to sit with minAx1 with min cues for hand placement and controlled descent   Ambulation/Elevation   Gait pattern Wide KEVIN; Decreased foot clearance; Short stride; Excessively slow; Improper Weight shift  (increased knee flexion, decreased hip ext)   Gait Assistance 3  Moderate assist  (progressing to miNAx1)   Additional items Assist x 1;Verbal cues   Assistive Device Rolling walker   Distance 14'x1 with RW with modAx1 with short shuffled steps with increased knee flexion  min cues for upright posture and increased step length  2nd trial ambulated 40'x1 with RW with miNAx1 with improved step length and foot clearance wiht min cues for step length and uprihgt posture  pt's spouse present during 2nd ambulation trial  further ambulaiton limited by fatigue   Balance   Static Sitting Good   Dynamic Sitting Fair +   Static Standing   (poor to fair -)   Ambulatory   (poor to poor +)   Endurance Deficit   Endurance Deficit Yes   Endurance Deficit Description fatigue  SpO2 90-94% on room air   Activity Tolerance   Activity Tolerance Patient limited by fatigue   Medical Staff Made Aware Jamison TORRE   Nurse Made Aware Zandra RIOS   Exercises   Hip Flexion Sitting;10 reps;AROM; Bilateral   Knee AROM Long Arc Quad Sitting;10 reps;AROM; Bilateral   Ankle Pumps Sitting;10 reps;AROM; Bilateral   Assessment   Prognosis Excellent   Problem List Decreased strength;Decreased endurance; Impaired balance;Decreased mobility; Impaired judgement;Decreased safety awareness;Decreased cognition;Decreased range of motion   Barriers to Discharge Decreased caregiver support; Inaccessible home environment   Barriers to Discharge Comments increased assistance for mobility   Goals   PT Treatment Day 2   Plan   Treatment/Interventions Functional transfer training;LE strengthening/ROM; Elevations; Therapeutic exercise; Endurance training;Patient/family training;Equipment eval/education; Bed mobility;Gait training; Compensatory technique education;Spoke to nursing;OT   Progress Progressing toward goals   PT Frequency 5x/wk   Recommendation   PT Discharge Recommendation Post-Acute Rehabilitation Services   Equipment Recommended   (TBD by rehab)   PT - OK to Discharge   (when medically cleared to rehab)     Pt tolerated session fairly limited by fatigue  He progressed transfers and ambulation level of assistance within session and ambulated increased distance this date  He requires min cues for proper completion of LE therapeutic exercise to facilitate strengthening for mobility  He is limited by decreased strength, balance, endurance, safety  He is slow to respond and process commands at times  He will continue to benefit from PT services to maximize LOF       Allison Fonseca, PT,DPT

## 2020-09-11 NOTE — ASSESSMENT & PLAN NOTE
3-4 loose stool, non foul-smelling  Less likely to be C diff  Will keep monitoring patient today  Appreciate Nutrition consult

## 2020-09-11 NOTE — PLAN OF CARE
Problem: OCCUPATIONAL THERAPY ADULT  Goal: Performs self-care activities at highest level of function for planned discharge setting  See evaluation for individualized goals  Description: Treatment Interventions: ADL retraining, Functional transfer training, UE strengthening/ROM, Endurance training, Cognitive reorientation, Patient/family training, Neuromuscular reeducation, Equipment evaluation/education, Compensatory technique education, Energy conservation, Activityengagement, Continued evaluation          See flowsheet documentation for full assessment, interventions and recommendations  Outcome: Progressing  Note: Limitation: Decreased ADL status, Decreased UE strength, Decreased Safe judgement during ADL, Decreased cognition, Decreased endurance, Decreased self-care trans, Decreased high-level ADLs  Prognosis: Good  Assessment: Pt seen for treatment session #1  Pt alert and agreeable to participate at this time  Pt with great improvement in functional transfers, standing balance, and ADL tasks compared to previous therapy session  Pt continues to demonstrate decrease activity tolerance, decrease standing balance, decrease performance during ADL tasks, decrease cognition, decrease safety awareness , decrease UB MS, decrease generalized strength, decrease activity engagement and decrease performance during functional transfers although is consistently making gains towards goals  Pt is expected to reach goals with continued therapy services  Upon end of session, Pt seated OOB in recliner with call bell in reach, chair alarm intact, wife at bedside and all needs met at this time     Recommendation: (post acute rehab)  OT Discharge Recommendation: Post-Acute Rehabilitation Services

## 2020-09-11 NOTE — SPEECH THERAPY NOTE
Speech/Language Pathology Progress Note    Patient Name: Ami Underwood Date: 9/11/2020       Subjective:  Pt awake and alert  Agreeable to trial of mech soft solid for ST today  Objective:  Pt seen for diagnostic swallow therapy  Current diet is puree and thin liquids  He was assessed today with a trial of crushed cookies in pudding  Mastication was slow but complete and piecemeal deglutition was observed  Swallow initiation appeared prompt with adequate hyolaryngeal rise  No s/s aspiration occurred with trials of mech soft and thin liquids  Assessment:  Swallow is [place] for advancement to dysphagia 2 and to continue thin liquids  Plan/Recommendations:  Continue thin liquids  Advance to mech soft  ST will continue to follow

## 2020-09-11 NOTE — PLAN OF CARE
Problem: PHYSICAL THERAPY ADULT  Goal: Performs mobility at highest level of function for planned discharge setting  See evaluation for individualized goals  Description: Treatment/Interventions: Functional transfer training, LE strengthening/ROM, Therapeutic exercise, Endurance training, Cognitive reorientation, Patient/family training, Equipment eval/education, Bed mobility, Gait training, Compensatory technique education, Spoke to nursing, Spoke to case management, OT  Equipment Recommended: (TBD by rehab)       See flowsheet documentation for full assessment, interventions and recommendations  Outcome: Progressing  Note: Prognosis: Excellent  Problem List: Decreased strength, Decreased endurance, Impaired balance, Decreased mobility, Impaired judgement, Decreased safety awareness, Decreased cognition, Decreased range of motion  Assessment: Pt tolerated session fairly limited by fatigue  He progressed transfers and ambulation level of assistance within session and ambulated increased distance this date  He requires min cues for proper completion of LE therapeutic exercise to facilitate strengthening for mobility  He is limited by decreased strength, balance, endurance, safety  He is slow to respond and process commands at times  He will continue to benefit from PT services to maximize LOF  Barriers to Discharge: Decreased caregiver support, Inaccessible home environment  Barriers to Discharge Comments: increased assistance for mobility  PT Discharge Recommendation: Post-Acute Rehabilitation Services     PT - OK to Discharge: (when medically cleared to rehab)    See flowsheet documentation for full assessment

## 2020-09-11 NOTE — OCCUPATIONAL THERAPY NOTE
[address] Progress Note     Patient Name: Zeinab Nina  Today's Date: 9/11/2020  Problem List  Principal Problem:    Septic shock ([facility] )  Active Problems:    Community acquired pneumonia    Elevated brain natriuretic peptide (BNP) level    Type 2 [facility] (myocardial infarction) ([facility] )    Type 2 diabetes mellitus, with long-term current use of insulin (HCC)    Acute respiratory failure with hypoxia (HCC)    Anemia    Cardiomyopathy ([facility] )    Diarrhea          09/11/20 1410   Restrictions/Precautions   Other Precautions Cognitive; Chair Alarm; Bed Alarm; Fall Risk   General   Family/Caregiver Present Pt's wife present for 2nd half of therapy session   Pain Assessment   Pain Assessment Tool 0-10   Pain Score No Pain   ADL   Grooming Assistance 5  Supervision/Setup   Grooming Deficit Setup   Grooming Comments grooming completed while seated on recliner   Toileting Assistance  3  Moderate Assistance   Toileting Deficit Steadying;Verbal cueing;Supervison/safety; Increased time to complete;Grab bar use;Clothing management up;Clothing management down;Perineal hygiene   Toileting Comments Pt completing toileting tasks with use of RW and grab bar for UB support  Pt requiring Mod A for CM and pericare while standing  Mod A for STS from toilet>RW  Bed Mobility   Additional Comments Pt seated OOB in recliner at start and end of sessin   Transfers   Sit to Stand 3  Moderate assistance  (progressing to Min/Steadying for STS from recliner)   Additional items Assist x 1; Increased time required;Verbal cues   Stand to Sit   [facility] Assist)   Additional items Assist x 1; Increased time required;Verbal cues   Stand pivot 3  Moderate assistance  (progressing to Min A)   Additional items Assist x 1; Increased time required;Verbal cues   Additional Comments Pt completing STS from recliner>RW @ Mod A  Pt then ambulating short distance ambulation into restroom with RW @ Min A  Pt requiring Mod A for STS from toilet   Pt then completing STS x's 3 from recliner>RW @ Min A on 1st 2 attempts then progressing to [address]    Activity Tolerance   Activity Tolerance Patient tolerated treatment well;Patient limited by fatigue   Medical Staff Made Aware Spoke with RN, Nayeli Loaiza and Nick  Spoke with PT, Jose Kim   Assessment   Assessment Pt seen for treatment session #1  Pt alert and agreeable to participate at this time  Pt with great improvement in functional transfers, standing balance, and ADL tasks compared to previous therapy session  Pt continues to demonstrate decrease activity tolerance, decrease standing balance, decrease performance during ADL tasks, decrease cognition, decrease safety awareness , decrease UB MS, decrease generalized strength, decrease activity engagement and decrease performance during functional transfers although is consistently making gains towards goals  Pt is expected to reach goals with continued therapy services  Upon end of session, Pt seated OOB in recliner with call bell in reach, chair alarm intact, wife at bedside and all needs met at this time  Plan   Treatment Interventions ADL retraining;Functional transfer training;UE strengthening/ROM; Endurance training;Cognitive reorientation;Patient/family training;Equipment evaluation/education; Neuromuscular reeducation; Compensatory technique education;Continued evaluation; Energy conservation; Activityengagement   Goal Expiration Date 09/20/20   OT Treatment Day 1   OT Frequency 3-5x/wk   Recommendation   Recommendation   (post acute rehab)   OT Discharge Recommendation Post-Acute Rehabilitation Services     Pt goals to be met by 9/20/2020     1  Pt will demonstrate ability to complete LB dressing @ S in order to increase safety and independence during meaningful tasks  2  Pt will demonstrate ability to jung/doff socks/shoes while sitting EOB @ S in order to increase safety and independence during meaningful tasks     3  Pt will demonstrate ability to complete toileting tasks including CM and pericare @ S in order to increase safety and independence during meaningful tasks  4  Pt will demonstrate ability to complete EOB, chair, toilet/commode transfers @ S in order to increase performance and participation during functional tasks  5  Pt will demonstrate ability to stand for 7-8 minutes while maintaining G balance with use of RW for UB support PRN  6  Pt will demonstrate ability to tolerate 30-35 minute OT session with no vc'ing for deep breathing or use of energy conservation techniques in order to increase activity tolerance during functional tasks  7  Pt will demonstrate Good carryover of use of energy conservation/compensatory strategies during ADLs and functional tasks in order to increase safety and reduce risk for falls  8  Pt will demonstrate Good attention and participation in continued evaluation of functional ambulation house hold distances in order to assist with safe d/c planning  9  Pt will attend to continued cognitive assessments 100% of the time in order to provide most appropriate d/c recommendations  10  Pt will follow 100% simple 2-step commands and be A&O x4 consistently with environmental cues to increase participation in functional activities  11  Pt will identify 3 areas of interest/hobbies and 1 intervention on how to incorporate into daily life in order to increase interaction with environment and peers as well as increase participation in meaningful tasks     12  Pt will demonstrate 100% carryover of BUE HEP in order to increase BUE MS and increase performance during functional tasks upon d/c home      Bertha Brody OTR/L

## 2020-09-11 NOTE — UTILIZATION REVIEW
Continued Stay Review    Date: 9/11                    Current Patient Class: INpatient   Current Level of Care: ICU to Med/surg    HPI:74 y o  male initially admitted on 9/1  Assessment/Plan:   9/11 UPdate:  Septic shock treated with IV abx  Saturating well on room air  PROcal trending down  Has started with loose stools  Continue to monitor  Extubated 9/6  Case management working on [facility] [employer] when patient stable for dc to STR   9/11 Nephrology consult: CHERISE recovered  Etiology acute tubular necrosis due to septic shock and renal hypoperfusion  Abdomen is soft, nontender, nondistended with good bowel sounds  9/10 Cardiology consult: HF with reduced LVEF in the setting of severe sepsis secondary to pneumonia  CHERISE resolved  Start metoprolol not that BP and HR is stable  Pertinent Labs/Diagnostic Results:   9/10 Hepatobiliary scan:  Gallbladder visualization post morphine administration compatible with a patent cystic duct  2   Administration of morphine precludes further evaluation with CCK to assess for biliary dyskinesia   Consider outpatient reassessment with HIDA CCK study as warranted  9/8/20 CTA chest:    Limited examination as described, no evidence of pulmonary embolism      Upper lobe patchy opacities, greater on the right, consistent with pneumonia, similar     Moderate pleural effusions, increased    Mediastinal lymphadenopathy   9/8 CXR: Improving edema   Underlying pneumonia cannot be excluded         Results from last 7 days   Lab Units 09/11/20  0534 09/10/20  0517 09/09/20  0421 09/08/20  0533 09/07/20  0455 09/06/20  0404 09/05/20  0311   WBC Thousand/uL 7 55 8 31 9 73 9 84 10 32* 8 25 10 56*   HEMOGLOBIN g/dL 10 9* 10 4* 10 5* 11 0* 10 6* 8 5* 8 9*   HEMATOCRIT % 34 5* 33 6* 33 2* 34 8* 33 6* 26 5* 26 8*   PLATELETS Thousands/uL 504* 502* 492* 429* 371 285 284   NEUTROS ABS Thousands/µL 4 12  --   --   --   --   --  7 90*   BANDS PCT %  --   --  3  --   --  4  --          Results from last 7 days   Lab Units 09/11/20  0534 09/10/20  0517 09/09/20  3906 09/09/20  0421 09/08/20  0533 09/07/20  0455 09/06/20  0404 09/05/20  0311   SODIUM mmol/L 143 140 145 143 143 144 142 141   POTASSIUM mmol/L 3 9 4 2 3 6 3 5 3 9 3 9 3 8 4 1   CHLORIDE mmol/L 108 109* 111* 107 109* 108 110* 110*   CO2 mmol/L 25 23 28 28 26 23 24 24   ANION GAP mmol/L 10 8 6 8 8 13 8 7   BUN mg/dL 21 33* 37* 36* 27* 27* 26* 26*   CREATININE mg/dL 1 01 1 14 1 30 1 42* 1 22 1 25 1 17 1 35*   EGFR ml/min/1 73sq m 72 62 53 48 57 56 60 51   CALCIUM mg/dL 8 7 8 5 8 8 8 7 8 7 8 8 8 2* 8 4   CALCIUM, IONIZED mmol/L  --   --   --   --   --   --  1 13 1 14   MAGNESIUM mg/dL  --   --   --  2 0 1 7 1 6 2 0 2 4   PHOSPHORUS mg/dL  --   --   --  2 6 2 6  --  2 4 2 8     Results from last 7 days   Lab Units 09/09/20  0421 09/08/20  0533 09/06/20  0404 09/05/20  0311   AST U/L 22 39 94* 129*   ALT U/L 44 55 71 74   ALK PHOS U/L 132* 159* 174* 140*   TOTAL PROTEIN g/dL 7 6 7 7 6 6 6 7   ALBUMIN g/dL 2 0* 2 1* 2 0* 2 0*   TOTAL BILIRUBIN mg/dL 0 24 0 58 0 46 0 58   BILIRUBIN DIRECT mg/dL 0 10  --   --   --      Results from last 7 days   Lab Units 09/11/20  1126 09/11/20  0742 09/10/20  2138 09/10/20  1610 09/10/20  1134 09/10/20  0708 09/09/20  2144 09/09/20  1738 09/09/20  1450 09/09/20  1111 09/09/20  1020 09/09/20  0849   POC GLUCOSE mg/dl 125 111 187* 304* 301* 273* 272* 292* 142* 91 73 83     Results from last 7 days   Lab Units 09/11/20  0534 09/10/20  0517 09/09/20  0808 09/09/20  0421 09/08/20  0533 09/07/20  0455 09/06/20  0404 09/05/20  0311   GLUCOSE RANDOM mg/dL 109 278* 88 259* 211* 145* 128 113         Results from last 7 days   Lab Units 09/08/20  0533   CK TOTAL U/L 189   CK MB INDEX % 1 2   CK MB ng/mL 2 3       Results from last 7 days   Lab Units 09/05/20  0550   PTT seconds 70*     Results from last 7 days   Lab Units 09/08/20  0533   TSH 3RD GENERATON uIU/mL 0 422     Results from last 7 days   Lab Units 09/11/20  0534 09/08/20  0533 09/05/20  0311   PROCALCITONIN ng/ml 0 31* 1 38* 8 16*     Results from last 7 days   Lab Units 09/08/20  0858   CLARITY UA  Slightly Cloudy   COLOR UA  Yellow   SPEC GRAV UA  1 025   PH UA  5 5   GLUCOSE UA mg/dl Negative   KETONES UA mg/dl 15 (1+)*   BLOOD UA  Moderate*   PROTEIN UA mg/dl 30 (1+)*   NITRITE UA  Negative   BILIRUBIN UA  Negative   UROBILINOGEN UA E U /dl 0 2   LEUKOCYTES UA  Negative   WBC UA /hpf 4-10*   RBC UA /hpf 10-20*   BACTERIA UA /hpf Moderate*   EPITHELIAL CELLS WET PREP /hpf Occasional   MUCUS THREADS  Occasional*         Results from last 7 days   Lab Units 09/08/20  1050 09/08/20  1049   BLOOD CULTURE  No Growth at 48 hrs  No Growth at 48 hrs       Results from last 7 days   Lab Units 09/09/20  0421 09/06/20  0404   TOTAL COUNTED  100 100           Vital Signs:   Time  Temp  Pulse  Resp  BP   MAP (mmHg)   SpO2   Calculated FIO2 (%) - Nasal Cannula   Nasal Cannula O2 Flow Rate (L/min)   O2 Device  Patient Position - Orthostatic VS    09/11/20 1402    84    120/69                     09/11/20 1351              95 %         None (Room air)      09/11/20 0850    87  18        97 %   24   1 L/min   Nasal cannula      09/11/20 0801    86    127/84                     09/11/20 0800  98 3 °F (36 8 °C)  89  20  127/87   101   96 %   24   1 L/min   Nasal cannula  Lying    09/10/20 2020  98 3 °F (36 8 °C)  86  19  149/73   105   95 %         None (Room air)  Sitting    09/10/20 2012    84          97 %               09/10/20 1500  98 °F (36 7 °C)  91  19  117/66   86   94 %         None (Room air)  Sitting    09/10/20 1200    101          96 %               09/10/20 0800      18        98 %         None (Room air)      09/10/20 0710  98 °F (36 7 °C)  87  18  137/76   99   97 %   28   2 L/min   Nasal cannula  Lying    09/10/20 0322    91          92 %         None (Room air)      09/10/20 0300  97 8 °F (36 6 °C)  87  20  145/92   114 Medications:   Scheduled Medications:  aspirin, 81 mg, Oral, Daily  [START ON 9/12/2020] ferrous sulfate, 325 mg, Oral, Daily With Breakfast  heparin (porcine), 5,000 Units, Subcutaneous, Q8H JAVID  insulin glargine, 16 Units, Subcutaneous, HS  insulin lispro, 1-5 Units, Subcutaneous, HS  insulin lispro, 1-6 Units, Subcutaneous, TID AC  insulin lispro, 7 Units, Subcutaneous, TID With Meals  ipratropium, 0 5 mg, Nebulization, TID  levalbuterol, 1 25 mg, Nebulization, Q6H While awake  lisinopril, 5 mg, Oral, Daily  metoprolol succinate, 50 mg, Oral, Daily  pravastatin, 20 mg, Oral, Daily With Dinner      Continuous IV Infusions:     PRN Meds:  acetaminophen, 650 mg, Oral, Q4H PRN  acetaminophen, 325 mg, Rectal, Q4H PRN  albuterol, 2 5 mg, Nebulization, Q4H PRN  metoprolol, 5 mg, Intravenous, Q6H PRN        Discharge Plan: [facility]  Network Utilization Review Department  [email or web address]  ATTENTION: Please call with any questions or concerns to [phone] and carefully listen to the prompts so that you are directed to the right person  All voicemails are confidential   Candis Olmedo all requests for admission clinical reviews, approved or denied determinations and any other requests to dedicated fax number below belonging to the campus where the patient is receiving treatment   List of dedicated fax numbers for the Facilities:  [address] DENIALS (Administrative/Medical Necessity) [phone]   [address] [place] [address] (Maternity/NICU/Pediatrics) [phone]   [facility] [phone]   Kristy Hoffman [phone]   Matthew Him [phone]   [facility] [address] [facility] [phone]   [facility]  [phone]   [address] [facility] [address]  [address] [facility] And [address] Sharri Revel [phone]

## 2020-09-11 NOTE — ASSESSMENT & PLAN NOTE
Euvolemic on exam  Echo: Apical akinesis with apical inferior akinesis, hypokinesis of the mid to distal anterolateral wall as well as the mid to distal septum  Anterior wall hypokinesis  Could represent apical ballooning/Takotsubo cardiomyopathy given the basal  segments have dynamic function  Systolic function was markedly reduced  Ejection fraction was estimated in the range of 25 % to 30 %    Appreciate cardiology evaluation, continue metoprolol and follow-up with cardiology as an outpatient

## 2020-09-11 NOTE — PROGRESS NOTES
Progress Note - Nephrology   Denita Mannena 66 y o  male MRN: [id number]  Unit/Bed#: -01 Encounter: [id number]    A/P:  1  Acute kidney injury (POA)  Initial creatinine on admission was 3 51 mg/dL  Has recovered completely with a creatinine of 1 01 mg/dL which translates to a EGFR of 72 mils per minute  Nonoliguric: 1680/1285 in 24 hours  (-3389 since admission)  Etiology was acute tubular necrosis due to septic shock and renal hypoperfusion which has resolved  2  Septic shock with VDRF  Resolved  3  Type 2 diabetes with long term use of insulin  Would benefit from low dose lisinopril 5mg daily and will   check labs in the morning after starting medication today  He has mild proteinuria   4  Anemia  Has iron deficiency with an iron saturation of 9%  Hold IV iron in view of recent sepsis  Can take oral iron which will be ordered  Follow up reason for today's visit:  Acute kidney injury    Septic shock [facility])    Patient Active Problem List   Diagnosis    Septic shock ([facility] )    Community acquired pneumonia    Elevated brain natriuretic peptide (BNP) level    Type 2 MI (myocardial infarction) ([facility] )    Type 2 diabetes mellitus, with long-term current use of insulin (HCC)    Acute respiratory failure with hypoxia (HCC)    Anemia    Cardiomyopathy ([facility] )    Diarrhea         Subjective:   He denies dizziness chest pain shortness, abdominal pain, nausea vomiting or dysuria  He voided shortly before I came to the room and a urinal demonstrated clear yellow urine    Objective:     Vitals: Blood pressure 127/84, pulse 87, temperature 98 3 °F (36 8 °C), temperature source Oral, resp  rate 18, height 6' 3" (1 905 m), weight 79 3 kg (174 lb 13 2 oz), SpO2 97 %  ,Body mass index is 21 85 kg/m²      Weight (last 2 days)     Date/Time   Weight    09/11/20 0600   79 3 (174 83)    09/10/20 0600   79 3 (174 83)    09/09/20 0415   78 5 (173 06)                Intake/Output Summary (Last 24 hours) at 9/11/2020 1319  Last data filed at 9/11/2020 1131  Gross per 24 hour   Intake 150 ml   Output 1365 ml   Net -1215 ml     I/O last 3 completed shifts: In: 2147 6 [P O :1600; I V :354 6; IV Piggyback:193]  Out: 2460 [Urine:2460]         Physical Exam: /84   Pulse 87   Temp 98 3 °F (36 8 °C) (Oral)   Resp 18   Ht 6' 3" (1 905 m)   Wt 79 3 kg (174 lb 13 2 oz)   SpO2 97%   BMI 21 85 kg/m²     General Appearance:    Alert, cooperative, no distress, appears stated age   Head:    Normocephalic, without obvious abnormality, atraumatic   Eyes:    Conjunctiva/corneas clear   Ears:    Normal external ears   Nose:   Nares normal, septum midline, mucosa normal, no drainage    or sinus tenderness   Throat:   Lips, mucosa, and tongue normal; teeth and gums normal   Neck:   Supple, symmetrical, trachea midline, no adenopathy;        thyroid:  No enlargement/tenderness/nodules; no carotid    bruit or JVD   Back:     Symmetric, no curvature, ROM normal, no CVA tenderness   Lungs:     Clear to auscultation bilaterally, respirations unlabored   Chest wall:    No tenderness or deformity   Heart:    Regular rate and rhythm, S1 and S2 normal, no murmur, rub   or gallop   Abdomen:     Soft, non-tender, bowel sounds active   Extremities:   Extremities normal, atraumatic, no cyanosis or edema   Skin:   Skin color, texture, turgor normal, no rashes or lesions   Lymph nodes:   Cervical normal   Neurologic:   CNII-XII intact            Lab, Imaging and other studies: I have personally reviewed pertinent labs    CBC:   Lab Results   Component Value Date    WBC 7 55 09/11/2020    HGB 10 9 (L) 09/11/2020    HCT 34 5 (L) 09/11/2020    MCV 89 09/11/2020     (H) 09/11/2020    MCH 28 2 09/11/2020    MCHC 31 6 09/11/2020    RDW 14 8 09/11/2020    MPV 9 8 09/11/2020    NRBC 0 09/11/2020     CMP:   Lab Results   Component Value Date    K 3 9 09/11/2020     09/11/2020    CO2 25 09/11/2020    BUN 21 09/11/2020    CREATININE 1 01 09/11/2020    CALCIUM 8 7 09/11/2020    EGFR 72 09/11/2020         Results from last 7 days   Lab Units 09/11/20  0534 09/10/20  0517 09/09/20  0808 09/09/20  0421 09/08/20  0533  09/06/20  0404   POTASSIUM mmol/L 3 9 4 2 3 6 3 5 3 9   < > 3 8   CHLORIDE mmol/L 108 109* 111* 107 109*   < > 110*   CO2 mmol/L 25 23 28 28 26   < > 24   BUN mg/dL 21 33* 37* 36* 27*   < > 26*   CREATININE mg/dL 1 01 1 14 1 30 1 42* 1 22   < > 1 17   CALCIUM mg/dL 8 7 8 5 8 8 8 7 8 7   < > 8 2*   ALK PHOS U/L  --   --   --  132* 159*  --  174*   ALT U/L  --   --   --  44 55  --  71   AST U/L  --   --   --  22 39  --  94*    < > = values in this interval not displayed  Phosphorus: No results found for: PHOS  Magnesium: No results found for: MG  Urinalysis: No results found for: Sagar Livingston, SPECGRAV, PHUR, LEUKOCYTESUR, NITRITE, PROTEINUA, GLUCOSEU, KETONESU, BILIRUBINUR, BLOODU  Ionized Calcium: No results found for: CAION  Coagulation: No results found for: PT, INR, APTT  Troponin: No results found for: TROPONINI  ABG: No results found for: PHART, UTP1MOY, PO2ART, RLY3NQA, P5NDMCJE, BEART, SOURCE  Radiology review:     IMAGING  Procedure: Nm Hepatobiliary W Rx    Result Date: 9/10/2020  Narrative: HEPATOBILIARY SCAN INDICATION:  Right upper quadrant pain  Biliary dyskinesia  COMPARISON:  Ultrasound abdomen 9/3/2020 TECHNIQUE:   Following the intravenous administration of 5 2 mCi Tc-99m labeled mebrofenin, dynamic abdominal images were obtained over a 60 minute time period  Images were performed in AP projection  FINDINGS: There is prompt, uniform accumulation with normal clearance of the radiopharmaceutical by the liver  Gallbladder is not seen within the first hour of imaging  Subsequently 3mg of morphine was intravenously administered along with an additional 1 0 mCi of technetium 99m mebrofenin  Imaging was obtained for an additional 45  minutes  Gallbladder is seen post morphine injection  Impression: 1  Gallbladder visualization post morphine administration compatible with a patent cystic duct  2   Administration of morphine precludes further evaluation with CCK to assess for biliary dyskinesia  Consider outpatient reassessment with HIDA CCK study as warranted  Workstation performed: [id number]     Procedure: Cta Chest Pe Study    Result Date: 9/8/2020  Narrative: CTA - CHEST WITH IV CONTRAST - PULMONARY ANGIOGRAM INDICATION:   Shortness of breath  Patient admitted with septic shock possibly from pneumonia COMPARISON: CT chest abdomen pelvis 9/1/20 TECHNIQUE: CTA examination of the chest was performed using angiographic technique according to a protocol specifically tailored to evaluate for pulmonary embolism  Axial, sagittal, and coronal 2D reformatted images were created from the source data and  submitted for interpretation  In addition, coronal 3D MIP postprocessing was performed on the acquisition scanner  Radiation dose length product (DLP) for this visit:  562 mGy-cm   This examination, like all CT scans performed in the [facility], was performed utilizing techniques to minimize radiation dose exposure, including the use of iterative reconstruction and automated exposure control  IV Contrast:  85 mL of iodixanol (VISIPAQUE)  FINDINGS: PULMONARY ARTERIAL TREE:  Exam is limited by respiratory motion artifact causing volume averaging in the middle lobe and the apices  No pulmonary embolism visualized LUNGS:  Again noted are interstitial and airspace opacities in the upper lobes as noted on series 3/43, this appears similar   Diffuse interstitial opacities PLEURA:  Moderate pleural effusions, increased from prior HEART/GREAT VESSELS:  Trace pericardial fluid MEDIASTINUM AND YUKI:  1 7 cm subcarinal lymph node series 2/121 1 1 cm right paratracheal lymph node series 2/69, with additional smaller lymph nodes in this region CHEST WALL AND LOWER NECK:   Focus of air noted within the left sternocleidomastoid seen on series 2/8, and the adjacent left IJ on series 2/33 consistent with recent IJ central catheter VISUALIZED STRUCTURES IN THE UPPER ABDOMEN:  Unremarkable  OSSEOUS STRUCTURES:  No acute fracture or destructive osseous lesion  Impression: 1  Limited examination as described, no evidence of pulmonary embolism 2  Upper lobe patchy opacities, greater on the right, consistent with pneumonia, similar 3  Moderate pleural effusions, increased 4    Mediastinal lymphadenopathy Workstation performed: [id number]       Current Facility-Administered Medications   Medication Dose Route Frequency    acetaminophen (TYLENOL) oral suspension 650 mg  650 mg Oral Q4H PRN    acetaminophen (TYLENOL) rectal suppository 325 mg  325 mg Rectal Q4H PRN    albuterol inhalation solution 2 5 mg  2 5 mg Nebulization Q4H PRN    aspirin chewable tablet 81 mg  81 mg Oral Daily    heparin (porcine) subcutaneous injection 5,000 Units  5,000 Units Subcutaneous Q8H [address]    insulin glargine (LANTUS) subcutaneous injection 16 Units 0 16 mL  16 Units Subcutaneous HS    insulin lispro (HumaLOG) 100 units/mL subcutaneous injection 1-5 Units  1-5 Units Subcutaneous HS    insulin lispro (HumaLOG) 100 units/mL subcutaneous injection 1-6 Units  1-6 Units Subcutaneous TID AC    insulin lispro (HumaLOG) 100 units/mL subcutaneous injection 7 Units  7 Units Subcutaneous TID With Meals    ipratropium (ATROVENT) 0 02 % inhalation solution 0 5 mg  0 5 mg Nebulization TID    levalbuterol (XOPENEX) inhalation solution 1 25 mg  1 25 mg Nebulization Q6H While awake    metoprolol (LOPRESSOR) injection 5 mg  5 mg Intravenous Q6H PRN    metoprolol succinate (TOPROL-XL) 24 hr tablet 50 mg  50 mg Oral Daily    pravastatin (PRAVACHOL) tablet 20 mg  20 mg Oral Daily With Dinner     Medications Discontinued During This Encounter   Medication Reason    sodium chloride 0 9 % bolus 1,000 mL     sodium chloride 0 9 % bolus 1,000 mL     lisinopril (ZESTRIL) 10 mg tablet     linaGLIPtin-metFORMIN HCl (JENTADUETO XR PO) Discontinued by another clinician    heparin (porcine) subcutaneous injection 5,000 Units     cefTRIAXone (ROCEPHIN) IVPB (premix) 1,000 mg 50 mL     heparin (ACS LOW) Duplicate order    pravastatin (PRAVACHOL) tablet 40 mg     acetaminophen (TYLENOL) tablet 650 mg     azithromycin (ZITHROMAX) 500 mg in sodium chloride 0 9 % 250 mL IVPB     insulin lispro (HumaLOG) 100 units/mL subcutaneous injection 1-6 Units     phenylephrine (REG-SYNEPHRINE) 50 mg (STANDARD CONCENTRATION) in sodium chloride 0 9% 250 mL     ipratropium (ATROVENT) 0 02 % inhalation solution 0 5 mg     levalbuterol (XOPENEX) inhalation solution 1 25 mg     cefepime (MAXIPIME) IVPB (premix) 1,000 mg 50 mL     vasopressin (PITRESSIN) 20 Units in sodium chloride 0 9 % 100 mL infusion     cefepime (MAXIPIME) IVPB (premix) 1,000 mg 50 mL     vancomycin (VANCOCIN) IVPB (premix) 1,000 mg 200 mL     heparin (porcine) 25,000 units in 0 45% NaCl 250 mL infusion (premix)     heparin (porcine) injection 4,000 Units     heparin (porcine) injection 2,000 Units     bisacodyl (DULCOLAX) rectal suppository 10 mg     polyethylene glycol (MIRALAX) packet 17 g     propofol (DIPRIVAN) 1000 mg in 100 mL infusion (premix)     fentanyl citrate (PF) 100 MCG/2ML 100 mcg     fentaNYL 1000 mcg in sodium chloride 0 9% 100mL infusion     norepinephrine (LEVOPHED) 4 mg (STANDARD CONCENTRATION) IV in sodium chloride 0 9% 250 mL     insulin regular (HumuLIN R,NovoLIN R) 1 Units/mL in sodium chloride 0 9 % 100 mL infusion     acetaminophen (TYLENOL) oral suspension 650 mg     polyethylene glycol (MIRALAX) packet 17 g     senna 8 8 mg/5 mL oral syrup 8 8 mg     ipratropium (ATROVENT) 0 02 % inhalation solution 0 5 mg     levalbuterol (XOPENEX) inhalation solution 1 25 mg     metroNIDAZOLE (FLAGYL) IVPB (premix) 500 mg 100 mL     acetaminophen (TYLENOL) chewable tablet 480 mg     cefepime (MAXIPIME) IVPB (premix) 2,000 mg 50 mL     vancomycin (VANCOCIN) 1,750 mg in sodium chloride 0 9 % 500 mL IVPB     cefTRIAXone (ROCEPHIN) IVPB (premix) 1,000 mg 50 mL     guaiFENesin (ROBITUSSIN) oral solution 200 mg     chlorhexidine (PERIDEX) 0 12 % oral rinse 15 mL     insulin lispro (HumaLOG) 100 units/mL subcutaneous injection 1-6 Units     insulin glargine (LANTUS) subcutaneous injection 10 Units 0 1 mL     insulin regular (HumuLIN R,NovoLIN R) 1 Units/mL in sodium chloride 0 9 % 100 mL infusion     famotidine (PEPCID) injection 20 mg     dextrose 5 % in lactated Ringer's infusion     senna 8 8 mg/5 mL oral syrup 8 8 mg     senna-docusate sodium (SENOKOT S) 8 6-50 mg per tablet 1 tablet     bisacodyl (DULCOLAX) rectal suppository 10 mg     polyethylene glycol (MIRALAX) packet 17 g     insulin lispro (HumaLOG) 100 units/mL subcutaneous injection 3 Units     insulin glargine (LANTUS) subcutaneous injection 14 Units 0 14 mL     levalbuterol (XOPENEX) inhalation solution 0 63 mg     ipratropium (ATROVENT) 0 02 % inhalation solution 0 5 mg     levalbuterol (XOPENEX) inhalation solution 1 25 mg     metroNIDAZOLE (FLAGYL) IVPB (premix) 500 mg 100 mL     cefTRIAXone (ROCEPHIN) IVPB (premix) 2,000 mg 50 mL     nystatin (MYCOSTATIN) oral suspension 500,000 Units     guaiFENesin (ROBITUSSIN) oral solution 200 mg        Miguelina Urbina MD      This progress note was produced in part using a dictation device which may document imprecise wording from author's original intent

## 2020-09-11 NOTE — ASSESSMENT & PLAN NOTE
Lab Results   Component Value Date    HGBA1C 8 2 (H) 09/03/2020       Recent Labs     09/10/20  0708 09/10/20  1134 09/10/20  1610 09/11/20  0742   POCGLU 273* 301* 304* 111       Blood Sugar Average: Last 72 hrs:  (P) 224 [id number]   Insulin dose adjusted  Check blood glucose 4 times daily with corrective insulin

## 2020-09-11 NOTE — PROGRESS NOTES
Progress Note - Ethyl Bigger 1945, 66 y o  male MRN: [id number]    Unit/Bed#: -01 Encounter: [id number]    Primary Care Provider: Gurpreet Cochran DO   Date and time admitted to hospital: 9/1/2020  4:56 PM    * Septic shock [facility])  Assessment & Plan  Septic shock, POA secondary to pneumonia  Successfully extubated sepsis has improved now  Adequately treated with IV antibiotics    Acute respiratory failure with hypoxia [facility])  Assessment & Plan  Acute respiratory failure which was present on admission most likely secondary to pneumonia  Resolved now, comfortably breathing well on room air    Community acquired pneumonia  Assessment & Plan  Treated with broad-spectrum IV antibiotics for 8 days  Procalcitonin trending down    Cardiomyopathy [facility])  Assessment & Plan  Euvolemic on exam  Echo: Apical akinesis with apical inferior akinesis, hypokinesis of the mid to distal anterolateral wall as well as the mid to distal septum  Anterior wall hypokinesis  Could represent apical ballooning/Takotsubo cardiomyopathy given the basal  segments have dynamic function  Systolic function was markedly reduced  Ejection fraction was estimated in the range of 25 % to 30 %    Appreciate cardiology evaluation, continue metoprolol and follow-up with cardiology as an outpatient    Diarrhea  Assessment & Plan  3-4 loose stool, non foul-smelling  Less likely to be C diff  Will keep monitoring patient today  Appreciate Nutrition consult    Anemia  Assessment & Plan  Normocytic anemia  Hemoglobin stable    Type 2 diabetes mellitus, with long-term current use of insulin [facility])  Assessment & Plan  Lab Results   Component Value Date    HGBA1C 8 2 (H) 09/03/2020     Recent Labs     09/10/20  0708 09/10/20  1134 09/10/20  1610 09/11/20  0742   POCGLU 273* 301* 304* 111     Blood Sugar Average: Last 72 hrs:  (P) 224 [id number]   Insulin dose adjusted  Check blood glucose 4 times daily with corrective insulin    VTE Pharmacologic Prophylaxis:   Pharmacologic: Heparin    Patient Centered Rounds: I have performed bedside rounds with nursing staff today    Discussions with Specialists or Other Care Team Provider:     Education and Discussions with Family / Patient:     Current Length of Stay: 10 day(s)    Current Patient Status: Inpatient   Certification Statement: The patient will continue to require additional inpatient hospital stay due to Diarrhea    Discharge Plan:  Probably tomorrow    Code Status: Level 1 - Full Code      Subjective:   No complaints    Objective:     Vitals:   Temp (24hrs), Av 2 °F (36 8 °C), Min:98 °F (36 7 °C), Max:98 3 °F (36 8 °C)    Temp:  [98 °F (36 7 °C)-98 3 °F (36 8 °C)] 98 3 °F (36 8 °C)  HR:  [] 87  Resp:  [18-20] 18  BP: (117-149)/(66-87) 127/84  SpO2:  [94 %-97 %] 97 %  Body mass index is 21 85 kg/m²  Input and Output Summary (last 24 hours): Intake/Output Summary (Last 24 hours) at 2020 1057  Last data filed at 2020 0730  Gross per 24 hour   Intake 1150 ml   Output 1425 ml   Net -275 ml       Physical Exam:     General appearance: alert, appears stated age and cooperative  Head: Normocephalic, without obvious abnormality, atraumatic  Lungs: clear to auscultation bilaterally  Heart: regular rate and rhythm  Abdomen: soft, non-tender, positive bowel sounds   Back: negative  Extremities: extremities atraumatic, no cyanosis or edema  Neurologic: Alert and oriented X 3, normal strength and tone  Normal symmetric reflexes   Normal coordination and gait    Additional Data:     Labs:    Results from last 7 days   Lab Units 20  0534  20  0421   WBC Thousand/uL 7 55   < > 9 73   HEMOGLOBIN g/dL 10 9*   < > 10 5*   HEMATOCRIT % 34 5*   < > 33 2*   PLATELETS Thousands/uL 504*   < > 492*   BANDS PCT %  --   --  3   NEUTROS PCT % 55  --   --    LYMPHS PCT % 28  --   --    LYMPHO PCT %  --   --  18   MONOS PCT % 7  --   --    MONO PCT %  --   --  1*   EOS PCT % 8*  -- 0    < > = values in this interval not displayed  Results from last 7 days   Lab Units 09/11/20  0534  09/09/20  0421   SODIUM mmol/L 143   < > 143   POTASSIUM mmol/L 3 9   < > 3 5   CHLORIDE mmol/L 108   < > 107   CO2 mmol/L 25   < > 28   BUN mg/dL 21   < > 36*   CREATININE mg/dL 1 01   < > 1 42*   ANION GAP mmol/L 10   < > 8   CALCIUM mg/dL 8 7   < > 8 7   ALBUMIN g/dL  --   --  2 0*   TOTAL BILIRUBIN mg/dL  --   --  0 24   ALK PHOS U/L  --   --  132*   ALT U/L  --   --  44   AST U/L  --   --  22   GLUCOSE RANDOM mg/dL 109   < > 259*    < > = values in this interval not displayed  Results from last 7 days   Lab Units 09/11/20  0742 09/10/20  1610 09/10/20  1134 09/10/20  0708 09/09/20  2144 09/09/20  1738 09/09/20  1450 09/09/20  1111 09/09/20  1020 09/09/20  0849 09/09/20  0802 09/09/20  0600   POC GLUCOSE mg/dl 111 304* 301* 273* 272* 292* 142* 91 73 83 96 159*         Results from last 7 days   Lab Units 09/11/20  0534 09/08/20  0533 09/05/20  0311   PROCALCITONIN ng/ml 0 31* 1 38* 8 16*           * I Have Reviewed All Lab Data Listed Above  * Additional Pertinent Lab Tests Reviewed: Max 66 Admission Reviewed    Imaging:    Imaging Reports Reviewed Today Include: images reviewed    Recent Cultures (last 7 days):     Results from last 7 days   Lab Units 09/08/20  1050 09/08/20  1049   BLOOD CULTURE  No Growth at 48 hrs  No Growth at 48 hrs         Last 24 Hours Medication List:   Current Facility-Administered Medications   Medication Dose Route Frequency Provider Last Rate    acetaminophen  650 mg Oral Q4H PRN JASMINE Collins      acetaminophen  325 mg Rectal Q4H PRN JASMINE Collins      albuterol  2 5 mg Nebulization Q4H PRN Alejandra Guaman MD      aspirin  81 mg Oral Daily JASMINE Collins      heparin (porcine)  5,000 Units Subcutaneous Q8H [address] JASMINE Collins      insulin glargine  16 Units Subcutaneous HS Alejandra Guaman MD      insulin lispro  1-5 Units Subcutaneous HS JASMINE Collins      insulin lispro  1-6 Units Subcutaneous TID AC JASMINE Collins      insulin lispro  7 Units Subcutaneous TID With Meals Yessenia Flannery MD      ipratropium  0 5 mg Nebulization TID Yessenia Flannery MD      levalbuterol  1 25 mg Nebulization Q6H While awake Yessenia Flannery MD      metoprolol  5 mg Intravenous Q6H PRN JASMINE Collins      metoprolol succinate  50 mg Oral Daily [place], PA-C      pravastatin  20 mg Oral Daily With Lindsay Tai PA-C          Today, Patient Was Seen By: Yessenia Flannery MD    ** Please Note: Dictation voice to text software may have been used in the creation of this document   **

## 2020-09-11 NOTE — PROGRESS NOTES
Pt now on pured diet with thin liquids  Per ST note "Pt declined to try solid/soft solid foods  He reported he is happy with the pureed food and appetite is limited  Pt had no complaint about NTL consistency drinks, but was agreeable to trials of thin liquid " Noting multiple bowel movements per day, pt reports typically having at least 2 uncontrolled BMs daily  Pt and daughter provided diet recall  Report consumption of milk and sugar substitute containing beverages often  Primarily eats sandwiches with chips for both lunch and dinner  Discussed alternatives including lactaid milk (lactose free), hint water/infused water that does not contain sugar substitutes  Pt is reported to be a "picky eater" daughter unsure if pt will drink water, recommended diluting G zero with 50% plain water to decrease sugar substitute intake  Encouraged fresh meats on white bread for sandwiches instead of wheat bread with lunchmeat, soft fruit such as banana or melon instead of chips  Will trial banatrol BID, will request lactaid milk to kitchen  Will not order CCD which would contain diet beverages, continue diet as ordered  Recommend addition of probiotic after d/c though not in addition to banatrol  Pt does not like yogurt and likely not to obtain probiotics from food  English

## 2020-09-11 NOTE — CASE MANAGEMENT
Received call back from Mrs Ata Martin with STR choices:  [facility]  2  [facility]  3  [facility]    There are No beds at [facility] until next week  [facility] can accept  Await [facility] to review  CM spoke to patient and updated him on this information and dc IMM was done  MD indicated patient will be ready for discharge tomorrow  Will need COVID testing  Once facility accept CM will submit for authorization  3:30 CM has been in contact with [facility] throughout the day and they have accepted, however unsure at this time if they have a bed available tomorrow at this time  CM spoke to wife at the bedside, her choice is to go with her 2nd choice at [facility] [place]  Discussed Discussed with patient that transportation via [facility] is not covered by insurance and patient will be billed for this service  Verbalized understanding  CM submitted to [employer] for SNF  CM called and spoke to liaison at [facility] and they can accept tomorrow  MD is aware and Covid test will be ordered  [facility] is requesting admission tomorrow anywhere from 10:00 - 12:00  Report for nursing was placed in DC Navigator  CM called Ed at [facility] to schedule transport for 10:00   - tentatively for 10:00 will need to confirm tomorrow  Await Auth from [facility]  Nursing is aware of plan of care

## 2020-09-12 VITALS
OXYGEN SATURATION: 92 % | HEIGHT: 75 IN | TEMPERATURE: 97.3 F | HEART RATE: 83 BPM | BODY MASS INDEX: 22.67 KG/M2 | WEIGHT: 182.32 LBS | DIASTOLIC BLOOD PRESSURE: 55 MMHG | SYSTOLIC BLOOD PRESSURE: 108 MMHG | RESPIRATION RATE: 19 BRPM

## 2020-09-12 PROBLEM — R13.10 DYSPHAGIA: Status: ACTIVE | Noted: 2020-09-12

## 2020-09-12 LAB
ANION GAP SERPL CALCULATED.3IONS-SCNC: 9 MMOL/L (ref 4–13)
BUN SERPL-MCNC: 21 MG/DL (ref 5–25)
CALCIUM SERPL-MCNC: 8.9 MG/DL (ref 8.3–10.1)
CHLORIDE SERPL-SCNC: 104 MMOL/L (ref 100–108)
CO2 SERPL-SCNC: 26 MMOL/L (ref 21–32)
CREAT SERPL-MCNC: 1.05 MG/DL (ref 0.6–1.3)
GFR SERPL CREATININE-BSD FRML MDRD: 69 ML/MIN/1.73SQ M
GLUCOSE SERPL-MCNC: 153 MG/DL (ref 65–140)
GLUCOSE SERPL-MCNC: 160 MG/DL (ref 65–140)
GLUCOSE SERPL-MCNC: 233 MG/DL (ref 65–140)
POTASSIUM SERPL-SCNC: 4.3 MMOL/L (ref 3.5–5.3)
PROCALCITONIN SERPL-MCNC: 0.17 NG/ML
SODIUM SERPL-SCNC: 139 MMOL/L (ref 136–145)

## 2020-09-12 PROCEDURE — 82948 REAGENT STRIP/BLOOD GLUCOSE: CPT

## 2020-09-12 PROCEDURE — 99239 HOSP IP/OBS DSCHRG MGMT >30: CPT | Performed by: INTERNAL MEDICINE

## 2020-09-12 PROCEDURE — 94760 N-INVAS EAR/PLS OXIMETRY 1: CPT

## 2020-09-12 PROCEDURE — 84145 PROCALCITONIN (PCT): CPT | Performed by: INTERNAL MEDICINE

## 2020-09-12 PROCEDURE — 80048 BASIC METABOLIC PNL TOTAL CA: CPT | Performed by: INTERNAL MEDICINE

## 2020-09-12 PROCEDURE — 94640 AIRWAY INHALATION TREATMENT: CPT

## 2020-09-12 RX ORDER — LEVALBUTEROL 1.25 MG/.5ML
1.25 SOLUTION, CONCENTRATE RESPIRATORY (INHALATION) EVERY 6 HOURS PRN
Refills: 0
Start: 2020-09-12 | End: 2020-09-19 | Stop reason: HOSPADM

## 2020-09-12 RX ORDER — FERROUS SULFATE 325(65) MG
325 TABLET ORAL
Refills: 0
Start: 2020-09-13 | End: 2020-09-19 | Stop reason: HOSPADM

## 2020-09-12 RX ORDER — METOPROLOL SUCCINATE 50 MG/1
50 TABLET, EXTENDED RELEASE ORAL DAILY
Refills: 0
Start: 2020-09-13 | End: 2020-09-19 | Stop reason: HOSPADM

## 2020-09-12 RX ORDER — LISINOPRIL 5 MG/1
5 TABLET ORAL DAILY
Refills: 0
Start: 2020-09-13 | End: 2020-09-19 | Stop reason: HOSPADM

## 2020-09-12 RX ORDER — INSULIN GLARGINE 100 [IU]/ML
16 INJECTION, SOLUTION SUBCUTANEOUS
Refills: 0
Start: 2020-09-12 | End: 2020-09-19 | Stop reason: HOSPADM

## 2020-09-12 RX ADMIN — METOPROLOL SUCCINATE 50 MG: 50 TABLET, EXTENDED RELEASE ORAL at 08:18

## 2020-09-12 RX ADMIN — INSULIN LISPRO 1 UNITS: 100 INJECTION, SOLUTION INTRAVENOUS; SUBCUTANEOUS at 08:17

## 2020-09-12 RX ADMIN — FERROUS SULFATE TAB 325 MG (65 MG ELEMENTAL FE) 325 MG: 325 (65 FE) TAB at 08:18

## 2020-09-12 RX ADMIN — LISINOPRIL 5 MG: 5 TABLET ORAL at 08:18

## 2020-09-12 RX ADMIN — ASPIRIN 81 MG 81 MG: 81 TABLET ORAL at 08:18

## 2020-09-12 RX ADMIN — LEVALBUTEROL HYDROCHLORIDE 1.25 MG: 1.25 SOLUTION, CONCENTRATE RESPIRATORY (INHALATION) at 07:31

## 2020-09-12 RX ADMIN — HEPARIN SODIUM 5000 UNITS: 5000 INJECTION INTRAVENOUS; SUBCUTANEOUS at 05:26

## 2020-09-12 RX ADMIN — IPRATROPIUM BROMIDE 0.5 MG: 0.5 SOLUTION RESPIRATORY (INHALATION) at 07:31

## 2020-09-12 RX ADMIN — INSULIN LISPRO 3 UNITS: 100 INJECTION, SOLUTION INTRAVENOUS; SUBCUTANEOUS at 11:42

## 2020-09-12 NOTE — PLAN OF CARE
Problem: Potential for Falls  Goal: Patient will remain free of falls  Description: INTERVENTIONS:  - Assess patient frequently for physical needs  -  Identify cognitive and physical deficits and behaviors that affect risk of falls  -  [place] fall precautions as indicated by assessment   - Educate patient/family on patient safety including physical limitations  - Instruct patient to call for assistance with activity based on assessment  - Modify environment to reduce risk of injury  - Consider OT/PT consult to assist with strengthening/mobility  Outcome: Adequate for Discharge     Problem: Prexisting or High Potential for Compromised Skin Integrity  Goal: Skin integrity is maintained or improved  Description: INTERVENTIONS:  - Identify patients at risk for skin breakdown  - Assess and monitor skin integrity  - Assess and monitor nutrition and hydration status  - Monitor labs   - Assess for incontinence   - Turn and reposition patient  - Assist with mobility/ambulation  - Relieve pressure over bony prominences  - Avoid friction and shearing  - Provide appropriate hygiene as needed including keeping skin clean and dry  - Evaluate need for skin moisturizer/barrier cream  - Collaborate with interdisciplinary team   - Patient/family teaching  - Consider wound care consult   Outcome: Adequate for Discharge     Problem: Nutrition/Hydration-ADULT  Goal: Nutrient/Hydration intake appropriate for improving, restoring or maintaining nutritional needs  Description: Monitor and assess patient's nutrition/hydration status for malnutrition  Collaborate with interdisciplinary team and initiate plan and interventions as ordered  Monitor patient's weight and dietary intake as ordered or per policy  Utilize nutrition screening tool and intervene as necessary  Determine patient's food preferences and provide high-protein, high-caloric foods as appropriate       INTERVENTIONS:  - Monitor oral intake, urinary output, labs, and treatment plans  - Assess nutrition and hydration status and recommend course of action  - Evaluate amount of meals eaten  - Assist patient with eating if necessary   - Allow adequate time for meals  - Recommend/ encourage appropriate diets, oral nutritional supplements, and vitamin/mineral supplements  - Order, calculate, and assess calorie counts as needed  - Recommend, monitor, and adjust tube feedings and TPN/PPN based on assessed needs  - Assess need for intravenous fluids  - Provide specific nutrition/hydration education as appropriate  - Include patient/family/caregiver in decisions related to nutrition  Outcome: Adequate for Discharge     Problem: PAIN - ADULT  Goal: Verbalizes/displays adequate comfort level or baseline comfort level  Description: Interventions:  - Encourage patient to monitor pain and request assistance  - Assess pain using appropriate pain scale  - Administer analgesics based on type and severity of pain and evaluate response  - Implement non-pharmacological measures as appropriate and evaluate response  - Consider cultural and social influences on pain and pain management  - Notify physician/advanced practitioner if interventions unsuccessful or patient reports new pain  Outcome: Adequate for Discharge     Problem: INFECTION - ADULT  Goal: Absence or prevention of progression during hospitalization  Description: INTERVENTIONS:  - Assess and monitor for signs and symptoms of infection  - Monitor lab/diagnostic results  - Monitor all insertion sites, i e  indwelling lines, tubes, and drains  - Monitor endotracheal if appropriate and nasal secretions for changes in amount and color  - Administer medications as ordered  - Instruct and encourage patient and family to use good hand hygiene technique  Outcome: Adequate for Discharge     Problem: SAFETY ADULT  Goal: Patient will remain free of falls  Description: INTERVENTIONS:  - Assess patient frequently for physical needs  -  Identify cognitive and physical deficits and behaviors that affect risk of falls    -  [place] fall precautions as indicated by assessment   - Educate patient/family on patient safety including physical limitations  - Instruct patient to call for assistance with activity based on assessment  - Modify environment to reduce risk of injury  - Consider OT/PT consult to assist with strengthening/mobility  Outcome: Adequate for Discharge  Goal: Maintain or return to baseline ADL function  Description: INTERVENTIONS:  -  Assess patient's ability to carry out ADLs; assess patient's baseline for ADL function and identify physical deficits which impact ability to perform ADLs (bathing, care of mouth/teeth, toileting, grooming, dressing, etc )  - Assess/evaluate cause of self-care deficits   - Assess range of motion  - Assess patient's mobility; develop plan if impaired  - Assess patient's need for assistive devices and provide as appropriate  - Encourage maximum independence but intervene and supervise when necessary  - Involve family in performance of ADLs  - Assess for home care needs following discharge   - Consider OT consult to assist with ADL evaluation and planning for discharge  - Provide patient education as appropriate  Outcome: Adequate for Discharge  Goal: Maintain or return mobility status to optimal level  Description: INTERVENTIONS:  - Assess patient's baseline mobility status (ambulation, transfers, stairs, etc )    - Identify cognitive and physical deficits and behaviors that affect mobility  - Identify mobility aids required to assist with transfers and/or ambulation (gait belt, sit-to-stand, lift, walker, cane, etc )  - [place] fall precautions as indicated by assessment  - Record patient progress and toleration of activity level on Mobility SBAR; progress patient to next Phase/Stage  - Instruct patient to call for assistance with activity based on assessment  - Consider rehabilitation consult to assist with strengthening/weightbearing, etc   Outcome: Adequate for Discharge     Problem: DISCHARGE PLANNING  Goal: Discharge to home or other facility with appropriate resources  Description: INTERVENTIONS:  - Identify barriers to discharge w/patient and caregiver  - Arrange for needed discharge resources and transportation as appropriate  - Identify discharge learning needs (meds, wound care, etc )  - Arrange for interpretive services to assist at discharge as needed  - Refer to Case Management Department for coordinating discharge planning if the patient needs post-hospital services based on physician/advanced practitioner order or complex needs related to functional status, cognitive ability, or social support system  Outcome: Adequate for Discharge     Problem: Knowledge Deficit  Goal: Patient/family/caregiver demonstrates understanding of disease process, treatment plan, medications, and discharge instructions  Description: Complete learning assessment and assess knowledge base    Interventions:  - Provide teaching at level of understanding  - Provide teaching via preferred learning methods  Outcome: Adequate for Discharge     Problem: NEUROSENSORY - ADULT  Goal: Achieves stable or improved neurological status  Description: INTERVENTIONS  - Monitor and report changes in neurological status  - Monitor vital signs such as temperature, blood pressure, glucose, and any other labs ordered   - Initiate measures to prevent increased intracranial pressure  - Monitor for seizure activity and implement precautions if appropriate      Outcome: Adequate for Discharge     Problem: CARDIOVASCULAR - ADULT  Goal: Maintains optimal cardiac output and hemodynamic stability  Description: INTERVENTIONS:  - Monitor I/O, vital signs and rhythm  - Monitor for S/S and trends of decreased cardiac output  - Administer and titrate ordered vasoactive medications to optimize hemodynamic stability  - Assess quality of pulses, skin color and temperature  - Assess for signs of decreased coronary artery perfusion  - Instruct patient to report change in severity of symptoms  Outcome: Adequate for Discharge  Goal: Absence of cardiac dysrhythmias or at baseline rhythm  Description: INTERVENTIONS:  - Continuous cardiac monitoring, vital signs, obtain 12 lead EKG if ordered  - Administer antiarrhythmic and heart rate control medications as ordered  - Monitor electrolytes and administer replacement therapy as ordered  Outcome: Adequate for Discharge     Problem: RESPIRATORY - ADULT  Goal: Achieves optimal ventilation and oxygenation  Description: INTERVENTIONS:  - Assess for changes in respiratory status  - Assess for changes in mentation and behavior  - Position to facilitate oxygenation and minimize respiratory effort  - Oxygen administered by appropriate delivery if ordered  - Initiate smoking cessation education as indicated  - Encourage broncho-pulmonary hygiene including cough, deep breathe, Incentive Spirometry  - Assess the need for suctioning and aspirate as needed  - Assess and instruct to report SOB or any respiratory difficulty  - Respiratory Therapy support as indicated  Outcome: Adequate for Discharge     Problem: GENITOURINARY - ADULT  Goal: Maintains or returns to baseline urinary function  Description: INTERVENTIONS:  - Assess urinary function  - Encourage oral fluids to ensure adequate hydration if ordered  - Administer IV fluids as ordered to ensure adequate hydration  - Administer ordered medications as needed  - Offer frequent toileting  - Follow urinary retention protocol if ordered  Outcome: Adequate for Discharge     Problem: METABOLIC, FLUID AND ELECTROLYTES - ADULT  Goal: Electrolytes maintained within normal limits  Description: INTERVENTIONS:  - Monitor labs and assess patient for signs and symptoms of electrolyte imbalances  - Administer electrolyte replacement as ordered  - Monitor response to electrolyte replacements, including repeat lab results as appropriate  - Instruct patient on fluid and nutrition as appropriate  Outcome: Adequate for Discharge  Goal: Fluid balance maintained  Description: INTERVENTIONS:  - Monitor labs   - Monitor I/O and WT  - Instruct patient on fluid and nutrition as appropriate  - Assess for signs & symptoms of volume excess or deficit  Outcome: Adequate for Discharge  Goal: Glucose maintained within target range  Description: INTERVENTIONS:  - Monitor Blood Glucose as ordered  - Assess for signs and symptoms of hyperglycemia and hypoglycemia  - Administer ordered medications to maintain glucose within target range  - Assess nutritional intake and initiate nutrition service referral as needed  Outcome: Adequate for Discharge

## 2020-09-12 NOTE — SOCIAL WORK
Current LOS 11 days  CM completed chart review  Pt case discussed with attending Dr Roberto Hernandez  No events overnight  Pt is medically stable for d/c  Pt has been accepted to [employer] at [facility]  Auth obtained for [facility] yesterday  Facility is requesting early arrival as admission nurse in only on site until 2pm      CM contacted [facility] to confirm [place] [address] transportation at [employer]  Spoke to [facility]  Received return call from [place]  Pt will be picked up at 12:00pm by [facility]  CM notified attending Dr Roberto Hernandez, bedside nurse [facility], pt, pt family, and facility of transfer time  CM will continue to follow and remain available until d/c

## 2020-09-12 NOTE — ASSESSMENT & PLAN NOTE
Lab Results   Component Value Date    HGBA1C 8 2 (H) 09/03/2020       Recent Labs     09/11/20  1126 09/11/20  1646 09/11/20  2104 09/12/20  0711   POCGLU 125 198* 202* 153*       Blood Sugar Average: Last 72 hrs:  (P) 571 [id number]   Continue home medication  Lantus dose decreased to 16 units and may adjust with follow-up  Continue oral hypoglycemics on discharge

## 2020-09-12 NOTE — DISCHARGE SUMMARY
Discharge- Denita Mannena 1945, 66 y o  male MRN: [id number]    Unit/Bed#: -01 Encounter: [id number]    Primary Care Provider: Sara Oswald DO   Date and time admitted to hospital: 9/1/2020  4:56 PM    * Septic shock [facility])  Assessment & Plan  Septic shock, POA secondary to pneumonia  Successfully extubated, sepsis improved on discharge  Adequately treated with IV antibiotics    Acute respiratory failure with hypoxia [facility])  Assessment & Plan  Acute respiratory failure which was present on admission most likely secondary to pneumonia  Resolved now, comfortably breathing well on room air    Community acquired pneumonia  Assessment & Plan  Treated with broad-spectrum IV antibiotics for 8 days  Procalcitonin trending down    Cardiomyopathy [facility])  Assessment & Plan  Euvolemic on exam  Echo: Apical akinesis with apical inferior akinesis, hypokinesis of the mid to distal anterolateral wall as well as the mid to distal septum  Anterior wall hypokinesis  Could represent apical ballooning/Takotsubo cardiomyopathy given the basal  segments have dynamic function  Systolic function was markedly reduced  Ejection fraction was estimated in the range of 25 % to 30 %  Appreciate cardiology evaluation, continue metoprolol and follow-up with cardiology as an outpatient    Dysphagia  Assessment & Plan  Appreciate bedside swallow study  Patient currently on mechanical soft diet and thin liquids  Recommend to reassess swallow study at rehab and advanced his diet    Diarrhea  Assessment & Plan  3-4 loose stool, non foul-smelling  Less likely to be C diff  Will keep monitoring patient today  Appreciate Nutrition consult:  "lactaid milk (lactose free), hint water/infused water that does not contain sugar substitutes  Pt is reported to be a "picky eater" daughter unsure if pt will drink water, recommended diluting G zero with 50% plain water to decrease sugar substitute intake   Encouraged fresh meats on white bread for sandwiches instead of wheat bread with lunchmeat, soft fruit such as banana or melon instead of chips  "    Anemia  Assessment & Plan  Normocytic anemia  Hemoglobin stable  Continue iron tabs daily    Type 2 diabetes mellitus, with long-term current use of insulin [facility])  Assessment & Plan  Lab Results   Component Value Date    HGBA1C 8 2 (H) 09/03/2020     Recent Labs     09/11/20  1126 09/11/20  1646 09/11/20  2104 09/12/20  0711   POCGLU 125 198* 202* 153*     Blood Sugar Average: Last 72 hrs:  (P) 867 [id number]   Continue home medication  Lantus dose decreased to 16 units and may adjust with follow-up  Continue oral hypoglycemics on discharge    Discharging Physician / Practitioner: Naman Holt MD  PCP: Felizardo Phoenix, DO  Admission Date:   Admission Orders (From admission, onward)     Ordered        09/01/20 1842  Inpatient Admission  Once                   Discharge Date: 09/12/20    Disposition:      Other: [facility] [address] to [facility]:   · Not Applicable to this Patient - Not Applicable to this Patient    Reason for Admission:  Sepsis secondary to pneumonia    Discharge Diagnoses:     Please see assessment and plan section above for further details regarding discharge diagnoses       Resolved Problems  Date Reviewed: 9/9/2020          Resolved    CHERISE (acute kidney injury) ([facility] [address] ) 9/8/2020     Resolved by  Wisam Robison PA-C    Encephalopathy due to infection 9/8/2020     Resolved by  Wisam Robison PA-C    Hyponatremia 9/4/2020     Resolved by  Roxy Maza PA-C          Consultations During Hospital Stay:  IP CONSULT TO CASE MANAGEMENT  IP CONSULT TO CASE MANAGEMENT  IP CONSULT TO PHARMACY  IP CONSULT TO NEPHROLOGY  IP CONSULT TO INFECTIOUS DISEASES  IP CONSULT TO ACUTE CARE SURGERY  IP CONSULT TO INFECTIOUS DISEASES  IP CONSULT TO CARDIOLOGY     Procedures Performed:   * No surgery found *      Ct Chest Abdomen Pelvis Wo Contrast    Result Date: 9/1/2020  Narrative: CT CHEST, ABDOMEN AND PELVIS WITHOUT IV CONTRAST INDICATION:   confusion, SOB  COMPARISON:  None  TECHNIQUE: CT examination of the chest, abdomen and pelvis was performed without intravenous contrast   Axial, sagittal, and coronal 2D reformatted images were created from the source data and submitted for interpretation  Radiation dose length product (DLP) for this visit:  1327 mGy-cm   This examination, like all CT scans performed in the [facility], was performed utilizing techniques to minimize radiation dose exposure, including the use of iterative reconstruction and automated exposure control  Enteric contrast was administered  FINDINGS: CHEST LUNGS:  Diffuse bilateral upper lobe nodular interstitial changes  Most of the nodules are less than 2 mm  Breathing motion artifact seen  Right upper lobe subsegmental atelectasis seen  Posterior bibasilar dependent changes seen     Atelectasis/infiltrates in the lung bases posteriorly bilaterally  There is no tracheal or endobronchial lesion  PLEURA:  Unremarkable  HEART/GREAT VESSELS:  Unremarkable for patient's age  MEDIASTINUM AND YUKI:  Prevascular lymph nodes largest measuring 1 1 cm  Right paratracheal lymph nodes largest measuring 1 5 cm  Subcarinal lymph node measuring 1 8 cm  No hilar lymphadenopathy seen  CHEST WALL AND LOWER NECK:   Shotty left supraclavicular lymph nodes  ABDOMEN LIVER/BILIARY TREE:  Unremarkable  GALLBLADDER:  Contracted  No gallstones seen  SPLEEN:  Unremarkable  PANCREAS:  Unremarkable  ADRENAL GLANDS:  Unremarkable  KIDNEYS/URETERS:  Unremarkable  No hydronephrosis  STOMACH AND BOWEL:  Unremarkable  APPENDIX:  No findings to suggest appendicitis  ABDOMINOPELVIC CAVITY:  No ascites  No pneumoperitoneum  No lymphadenopathy  VESSELS:  Unremarkable for patient's age  PELVIS REPRODUCTIVE ORGANS:  Calcifications in the prostate seen   URINARY BLADDER:  Bladder is decompressed with diffuse wall thickening measuring 1 3 cm in maximum thickness and demonstrates a Means balloon  ABDOMINAL WALL/INGUINAL REGIONS:  Small left inguinal hernia containing omental fat  OSSEOUS STRUCTURES:  Degenerative disc changes in the lumbar spine seen  Impression: 1  Bilateral predominantly upper lobe diffuse nodular interstitial changes of inflammatory infectious etiology  2   Subsegmental atelectasis /consolidation in the right upper lobe in a bronchovascular distribution  3   Posterior bibasilar dependent changes with possible atelectasis/infiltrate in the left lung base  4   Diffuse bladder wall thickening which is decompressed containing a Means balloon  5  Mediastinal lymphadenopathy as described largest in the subcarinal region measuring 1 8 cm  Workstation performed: [id number]     Xr Chest Portable    Result Date: 9/8/2020  Narrative: CHEST INDICATION:   fever  COMPARISON:  Chest radiograph from 9/6/2020 and 9/5/2020  Chest CT from 9/1/2020  EXAM PERFORMED/VIEWS:  XR CHEST PORTABLE FINDINGS:  Lines and tubes removed  Cardiomediastinal silhouette appears unremarkable  Improving edema  No effusion or pneumothorax  Nodularity in the upper lobes better shown on CT  Osseous structures appear within normal limits for patient age  Impression: Improving edema  Underlying pneumonia cannot be excluded  Workstation performed: [id number]     Xr Chest Portable    Result Date: 9/6/2020  Narrative: CHEST INDICATION:   pna  COMPARISON:  Chest radiograph from 9/5/2020 and chest CT from 9/1/2020  EXAM PERFORMED/VIEWS:  XR CHEST PORTABLE FINDINGS:  ET tube 5 cm above the supriya  NG tube below the diaphragm  Left jugular catheter in SVC  Cardiomediastinal silhouette appears unremarkable  Persistent pulmonary edema  Question small right effusion  No pneumothorax  Osseous structures appear within normal limits for patient age  Impression: Persistent pulmonary edema with probable small right effusion  Underlying pneumonia cannot be excluded  Workstation performed: [id number]     Xr Chest Portable    Result Date: 9/5/2020  Narrative: CHEST INDICATION:   intubation  COMPARISON:  Chest radiograph from 9/3/2020 and chest CT from 9/1/2020  EXAM PERFORMED/VIEWS:  XR CHEST PORTABLE FINDINGS:  ET tube 5 cm above the supriya  NG tube below the diaphragm  Left jugular catheter in SVC  Cardiomediastinal silhouette appears unremarkable  Persistent pulmonary edema  No effusion or pneumothorax  Osseous structures appear within normal limits for patient age  Impression: Persistent pulmonary edema  Workstation performed: [id number]     Xr Chest Portable    Result Date: 9/3/2020  Narrative: CHEST INDICATION:   hypoxia, intubated  COMPARISON:  9/2/2020 EXAM PERFORMED/VIEWS:  XR CHEST PORTABLE FINDINGS:  The tip of the endotracheal tube projects 3 3 cm above the supriya  The tip of the enteric tube projects at the stomach  The tip of the left internal jugular central venous catheter projects at the superior vena cava  Cardiomediastinal silhouette appears unremarkable  Redemonstrated are bilateral parahilar and diffuse interstitial opacities throughout both lungs, which have improved  No new lung opacity  No evidence of a pleural effusion or pneumothorax  Osseous structures appear within normal limits for patient age  Impression: Improved pulmonary edema  Support devices as described Workstation performed: [id number]     Xr Chest Portable    Result Date: 9/2/2020  Narrative: CHEST INDICATION:   CVC insertion  COMPARISON:  Chest x-ray from September 2, 2020  EXAM PERFORMED/VIEWS:  XR CHEST PORTABLE FINDINGS: Endotracheal tube tip has advanced with its tip now measuring 4 cm from the supriya  Interval level introduction of a left IJ central venous catheter with its tip in the in the superior vena cava  Orogastric catheter is identified with its tip distal to  the hemidiaphragm   Stable cardiomediastinal silhouette with prominence of the hilar shadows and central more than peripheral vascular markings  Interval increase in the hazy opacities in the midlung fields likely represent alveolar edema  Suggestion of right mid lung platelike atelectasis  No pneumothorax or pleural effusion  No consolidation  The lungs are clear  No pneumothorax or pleural effusion  Osseous structures appear within normal limits for patient age  Impression: Interval advancement of the endotracheal catheter with its tip now residing 4 cm from the supriya  Left IJ central venous catheter identified with its tip in the superior vena cava  No pneumothorax  Increasing interstitial and alveolar edema  Workstation performed: [id number]     Xr Chest Portable    Result Date: 9/2/2020  Narrative: CHEST INDICATION:   ETT placement  Sepsis COMPARISON:  09/01/2020 EXAM PERFORMED/VIEWS:  XR CHEST PORTABLE Images: 4 FINDINGS: Cardiomediastinal silhouette appears unremarkable  Endotracheal tube is present with its tip lying approximately 7 6 cm above the supriya  This can be advanced 2 to 3 cm  Diffuse increased interstitial lung markings are noted  No pneumothorax or pleural effusion  Osseous structures appear within normal limits for patient age  Nasogastric tube extends into the proximal stomach below the left hemidiaphragm  Impression: 1  The endotracheal tube is present with its tip lying approximately 7 6 cm above the supriya  This can be advanced 2 to 3 cm  2   Increased interstitial lung markings  Consider infectious etiologies  The study was marked in [facility] for immediate notification  Workstation performed: [id number]     Xr Chest Portable    Result Date: 9/1/2020  Narrative: CHEST INDICATION:   sob  COMPARISON:  None EXAM PERFORMED/VIEWS:  XR CHEST PORTABLE FINDINGS: Cardiomediastinal silhouette appears unremarkable  Left basilar opacity could represent pericardial fat pad, effusion, atelectasis and/or pneumonia   Right mid lung opacity could represent pneumonia, follow-up with unenhanced chest CT recommended  No pneumothorax  Osseous structures appear within normal limits for patient age  Impression: Left basilar opacity could represent pericardial fat pad, effusion, atelectasis and/or pneumonia  Right mid lung opacity could represent pneumonia, follow-up with unenhanced chest CT recommended  Workstation performed: [id number]     Ct Head Without Contrast    Result Date: 9/1/2020  Narrative: CT BRAIN - WITHOUT CONTRAST INDICATION:   confusion  COMPARISON:  None  TECHNIQUE:  CT examination of the brain was performed  In addition to axial images, sagittal and coronal 2D reformatted images were created and submitted for interpretation  Radiation dose length product (DLP) for this visit:  999 mGy-cm   This examination, like all CT scans performed in the [facility], was performed utilizing techniques to minimize radiation dose exposure, including the use of iterative reconstruction and automated exposure control  IMAGE QUALITY:  Diagnostic  FINDINGS: PARENCHYMA: Decreased attenuation is noted in periventricular and subcortical white matter demonstrating an appearance that is statistically most likely to represent mild microangiopathic change  No CT signs of acute infarction  No intracranial mass, mass effect or midline shift  No acute parenchymal hemorrhage  VENTRICLES AND EXTRA-AXIAL SPACES:  Normal for the patient's age  VISUALIZED ORBITS AND PARANASAL SINUSES:  Unremarkable  CALVARIUM AND EXTRACRANIAL SOFT TISSUES:  Normal      Impression: No acute intracranial abnormality  Minor microangiopathic changes  Workstation performed: [id number]     Ct Cervical Spine Without Contrast    Result Date: 9/1/2020  Narrative: CT CERVICAL SPINE - WITHOUT CONTRAST INDICATION:   confusion  COMPARISON:  None  TECHNIQUE:  CT examination of the cervical spine was performed without intravenous contrast   Contiguous axial images were obtained  Sagittal and coronal reconstructions were performed  Radiation dose length product (DLP) for this visit:  490 mGy-cm   This examination, like all CT scans performed in the [facility], was performed utilizing techniques to minimize radiation dose exposure, including the use of iterative reconstruction and automated exposure control  IMAGE QUALITY:  Diagnostic  FINDINGS: ALIGNMENT:  Normal alignment of the cervical spine  No subluxation  VERTEBRAL BODIES:  No fracture  DEGENERATIVE CHANGES:  Mild multilevel cervical degenerative changes are noted without critical central canal stenosis  Partial fusion of the left C2-3 facet and right C4-5 facet  PREVERTEBRAL AND PARASPINAL SOFT TISSUES:  Normal  THORACIC INLET:  Numerous small nodules are seen within the lung apices bilaterally, too numerous to count  Mild right apical scarring  See separate CT chest, abdomen and pelvis report  Impression: Mild cervical degenerative change with no acute osseous abnormality  Innumerable small pulmonary nodules noted within the lung apices with mild right apical scarring  See separate CT chest, abdomen and pelvis report performed today  Workstation performed: [id number]     Nm Hepatobiliary W Rx    Result Date: 9/10/2020  Narrative: HEPATOBILIARY SCAN INDICATION:  Right upper quadrant pain  Biliary dyskinesia  COMPARISON:  Ultrasound abdomen 9/3/2020 TECHNIQUE:   Following the intravenous administration of 5 2 mCi Tc-99m labeled mebrofenin, dynamic abdominal images were obtained over a 60 minute time period  Images were performed in AP projection  FINDINGS: There is prompt, uniform accumulation with normal clearance of the radiopharmaceutical by the liver  Gallbladder is not seen within the first hour of imaging  Subsequently 3mg of morphine was intravenously administered along with an additional 1 0 mCi of technetium 99m mebrofenin  Imaging was obtained for an additional 45  minutes    Gallbladder is seen post morphine injection  Impression: 1  Gallbladder visualization post morphine administration compatible with a patent cystic duct  2   Administration of morphine precludes further evaluation with CCK to assess for biliary dyskinesia  Consider outpatient reassessment with HIDA CCK study as warranted  Workstation performed: [id number]     Us Abdomen Limited    Result Date: 9/3/2020  Narrative: RIGHT UPPER QUADRANT ULTRASOUND INDICATION:    sepsis  COMPARISON:  CT from 9/1/2020 TECHNIQUE:   Real-time ultrasound of the right upper quadrant was performed with a curvilinear transducer with both volumetric sweeps and still imaging techniques  FINDINGS: PANCREAS:  The pancreas is partially obscured by bowel gas  AORTA AND IVC:  Visualized portions are normal for patient age  LIVER: Size:  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  The liver measures 20 7 cm in the midclavicular line  Contour:  Surface contour is smooth  Parenchyma:  Echogenicity and echotexture are within normal limits  No evidence of suspicious mass  Limited imaging of the main portal vein shows it to be patent and hepatopetal  Prominent vessel extends from the liver towards the abdominal wall with faint Doppler flow and may represent a recannulated periumbilical vein  No obvious nodularity in the liver is seen  BILIARY: No gallstones are seen  The gallbladder wall is mildly thickened at 3 5 mm  Trace pericholecystic fluid is present  No gallstone is seen  Sonographic Sonia Kida sign could not be obtained on this limited patient No intrahepatic biliary dilatation  CBD measures 2 4 mm  No choledocholithiasis  KIDNEY: Right kidney measures 11 0 cm  Within normal limits  ASCITES:   None  Impression: No gallstones seen  Some gallbladder wall thickening and pericholecystic fluid is present which could be related to underdistention or other pathology    Gallbladder inflammation is somewhat less likely given the lack of distention unless early and should be correlated clinically  The common bile duct is normal in caliber Workstation performed: [id number]     Cta Chest Pe Study    Result Date: 9/8/2020  Narrative: CTA - CHEST WITH IV CONTRAST - PULMONARY ANGIOGRAM INDICATION:   Shortness of breath  Patient admitted with septic shock possibly from pneumonia COMPARISON: CT chest abdomen pelvis 9/1/20 TECHNIQUE: CTA examination of the chest was performed using angiographic technique according to a protocol specifically tailored to evaluate for pulmonary embolism  Axial, sagittal, and coronal 2D reformatted images were created from the source data and  submitted for interpretation  In addition, coronal 3D MIP postprocessing was performed on the acquisition scanner  Radiation dose length product (DLP) for this visit:  562 mGy-cm   This examination, like all CT scans performed in the [facility], was performed utilizing techniques to minimize radiation dose exposure, including the use of iterative reconstruction and automated exposure control  IV Contrast:  85 mL of iodixanol (VISIPAQUE)  FINDINGS: PULMONARY ARTERIAL TREE:  Exam is limited by respiratory motion artifact causing volume averaging in the middle lobe and the apices  No pulmonary embolism visualized LUNGS:  Again noted are interstitial and airspace opacities in the upper lobes as noted on series 3/43, this appears similar  Diffuse interstitial opacities PLEURA:  Moderate pleural effusions, increased from prior HEART/GREAT VESSELS:  Trace pericardial fluid MEDIASTINUM AND YUKI:  1 7 cm subcarinal lymph node series 2/121 1 1 cm right paratracheal lymph node series 2/69, with additional smaller lymph nodes in this region CHEST WALL AND LOWER NECK:   Focus of air noted within the left sternocleidomastoid seen on series 2/8, and the adjacent left IJ on series 2/33 consistent with recent IJ central catheter VISUALIZED STRUCTURES IN THE UPPER ABDOMEN:  Unremarkable   OSSEOUS STRUCTURES:  No acute fracture or destructive osseous lesion  Impression: 1  Limited examination as described, no evidence of pulmonary embolism 2  Upper lobe patchy opacities, greater on the right, consistent with pneumonia, similar 3  Moderate pleural effusions, increased 4  Mediastinal lymphadenopathy Workstation performed: [id number]     Vas Lower Limb Venous Duplex Study, Complete Bilateral    Result Date: 9/8/2020  Narrative:  [facility] REPORT CLINICAL: Indications: Patient presents with bilateral lower extremity edema x 3 days and shortness of breath  Operative History: Denies cardiovascular intervention Risk Factors The patient has history of Diabetes (IDDM) and AKD  FINDINGS:  Segment  Right            Left              Impression       Impression       CFV      Normal (Patent)  Normal (Patent)     CONCLUSION: Impression: RIGHT LOWER LIMB: No evidence of acute or chronic deep vein thrombosis  No evidence of superficial thrombophlebitis noted  Doppler evaluation shows a normal response to augmentation maneuvers  Popliteal, posterior tibial and anterior tibial arterial Doppler waveforms are triphasic  LEFT LOWER LIMB: No evidence of acute or chronic deep vein thrombosis  No evidence of superficial thrombophlebitis noted  Doppler evaluation shows a normal response to augmentation maneuvers  Popliteal, posterior tibial and anterior tibial arterial Doppler waveforms are triphasic  Technical findings were given to Mark Crocker 18: Electronically Signed by: Spike Davis MD, RPVI on 2020-09-08 04:27:39 PM       Medication Adjustments and Discharge Medications:  · Summary of Medication Adjustments made as a result of this hospitalization:  None  · Medication Dosing Tapers - Please refer to Discharge Medication List for details on any medication dosing tapers (if applicable to patient)  · Discharge Medication List: See after visit summary for reconciled discharge medications       Wound Care Recommendations:  When applicable, please see wound care section of After Visit Summary  Diet Recommendations at Discharge:  Diet -        Diet Orders   (From admission, onward)             Start     Ordered    09/11/20 1507  Diet Dysphagia/Modified Consistency; Dysphagia 2-Mechanical Soft; Thin Liquid; Dysphagia 2-Mechanical Soft  Diet effective now     Comments:  As per speech eval 9/11/2020   Question Answer Comment   Diet Type Dysphagia/Modified Consistency    Dysphagia/Modified Consistency Dysphagia 2-Mechanical Soft    Liquid Modifier Thin Liquid    Other Restriction(s): Dysphagia 2-Mechanical Soft    RD to adjust diet per protocol? Yes        09/11/20 1507    09/11/20 1247  Dietary nutrition supplements  Once     Question Answer Comment   Select Supplement: Banatrol Plus Banana Flakes    Mix withCrispin Tristan    Frequency Dinner        09/11/20 1246    09/11/20 1247  Dietary nutrition supplements  Once     Question Answer Comment   Select Supplement: Banatrol Plus Banana Flakes    Mix with: Applesauce    Frequency Breakfast        09/11/20 1246                  Incidental Findings:   · None     Test Results Pending at Discharge (will require follow up): · None         Hospital Course:     Deny Mcarthur is a 66 y o  male patient who originally presented to the hospital on 9/1/2020 due to sepsis secondary to pneumonia  Patient intubated for septic shock secondary to pneumonia and adequately treated with IV antibiotics  Successfully extubated  Patient progressively improved, antibiotics discontinued  PTOT evaluated patient  Patient will be discharged to rehab  Patient complaining as well loose stool which is about 3 to 4 times a day, non foul-smelling, no abdominal pain/nausea/vomiting; less likely to be infectious  Patient noticed to have elevated troponin which most likely is demand ischemia    Echocardiogram showed some apical ballooning for which cardiology was consulted and considered takotsubo cardiomyopathy and started on low-dose metoprolol; plan is to follow up patient as an outpatient and repeat echocardiogram     Condition at Discharge: stable     Discharge Day Visit / Exam:     Subjective:  No complaints  Vitals: Blood Pressure: 112/63 (09/12/20 0800)  Pulse: 87 (09/12/20 0800)  Temperature: 97 9 °F (36 6 °C) (09/12/20 0700)  Temp Source: Oral (09/12/20 0700)  Respirations: 18 (09/12/20 0731)  Height: 6' 3" (190 5 cm) (09/02/20 0857)  Weight - Scale: 82 7 kg (182 lb 5 1 oz) (09/12/20 0534)  SpO2: 94 % (09/12/20 0731)  Exam:     General appearance: alert, appears stated age and cooperative  Head: Normocephalic, without obvious abnormality, atraumatic  Lungs: clear to auscultation bilaterally  Heart: regular rate and rhythm  Abdomen: soft, non-tender, positive bowel sounds   Back: negative  Extremities: extremities atraumatic, no cyanosis or edema  Neurologic: Alert and oriented X 3, normal strength and tone  Normal symmetric reflexes  Normal coordination and gait      Discharge instructions/Information to patient and family:   See after visit summary section titled Discharge Instructions for information provided to patient and family  Planned Readmission:  No      Discharge Statement:  I spent 35 minutes discharging the patient  This time was spent on the day of discharge  I had direct contact with the patient on the day of discharge  Greater than 50% of the total time was spent examining patient, answering all patient questions, arranging and discussing plan of care with patient as well as directly providing post-discharge instructions  Additional time then spent on discharge activities      ** Please Note: This note has been constructed using a voice recognition system **

## 2020-09-12 NOTE — ASSESSMENT & PLAN NOTE
3-4 loose stool, non foul-smelling  Less likely to be C diff  Will keep monitoring patient today  Appreciate Nutrition consult:  "lactaid milk (lactose free), hint water/infused water that does not contain sugar substitutes  Pt is reported to be a "picky eater" daughter unsure if pt will drink water, recommended diluting G zero with 50% plain water to decrease sugar substitute intake   Encouraged fresh meats on white bread for sandwiches instead of wheat bread with lunchmeat, soft fruit such as banana or melon instead of chips  "

## 2020-09-12 NOTE — ASSESSMENT & PLAN NOTE
Appreciate bedside swallow study  Patient currently on mechanical soft diet and thin liquids  Recommend to reassess swallow study at rehab and advanced his diet

## 2020-09-12 NOTE — ASSESSMENT & PLAN NOTE
Septic shock, POA secondary to pneumonia  Successfully extubated, sepsis improved on discharge  Adequately treated with IV antibiotics

## 2020-09-13 LAB
BACTERIA BLD CULT: NORMAL
BACTERIA BLD CULT: NORMAL

## 2020-09-14 NOTE — UTILIZATION REVIEW
Notification of Discharge  This is a Notification of Discharge from our facility [address]  Please be advised that this patient has been discharge from our facility  Below you will find the admission and discharge date and time including the patients disposition  PRESENTATION DATE: 9/1/2020  4:56 PM  OBS ADMISSION DATE:   IP ADMISSION DATE: 9/1/20 1842   DISCHARGE DATE: 9/12/2020 12:54 PM  DISPOSITION: Non [facility] Non [facility]   Admission Orders listed below:  Admission Orders (From admission, onward)     Ordered        09/01/20 1842  Inpatient Admission  Once                   Please contact the UR Department if additional information is required to close this patient's authorization/case  [email or web address] Utilization Review Department  Main: [phone] x carefully listen to the prompts  All voicemails are confidential   [email or web address]  Send all requests for admission clinical reviews, approved or denied determinations and any other requests to dedicated fax number below belonging to the campus where the patient is receiving treatment   List of dedicated fax numbers:  [address] DENIALS (Administrative/Medical Necessity) [phone]   [address]  (Maternity/NICU/Pediatrics) [phone] [address] [facility] [phone]   [facility] [phone]   [facility] [phone]   [place] [address] [phone]   [facility]  [phone]   [address] [facility]  [address] [facility] [address] [facility] [phone]

## 2020-09-19 ENCOUNTER — HOSPITAL ENCOUNTER (INPATIENT)
Facility: HOSPITAL | Age: 75
LOS: 1 days | DRG: 870 | End: 2020-09-19
Attending: EMERGENCY MEDICINE | Admitting: STUDENT IN AN ORGANIZED HEALTH CARE EDUCATION/TRAINING PROGRAM
Payer: COMMERCIAL

## 2020-09-19 ENCOUNTER — APPOINTMENT (EMERGENCY)
Dept: RADIOLOGY | Facility: HOSPITAL | Age: 75
DRG: 870 | End: 2020-09-19
Payer: COMMERCIAL

## 2020-09-19 ENCOUNTER — APPOINTMENT (INPATIENT)
Dept: RADIOLOGY | Facility: HOSPITAL | Age: 75
DRG: 870 | End: 2020-09-19
Payer: COMMERCIAL

## 2020-09-19 ENCOUNTER — APPOINTMENT (INPATIENT)
Dept: CT IMAGING | Facility: HOSPITAL | Age: 75
DRG: 870 | End: 2020-09-19
Payer: COMMERCIAL

## 2020-09-19 ENCOUNTER — HOSPITAL ENCOUNTER (INPATIENT)
Facility: HOSPITAL | Age: 75
LOS: 30 days | Discharge: NURSING, REHAB OR LONG-TERM CARE | DRG: 673 | End: 2020-10-19
Attending: STUDENT IN AN ORGANIZED HEALTH CARE EDUCATION/TRAINING PROGRAM | Admitting: INTERNAL MEDICINE
Payer: COMMERCIAL

## 2020-09-19 VITALS
DIASTOLIC BLOOD PRESSURE: 59 MMHG | BODY MASS INDEX: 26.23 KG/M2 | HEART RATE: 84 BPM | TEMPERATURE: 95.8 F | OXYGEN SATURATION: 95 % | SYSTOLIC BLOOD PRESSURE: 104 MMHG | WEIGHT: 204.37 LBS | RESPIRATION RATE: 27 BRPM | HEIGHT: 74 IN

## 2020-09-19 DIAGNOSIS — N17.9 ACUTE RENAL FAILURE (ARF) (HCC): Primary | ICD-10-CM

## 2020-09-19 DIAGNOSIS — R19.7 DIARRHEA, UNSPECIFIED TYPE: ICD-10-CM

## 2020-09-19 DIAGNOSIS — Z79.4 TYPE 2 DIABETES MELLITUS WITHOUT COMPLICATION, WITH LONG-TERM CURRENT USE OF INSULIN (HCC): ICD-10-CM

## 2020-09-19 DIAGNOSIS — I82.412 ACUTE DEEP VEIN THROMBOSIS (DVT) OF FEMORAL VEIN OF LEFT LOWER EXTREMITY (HCC): ICD-10-CM

## 2020-09-19 DIAGNOSIS — R60.9 SWELLING: ICD-10-CM

## 2020-09-19 DIAGNOSIS — R57.9 SHOCK (HCC): ICD-10-CM

## 2020-09-19 DIAGNOSIS — S31.000D WOUND OF SACRAL REGION, SUBSEQUENT ENCOUNTER: ICD-10-CM

## 2020-09-19 DIAGNOSIS — N17.9 ACUTE RENAL FAILURE, UNSPECIFIED ACUTE RENAL FAILURE TYPE (HCC): ICD-10-CM

## 2020-09-19 DIAGNOSIS — A41.9 SEPTIC SHOCK (HCC): ICD-10-CM

## 2020-09-19 DIAGNOSIS — I95.9 HYPOTENSION: ICD-10-CM

## 2020-09-19 DIAGNOSIS — E87.5 HYPERKALEMIA: ICD-10-CM

## 2020-09-19 DIAGNOSIS — L89.90 DECUBITAL ULCER: ICD-10-CM

## 2020-09-19 DIAGNOSIS — E11.9 TYPE 2 DIABETES MELLITUS WITHOUT COMPLICATION, WITH LONG-TERM CURRENT USE OF INSULIN (HCC): ICD-10-CM

## 2020-09-19 DIAGNOSIS — J90 BILATERAL PLEURAL EFFUSION: ICD-10-CM

## 2020-09-19 DIAGNOSIS — R65.21 SEPTIC SHOCK (HCC): ICD-10-CM

## 2020-09-19 PROBLEM — E83.39 HYPERPHOSPHATEMIA: Status: ACTIVE | Noted: 2020-09-19

## 2020-09-19 PROBLEM — G93.41 METABOLIC ENCEPHALOPATHY: Status: ACTIVE | Noted: 2020-09-19

## 2020-09-19 LAB
ALBUMIN SERPL BCP-MCNC: 1.8 G/DL (ref 3.5–5)
ALP SERPL-CCNC: 86 U/L (ref 46–116)
ALT SERPL W P-5'-P-CCNC: 88 U/L (ref 12–78)
AMORPH URATE CRY URNS QL MICRO: ABNORMAL /HPF
AMORPH URATE CRY URNS QL MICRO: ABNORMAL /HPF
ANION GAP SERPL CALCULATED.3IONS-SCNC: 12 MMOL/L (ref 4–13)
ANION GAP SERPL CALCULATED.3IONS-SCNC: 14 MMOL/L (ref 4–13)
ANION GAP SERPL CALCULATED.3IONS-SCNC: 16 MMOL/L (ref 4–13)
ANION GAP SERPL CALCULATED.3IONS-SCNC: 17 MMOL/L (ref 4–13)
ANION GAP SERPL CALCULATED.3IONS-SCNC: 17 MMOL/L (ref 4–13)
APPEARANCE FLD: ABNORMAL
AST SERPL W P-5'-P-CCNC: 157 U/L (ref 5–45)
ATRIAL RATE: 83 BPM
ATRIAL RATE: 95 BPM
BACTERIA UR QL AUTO: ABNORMAL /HPF
BACTERIA UR QL AUTO: ABNORMAL /HPF
BASE EX.OXY STD BLDV CALC-SCNC: 97.3 % (ref 60–80)
BASE EXCESS BLDA CALC-SCNC: -10.7 MMOL/L
BASE EXCESS BLDA CALC-SCNC: -11.5 MMOL/L
BASE EXCESS BLDV CALC-SCNC: -9.5 MMOL/L
BASOPHILS # BLD AUTO: 0.06 THOUSANDS/ΜL (ref 0–0.1)
BASOPHILS NFR BLD AUTO: 1 % (ref 0–1)
BILIRUB SERPL-MCNC: 0.35 MG/DL (ref 0.2–1)
BILIRUB UR QL STRIP: ABNORMAL
BILIRUB UR QL STRIP: NEGATIVE
BODY TEMPERATURE: 95.9 DEGREES FEHRENHEIT
BUN SERPL-MCNC: 100 MG/DL (ref 5–25)
BUN SERPL-MCNC: 101 MG/DL (ref 5–25)
BUN SERPL-MCNC: 108 MG/DL (ref 5–25)
BUN SERPL-MCNC: 112 MG/DL (ref 5–25)
BUN SERPL-MCNC: 113 MG/DL (ref 5–25)
CA-I BLD-SCNC: 1.02 MMOL/L (ref 1.12–1.32)
CALCIUM ALBUM COR SERPL-MCNC: 9.9 MG/DL (ref 8.3–10.1)
CALCIUM SERPL-MCNC: 8.1 MG/DL (ref 8.3–10.1)
CALCIUM SERPL-MCNC: 8.2 MG/DL (ref 8.3–10.1)
CALCIUM SERPL-MCNC: 8.3 MG/DL (ref 8.3–10.1)
CHLORIDE SERPL-SCNC: 101 MMOL/L (ref 100–108)
CHLORIDE SERPL-SCNC: 102 MMOL/L (ref 100–108)
CHLORIDE SERPL-SCNC: 102 MMOL/L (ref 100–108)
CHLORIDE SERPL-SCNC: 108 MMOL/L (ref 100–108)
CHLORIDE SERPL-SCNC: 111 MMOL/L (ref 100–108)
CHOLEST FLD-MCNC: 51 MG/DL
CLARITY UR: ABNORMAL
CLARITY UR: CLEAR
CO2 SERPL-SCNC: 16 MMOL/L (ref 21–32)
CO2 SERPL-SCNC: 16 MMOL/L (ref 21–32)
CO2 SERPL-SCNC: 17 MMOL/L (ref 21–32)
CO2 SERPL-SCNC: 18 MMOL/L (ref 21–32)
CO2 SERPL-SCNC: 19 MMOL/L (ref 21–32)
COLOR FLD: YELLOW
COLOR UR: ABNORMAL
COLOR UR: YELLOW
CREAT SERPL-MCNC: 10.32 MG/DL (ref 0.6–1.3)
CREAT SERPL-MCNC: 10.52 MG/DL (ref 0.6–1.3)
CREAT SERPL-MCNC: 10.78 MG/DL (ref 0.6–1.3)
CREAT SERPL-MCNC: 9.75 MG/DL (ref 0.6–1.3)
CREAT SERPL-MCNC: 9.82 MG/DL (ref 0.6–1.3)
EOSINOPHIL # BLD AUTO: 0.33 THOUSAND/ΜL (ref 0–0.61)
EOSINOPHIL NFR BLD AUTO: 3 % (ref 0–6)
ERYTHROCYTE [DISTWIDTH] IN BLOOD BY AUTOMATED COUNT: 14.9 % (ref 11.6–15.1)
FINE GRAN CASTS URNS QL MICRO: ABNORMAL /LPF
GFR SERPL CREATININE-BSD FRML MDRD: 4 ML/MIN/1.73SQ M
GFR SERPL CREATININE-BSD FRML MDRD: 5 ML/MIN/1.73SQ M
GFR SERPL CREATININE-BSD FRML MDRD: 5 ML/MIN/1.73SQ M
GLUCOSE FLD-MCNC: 60 MG/DL
GLUCOSE SERPL-MCNC: 103 MG/DL (ref 65–140)
GLUCOSE SERPL-MCNC: 104 MG/DL (ref 65–140)
GLUCOSE SERPL-MCNC: 137 MG/DL (ref 65–140)
GLUCOSE SERPL-MCNC: 34 MG/DL (ref 65–140)
GLUCOSE SERPL-MCNC: 47 MG/DL (ref 65–140)
GLUCOSE SERPL-MCNC: 89 MG/DL (ref 65–140)
GLUCOSE SERPL-MCNC: 91 MG/DL (ref 65–140)
GLUCOSE SERPL-MCNC: 91 MG/DL (ref 65–140)
GLUCOSE UR STRIP-MCNC: NEGATIVE MG/DL
GLUCOSE UR STRIP-MCNC: NEGATIVE MG/DL
GRAM STN SPEC: NORMAL
GRAM STN SPEC: NORMAL
HCO3 BLDA-SCNC: 12.8 MMOL/L (ref 22–28)
HCO3 BLDA-SCNC: 14 MMOL/L (ref 22–28)
HCO3 BLDV-SCNC: 15.1 MMOL/L (ref 24–30)
HCT VFR BLD AUTO: 31.2 % (ref 36.5–49.3)
HGB BLD-MCNC: 10.1 G/DL (ref 12–17)
HGB UR QL STRIP.AUTO: ABNORMAL
HGB UR QL STRIP.AUTO: ABNORMAL
HYALINE CASTS #/AREA URNS LPF: ABNORMAL /LPF
HYALINE CASTS #/AREA URNS LPF: ABNORMAL /LPF
IMM GRANULOCYTES # BLD AUTO: 0.03 THOUSAND/UL (ref 0–0.2)
IMM GRANULOCYTES NFR BLD AUTO: 0 % (ref 0–2)
INR PPP: 1.57 (ref 0.84–1.19)
KETONES UR STRIP-MCNC: NEGATIVE MG/DL
KETONES UR STRIP-MCNC: NEGATIVE MG/DL
LACTATE SERPL-SCNC: 0.6 MMOL/L (ref 0.5–2)
LACTATE SERPL-SCNC: 0.8 MMOL/L (ref 0.5–2)
LACTATE SERPL-SCNC: 1.2 MMOL/L (ref 0.5–2)
LDH FLD L TO P-CCNC: 614 U/L
LEUKOCYTE ESTERASE UR QL STRIP: ABNORMAL
LEUKOCYTE ESTERASE UR QL STRIP: NEGATIVE
LYMPHOCYTES # BLD AUTO: 1.45 THOUSANDS/ΜL (ref 0.6–4.47)
LYMPHOCYTES NFR BLD AUTO: 13 % (ref 14–44)
LYMPHOCYTES NFR BLD AUTO: 19 %
MAGNESIUM SERPL-MCNC: 2.1 MG/DL (ref 1.6–2.6)
MAGNESIUM SERPL-MCNC: 2.2 MG/DL (ref 1.6–2.6)
MAGNESIUM SERPL-MCNC: 2.4 MG/DL (ref 1.6–2.6)
MCH RBC QN AUTO: 28.2 PG (ref 26.8–34.3)
MCHC RBC AUTO-ENTMCNC: 32.4 G/DL (ref 31.4–37.4)
MCV RBC AUTO: 87 FL (ref 82–98)
MONOCYTES # BLD AUTO: 0.89 THOUSAND/ΜL (ref 0.17–1.22)
MONOCYTES NFR BLD AUTO: 8 % (ref 4–12)
MONOCYTES NFR BLD AUTO: 9 %
NASAL CANNULA: 4
NASAL CANNULA: ABNORMAL
NEUTROPHILS # BLD AUTO: 8.15 THOUSANDS/ΜL (ref 1.85–7.62)
NEUTS SEG NFR BLD AUTO: 72 %
NEUTS SEG NFR BLD AUTO: 75 % (ref 43–75)
NITRITE UR QL STRIP: NEGATIVE
NITRITE UR QL STRIP: NEGATIVE
NON-SQ EPI CELLS URNS QL MICRO: ABNORMAL /HPF
NON-SQ EPI CELLS URNS QL MICRO: ABNORMAL /HPF
NRBC BLD AUTO-RTO: 0 /100 WBCS
NT-PROBNP SERPL-MCNC: ABNORMAL PG/ML
O2 CT BLDA-SCNC: 14.1 ML/DL (ref 16–23)
O2 CT BLDA-SCNC: 15.5 ML/DL (ref 16–23)
O2 CT BLDV-SCNC: 14.5 ML/DL
OXYHGB MFR BLDA: 91.9 % (ref 94–97)
OXYHGB MFR BLDA: 94.3 % (ref 94–97)
P AXIS: 69 DEGREES
P AXIS: 74 DEGREES
PCO2 BLDA: 24.6 MM HG (ref 36–44)
PCO2 BLDA: 28.2 MM HG (ref 36–44)
PCO2 BLDV: 28.8 MM HG (ref 42–50)
PCO2 TEMP ADJ BLDA: 26.4 MM HG (ref 36–44)
PH BLD: 7.34 [PH] (ref 7.35–7.45)
PH BLDA: 7.32 [PH] (ref 7.35–7.45)
PH BLDA: 7.33 [PH] (ref 7.35–7.45)
PH BLDV: 7.34 [PH] (ref 7.3–7.4)
PH BODY FLUID: 6.8
PH UR STRIP.AUTO: 5 [PH]
PH UR STRIP.AUTO: 5 [PH]
PHOSPHATE SERPL-MCNC: 7.2 MG/DL (ref 2.3–4.1)
PHOSPHATE SERPL-MCNC: 7.6 MG/DL (ref 2.3–4.1)
PHOSPHATE SERPL-MCNC: 7.7 MG/DL (ref 2.3–4.1)
PLATELET # BLD AUTO: 403 THOUSANDS/UL (ref 149–390)
PLATELET # BLD AUTO: 448 THOUSANDS/UL (ref 149–390)
PLATELET # BLD AUTO: 482 THOUSANDS/UL (ref 149–390)
PMV BLD AUTO: 10.4 FL (ref 8.9–12.7)
PMV BLD AUTO: 10.4 FL (ref 8.9–12.7)
PMV BLD AUTO: 10.5 FL (ref 8.9–12.7)
PO2 BLD: 62.9 MM HG (ref 75–129)
PO2 BLDA: 69.6 MM HG (ref 75–129)
PO2 BLDA: 80 MM HG (ref 75–129)
PO2 BLDV: 208.6 MM HG (ref 35–45)
POTASSIUM SERPL-SCNC: 5.4 MMOL/L (ref 3.5–5.3)
POTASSIUM SERPL-SCNC: 5.7 MMOL/L (ref 3.5–5.3)
POTASSIUM SERPL-SCNC: 5.9 MMOL/L (ref 3.5–5.3)
POTASSIUM SERPL-SCNC: 5.9 MMOL/L (ref 3.5–5.3)
POTASSIUM SERPL-SCNC: 6.2 MMOL/L (ref 3.5–5.3)
PR INTERVAL: 180 MS
PR INTERVAL: 200 MS
PROCALCITONIN SERPL-MCNC: 26.74 NG/ML
PROT FLD-MCNC: 5.3 G/DL
PROT SERPL-MCNC: 7.7 G/DL (ref 6.4–8.2)
PROT UR STRIP-MCNC: ABNORMAL MG/DL
PROT UR STRIP-MCNC: ABNORMAL MG/DL
PROTHROMBIN TIME: 18.7 SECONDS (ref 11.6–14.5)
QRS AXIS: 67 DEGREES
QRS AXIS: 80 DEGREES
QRSD INTERVAL: 92 MS
QRSD INTERVAL: 96 MS
QT INTERVAL: 372 MS
QT INTERVAL: 408 MS
QTC INTERVAL: 467 MS
QTC INTERVAL: 480 MS
RBC # BLD AUTO: 3.58 MILLION/UL (ref 3.88–5.62)
RBC # FLD MANUAL: NORMAL /UL
RBC #/AREA URNS AUTO: ABNORMAL /HPF
RBC #/AREA URNS AUTO: ABNORMAL /HPF
SARS-COV-2 RNA RESP QL NAA+PROBE: NEGATIVE
SITE: ABNORMAL
SODIUM SERPL-SCNC: 135 MMOL/L (ref 136–145)
SODIUM SERPL-SCNC: 137 MMOL/L (ref 136–145)
SODIUM SERPL-SCNC: 137 MMOL/L (ref 136–145)
SODIUM SERPL-SCNC: 138 MMOL/L (ref 136–145)
SODIUM SERPL-SCNC: 139 MMOL/L (ref 136–145)
SP GR UR STRIP.AUTO: 1.01 (ref 1–1.03)
SP GR UR STRIP.AUTO: >=1.03 (ref 1–1.03)
SPECIMEN SOURCE: ABNORMAL
SPECIMEN SOURCE: ABNORMAL
T WAVE AXIS: 113 DEGREES
T WAVE AXIS: 94 DEGREES
TOTAL CELLS COUNTED SPEC: 100
TROPONIN I SERPL-MCNC: 0.2 NG/ML
TROPONIN I SERPL-MCNC: 0.22 NG/ML
TROPONIN I SERPL-MCNC: 0.24 NG/ML
TROPONIN I SERPL-MCNC: 0.28 NG/ML
UROBILINOGEN UR QL STRIP.AUTO: 0.2 E.U./DL
UROBILINOGEN UR QL STRIP.AUTO: 0.2 E.U./DL
VENTRICULAR RATE: 83 BPM
VENTRICULAR RATE: 95 BPM
WBC # BLD AUTO: 10.91 THOUSAND/UL (ref 4.31–10.16)
WBC # FLD MANUAL: 3154 /UL
WBC #/AREA URNS AUTO: ABNORMAL /HPF
WBC #/AREA URNS AUTO: ABNORMAL /HPF

## 2020-09-19 PROCEDURE — 84100 ASSAY OF PHOSPHORUS: CPT | Performed by: PHYSICIAN ASSISTANT

## 2020-09-19 PROCEDURE — 82330 ASSAY OF CALCIUM: CPT | Performed by: PHYSICIAN ASSISTANT

## 2020-09-19 PROCEDURE — 36415 COLL VENOUS BLD VENIPUNCTURE: CPT | Performed by: EMERGENCY MEDICINE

## 2020-09-19 PROCEDURE — 96375 TX/PRO/DX INJ NEW DRUG ADDON: CPT

## 2020-09-19 PROCEDURE — 89050 BODY FLUID CELL COUNT: CPT | Performed by: PHYSICIAN ASSISTANT

## 2020-09-19 PROCEDURE — 84484 ASSAY OF TROPONIN QUANT: CPT | Performed by: EMERGENCY MEDICINE

## 2020-09-19 PROCEDURE — 96372 THER/PROPH/DIAG INJ SC/IM: CPT

## 2020-09-19 PROCEDURE — 83880 ASSAY OF NATRIURETIC PEPTIDE: CPT | Performed by: PHYSICIAN ASSISTANT

## 2020-09-19 PROCEDURE — 82805 BLOOD GASES W/O2 SATURATION: CPT | Performed by: INTERNAL MEDICINE

## 2020-09-19 PROCEDURE — 80048 BASIC METABOLIC PNL TOTAL CA: CPT | Performed by: EMERGENCY MEDICINE

## 2020-09-19 PROCEDURE — 93010 ELECTROCARDIOGRAM REPORT: CPT | Performed by: INTERNAL MEDICINE

## 2020-09-19 PROCEDURE — 06HY33Z INSERTION OF INFUSION DEVICE INTO LOWER VEIN, PERCUTANEOUS APPROACH: ICD-10-PCS | Performed by: STUDENT IN AN ORGANIZED HEALTH CARE EDUCATION/TRAINING PROGRAM

## 2020-09-19 PROCEDURE — 82805 BLOOD GASES W/O2 SATURATION: CPT | Performed by: EMERGENCY MEDICINE

## 2020-09-19 PROCEDURE — 93005 ELECTROCARDIOGRAM TRACING: CPT

## 2020-09-19 PROCEDURE — 99285 EMERGENCY DEPT VISIT HI MDM: CPT

## 2020-09-19 PROCEDURE — 74176 CT ABD & PELVIS W/O CONTRAST: CPT

## 2020-09-19 PROCEDURE — 99291 CRITICAL CARE FIRST HOUR: CPT | Performed by: PHYSICIAN ASSISTANT

## 2020-09-19 PROCEDURE — 96361 HYDRATE IV INFUSION ADD-ON: CPT

## 2020-09-19 PROCEDURE — 84100 ASSAY OF PHOSPHORUS: CPT | Performed by: INTERNAL MEDICINE

## 2020-09-19 PROCEDURE — 36620 INSERTION CATHETER ARTERY: CPT | Performed by: STUDENT IN AN ORGANIZED HEALTH CARE EDUCATION/TRAINING PROGRAM

## 2020-09-19 PROCEDURE — 84484 ASSAY OF TROPONIN QUANT: CPT | Performed by: PHYSICIAN ASSISTANT

## 2020-09-19 PROCEDURE — 87116 MYCOBACTERIA CULTURE: CPT | Performed by: PHYSICIAN ASSISTANT

## 2020-09-19 PROCEDURE — 99292 CRITICAL CARE ADDL 30 MIN: CPT | Performed by: PHYSICIAN ASSISTANT

## 2020-09-19 PROCEDURE — 71250 CT THORAX DX C-: CPT

## 2020-09-19 PROCEDURE — 99291 CRITICAL CARE FIRST HOUR: CPT | Performed by: INTERNAL MEDICINE

## 2020-09-19 PROCEDURE — 80053 COMPREHEN METABOLIC PANEL: CPT | Performed by: EMERGENCY MEDICINE

## 2020-09-19 PROCEDURE — 84157 ASSAY OF PROTEIN OTHER: CPT | Performed by: PHYSICIAN ASSISTANT

## 2020-09-19 PROCEDURE — 87040 BLOOD CULTURE FOR BACTERIA: CPT | Performed by: PHYSICIAN ASSISTANT

## 2020-09-19 PROCEDURE — 82948 REAGENT STRIP/BLOOD GLUCOSE: CPT

## 2020-09-19 PROCEDURE — 80048 BASIC METABOLIC PNL TOTAL CA: CPT | Performed by: PHYSICIAN ASSISTANT

## 2020-09-19 PROCEDURE — 82945 GLUCOSE OTHER FLUID: CPT | Performed by: PHYSICIAN ASSISTANT

## 2020-09-19 PROCEDURE — 99292 CRITICAL CARE ADDL 30 MIN: CPT | Performed by: STUDENT IN AN ORGANIZED HEALTH CARE EDUCATION/TRAINING PROGRAM

## 2020-09-19 PROCEDURE — 71045 X-RAY EXAM CHEST 1 VIEW: CPT

## 2020-09-19 PROCEDURE — 83605 ASSAY OF LACTIC ACID: CPT | Performed by: INTERNAL MEDICINE

## 2020-09-19 PROCEDURE — 0W993ZX DRAINAGE OF RIGHT PLEURAL CAVITY, PERCUTANEOUS APPROACH, DIAGNOSTIC: ICD-10-PCS | Performed by: STUDENT IN AN ORGANIZED HEALTH CARE EDUCATION/TRAINING PROGRAM

## 2020-09-19 PROCEDURE — 83735 ASSAY OF MAGNESIUM: CPT | Performed by: INTERNAL MEDICINE

## 2020-09-19 PROCEDURE — 89051 BODY FLUID CELL COUNT: CPT | Performed by: PHYSICIAN ASSISTANT

## 2020-09-19 PROCEDURE — 83986 ASSAY PH BODY FLUID NOS: CPT | Performed by: PHYSICIAN ASSISTANT

## 2020-09-19 PROCEDURE — 87493 C DIFF AMPLIFIED PROBE: CPT | Performed by: EMERGENCY MEDICINE

## 2020-09-19 PROCEDURE — 85025 COMPLETE CBC W/AUTO DIFF WBC: CPT | Performed by: EMERGENCY MEDICINE

## 2020-09-19 PROCEDURE — 82465 ASSAY BLD/SERUM CHOLESTEROL: CPT | Performed by: PHYSICIAN ASSISTANT

## 2020-09-19 PROCEDURE — 94760 N-INVAS EAR/PLS OXIMETRY 1: CPT

## 2020-09-19 PROCEDURE — 84145 PROCALCITONIN (PCT): CPT | Performed by: PHYSICIAN ASSISTANT

## 2020-09-19 PROCEDURE — 85049 AUTOMATED PLATELET COUNT: CPT | Performed by: PHYSICIAN ASSISTANT

## 2020-09-19 PROCEDURE — 87206 SMEAR FLUORESCENT/ACID STAI: CPT | Performed by: PHYSICIAN ASSISTANT

## 2020-09-19 PROCEDURE — NC001 PR NO CHARGE: Performed by: INTERNAL MEDICINE

## 2020-09-19 PROCEDURE — 83605 ASSAY OF LACTIC ACID: CPT | Performed by: EMERGENCY MEDICINE

## 2020-09-19 PROCEDURE — 36556 INSERT NON-TUNNEL CV CATH: CPT | Performed by: STUDENT IN AN ORGANIZED HEALTH CARE EDUCATION/TRAINING PROGRAM

## 2020-09-19 PROCEDURE — NC001 PR NO CHARGE: Performed by: PHYSICIAN ASSISTANT

## 2020-09-19 PROCEDURE — 87102 FUNGUS ISOLATION CULTURE: CPT | Performed by: PHYSICIAN ASSISTANT

## 2020-09-19 PROCEDURE — 0W9B3ZX DRAINAGE OF LEFT PLEURAL CAVITY, PERCUTANEOUS APPROACH, DIAGNOSTIC: ICD-10-PCS | Performed by: STUDENT IN AN ORGANIZED HEALTH CARE EDUCATION/TRAINING PROGRAM

## 2020-09-19 PROCEDURE — 82805 BLOOD GASES W/O2 SATURATION: CPT | Performed by: STUDENT IN AN ORGANIZED HEALTH CARE EDUCATION/TRAINING PROGRAM

## 2020-09-19 PROCEDURE — 99291 CRITICAL CARE FIRST HOUR: CPT | Performed by: EMERGENCY MEDICINE

## 2020-09-19 PROCEDURE — 32555 ASPIRATE PLEURA W/ IMAGING: CPT | Performed by: STUDENT IN AN ORGANIZED HEALTH CARE EDUCATION/TRAINING PROGRAM

## 2020-09-19 PROCEDURE — 87635 SARS-COV-2 COVID-19 AMP PRB: CPT | Performed by: EMERGENCY MEDICINE

## 2020-09-19 PROCEDURE — 80048 BASIC METABOLIC PNL TOTAL CA: CPT | Performed by: INTERNAL MEDICINE

## 2020-09-19 PROCEDURE — 85049 AUTOMATED PLATELET COUNT: CPT | Performed by: INTERNAL MEDICINE

## 2020-09-19 PROCEDURE — 83735 ASSAY OF MAGNESIUM: CPT | Performed by: PHYSICIAN ASSISTANT

## 2020-09-19 PROCEDURE — 94640 AIRWAY INHALATION TREATMENT: CPT

## 2020-09-19 PROCEDURE — 87070 CULTURE OTHR SPECIMN AEROBIC: CPT | Performed by: PHYSICIAN ASSISTANT

## 2020-09-19 PROCEDURE — 84484 ASSAY OF TROPONIN QUANT: CPT | Performed by: INTERNAL MEDICINE

## 2020-09-19 PROCEDURE — 81001 URINALYSIS AUTO W/SCOPE: CPT | Performed by: INTERNAL MEDICINE

## 2020-09-19 PROCEDURE — 85610 PROTHROMBIN TIME: CPT | Performed by: INTERNAL MEDICINE

## 2020-09-19 PROCEDURE — G1004 CDSM NDSC: HCPCS

## 2020-09-19 PROCEDURE — 87205 SMEAR GRAM STAIN: CPT | Performed by: PHYSICIAN ASSISTANT

## 2020-09-19 PROCEDURE — 83615 LACTATE (LD) (LDH) ENZYME: CPT | Performed by: PHYSICIAN ASSISTANT

## 2020-09-19 PROCEDURE — 96365 THER/PROPH/DIAG IV INF INIT: CPT

## 2020-09-19 PROCEDURE — 99223 1ST HOSP IP/OBS HIGH 75: CPT | Performed by: INTERNAL MEDICINE

## 2020-09-19 PROCEDURE — 03HY32Z INSERTION OF MONITORING DEVICE INTO UPPER ARTERY, PERCUTANEOUS APPROACH: ICD-10-PCS | Performed by: STUDENT IN AN ORGANIZED HEALTH CARE EDUCATION/TRAINING PROGRAM

## 2020-09-19 PROCEDURE — 81001 URINALYSIS AUTO W/SCOPE: CPT | Performed by: EMERGENCY MEDICINE

## 2020-09-19 PROCEDURE — 83605 ASSAY OF LACTIC ACID: CPT | Performed by: STUDENT IN AN ORGANIZED HEALTH CARE EDUCATION/TRAINING PROGRAM

## 2020-09-19 RX ORDER — CALCIUM GLUCONATE 20 MG/ML
1 INJECTION, SOLUTION INTRAVENOUS ONCE
Status: COMPLETED | OUTPATIENT
Start: 2020-09-19 | End: 2020-09-19

## 2020-09-19 RX ORDER — CHLORHEXIDINE GLUCONATE 0.12 MG/ML
15 RINSE ORAL EVERY 12 HOURS SCHEDULED
Status: DISCONTINUED | OUTPATIENT
Start: 2020-09-19 | End: 2020-09-19 | Stop reason: HOSPADM

## 2020-09-19 RX ORDER — DEXTROSE MONOHYDRATE 25 G/50ML
25 INJECTION, SOLUTION INTRAVENOUS ONCE
Status: COMPLETED | OUTPATIENT
Start: 2020-09-19 | End: 2020-09-19

## 2020-09-19 RX ORDER — CHLORHEXIDINE GLUCONATE 0.12 MG/ML
15 RINSE ORAL EVERY 12 HOURS SCHEDULED
Status: DISCONTINUED | OUTPATIENT
Start: 2020-09-19 | End: 2020-09-19

## 2020-09-19 RX ORDER — LINAGLIPTIN 5 MG/1
5 TABLET, FILM COATED ORAL DAILY
COMMUNITY
End: 2020-10-19 | Stop reason: HOSPADM

## 2020-09-19 RX ORDER — HEPARIN SODIUM 5000 [USP'U]/ML
5000 INJECTION, SOLUTION INTRAVENOUS; SUBCUTANEOUS EVERY 8 HOURS SCHEDULED
Status: DISCONTINUED | OUTPATIENT
Start: 2020-09-19 | End: 2020-10-05 | Stop reason: ALTCHOICE

## 2020-09-19 RX ORDER — ALBUTEROL SULFATE 2.5 MG/3ML
2.5 SOLUTION RESPIRATORY (INHALATION) ONCE
Status: COMPLETED | OUTPATIENT
Start: 2020-09-19 | End: 2020-09-19

## 2020-09-19 RX ORDER — SODIUM CHLORIDE, SODIUM GLUCONATE, SODIUM ACETATE, POTASSIUM CHLORIDE, MAGNESIUM CHLORIDE, SODIUM PHOSPHATE, DIBASIC, AND POTASSIUM PHOSPHATE .53; .5; .37; .037; .03; .012; .00082 G/100ML; G/100ML; G/100ML; G/100ML; G/100ML; G/100ML; G/100ML
250 INJECTION, SOLUTION INTRAVENOUS CONTINUOUS
Status: DISCONTINUED | OUTPATIENT
Start: 2020-09-19 | End: 2020-09-19 | Stop reason: HOSPADM

## 2020-09-19 RX ORDER — HEPARIN SODIUM 5000 [USP'U]/ML
5000 INJECTION, SOLUTION INTRAVENOUS; SUBCUTANEOUS EVERY 8 HOURS SCHEDULED
Status: DISCONTINUED | OUTPATIENT
Start: 2020-09-19 | End: 2020-09-19 | Stop reason: HOSPADM

## 2020-09-19 RX ORDER — SODIUM CHLORIDE 9 MG/ML
250 INJECTION, SOLUTION INTRAVENOUS CONTINUOUS
Status: DISCONTINUED | OUTPATIENT
Start: 2020-09-19 | End: 2020-09-19

## 2020-09-19 RX ADMIN — DEXTROSE MONOHYDRATE 25 ML: 500 INJECTION PARENTERAL at 15:07

## 2020-09-19 RX ADMIN — SODIUM CHLORIDE, SODIUM GLUCONATE, SODIUM ACETATE, POTASSIUM CHLORIDE, MAGNESIUM CHLORIDE, SODIUM PHOSPHATE, DIBASIC, AND POTASSIUM PHOSPHATE 250 ML/HR: .53; .5; .37; .037; .03; .012; .00082 INJECTION, SOLUTION INTRAVENOUS at 14:53

## 2020-09-19 RX ADMIN — SODIUM BICARBONATE 100 ML/HR: 84 INJECTION, SOLUTION INTRAVENOUS at 18:32

## 2020-09-19 RX ADMIN — SODIUM CHLORIDE 250 ML/HR: 0.9 INJECTION, SOLUTION INTRAVENOUS at 08:52

## 2020-09-19 RX ADMIN — SODIUM CHLORIDE 1000 ML: 0.9 INJECTION, SOLUTION INTRAVENOUS at 09:16

## 2020-09-19 RX ADMIN — VANCOMYCIN HYDROCHLORIDE 1750 MG: 1 INJECTION, POWDER, LYOPHILIZED, FOR SOLUTION INTRAVENOUS at 12:06

## 2020-09-19 RX ADMIN — PIPERACILLIN SODIUM AND TAZOBACTAM SODIUM 3.38 G: 36; 4.5 INJECTION, POWDER, FOR SOLUTION INTRAVENOUS at 12:06

## 2020-09-19 RX ADMIN — SODIUM CHLORIDE 1000 ML: 0.9 INJECTION, SOLUTION INTRAVENOUS at 10:10

## 2020-09-19 RX ADMIN — SODIUM BICARBONATE 50 MEQ: 84 INJECTION INTRAVENOUS at 09:15

## 2020-09-19 RX ADMIN — DEXTROSE MONOHYDRATE 25 ML: 500 INJECTION PARENTERAL at 15:10

## 2020-09-19 RX ADMIN — HEPARIN SODIUM 5000 UNITS: 5000 INJECTION INTRAVENOUS; SUBCUTANEOUS at 22:54

## 2020-09-19 RX ADMIN — CALCIUM CHLORIDE 1 G: 100 INJECTION, SOLUTION INTRAVENOUS; INTRAVENTRICULAR at 14:00

## 2020-09-19 RX ADMIN — CALCIUM GLUCONATE 1 G: 20 INJECTION, SOLUTION INTRAVENOUS at 09:19

## 2020-09-19 RX ADMIN — ALBUTEROL SULFATE 2.5 MG: 2.5 SOLUTION RESPIRATORY (INHALATION) at 15:01

## 2020-09-19 RX ADMIN — DEXTROSE MONOHYDRATE 25 ML: 25 INJECTION, SOLUTION INTRAVENOUS at 09:18

## 2020-09-19 RX ADMIN — HEPARIN SODIUM 5000 UNITS: 5000 INJECTION INTRAVENOUS; SUBCUTANEOUS at 17:40

## 2020-09-19 RX ADMIN — SODIUM CHLORIDE, SODIUM LACTATE, POTASSIUM CHLORIDE, AND CALCIUM CHLORIDE 1000 ML: .6; .31; .03; .02 INJECTION, SOLUTION INTRAVENOUS at 12:18

## 2020-09-19 RX ADMIN — INSULIN LISPRO 10 UNITS: 100 INJECTION, SOLUTION INTRAVENOUS; SUBCUTANEOUS at 09:21

## 2020-09-19 RX ADMIN — NOREPINEPHRINE BITARTRATE 1 MCG/MIN: 1 INJECTION INTRAVENOUS at 14:29

## 2020-09-19 NOTE — ASSESSMENT & PLAN NOTE
Lab Results   Component Value Date    HGBA1C 8 2 (H) 09/03/2020       No results for input(s): POCGLU in the last 72 hours  Blood Sugar Average: Last 72 hrs:  Pt   Takes 16 u of lantus nightly  Tradjenta 5 mg daily  Metformin 1000 mg twice oh  Januvia 100 mg daily   All medications currently on hold - glucose on admission 89   Glucose at noon 47-  D50 given  May need a dextrose gtt if glucose remains low

## 2020-09-19 NOTE — PROGRESS NOTES
Renal Note  Case was discussed with ED attending this morning  Patient admitted with acute kidney injury  With a creatinine of 10 78 mg/dl and relative oliguria and hypotension  He was initially hyperkalemic with a potassium of 6  2  he was treated appropriately with fluid boluses but did not respond  His blood pressure is 90/52  His PVR was minimal    He was having profound diarrhea and volume depletion   He has had little change in creatinine  Dr Roddie Leyden (Critical Care) has decided to transfer him to [facility]  He will need cardiac evaluation and possible CVVH

## 2020-09-19 NOTE — ASSESSMENT & PLAN NOTE
Pt  Discharged from [address] [place] [address] 1 week ago with Creatinine of 1 05- admitted today with Cr of 10 78  Nephrology contacted in ED - Dr Beck Mckeon   Concern for pre-renal    Recommends IVF resuscitation  Minimal u/o upon arrival to ICU - pressor started - concern for need for CRRT- will need transfer to another [facility]   Dialysis catheter placed Right femoral

## 2020-09-19 NOTE — UTILIZATION REVIEW
Initial Clinical Review - admitted to [address] [facility] [address] ICU on 9/19/20 @ 1048  Transferred by EMS to [address] [facility] for higher level of care  Admission: Date/Time/Statement:   Admission Orders (From admission, onward)     Ordered        09/19/20 1048  Inpatient Admission (expected length of stay for this patient Order details is greater than two midnights)  Once                   Orders Placed This Encounter   Procedures    Inpatient Admission (expected length of stay for this patient Order details is greater than two midnights)     Standing Status:   Standing     Number of Occurrences:   1     Order Specific Question:   Admitting Physician     Answer:   Joanne Landry [[id number]]     Order Specific Question:   Level of Care     Answer:   Critical Care [15]     Order Specific Question:   Estimated length of stay     Answer:   More than 2 Midnights     Order Specific Question:   Certification     Answer:   I certify that inpatient services are medically necessary for this patient for a duration of greater than two midnights  See H&P and MD Progress Notes for additional information about the patient's course of treatment  ED Arrival Information     Expected Arrival Acuity Means of Arrival Escorted By Service Admission Type    - 9/19/2020 08:15 Urgent Ambulance [facility] EMS Critical Care/ICU Urgent    Arrival Complaint    low blood pressure        Chief Complaint   Patient presents with    Abnormal Lab     pt is with elevated creatine           HPI:  66 y o  male who presents to the ED from a rehab facility with altered mental status, weakness and hypotension after four days of diarrhea  PMHx is positive for diabetes mellitus type II, HTN, HL and cardiomyopathy    Patient was discharged from [address] [place] [address] 1 week ago after a 2 week hospitalization for septic shock and pneumonia and discharged to [address] [place] [address]  Hypotensive on arrival in ED   Labs revealed creatinine of 10 78, (baseline 1 05) hyperkalemia, and hyperphosphatemia  He was brought to the  his creatinine was noted to be 10 78 and he was hypotensive  Nephrology recommended IVF resuscitation  Imaging revealed increasing B/L pleural effusions  A nicole catheter was placed with approximately 100 ml of dark urine drained  His blood pressure improved slightly with the IVF  Physical exam: Lethargic and confused  GCS 14  Mucous membranes dry, decreased breath sounds  Plan: Inpatient Critical  Care admission for evaluation and treatment of Shock, acute renal failure and metabolic encephalopathy: Arterial line, Levophed, IV abx  dialysis catheter, BMP Q6H, 2D ECHO  9/19 3:57 PM  Critical Care: B/L thoracentesis was attempted with minimal drainage  His blood pressure did not respond appropriately to IVF and he was placed on low dose levophed   Decision was made to transfer to [facility] for possible CRRT and IR guided drainage of bilateral pleural effusions           ED Triage Vitals   Temperature Pulse Respirations Blood Pressure SpO2   09/19/20 0815 09/19/20 0815 09/19/20 0815 09/19/20 0815 09/19/20 0815   (!) 97 1 °F (36 2 °C) 94 16 97/53 94 %      Temp Source Heart Rate Source Patient Position - Orthostatic VS BP Location FiO2 (%)   09/19/20 1148 09/19/20 1013 09/19/20 1148 09/19/20 0815 --   Oral Monitor Lying Left arm       Pain Score       09/19/20 0815       3          Wt Readings from Last 1 Encounters:   09/19/20 92 7 kg (204 lb 5 9 oz)     Additional Vital Signs:     /Time   Temp   Pulse   Resp   BP   MAP (mmHg)   Arterial Line BP   MAP   SpO2   Calculated FIO2 (%) - Nasal Cannula   Nasal Cannula O2 Flow Rate (L/min)   O2 Device     09/19/20 1530                  145/20   69 mmHg                 09/19/20 1515                  141/46   71 mmHg                 09/19/20 1500      84   27Abnormal           133/52   74 mmHg   95 %   44   6 L/min   Simple mask     09/19/20 1445                  131/47   70 mmHg               09/19/20 1430                  129/43   65 mmHg                 09/19/20 1415            104/59   78                       09/19/20 1400   95 8 °F (35 4 °C)Abnormal           86/50Abnormal     64                       09/19/20 1345            110/61                          09/19/20 1330            111/61   80                       09/19/20 1300      87   27Abnormal     99/55   74         93 %              09/19/20 1230            93/53   68                       09/19/20 1200            92/50   65                       09/19/20 1148   98 4 °F (36 9 °C)   97   34Abnormal     90/52   66         92 %         None (Room air)     09/19/20 1100      93      97/55   73         95 %              09/19/20 1045      93   18   100/56   75         95 %              09/19/20 1030      91      100/58   73         96 %              09/19/20 1015      93      95/54   73         94 %              09/19/20 1013      93   18   99/66            93 %   28   2 L/min   Nasal cannula     09/19/20 1000      92      99/66   77         96 %              09/19/20 0945      92      95/69   75         95 %              09/19/20 0930      95      91/54   68         94 %   28   2 L/min   Nasal cannula     09/19/20 0900      92      88/51Abnormal     64         95 %              09/19/20 0830      93      97/57   68         95 %              09/19/20 0815   97 1 °F (36 2 °C)Abnormal     94   16   97/53            94 %         None (Room air)         Date and Time  Eye Opening  Best Verbal Response  Best Motor Response  Carlitos Coma Scale Score    09/19/20 1200  4  4  6  14    09/19/20 0830  4  4  6  14        Pertinent Labs/Diagnostic Test Results:       9/19 CT CAP: Increasing bilateral pleural effusions with worsening compressive atelectasis in the lung bases    Stable right upper lobe peribronchial vascular infiltrate suspicious for pneumonia        Results from last 7 days   Lab Units 09/19/20  0835   SARS-COV-2  Negative     Results from last 7 days   Lab Units 09/19/20  1200 09/19/20  0835   WBC Thousand/uL  --  10 91*   HEMOGLOBIN g/dL  --  10 1*   HEMATOCRIT %  --  31 2*   PLATELETS Thousands/uL 482* 448*   NEUTROS ABS Thousands/µL  --  8 15*         Results from last 7 days   Lab Units 09/19/20  1200 09/19/20  1013 09/19/20  0835   SODIUM mmol/L 137 137 135*   POTASSIUM mmol/L 5 4* 5 9* 6 2*   CHLORIDE mmol/L 102 101 102   CO2 mmol/L 18* 19* 17*   ANION GAP mmol/L 17* 17* 16*   BUN mg/dL 108* 112* 113*   CREATININE mg/dL 10 32* 10 52* 10 78*   EGFR ml/min/1 73sq m 4 4 4   CALCIUM mg/dL 8 1* 8 2* 8 1*   CALCIUM, IONIZED mmol/L 1 02*  --   --    MAGNESIUM mg/dL 2 2  --   --    PHOSPHORUS mg/dL 7 6*  --   --      Results from last 7 days   Lab Units 09/19/20  0835   AST U/L 157*   ALT U/L 88*   ALK PHOS U/L 86   TOTAL PROTEIN g/dL 7 7   ALBUMIN g/dL 1 8*   TOTAL BILIRUBIN mg/dL 0 35     Results from last 7 days   Lab Units 09/19/20  1521 09/19/20  1503   POC GLUCOSE mg/dl 137 34*     Results from last 7 days   Lab Units 09/19/20  1200 09/19/20  1013 09/19/20  0835   GLUCOSE RANDOM mg/dL 47* 103 89           Results from last 7 days   Lab Units 09/19/20  1441   PH ART  7 315*   PCO2 ART mm Hg 28 2*   PO2 ART mm Hg 69 6*   HCO3 ART mmol/L 14 0*   BASE EXC ART mmol/L -10 7   O2 CONTENT ART mL/dL 15 5*   O2 HGB, ARTERIAL % 91 9*   ABG SOURCE  Line, Arterial     Results from last 7 days   Lab Units 09/19/20  1122   PH GOLD  7 337   PCO2 GOLD mm Hg 28 8*   PO2 GOLD mm Hg 208 6*   HCO3 GOLD mmol/L 15 1*   BASE EXC GOLD mmol/L -9 5   O2 CONTENT GOLD ml/dL 14 5   O2 HGB, VENOUS % 97 3*             Results from last 7 days   Lab Units 09/19/20  1446 09/19/20  1111 09/19/20  0835   TROPONIN I ng/mL 0 22* 0 24* 0 28*                 Results from last 7 days   Lab Units 09/19/20  1111   PROCALCITONIN ng/ml 26 74*     Results from last 7 days   Lab Units 09/19/20  1447 09/19/20  0835   LACTIC ACID mmol/L 0 6 1 2             Results from last 7 days   Lab Units 09/19/20  1013   NT-PRO BNP pg/mL 16,199*                         Results from last 7 days   Lab Units 09/19/20  0935   CLARITY UA  Clear   COLOR UA  Mimi   SPEC GRAV UA  >=1 030   PH UA  5 0   GLUCOSE UA mg/dl Negative   KETONES UA mg/dl Negative   BLOOD UA  Large*   PROTEIN UA mg/dl Trace*   NITRITE UA  Negative   BILIRUBIN UA  Small*   UROBILINOGEN UA E U /dl 0 2   LEUKOCYTES UA  Negative   WBC UA /hpf None Seen   RBC UA /hpf None Seen   BACTERIA UA /hpf None Seen   EPITHELIAL CELLS WET PREP /hpf Occasional       Results from last 7 days   Lab Units 09/19/20  1439   GRAM STAIN RESULT  No No bacteria seen  1+ Polys             ED Treatment:   Medication Administration from 09/19/2020 0814 to 09/19/2020 1148       Date/Time Order Dose Route Action     09/19/2020 0852 sodium chloride 0 9 % infusion 250 mL/hr Intravenous New Bag     09/19/2020 0915 sodium bicarbonate 8 4 % injection 50 mEq 50 mEq Intravenous Given     09/19/2020 0919 calcium gluconate 1 g in sodium chloride 0 9% 50 mL (premix) 1 g Intravenous New Bag     09/19/2020 0918 dextrose 50 % IV solution 25 mL 25 mL Intravenous Given     09/19/2020 0921 insulin lispro (HumaLOG) 100 units/mL subcutaneous injection 10 Units 10 Units Subcutaneous Given     09/19/2020 0916 sodium chloride 0 9 % bolus 1,000 mL 1,000 mL Intravenous New Bag     09/19/2020 1010 sodium chloride 0 9 % bolus 1,000 mL 1,000 mL Intravenous New Bag        Past Medical History:   Diagnosis Date    Diabetes mellitus ([facility] )     Hyperlipidemia     Hypertension      Present on Admission:   Anemia   Diarrhea   Metabolic encephalopathy   Dysphagia   Cardiomyopathy (HCC)   Bilateral pleural effusion   Acute renal failure (ARF) (HCC)   Shock (HCC)   Hyperkalemia   Hyperphosphatemia      Admitting Diagnosis: Hyperkalemia [E87 5]  Hypotension [I95 9]  Acute renal failure (ARF) (HCC) [N17 9]  Low blood pressure [I95 9]  Age/Sex: 66 y o  male       Admission Orders: Cardio-Pulmonary monitoring, neuro checks, NPO, ECHO, SCD, BMP,calcium, mag and phos Q6H,       Scheduled Medications:           Medications  09/19    chlorhexidine (PERIDEX) 0 12 % oral rinse 15 mL    Dose: 15 mL  Freq: Every 12 hours scheduled Route: SWISH & SPIT  Start: 09/19/20 1115 End: 09/19/20 1645     Admin Instructions:    Swish orally for 30 seconds, then expectorate  Do not swallow  **DISPOSE IN 8 GALLON BLACK CONTAINER**     (3619) 1606-D/C'd       heparin (porcine) subcutaneous injection 5,000 Units    Dose: 5,000 Units  Freq: Every 8 hours scheduled Route: SC  Start: 09/19/20 1400 End: 09/19/20 1645     Admin Instructions:    Check for allergies to pork or pork derivatives/dietary restrictions before administration  High alert medication  LOOK ALIKE SOUND ALIKE MED     (7653)     1645-D/C'd       piperacillin-tazobactam (ZOSYN) 3 375 g in sodium chloride 0 9 % 100 mL IVPB (EXTENDED-INFUSION)    Dose: 3 375 g  Freq: Every 12 hours Route: IV  Start: 09/19/20 1800 End: 09/19/20 1645     Admin Instructions:    EXTENDED-INFUSION dosing administered over 4 hours (240 Minutes)  Refrigerate if in solution  Order specific questions:    Indication: pneumonia  Indication: other  Specify indication: sepsis    1645-D/C'd       vancomycin (VANCOCIN) 1,750 mg in sodium chloride 0 9 % 500 mL IVPB    Dose: 20 mg/kg  Weight Dosing Info: 82 6 kg  Freq: Once Route: IV  Last Dose: Stopped (09/19/20 1400)  Start: 09/19/20 1140 End: 09/19/20 1400     Admin Instructions: Fastest infusion time = 30min for every 500mg administered; decrease rate if Red Man Syndrome occurs  Refrigerate if in solution       Order specific questions:    Indication: other  Specify indication: source unknown    1205     1400        Medications  09/19         Continuous Meds        Medications  09/19 multi-electrolyte (PLASMALYTE-A/ISOLYTE-S PH 7 4) IV solution    Rate: 250 mL/hr Dose: 250 mL/hr  Freq: Continuous Route: IV  Indications of Use: IV Hydration  Last Dose: Stopped (09/19/20 1454)  Start: 09/19/20 1500 End: 09/19/20 1645     1453     1454     1645-D/C'd       norepinephrine (LEVOPHED) 4 mg (STANDARD CONCENTRATION) IV in sodium chloride 0 9% 250 mL    Rate: 3 8-112 5 mL/hr Dose: 1-30 mcg/min  Freq: Titrated Route: IV  Last Dose: 3 mcg/min (09/19/20 1524)  Start: 09/19/20 1415 End: 09/19/20 1645     Admin Instructions:    Initial dose 1 mcg/min  Titrate by 1 mcg/min every 10 minutes to achieve target MAP above 70 mmHg  Refrigerate  Caution: Look-alike/sound-alike drug name! High-alert medication! 1429     1513     1524     1645-D/C'd       sodium chloride 0 9 % infusion    Rate: 250 mL/hr Dose: 250 mL/hr  Freq: Continuous Route: IV  Indications of Use: IV Hydration  Last Dose: Stopped (09/19/20 0916)  Start: 09/19/20 0845 End: 09/19/20 1450     0852     0916     1450-D/C'd                  IP CONSULT TO PHARMACY  IP CONSULT TO CASE MANAGEMENT  IP CONSULT TO NEPHROLOGY    Network Utilization Review Department  [email or web address]  ATTENTION: Please call with any questions or concerns to [phone] and carefully listen to the prompts so that you are directed to the right person  All voicemails are confidential   Candis lOmedo all requests for admission clinical reviews, approved or denied determinations and any other requests to dedicated fax number below belonging to the campus where the patient is receiving treatment   List of dedicated fax numbers for the Facilities:  FACILITY NAME UR FAX NUMBER   ADMISSION DENIALS (Administrative/Medical Necessity) [phone]   [address] (Maternity/NICU/Pediatrics) [phone]   [facility] [address] [phone]   [facility]  [phone]   [facility]  [phone]   [address] [place] [address] [phone]

## 2020-09-19 NOTE — ASSESSMENT & PLAN NOTE
Pt  Discharged from [address] [place] [address] 1 week ago with Creatinine of 1 05- admitted today with Cr of 10 78  Nephrology contacted in ED - Dr Tyshawn Alvarez   Concern for pre-renal    Recommends IVF resuscitation  Minimal u/o upon arrival to ICU - pressor started - concern for need for CRRT- will need transfer to another [facility]   Dialysis catheter placed Right femoral

## 2020-09-19 NOTE — ASSESSMENT & PLAN NOTE
Thoracentesis attempted bilaterally with minimal drainage, approximately 50 cc drained on right and 200 cc drained on left  Pleural fluid sent for full analysis  Interventional radiology for thoracentesis - plan to transfer patient

## 2020-09-19 NOTE — PROCEDURES
Thoracentesis    Date/Time: 9/19/2020 6:07 PM  Performed by: Leif You DO  Authorized by: Leif You DO     Patient location:  ICU and bedside  Consent:     Consent obtained:  Written    Consent given by:  Spouse    Risks discussed:  Bleeding, infection, pain, incomplete drainage, nerve damage and pneumothorax    Alternatives discussed:  No treatment, delayed treatment, observation and referral  Universal protocol:     Procedure explained and questions answered to patient or proxy's satisfaction: yes      Relevant documents present and verified: yes      Test results available and properly labeled: yes      Radiology Images displayed and confirmed  If images not available, report reviewed: yes      Required blood products, implants, devices and special equipment available: yes      Site marked: bilateral       Immediately prior to procedure a time out was called: yes      Patient identity confirmed:  Hospital-assigned identification number, arm band and provided demographic data  Indications:     Procedure Purpose: diagnostic and therapeutic      Indications: pleural effusion    Anesthesia (see MAR for exact dosages): Anesthesia method:  Local infiltration    Local anesthetic:  Lidocaine 1% w/o epi  Procedure details:     Preparation: Patient was prepped and draped in usual sterile fashion      Patient position:  Supine    Laterality:  Bilateral    Location:  Posterior    Intercostal space:  5th    Puncture method:  Over-the-needle catheter    Ultrasound guidance: yes      Reason for ultrasound: Identify fluid collection and guide cathetar placement  Indwelling catheter placed: no      Number of attempts:  2    Drainage color:  Mimi and brown    Drainage characteristics:  Serosanguinous    Fluid removed amount:  300, 100 from right and 200 from the left  Post-procedure details:     Chest x-ray performed: yes      Patient tolerance of procedure:   Tolerated well, no immediate complications  Comments: Unable to fully drain fluid

## 2020-09-19 NOTE — PROCEDURES
Central Line Insertion    Date/Time: 9/19/2020 6:12 PM  Performed by: Luan Llamas DO  Authorized by: Luan Llamas DO     Patient location:  Bedside and ICU  Other Assisting Provider: No    Consent:     Consent obtained:  Written and verbal    Consent given by:  Spouse    Risks discussed:  Arterial puncture, incorrect placement, nerve damage, infection, pneumothorax and bleeding    Alternatives discussed:  No treatment, delayed treatment, alternative treatment, observation and referral  Universal protocol:     Procedure explained and questions answered to patient or proxy's satisfaction: yes      Relevant documents present and verified: yes      Test results available and properly labeled: yes      Radiology Images displayed and confirmed  If images not available, report reviewed: yes      Required blood products, implants, devices, and special equipment available: yes      Immediately prior to procedure, a time out was called: yes      Patient identity confirmed:  Hospital-assigned identification number, arm band and provided demographic data  Pre-procedure details:     Hand hygiene: Hand hygiene performed prior to insertion      Sterile barrier technique: All elements of maximal sterile technique followed      Skin preparation:  ChloraPrep    Skin preparation agent: Skin preparation agent completely dried prior to procedure    Indications:     Central line indications: medications requiring central line and dialysis      Site selection rationale:  Patient unable to lay flat, decision to place femoral  Anesthesia (see MAR for exact dosages):      Anesthesia method:  Local infiltration    Local anesthetic:  Lidocaine 1% w/o epi  Procedure details:     Location:  Right femoral    Vessel type: vein      Laterality:  Right    Approach: percutaneous technique used      Patient position:  Reverse Trendelenburg    Catheter type:  Triple lumen    Landmarks identified: yes      Ultrasound guidance: yes      Sterile ultrasound techniques: Sterile gel and sterile probe covers were used      Manometry confirmation: yes (US confirmation as well)      Number of attempts:  1    Successful placement: yes    Post-procedure details:     Post-procedure:  Dressing applied and line sutured    Assessment:  Blood return through all ports and free fluid flow    Patient tolerance of procedure:   Tolerated well, no immediate complications

## 2020-09-19 NOTE — PROGRESS NOTES
The patients standard-infusion Piperacillin-Tazobactam / Zosyn (infused over 30-60 minutes) has been converted to extended-infusion (infused over 4 hours) per [facility] Extended-Infusion Piperacillin-Tazobactam Protocol for Adults as approved by the Pharmacy and Therapeutics Committee (accessible here on MyNET)       The patient met ALL eligible criteria:    Age >= 25years old   Critical Care patient    And did NOT have ANY exclusions:     Emergency Department or Operating Room patient  Drug incompatibilities that could NOT be avoided with timing or separate line administration    The following are reminders for Nursing regarding administration:  Infuse the first dose of Zosyn over 30min as a load (if new start), and then all subsequent doses will be given as an extended-infusion over 4 hours (see dosing below)  Use primary tubing as an intermittent infusion; change out primary tubing every 24 hours   Ensure full dose of the medication is given at the appropriate rate  Most incompatible drugs can be scheduled during times when the Zosyn is not being infused; however, if one requires administration during the same time, a separate site or lumen MUST be used  If access is limited and an incompatible medication urgently needs to be given, the Zosyn extended-infusion can be held for up to 30min (remember to flush line before/after)  Extended-infusion Zosyn does NOT require special timing around hemodialysis (it can even be given simultaneously)  If a patient needs an urgent MRI while Zosyn is infusing and there is not a MRI-compatible pump available for use, finish the infusion over the traditional length (30min) and ask Pharmacy to reschedule the next doses so that they start a few hours earlier  Pharmacy will assist nursing in troubleshooting other administration issues as they arise  Dosing for Piperacillin-Tazobactam  CrCl (mL/min) Traditional Dosing Extended-Infusion Dosing #   CrCl > 40 High-Dose  CrCl > 40 Low-Dose 4 5g Q6H (over 30min)  3 375g IV Q6H (over 30min) 3 375g IV Q8H (over 4hr)*    *1st dose loaded over 30min, then start extended-infusion dosing 4hr later   CrCl 20-40 High-Dose  CrCl 20-40 Low-Dose 3 375g IV Q6H (over 30min)  2 25g IV Q6H (over 30min)    CrCl < 20 High-Dose  CrCl < 20 Low-Dose 2 25g IV Q6H (over 30min)  2 25g IV Q8H (over 30min) 3 375g IV Q12H (over 4hr)*    *1st dose loaded over 30min, then start extended-infusion dosing 6hr later   Hemo/Peritoneal Dialysis High-Dose  Hemo/Peritoneal Dialysis Low-Dose 2 25g IV Q6H (over 30min)  2 25g IV Q8H (over 30min)    CVVH/D High-Dose  CVVH/D Low-Dose 3 375g IV Q6H (over 30min)  2 25 IV Q6H (over 30min) 3 375g IV Q8H (over 4hr)*    *1st dose loaded over 30min, then start extended-infusion dosing 4hr later   # = Use 4 5g dosing (same interval) if morbidly obese (BMI ?40)    Please call the Pharmacy with any questions or concerns

## 2020-09-19 NOTE — ASSESSMENT & PLAN NOTE
Patient cleared for mechanical soft with thin liquids prior to discharge from [facility] last week  Pt  states that he was not eating while at [facility]  Will need re-evaluation

## 2020-09-19 NOTE — PROCEDURES
Arterial Line Insertion    Date/Time: 9/19/2020 6:15 PM  Performed by: Miracle Avendano DO  Authorized by: Miracle Avendano DO     Patient location:  Bedside and ICU  Consent:     Consent obtained:  Verbal and written    Consent given by:  Spouse    Risks discussed:  Bleeding, infection, ischemia, repeat procedure and pain  Universal protocol:     Procedure explained and questions answered to patient or proxy's satisfaction: yes      Relevant documents present and verified: yes      Test results available and properly labeled: yes      Radiology Images displayed and confirmed  If images not available, report reviewed: yes      Required blood products, implants, devices, and special equipment available: yes      Immediately prior to procedure a time out was called: yes      Patient identity confirmed:  Hospital-assigned identification number, arm band and provided demographic data  Indications:     Indications: hemodynamic monitoring, multiple ABGs and continuous blood pressure monitoring    Pre-procedure details:     Skin preparation:  Chlorhexidine  Anesthesia (see MAR for exact dosages): Anesthesia method:  Local infiltration    Local anesthetic:  Lidocaine 1% w/o epi  Procedure details:     Location / Tip of Catheter:  Radial    Laterality:  Left    Needle gauge:  20 G    Placement technique:  Seldinger    Number of attempts:  1    Successful placement: yes      Transducer: waveform confirmed    Post-procedure details:     Post-procedure:  Sterile dressing applied and sutured    CMS:  Normal and unchanged    Patient tolerance of procedure:   Tolerated well, no immediate complications

## 2020-09-19 NOTE — ASSESSMENT & PLAN NOTE
Echo on 9/2/20 revealed: Apical akinesis with apical inferior akinesis, hypokinesis of the mid to distal anterolateral wall as well as the mid to distal septum  Anterior wall hypokinesis  Could represent apical ballooning/Takotsubo cardiomyopathygiven the basal segments have dynamic function  Size was normal  Systolic function was markedly reduced  Ejection fraction was estimated in the range of 25 % to 30 %  Wall thickness was mildly increased      No repeat echo performed outpatient  2d echo ordered today

## 2020-09-19 NOTE — ASSESSMENT & PLAN NOTE
Chronic  Takes ferrous sulfate at home 325 mg daily  Monitor Hgb daily  No obvious sign of bleeding at this time

## 2020-09-19 NOTE — DISCHARGE SUMMARY
Discharge Summary - Sabrina Aldrich 66 y o  male MRN: [id number]    Unit/Bed#: -01 Encounter: [id number]    Admission Date:   Admission Orders (From admission, onward)     Ordered        09/19/20 1048  Inpatient Admission (expected length of stay for this patient Order details is greater than two midnights)  Once                     Admitting Diagnosis:  Shock   Hyperkalemia [E87 5]  Hypotension [I95 9]  Acute renal failure (ARF) ([address] ) [N17 9]  Low blood pressure [I95 9]   Diabetes      HPI:   Sabrina Aldrich is a 66 y o  male who presents with altered mental status, weakness and hypotension  PMHx is positive for diabetes mellitus type II, HTN, Hyperlipidemia who was discharged from [address] [place] [address] 1 week ago after a 2 week hospitalization for septic shock and pna  He went to [place] for short term rehab and nursing care  According to family, he did not seem to be doing well but they were not allowed to visit him due to Covid restrictions  He was brought to the ED this am after 4 days of diarrhea, weakness and an acute change in mental status  Upon arrival, his creatinine was noted to be 10 78 and he was hypotensive  Nephrology was called and they recommended IVF resuscitation as patient appeared dehydrated  His lactic acid was normal, wbc was 10  Afoley was placed with approximately 100  Cc of dark urine drained  His blood pressure improved slightly with the IVF and he was admitted to the ICU  In the ICU, an arterial line was placed as well as a femoral dialysis catheter  Bilaterl thoracentesis was attempted with minimal drainage  His blood pressure did not respond appropriately to IVF and he was placed on low dose levophed and the decision was made to transfer to [place] for possible CRRT and IR guided drainage of bilateral pleural effusions  He was also noted to have a glucose of 45 and an amp of d50 was given       Procedures Performed:   Orders Placed This Encounter   Procedures    Arterial Blood Gas    Critical Care   Left arterial line  Right femoral dialysis catheter    Summary of Hospital Course: 65 y/o male with PMHx of diabetes type II, HTN and hyperlipidemia who presented to [address] [place] [address] ED from [facility] with altered mental status, weakness, creatinine of 10 78 and hypotension  He was admitted to the ICU with a nephrology consult  Nephrology felt the patient was pre-renal and recommended IVF resuscitation  The patient received 3L IVF with minimal improvement in Blood pressure  Low dose levophed was initiated  A bilateral thoracentesis was attempted with minimal drainage of fluid  Pt  Became hypoglycemic and d50 was given, unclear which medications the pt  Received in the am prior to arrival in ED  He does take multiple medications for his diabetes including tradjenta, lantus, metformin and januvia  Urine output remained minimal while in the ICU  and he became more encephalopathic and the decision was made to transfer to [facility] for possible CRRT and IR guided thoracentesis  Prior to transfer a left radial arterial line and right femoral dialysis catheter were placed  Dr Mario Doss accepted the patient  Discharge Diagnosis: Shock - likely cardiogenic    Resolved Problems  Date Reviewed: 9/19/2020    None          Condition at Discharge: critical         Discharge instructions/Information to patient and family:   See after visit summary for information provided to patient and family  Provisions for Follow-Up Care:  See after visit summary for information related to follow-up care and any pertinent home health orders  PCP: Osito Jose DO    Disposition: [facility]    Planned Readmission: Yes           Discharge Statement   I spent 30 minutes discharging the patient  This time was spent on the day of discharge  I had direct contact with the patient on the day of discharge   Additional documentation is required if more than 30 minutes were spent on discharge  Discharge Medications:  See after visit summary for reconciled discharge medications provided to patient and family

## 2020-09-19 NOTE — CONSULTS
Consultation    Nephrology   [place] Quispe 66 y o  male MRN: [id number]  Unit/Bed#: -01 Encounter: [id number]    History of Present Illness   Physician Requesting Consult: Annelise Dee DO  Reason for Consult : -  acute kidney injury    ASSESSMENT/PLAN:  66 y o   male with pmh of hypertension, diabetes, CHF, recent episode of acute kidney injury who was admitted for admitted with altered mental status weakness and hypotension transferred over from [facility] for higher level of care  Nephrology has been consulted for evaluation and management of acute kidney injury and need for renal replacement therapy   Acute kidney injury (POA):  - CHERISE most likely secondary to failure to auto regulate in the presence of ischemic injury due to hypotension as well as in the presence of Ace inhibitors of lisinopril plus septic shock plus increased susceptibility to acute kidney injury due to recent episode of acute kidney injury in early September 2020    - After review of records it appears that the patient has a baseline Creatinine of 1-1 2 mg/dL mg/dL  - patient was admitted with a creatinine of 10 78 mg/dL on 09/19/2020   - patient's creatinine today is at 10 32 mg/dL  - check BMP, magnesium, phosphorus now  - start patient on D5 water +150 mg sodium bicarb at 100 cc an hour   - check BMP in 4 hours  Patient nephrology if no improvement in renal parameters and urine output then will likely initiate patient on CVVHD  - had a detailed conversation and explained risks and benefits of renal replacement therapy in depth in terms from a renal perspective  Consent obtained and placed in the chart on 09/19/2020   - Check renal ultrasound to r/o hydronephrosis, obstruction if renal parameters continue to deteriorate  - Await renal recovery  - Clinically patient is not uremic and there is no acute indication for renal replacement therapy (dialysis)  - Optimize hemodynamic status to avoid delay in renal recovery    - Place on a renal diet when allowed diet order    - Avoid nephrotoxins, adjust meds to appropriate GFR   - Strict I/O   - Daily weights  - Urinary retention protocol  - will need to set up patient for follow up with Nephrology as an outpatient post hospitalization   Blood pressure:  - home medications:  Lisinopril  - current medications:  Levophed  - recommendations:  No no change  - Optimize hemodynamics   - Maintain MAP > 65mmHg  - Avoid BP fluctuations   H/H/anemia:  - most recent hemoglobin at 10 1 grams/deciliter  - maintain hemoglobin greater than 8 grams/deciliter     Acid-base electrolytes:  o Hyponatremia:    - Likely due to decreased free water clearance  - Likely due to decreased distal sodium delivery  - Admitted with sodium of 135 mEq  - Most recent sodium at 137 mEq    o Hyperkalemia:  - Likely secondary to decreased distal delivery  - Follow low-potassium diet 1 allowed p o   - Admitted with potassium of 6 2 mEq  - Most recent sodium at 5 4 mEq improved    o Hyperphosphatemia:  - Most recent phosphorus at 7 6  - Continue to monitor may benefit for for binders if continues to be elevated  o Hypermagnesemia:    -     o  Acid-base:    - Most recent bicarb at 18  Anion gap metabolic acidosis  Likely secondary to renal dysfunction plus septic shock   - Recommend trial of IV fluids with bicarb  If no improvement then start CVVHD     Volume status:  o       Proteinuria:   o Most recent urine with trace protein as of September 2020     Other medical problems:  o Diabetes:  Management per primary team   On insulin  o Bilateral pleural effusions:  Management per primary team   Follow-up with IR for drainage  o Septic shock:  Management primary team   Follow-up with ICU follow-up cultures  On Zosyn  o Diarrhea:  Management per primary team   Follow-up cultures      Thanks for the consult  Will continue to follow  Please call with questions/ concerns    Above-mentioned orders and Plan was discussed with the team in [address] MD, FASN, 9/19/2020, 4:30 PM          HISTORY OF PRESENT ILLNESS:   Cynthia Platt is a 66 y o   male with pmh of hypertension, diabetes, CHF, recent episode of acute kidney injury who was admitted for admitted with altered mental status weakness and hypotension transferred over from [facility] for higher level of care  Nephrology has been consulted for evaluation and management of acute kidney injury and need for renal replacement therapy  Records reveal patient is status post recent hospitalization with acute kidney injury likely secondary to sepsis and ATN in early September  And had recovered renal function back to baseline of 1 mg/dL  Subsequently was at rehab with diarrhea  On admission noted to have a creatinine of 10 78 mg/dL and was hypotensive  Thus far since a m  Has put out 200 cc urine  Is about to be started on pressors  While at [facility] dialysis femoral catheter was placed  Bilateral thoracentesis was attempted with minimal drainage  Transferred over for possible need for IR guided drainage of bilateral pleural effusions  Prior to admission he was on lisinopril, metformin  History obtained from chart review and the patient    Consults    Review of Systems     Historical Information   Patient Active Problem List   Diagnosis    Community acquired pneumonia    Elevated brain natriuretic peptide (BNP) level    Type 2 MI (myocardial infarction) ([facility] [address] )    Type 2 diabetes mellitus, with long-term current use of insulin ([facility] [address] )    Acute respiratory failure with hypoxia (HCC)    Anemia    Cardiomyopathy ([address] )    Diarrhea    Dysphagia    Metabolic encephalopathy    Bilateral pleural effusion    Acute renal failure (ARF) (HCC)    Shock (HCC)    Hyperkalemia    Hyperphosphatemia     Past Medical History:   Diagnosis Date    Diabetes mellitus ([facility] [address] )     Hyperlipidemia     Hypertension      History reviewed  No pertinent surgical history    Social History Social History     Substance and Sexual Activity   Alcohol Use Not Currently    Frequency: Never     Social History     Substance and Sexual Activity   Drug Use Never     Social History     Tobacco Use   Smoking Status Never Smoker   Smokeless Tobacco Never Used     History reviewed  No pertinent family history  Meds/Allergies   current meds:   No current facility-administered medications for this encounter  Facility-Administered Medications Ordered in Other Encounters   Medication Dose Route Frequency    chlorhexidine (PERIDEX) 0 12 % oral rinse 15 mL  15 mL Swish & Spit Q12H [address]    heparin (porcine) subcutaneous injection 5,000 Units  5,000 Units Subcutaneous Q8H [address]    and PTA meds:   Prior to Admission Medications   Prescriptions Last Dose Informant Patient Reported?  Taking?   ferrous sulfate 325 (65 Fe) mg tablet   No Yes   Sig: Take 1 tablet (325 mg total) by mouth daily with breakfast   insulin aspart (NovoLOG) 100 units/mL injection   Yes Yes   Sig: Inject 3 Units under the skin 3 (three) times a day before meals    insulin glargine (LANTUS) 100 units/mL subcutaneous injection   No Yes   Sig: Inject 16 Units under the skin daily at bedtime   ipratropium (ATROVENT) 0 02 % nebulizer solution   No Yes   Sig: Take 1 vial (0 5 mg total) by nebulization every 6 (six) hours as needed for wheezing or shortness of breath   levalbuterol (XOPENEX) 1 25 mg/0 5 mL nebulizer solution   No Yes   Sig: Take 0 5 mL (1 25 mg total) by nebulization every 6 (six) hours as needed for wheezing   linaGLIPtin (Tradjenta) 5 MG TABS   Yes Yes   Sig: Take 5 mg by mouth daily   lisinopril (ZESTRIL) 5 mg tablet   No Yes   Sig: Take 1 tablet (5 mg total) by mouth daily   metFORMIN (GLUCOPHAGE) 1000 MG tablet   Yes Yes   Sig: Take 1,000 mg by mouth 2 (two) times a day with meals   metoprolol succinate (TOPROL-XL) 50 mg 24 hr tablet   No Yes   Sig: Take 1 tablet (50 mg total) by mouth daily   simvastatin (ZOCOR) 20 mg tablet Yes Yes   Sig: Take 20 mg by mouth daily at bedtime   sitaGLIPtin (JANUVIA) 100 mg tablet Not Taking at Unknown time  Yes No   Sig: Take 100 mg by mouth daily      Facility-Administered Medications: None       Scheduled Meds:  Current Facility-Administered Medications   Medication Dose Route Frequency Provider Last Rate    chlorhexidine  15 mL Swish & Spit Q12H [facility] Nina Monte PA-C      heparin (porcine)  5,000 Units Subcutaneous [facility] Isauro Hernandez PA-C      multi-electrolyte  250 mL/hr Intravenous Continuous Miguelina rUbina MD Stopped (20)    norepinephrine  1-30 mcg/min Intravenous Titrated Francy Monte PA-C 3 mcg/min (20)    piperacillin-tazobactam  3 375 g Intravenous Q12H Nina Monte PA-C         PRN Meds:     Continuous Infusions:multi-electrolyte, 250 mL/hr, Last Rate: Stopped (20)  norepinephrine, 1-30 mcg/min, Last Rate: 3 mcg/min (20)        Allergies   Allergen Reactions    Iodinated Diagnostic Agents Hives         Invasive Devices: Invasive Devices     Peripheral Intravenous Line            Peripheral IV 20 Dorsal (posterior); Left Wrist less than 1 day    Peripheral IV 20 Left Forearm less than 1 day    Peripheral IV 20 Right Arm less than 1 day    Peripheral IV 20 Right Forearm less than 1 day          Drain            Urethral Catheter Other (Comment) 16 Fr  less than 1 day                  PHYSICAL EXAM  /59   Pulse 84   Temp 98 4 °F (36 9 °C) (Oral)   Resp (!) 27   Ht 6' 2" (1 88 m)   Wt 92 7 kg (204 lb 5 9 oz)   SpO2 95%   BMI 26 24 kg/m²   Temp (24hrs), Av 8 °F (36 6 °C), Min:97 1 °F (36 2 °C), Max:98 4 °F (36 9 °C)        Intake/Output Summary (Last 24 hours) at 2020 1630  Last data filed at 2020 1449  Gross per 24 hour   Intake 3735 ml   Output 280 ml   Net 3455 ml       I/O last 24 hours:   In: 8585 [I V :110; IV Piggyback:3625]  Out: 280 [Urine:280]          Current Weight: Weight - Scale: 92 7 kg (204 lb 5 9 oz)  First Weight: Weight - Scale: 82 6 kg (182 lb)  Physical Exam      LABORATORY:    Results from last 7 days   Lab Units 09/19/20  1200 09/19/20  1013 09/19/20  0835   WBC Thousand/uL  --   --  10 91*   HEMOGLOBIN g/dL  --   --  10 1*   HEMATOCRIT %  --   --  31 2*   PLATELETS Thousands/uL 482*  --  448*   POTASSIUM mmol/L 5 4* 5 9* 6 2*   CHLORIDE mmol/L 102 101 102   CO2 mmol/L 18* 19* 17*   BUN mg/dL 108* 112* 113*   CREATININE mg/dL 10 32* 10 52* 10 78*   CALCIUM mg/dL 8 1* 8 2* 8 1*   MAGNESIUM mg/dL 2 2  --   --    PHOSPHORUS mg/dL 7 6*  --   --       rest all reviewed    RADIOLOGY:  CT chest abdomen pelvis wo contrast   Final Result by Alireza Chau MD (09/19 1233)      Increasing bilateral pleural effusions with worsening compressive atelectasis in the lung bases  Stable right upper lobe peribronchial vascular infiltrate suspicious for pneumonia  Probably reactive mediastinal adenopathy unchanged  No acute inflammatory process identified in the abdomen or pelvis  Workstation performed: [id number]         XR chest 1 view portable    (Results Pending)   XR chest portable    (Results Pending)     Rest all reviewed    Portions of the record may have been created with voice recognition software  Occasional wrong word or "sound a like" substitutions may have occurred due to the inherent limitations of voice recognition software  Read the chart carefully and recognize, using context, where substitutions have occurred  If you have any questions, please contact the dictating provider

## 2020-09-19 NOTE — H&P
H&P- Erin Chen 1945, 66 y o  male MRN: [id number]    Unit/Bed#: -01 Encounter: [id number]    Primary Care Provider: Morrison Spatz, DO   Date and time admitted to hospital: 9/19/2020  8:15 AM        * Shock [facility])  Assessment & Plan  Septic vs cardiogenic  Hypotensive on presentation - Hx indicative of dehydration secondary to a few days of diarrhea and poor po intake  3 L IVF given with minimal urine output  Cardiac POC reveals decreased EF and only minimally collapsible IVC  Levophed initiated post IVF for MAP < 65   Lactic acid normal on admission  Procal pending  Vancomycin and Zosyn initiated  CT of chest / pelvis and abdomen reveals large bilateral pleural effusions, possible apical PNA and compressive atelectasis          Acute renal failure (ARF) ([facility] [address] )  Assessment & Plan  Pt  Discharged from [address] [place] [address] 1 week ago with Creatinine of 1 05- admitted today with Cr of 10 78  Nephrology contacted in ED - Dr Paulina Woods   Concern for pre-renal    Recommends IVF resuscitation  Minimal u/o upon arrival to ICU - pressor started - concern for need for CRRT- will need transfer to another [facility]   Dialysis catheter placed Right femoral        Metabolic encephalopathy  Assessment & Plan  Pt   Not oriented to person, place and time on admission  Improved slightly while in ED but mental status decreased upon arrival to ICU- responsive to stimuli  Likely secondary to acute renal failure  Monitor airway and need for intubation  Correct electrolyte abnormalities    Bilateral pleural effusion  Assessment & Plan  Thoracentesis attempted bilaterally with minimal drainage, approximately 50 cc drained on right and 200 cc drained on left  Pleural fluid sent for full analysis  Interventional radiology for thoracentesis - plan to transfer patient    Anemia  Assessment & Plan  Chronic  Takes ferrous sulfate at home 325 mg daily  Monitor Hgb daily  No obvious sign of bleeding at this time    Hyperkalemia  Assessment & Plan  Pt  Given albuterol, insulin, dextrose and calcium in ED upon arrival for K of 6 2  BMP every 6 hrs      Type 2 diabetes mellitus, with long-term current use of insulin ([facility])  Assessment & Plan  Lab Results   Component Value Date    HGBA1C 8 2 (H) 09/03/2020       No results for input(s): POCGLU in the last 72 hours  Blood Sugar Average: Last 72 hrs:  Pt  Takes 16 u of lantus nightly  Tradjenta 5 mg daily  Metformin 1000 mg twice oh  Januvia 100 mg daily   All medications currently on hold - glucose on admission 89   Glucose at noon 47-  D50 given  May need a dextrose gtt if glucose remains low    Hyperphosphatemia  Assessment & Plan  Due to renal failure - unable to give phos lo due to mental status and NPO  Will likely need dialysis    Diarrhea  Assessment & Plan  Hx of several days of diarrhea- c -diff negative from 11/17   Repeat C- diff sent upon arrival to ED as pt  Was incontinent of stool  IVF resuscitation      Dysphagia  Assessment & Plan  Patient cleared for mechanical soft with thin liquids prior to discharge from [facility] last week  Pt  states that he was not eating while at [facility]  Will need re-evaluation     Cardiomyopathy [facility])  Assessment & Plan  Echo on 9/2/20 revealed: Apical akinesis with apical inferior akinesis, hypokinesis of the mid to distal anterolateral wall as well as the mid to distal septum  Anterior wall hypokinesis  Could represent apical ballooning/Takotsubo cardiomyopathygiven the basal segments have dynamic function  Size was normal  Systolic function was markedly reduced  Ejection fraction was estimated in the range of 25 % to 30 %  Wall thickness was mildly increased      No repeat echo performed outpatient  2d echo ordered today       -------------------------------------------------------------------------------------------------------------  Chief Complaint: weakness    History of Present Illness   HX and PE limited by: altered mental status  Sandrine Salcedo is a 66 y o  male who presents with altered mental status, weakness and hypotension  PMHx is positive for diabetes mellitus type II, HTN, Hyperlipidemia who was discharged from [address] [place] [address] 1 week ago after a 2 week hospitalization for septic shock and pna  He went to [place] for short term rehab and nursing care  According to family, he did not seem to be doing well but they were not allowed to visit him due to Covid restrictions  He was brought to the ED this am after 4 days of diarrhea, weakness and an acute change in mental status  Upon arrival, his creatinine was noted to be 10 78 and he was hypotensive  Nephrology was called and they recommended IVF resuscitation as patient appeared dehydrated  His lactic acid was normal, wbc was 10  Afoley was placed with approximately 100  Cc of dark urine drained  His blood pressure improved slightly with the IVF and he was admitted to the ICU  In the ICU, an arterial line was placed as well as a femoral dialysis catheter  Bilaterl thoracentesis was attempted with minimal drainage  His blood pressure did not respond appropriately to IVF and he was placed on low dose levophed and the decision was made to transfer to [facility] [place] for possible CRRT and IR guided drainage of bilateral pleural effusions  He was also noted to have a glucose of 45 and an amp of d50 was given  History obtained from chart review, patient, caregive  -------------------------------------------------------------------------------------------------------------  Dispo: Admit to Critical Care     Code Status: Level 1 - Full Code  --------------------------------------------------------------------------------------------------------------  Review of Systems    A 12-point, complete review of systems was reviewed and negative except as stated above     Physical Exam  Vitals signs and nursing note reviewed  Constitutional:       Appearance: He is well-developed   He is ill-appearing  Interventions: Nasal cannula in place  HENT:      Head: Normocephalic and atraumatic  Nose: Nose normal       Mouth/Throat:      Mouth: Mucous membranes are dry  Eyes:      Pupils: Pupils are equal, round, and reactive to light  Neck:      Musculoskeletal: Normal range of motion  Thyroid: No thyroid mass  Trachea: Trachea normal    Cardiovascular:      Rate and Rhythm: Normal rate  Pulses:           Carotid pulses are 2+ on the right side and 2+ on the left side  Heart sounds: Normal heart sounds  Pulmonary:      Effort: Tachypnea present  No accessory muscle usage  Breath sounds: Decreased breath sounds present  Abdominal:      General: Abdomen is protuberant  Bowel sounds are increased  There is distension  Palpations: Abdomen is soft  Tenderness: There is no guarding  Musculoskeletal:      Right lower leg: No edema  Left lower leg: No edema  Lymphadenopathy:      Cervical: No cervical adenopathy  Skin:     General: Skin is warm  Capillary Refill: Capillary refill takes less than 2 seconds  Comments: 2 DTI noted on bilateral buttocks   Neurological:      Mental Status: He is lethargic and confused  GCS: GCS eye subscore is 4  GCS verbal subscore is 4  GCS motor subscore is 6  Cranial Nerves: Cranial nerves are intact  Sensory: Sensation is intact        Motor: Motor function is intact        --------------------------------------------------------------------------------------------------------------  Vitals:   Vitals:    09/19/20 1100 09/19/20 1148 09/19/20 1300 09/19/20 1500   BP: 97/55 90/52 99/55    BP Location:  Left arm     Pulse: 93 97 87 84   Resp:  (!) 34 (!) 27 (!) 27   Temp:  98 4 °F (36 9 °C)     TempSrc:  Oral     SpO2: 95% 92% 93% 95%   Weight:  92 7 kg (204 lb 5 9 oz)     Height:  6' 2" (1 88 m)       Temp  Min: 97 1 °F (36 2 °C)  Max: 98 4 °F (36 9 °C)  IBW: 82 2 kg  Height: 6' 2" (188 cm)  Body mass index is 26 24 kg/m²  Laboratory and Diagnostics:  Results from last 7 days   Lab Units 09/19/20  1200 09/19/20  0835   WBC Thousand/uL  --  10 91*   HEMOGLOBIN g/dL  --  10 1*   HEMATOCRIT %  --  31 2*   PLATELETS Thousands/uL 482* 448*   NEUTROS PCT %  --  75   MONOS PCT %  --  8     Results from last 7 days   Lab Units 09/19/20  1200 09/19/20  1013 09/19/20  0835   SODIUM mmol/L 137 137 135*   POTASSIUM mmol/L 5 4* 5 9* 6 2*   CHLORIDE mmol/L 102 101 102   CO2 mmol/L 18* 19* 17*   ANION GAP mmol/L 17* 17* 16*   BUN mg/dL 108* 112* 113*   CREATININE mg/dL 10 32* 10 52* 10 78*   CALCIUM mg/dL 8 1* 8 2* 8 1*   GLUCOSE RANDOM mg/dL 47* 103 89   ALT U/L  --   --  88*   AST U/L  --   --  157*   ALK PHOS U/L  --   --  86   ALBUMIN g/dL  --   --  1 8*   TOTAL BILIRUBIN mg/dL  --   --  0 35     Results from last 7 days   Lab Units 09/19/20  1200   MAGNESIUM mg/dL 2 2   PHOSPHORUS mg/dL 7 6*           Results from last 7 days   Lab Units 09/19/20  1446 09/19/20  1111 09/19/20  0835   TROPONIN I ng/mL 0 22* 0 24* 0 28*     Results from last 7 days   Lab Units 09/19/20  1447 09/19/20  0835   LACTIC ACID mmol/L 0 6 1 2     ABG:  Results from last 7 days   Lab Units 09/19/20  1441   PH ART  7 315*   PCO2 ART mm Hg 28 2*   PO2 ART mm Hg 69 6*   HCO3 ART mmol/L 14 0*   BASE EXC ART mmol/L -10 7   ABG SOURCE  Line, Arterial     VBG:  Results from last 7 days   Lab Units 09/19/20  1441 09/19/20  1122   PH GOLD   --  7 337   PCO2 GOLD mm Hg  --  28 8*   PO2 GOLD mm Hg  --  208 6*   HCO3 GOLD mmol/L  --  15 1*   BASE EXC GOLD mmol/L  --  -9 5   ABG SOURCE  Line, Arterial  --            Micro: Blood cultures sent  UA without evidence of UTI        Imaging:  CT chest abdomen pelvis wo contrast   Final Result      Increasing bilateral pleural effusions with worsening compressive atelectasis in the lung bases  Stable right upper lobe peribronchial vascular infiltrate suspicious for pneumonia        Probably reactive mediastinal adenopathy unchanged  No acute inflammatory process identified in the abdomen or pelvis  Workstation performed: [id number]         XR chest 1 view portable    (Results Pending)   XR chest portable    (Results Pending)      I have personally reviewed pertinent reports  Historical Information   Past Medical History:   Diagnosis Date    Diabetes mellitus ([facility] [address] )     Hyperlipidemia     Hypertension      History reviewed  No pertinent surgical history  Social History   Social History     Substance and Sexual Activity   Alcohol Use Not Currently    Frequency: Never     Social History     Substance and Sexual Activity   Drug Use Never     Social History     Tobacco Use   Smoking Status Never Smoker   Smokeless Tobacco Never Used       Family History:   History reviewed  No pertinent family history  Medications:  Current Facility-Administered Medications   Medication Dose Route Frequency    chlorhexidine (PERIDEX) 0 12 % oral rinse 15 mL  15 mL Swish & Spit Q12H [address]    heparin (porcine) subcutaneous injection 5,000 Units  5,000 Units Subcutaneous Q8H [address]    multi-electrolyte (PLASMALYTE-A/ISOLYTE-S PH 7 4) IV solution  250 mL/hr Intravenous Continuous    norepinephrine (LEVOPHED) 4 mg (STANDARD CONCENTRATION) IV in sodium chloride 0 9% 250 mL  1-30 mcg/min Intravenous Titrated    piperacillin-tazobactam (ZOSYN) 3 375 g in sodium chloride 0 9 % 100 mL IVPB (EXTENDED-INFUSION)  3 375 g Intravenous Q12H     Home medications:  Prior to Admission Medications   Prescriptions Last Dose Informant Patient Reported?  Taking?   ferrous sulfate 325 (65 Fe) mg tablet   No Yes   Sig: Take 1 tablet (325 mg total) by mouth daily with breakfast   insulin aspart (NovoLOG) 100 units/mL injection   Yes Yes   Sig: Inject 3 Units under the skin 3 (three) times a day before meals    insulin glargine (LANTUS) 100 units/mL subcutaneous injection   No Yes   Sig: Inject 16 Units under the skin daily at bedtime   ipratropium (ATROVENT) 0 02 % nebulizer solution   No Yes   Sig: Take 1 vial (0 5 mg total) by nebulization every 6 (six) hours as needed for wheezing or shortness of breath   levalbuterol (XOPENEX) 1 25 mg/0 5 mL nebulizer solution   No Yes   Sig: Take 0 5 mL (1 25 mg total) by nebulization every 6 (six) hours as needed for wheezing   linaGLIPtin (Tradjenta) 5 MG TABS   Yes Yes   Sig: Take 5 mg by mouth daily   lisinopril (ZESTRIL) 5 mg tablet   No Yes   Sig: Take 1 tablet (5 mg total) by mouth daily   metFORMIN (GLUCOPHAGE) 1000 MG tablet   Yes Yes   Sig: Take 1,000 mg by mouth 2 (two) times a day with meals   metoprolol succinate (TOPROL-XL) 50 mg 24 hr tablet   No Yes   Sig: Take 1 tablet (50 mg total) by mouth daily   simvastatin (ZOCOR) 20 mg tablet   Yes Yes   Sig: Take 20 mg by mouth daily at bedtime   sitaGLIPtin (JANUVIA) 100 mg tablet Not Taking at Unknown time  Yes No   Sig: Take 100 mg by mouth daily      Facility-Administered Medications: None     Allergies: Allergies   Allergen Reactions    Iodinated Diagnostic Agents Hives       ------------------------------------------------------------------------------------------------------------  Advance Directive and Living Will:      Power of :    POLST:    ------------------------------------------------------------------------------------------------------------  Anticipated Length of Stay is > 2 midnights    Care Time Delivered:   Upon my evaluation, this patient had a high probability of imminent or life-threatening deterioration due to shock, which required my direct attention, intervention, and personal management  I have personally provided 80 minutes (0930 to 1550) of critical care time, exclusive of procedures, teaching, family meetings, and any prior time recorded by providers other than myself  Sunday Velez PA-C        Portions of the record may have been created with voice recognition software    Occasional wrong word or "sound a like" substitutions may have occurred due to the inherent limitations of voice recognition software    Read the chart carefully and recognize, using context, where substitutions have occurred

## 2020-09-19 NOTE — ED PROVIDER NOTES
History  Chief Complaint   Patient presents with    Abnormal Lab     pt is with elevated creatine     Sent for evaluation of elevated creatinine, generally states no other complaints other than buttocks are sore  Denies headache  Denies chest pain  Denies dyspnea  Denies fever and chills  Denies abdominal pain  Wife present and notes he has ongoing diarrhea, recently tested and C diff negative  Recent hospitalization for severe sepsis, pneumonia, reviewed      Medical Problem   Progression:  Worsening  Chronicity:  New  Associated symptoms: diarrhea and fatigue    Associated symptoms: no abdominal pain, no chest pain, no fever, no nausea, no rash, no shortness of breath and no vomiting        Prior to Admission Medications   Prescriptions Last Dose Informant Patient Reported?  Taking?   ferrous sulfate 325 (65 Fe) mg tablet   No Yes   Sig: Take 1 tablet (325 mg total) by mouth daily with breakfast   insulin aspart (NovoLOG) 100 units/mL injection   Yes Yes   Sig: Inject 3 Units under the skin 3 (three) times a day before meals    insulin glargine (LANTUS) 100 units/mL subcutaneous injection   No Yes   Sig: Inject 16 Units under the skin daily at bedtime   ipratropium (ATROVENT) 0 02 % nebulizer solution   No Yes   Sig: Take 1 vial (0 5 mg total) by nebulization every 6 (six) hours as needed for wheezing or shortness of breath   levalbuterol (XOPENEX) 1 25 mg/0 5 mL nebulizer solution   No Yes   Sig: Take 0 5 mL (1 25 mg total) by nebulization every 6 (six) hours as needed for wheezing   linaGLIPtin (Tradjenta) 5 MG TABS   Yes Yes   Sig: Take 5 mg by mouth daily   lisinopril (ZESTRIL) 5 mg tablet   No Yes   Sig: Take 1 tablet (5 mg total) by mouth daily   metFORMIN (GLUCOPHAGE) 1000 MG tablet   Yes Yes   Sig: Take 1,000 mg by mouth 2 (two) times a day with meals   metoprolol succinate (TOPROL-XL) 50 mg 24 hr tablet   No Yes   Sig: Take 1 tablet (50 mg total) by mouth daily   simvastatin (ZOCOR) 20 mg tablet   Yes Yes Sig: Take 20 mg by mouth daily at bedtime   sitaGLIPtin (JANUVIA) 100 mg tablet Not Taking at Unknown time  Yes No   Sig: Take 100 mg by mouth daily      Facility-Administered Medications: None       Past Medical History:   Diagnosis Date    [facility]     Hyperlipidemia     Hypertension        History reviewed  No pertinent surgical history  History reviewed  No pertinent family history  I have reviewed and agree with the history as documented  E-Cigarette/Vaping    E-Cigarette Use Never User      E-Cigarette/Vaping Substances     Social History     Tobacco Use    Smoking status: Never Smoker    Smokeless tobacco: Never Used   Substance Use Topics    Alcohol use: Not Currently     Frequency: Never    Drug use: Never       Review of Systems   Constitutional: Positive for fatigue  Negative for fever  Respiratory: Negative for shortness of breath  Cardiovascular: Negative for chest pain  Gastrointestinal: Positive for diarrhea  Negative for abdominal pain, nausea and vomiting  Skin: Negative for rash  All other systems reviewed and are negative  Physical Exam  Physical Exam  Vitals signs and nursing note reviewed  Constitutional:       Comments: Pleasant, fatigued but comfortable-appearing, conversational   HENT:      Head: Normocephalic and atraumatic  Eyes:      Conjunctiva/sclera: Conjunctivae normal       Pupils: Pupils are equal, round, and reactive to light  Neck:      Musculoskeletal: Neck supple  Cardiovascular:      Rate and Rhythm: Normal rate and regular rhythm  Heart sounds: Normal heart sounds  Pulmonary:      Effort: Pulmonary effort is normal       Breath sounds: Normal breath sounds  Abdominal:      General: Bowel sounds are normal  There is no distension  Palpations: Abdomen is soft  Tenderness: There is no abdominal tenderness  Musculoskeletal: Normal range of motion  Skin:     General: Skin is warm and dry        Comments: Left buttock ovoid area 8 cm diameter subcutaneous purpuric changes without skin breakdown, nontender, perianal warty change   Neurological:      Mental Status: He is alert and oriented to person, place, and time  Cranial Nerves: No cranial nerve deficit  Coordination: Coordination normal    Psychiatric:         Behavior: Behavior normal          Thought Content:  Thought content normal          Judgment: Judgment normal          Vital Signs  ED Triage Vitals   Temperature Pulse Respirations Blood Pressure SpO2   09/19/20 0815 09/19/20 0815 09/19/20 0815 09/19/20 0815 09/19/20 0815   (!) 97 1 °F (36 2 °C) 94 16 97/53 94 %      Temp Source Heart Rate Source Patient Position - Orthostatic VS BP Location FiO2 (%)   09/19/20 1148 09/19/20 1013 09/19/20 1148 09/19/20 0815 --   Oral Monitor Lying Left arm       Pain Score       09/19/20 0815       3           Vitals:    09/19/20 1030 09/19/20 1045 09/19/20 1100 09/19/20 1148   BP: 100/58 100/56 97/55 90/52   Pulse: 91 93 93 97   Patient Position - Orthostatic VS:    Lying         Visual Acuity      ED Medications  Medications   sodium chloride 0 9 % infusion (0 mL/hr Intravenous Stopped 9/19/20 0916)   vancomycin (VANCOCIN) 1,750 mg in sodium chloride 0 9 % 500 mL IVPB (1,750 mg Intravenous New Bag 9/19/20 1206)   chlorhexidine (PERIDEX) 0 12 % oral rinse 15 mL (has no administration in time range)   heparin (porcine) subcutaneous injection 5,000 Units (has no administration in time range)   lactated ringers bolus 1,000 mL (1,000 mL Intravenous New Bag 9/19/20 1218)   piperacillin-tazobactam (ZOSYN) 3 375 g in sodium chloride 0 9 % 100 mL IVPB (EXTENDED-INFUSION) (has no administration in time range)   sodium bicarbonate 8 4 % injection 50 mEq (50 mEq Intravenous Given 9/19/20 0915)   calcium gluconate 1 g in sodium chloride 0 9% 50 mL (premix) (0 g Intravenous Stopped 9/19/20 0952)   dextrose 50 % IV solution 25 mL (25 mL Intravenous Given 9/19/20 0918) insulin lispro (HumaLOG) 100 units/mL subcutaneous injection 10 Units (10 Units Subcutaneous Given 9/19/20 0921)   sodium chloride 0 9 % bolus 1,000 mL (0 mL Intravenous Stopped 9/19/20 1003)   sodium chloride 0 9 % bolus 1,000 mL (0 mL Intravenous Stopped 9/19/20 1116)   piperacillin-tazobactam (ZOSYN) 3 375 g in sodium chloride 0 9 % 100 mL IVPB (3 375 g Intravenous New Bag 9/19/20 1206)       Diagnostic Studies  Results Reviewed     Procedure Component Value Units Date/Time    Basic metabolic panel [[id number]]  (Abnormal) Collected:  09/19/20 1200    Lab Status:  Final result Specimen:  Blood from Arm, Left Updated:  09/19/20 1221     Sodium 137 mmol/L      Potassium 5 4 mmol/L      Chloride 102 mmol/L      CO2 18 mmol/L      ANION GAP 17 mmol/L       mg/dL      Creatinine 10 32 mg/dL      Glucose 47 mg/dL      Calcium 8 1 mg/dL      eGFR 4 ml/min/1 73sq m     Narrative:       [facility] guidelines for Chronic Kidney Disease (CKD):     Stage 1 with normal or high GFR (GFR > 90 mL/min/1 73 square meters)    Stage 2 Mild CKD (GFR = 60-89 mL/min/1 73 square meters)    Stage 3A Moderate CKD (GFR = 45-59 mL/min/1 73 square meters)    Stage 3B Moderate CKD (GFR = 30-44 mL/min/1 73 square meters)    Stage 4 Severe CKD (GFR = 15-29 mL/min/1 73 square meters)    Stage 5 End Stage CKD (GFR <15 mL/min/1 73 square meters)  Note: GFR calculation is accurate only with a steady state creatinine    Phosphorus [[id number]]  (Abnormal) Collected:  09/19/20 1200    Lab Status:  Final result Specimen:  Blood from Arm, Left Updated:  09/19/20 1221     Phosphorus 7 6 mg/dL     Magnesium [[id number]]  (Normal) Collected:  09/19/20 1200    Lab Status:  Final result Specimen:  Blood from Arm, Left Updated:  09/19/20 1221     Magnesium 2 2 mg/dL     Calcium, ionized [[id number]]  (Abnormal) Collected:  09/19/20 1200    Lab Status:  Final result Specimen:  Blood from Arm, Left Updated:  09/19/20 1209 Calcium, Ionized 1 02 mmol/L     Platelet count [[id number]]  (Abnormal) Collected:  09/19/20 1200    Lab Status:  Final result Specimen:  Blood from Arm, Left Updated:  09/19/20 1208     Platelets 929 Thousands/uL      MPV 10 4 fL     Blood culture [[id number]] Collected:  09/19/20 1201    Lab Status: In process Specimen:  Blood from Arm, Left Updated:  09/19/20 1207    Blood culture [[id number]] Collected:  09/19/20 1201    Lab Status: In process Specimen:  Blood from Arm, Right Updated:  09/19/20 1205    Troponin I [[id number]]     Lab Status:  No result Specimen:  Blood     Blood gas, venous [[id number]]  (Abnormal) Collected:  09/19/20 1122    Lab Status:  Final result Specimen:  Blood from Arm, Right Updated:  09/19/20 1144     pH, Jerad 7 337     pCO2, Jerad 28 8 mm Hg      pO2, Jerad 208 6 mm Hg      HCO3, Jerad 15 1 mmol/L      Base Excess, Jerad -9 5 mmol/L      O2 Content, Jerad 14 5 ml/dL      O2 HGB, VENOUS 97 3 %     Troponin I [[id number]]  (Abnormal) Collected:  09/19/20 1111    Lab Status:  Final result Specimen:  Blood Updated:  09/19/20 1136     Troponin I 0 24 ng/mL     Procalcitonin with AM Reflex [[id number]] Collected:  09/19/20 1111    Lab Status:   In process Specimen:  Blood Updated:  09/19/20 0626    Basic metabolic panel [[id number]]  (Abnormal) Collected:  09/19/20 1013    Lab Status:  Final result Specimen:  Blood from Hand, Right Updated:  09/19/20 1028     Sodium 137 mmol/L      Potassium 5 9 mmol/L      Chloride 101 mmol/L      CO2 19 mmol/L      ANION GAP 17 mmol/L       mg/dL      Creatinine 10 52 mg/dL      Glucose 103 mg/dL      Calcium 8 2 mg/dL      eGFR 4 ml/min/1 73sq m     Narrative:       [facility] guidelines for Chronic Kidney Disease (CKD):     Stage 1 with normal or high GFR (GFR > 90 mL/min/1 73 square meters)    Stage 2 Mild CKD (GFR = 60-89 mL/min/1 73 square meters)    Stage 3A Moderate CKD (GFR = 45-59 mL/min/1 73 square meters)    Stage 3B Moderate CKD (GFR = 30-44 mL/min/1 73 square meters)    Stage 4 Severe CKD (GFR = 15-29 mL/min/1 73 square meters)    Stage 5 End Stage CKD (GFR <15 mL/min/1 73 square meters)  Note: GFR calculation is accurate only with a steady state creatinine    Urine Microscopic [[id number]]  (Abnormal) Collected:  09/19/20 0935    Lab Status:  Final result Specimen:  Urine, Indwelling Means Catheter Updated:  09/19/20 0953     RBC, UA None Seen /hpf      WBC, UA None Seen /hpf      Epithelial Cells Occasional /hpf      Bacteria, UA None Seen /hpf      Hyaline Casts, UA 0-1 /lpf      Fine granular casts 0-1 /lpf      AMORPH URATES Occasional /hpf     Novel Coronavirus Paula HOLMN [[id number]]  (Normal) Collected:  09/19/20 0835    Lab Status:  Final result Specimen:  Nares from Nose Updated:  09/19/20 0947     SARS-CoV-2 Negative    Narrative: The specimen collection materials, transport medium, and/or testing methodology utilized in the production of these test results have been proven to be reliable in a limited validation with an abbreviated program under the Emergency Utilization Authorization provided by the FDA  Testing reported as "Presumptive positive" will be confirmed with secondary testing with a reference laboratory to ensure result accuracy  Clinical caution and judgement should be used with the interpretation of these results with consideration of the clinical impression and other laboratory testing  Testing reported as "Positive" or "Negative" has been proven to be accurate according to standard laboratory validation requirements  All testing is performed with control materials showing appropriate reactivity at standard intervals        UA w Reflex to Microscopic w Reflex to Culture [[id number]]  (Abnormal) Collected:  09/19/20 0935    Lab Status:  Final result Specimen:  Urine, Indwelling Means Catheter Updated:  09/19/20 0943     Color, UA Mimi     Clarity, UA Clear     Specific Gravity, UA >=1 030 pH, UA 5 0     Leukocytes, UA Negative     Nitrite, UA Negative     Protein, UA Trace mg/dl      Glucose, UA Negative mg/dl      Ketones, UA Negative mg/dl      Urobilinogen, UA 0 2 E U /dl      Bilirubin, UA Small     Blood, UA Large    Troponin I [[id number]]  (Abnormal) Collected:  09/19/20 0835    Lab Status:  Final result Specimen:  Blood from Arm, Right Updated:  09/19/20 0902     Troponin I 0 28 ng/mL     Comprehensive metabolic panel [[id number]]  (Abnormal) Collected:  09/19/20 0835    Lab Status:  Final result Specimen:  Blood from Arm, Right Updated:  09/19/20 0858     Sodium 135 mmol/L      Potassium 6 2 mmol/L      Chloride 102 mmol/L      CO2 17 mmol/L      ANION GAP 16 mmol/L       mg/dL      Creatinine 10 78 mg/dL      Glucose 89 mg/dL      Calcium 8 1 mg/dL      Corrected Calcium 9 9 mg/dL       U/L      ALT 88 U/L      Alkaline Phosphatase 86 U/L      Total Protein 7 7 g/dL      Albumin 1 8 g/dL      Total Bilirubin 0 35 mg/dL      eGFR 4 ml/min/1 73sq m     Narrative:       [facility] guidelines for Chronic Kidney Disease (CKD):     Stage 1 with normal or high GFR (GFR > 90 mL/min/1 73 square meters)    Stage 2 Mild CKD (GFR = 60-89 mL/min/1 73 square meters)    Stage 3A Moderate CKD (GFR = 45-59 mL/min/1 73 square meters)    Stage 3B Moderate CKD (GFR = 30-44 mL/min/1 73 square meters)    Stage 4 Severe CKD (GFR = 15-29 mL/min/1 73 square meters)    Stage 5 End Stage CKD (GFR <15 mL/min/1 73 square meters)  Note: GFR calculation is accurate only with a steady state creatinine    Lactic acid [[id number]]  (Normal) Collected:  09/19/20 0835    Lab Status:  Final result Specimen:  Blood from Arm, Right Updated:  09/19/20 0857     LACTIC ACID 1 2 mmol/L     Narrative:       Result may be elevated if tourniquet was used during collection  Clostridium difficile toxin by PCR with EIA [[id number]] Collected:  09/19/20 0852    Lab Status:   In process Specimen: Stool from Rectum Updated:  09/19/20 0855    CBC and differential [[id number]]  (Abnormal) Collected:  09/19/20 0835    Lab Status:  Final result Specimen:  Blood from Arm, Right Updated:  09/19/20 0840     WBC 10 91 Thousand/uL      RBC 3 58 Million/uL      Hemoglobin 10 1 g/dL      Hematocrit 31 2 %      MCV 87 fL      MCH 28 2 pg      MCHC 32 4 g/dL      RDW 14 9 %      MPV 10 5 fL      Platelets 919 Thousands/uL      nRBC 0 /100 WBCs      Neutrophils Relative 75 %      Immat GRANS % 0 %      Lymphocytes Relative 13 %      Monocytes Relative 8 %      Eosinophils Relative 3 %      Basophils Relative 1 %      Neutrophils Absolute 8 15 Thousands/µL      Immature Grans Absolute 0 03 Thousand/uL      Lymphocytes Absolute 1 45 Thousands/µL      Monocytes Absolute 0 89 Thousand/µL      Eosinophils Absolute 0 33 Thousand/µL      Basophils Absolute 0 06 Thousands/µL                  CT chest abdomen pelvis wo contrast   Final Result by Ej Castellanos MD (09/19 1233)      Increasing bilateral pleural effusions with worsening compressive atelectasis in the lung bases  Stable right upper lobe peribronchial vascular infiltrate suspicious for pneumonia  Probably reactive mediastinal adenopathy unchanged  No acute inflammatory process identified in the abdomen or pelvis              Workstation performed: [id number]         XR chest 1 view portable    (Results Pending)              Procedures  CriticalCare Time  Performed by: Francois Escalera DO  Authorized by: Francois Escalera DO     Critical care provider statement:     Critical care time (minutes):  30    Critical care was necessary to treat or prevent imminent or life-threatening deterioration of the following conditions:  Renal failure    Critical care was time spent personally by me on the following activities:  Obtaining history from patient or surrogate, development of treatment plan with patient or surrogate, discussions with consultants, evaluation of patient's response to treatment, examination of patient, ordering and review of laboratory studies, ordering and review of radiographic studies, re-evaluation of patient's condition and review of old charts             ED Course  ED Course as of Sep 19 1255   Sat Sep 19, 2020   0909 Renal Piter TT      0916 CC Onye aware, will evaluate for admission      0926 Discussed with Piter, inserting nicole, IVF and discuss after brief observation      4658 Systolic blood pressure improved from 80s to 95 heart rate 90s      1049 EKG 8:27 a m  Normal sinus rhythm rate 95 normal axis normal intervals no ST elevation or depression interpreted by me    Renal Piter aware of improvements and agrees with [facility] inpatient      1241 Lips                              SBIRT 22yo+      Most Recent Value   SBIRT (22 yo +)   In order to provide better care to our patients, we are screening all of our patients for alcohol and drug use  Would it be okay to ask you these screening questions? Yes Filed at: 09/19/2020 5384   Initial Alcohol Screen: US AUDIT-C    1  How often do you have a drink containing alcohol?  0 Filed at: 09/19/2020 0819   2  How many drinks containing alcohol do you have on a typical day you are drinking? 0 Filed at: 09/19/2020 0819   3a  Male UNDER 65: How often do you have five or more drinks on one occasion? 0 Filed at: 09/19/2020 0819   3b  FEMALE Any Age, or MALE 65+: How often do you have 4 or more drinks on one occassion? 0 Filed at: 09/19/2020 0819   Audit-C Score  0 Filed at: 09/19/2020 1755   DOMINICK: How many times in the past year have you    Used an illegal drug or used a prescription medication for non-medical reasons?   Never Filed at: 09/19/2020 5541                  MDM    Disposition  Final diagnoses:   Acute renal failure (ARF) (HCC)   Hyperkalemia   Hypotension     Time reflects when diagnosis was documented in both MDM as applicable and the Disposition within this note     Time User Action Codes Description Comment    9/19/2020 10:23 AM Haley Spies Add [N17 9] Acute renal failure (ARF) ([facility] )     9/19/2020 10:23 AM Haley Spies Add [E87 5] Hyperkalemia     9/19/2020 10:23 AM Haley Spies Add [I95 9] Hypotension       ED Disposition     ED Disposition Condition Date/Time Comment    Admit Stable Sat Sep 19, 2020 10:47 AM Case was discussed with Piter/Madhav and the patient's admission status was agreed to be Admission Status: inpatient status to the service of Dr Josep Hess           Follow-up Information    None         Current Discharge Medication List      CONTINUE these medications which have NOT CHANGED    Details   ferrous sulfate 325 (65 Fe) mg tablet Take 1 tablet (325 mg total) by mouth daily with breakfast  Qty:  , Refills: 0    Associated Diagnoses: Anemia      insulin aspart (NovoLOG) 100 units/mL injection Inject 3 Units under the skin 3 (three) times a day before meals       insulin glargine (LANTUS) 100 units/mL subcutaneous injection Inject 16 Units under the skin daily at bedtime  Refills: 0    Associated Diagnoses: Type 2 diabetes mellitus without complication, with long-term current use of insulin (HCC)      ipratropium (ATROVENT) 0 02 % nebulizer solution Take 1 vial (0 5 mg total) by nebulization every 6 (six) hours as needed for wheezing or shortness of breath  Refills: 0    Associated Diagnoses: Acute respiratory failure with hypoxia (HCC)      levalbuterol (XOPENEX) 1 25 mg/0 5 mL nebulizer solution Take 0 5 mL (1 25 mg total) by nebulization every 6 (six) hours as needed for wheezing  Refills: 0    Associated Diagnoses: Acute respiratory failure with hypoxia (HCC)      linaGLIPtin (Tradjenta) 5 MG TABS Take 5 mg by mouth daily      lisinopril (ZESTRIL) 5 mg tablet Take 1 tablet (5 mg total) by mouth daily  Qty:  , Refills: 0    Associated Diagnoses: Hypertension      metFORMIN (GLUCOPHAGE) 1000 MG tablet Take 1,000 mg by mouth 2 (two) times a day with meals      metoprolol succinate (TOPROL-XL) 50 mg 24 hr tablet Take 1 tablet (50 mg total) by mouth daily  Qty:  , Refills: 0    Associated Diagnoses: Cardiomyopathy, unspecified type ([address] ); Hypertension      simvastatin (ZOCOR) 20 mg tablet Take 20 mg by mouth daily at bedtime      sitaGLIPtin (JANUVIA) 100 mg tablet Take 100 mg by mouth daily           No discharge procedures on file      PDMP Review       Value Time User    PDMP Reviewed  Yes 9/1/2020  9:51 PM Briana Madera MD          ED Provider  Electronically Signed by           Janny Guevara DO  09/19/20 1049       Janny Guevara DO  09/19/20 2055

## 2020-09-19 NOTE — PROGRESS NOTES
Vancomycin Assessment    Kris Jones is a 66 y o  male who is currently receiving vancomycin 1750 mg IV once for pneumonia/sepsis  Relevant clinical data and objective history reviewed:  Creatinine   Date Value Ref Range Status   09/19/2020 10 32 (H) 0 60 - 1 30 mg/dL Final     Comment:     Standardized to IDMS reference method   09/19/2020 10 52 (H) 0 60 - 1 30 mg/dL Final     Comment:     Standardized to IDMS reference method   09/19/2020 10 78 (H) 0 60 - 1 30 mg/dL Final     Comment:     Standardized to IDMS reference method     BP 90/52 (BP Location: Left arm)   Pulse 97   Temp 98 4 °F (36 9 °C) (Oral)   Resp (!) 34   Ht 6' 2" (1 88 m)   Wt 92 7 kg (204 lb 5 9 oz)   SpO2 92%   BMI 26 24 kg/m²   No intake/output data recorded  Lab Results   Component Value Date/Time     (H) 09/19/2020 12:00 PM    WBC 10 91 (H) 09/19/2020 08:35 AM    HGB 10 1 (L) 09/19/2020 08:35 AM    HCT 31 2 (L) 09/19/2020 08:35 AM    MCV 87 09/19/2020 08:35 AM     (H) 09/19/2020 12:00 PM     Temp Readings from Last 3 Encounters:   09/19/20 98 4 °F (36 9 °C) (Oral)   09/12/20 (!) 97 3 °F (36 3 °C) (Oral)     Vancomycin Days of Therapy: 1    Assessment/Plan  The patient is currently on vancomycin utilizing pulse dosing based on actual body weight  Baseline risks associated with therapy include: pre-existing renal impairment, concomitant nephrotoxic medications, advanced age, and dehydration  The patient was ordered vancomycin 1250 mg IV every 24 hours and after clinical evaluation will be changed to 20 mg/kg (1750 mg IV based on then weight of 82 6 kg) once followed by 10-15 mg/kg based on daily trough levels  Pharmacy will also follow closely for s/sx of nephrotoxicity, infusion reactions, and appropriateness of therapy  BMP and CBC will be ordered per protocol  Plan for trough in 24 hours on 9/20/20 at 1130  Due to infection severity, will target a trough of 15-20    Pharmacy will continue to follow the patients culture results and clinical progress daily      Yan Sal, Pharmacist

## 2020-09-19 NOTE — ASSESSMENT & PLAN NOTE
Hx of several days of diarrhea- c -diff negative from 11/17   Repeat C- diff sent upon arrival to ED as pt   Was incontinent of stool  IVF resuscitation

## 2020-09-19 NOTE — SOCIAL WORK
CM placed call to [place] to confirm that pt is a resident of theirs, as per Joey Cummings, pt is from their facility

## 2020-09-19 NOTE — ASSESSMENT & PLAN NOTE
Pt  Not oriented to person, place and time on admission  Improved slightly while in ED but mental status decreased upon arrival to ICU- responsive to stimuli  Likely secondary to acute renal failure  Monitor airway and need for intubation  Correct electrolyte abnormalities

## 2020-09-19 NOTE — ASSESSMENT & PLAN NOTE
Septic vs cardiogenic  Hypotensive on presentation - Hx indicative of dehydration secondary to a few days of diarrhea and poor po intake  3 L IVF given with minimal urine output  Cardiac POC reveals decreased EF and only minimally collapsible IVC  Levophed initiated post IVF for MAP < 65   Lactic acid normal on admission  Procal pending  Vancomycin and Zosyn initiated  CT of chest / pelvis and abdomen reveals large bilateral pleural effusions, possible apical PNA and compressive atelectasis

## 2020-09-19 NOTE — ASSESSMENT & PLAN NOTE
Due to renal failure - unable to give phos lo due to mental status and NPO  Will likely need dialysis

## 2020-09-20 ENCOUNTER — APPOINTMENT (INPATIENT)
Dept: RADIOLOGY | Facility: HOSPITAL | Age: 75
DRG: 673 | End: 2020-09-20
Payer: COMMERCIAL

## 2020-09-20 LAB
ANION GAP SERPL CALCULATED.3IONS-SCNC: 10 MMOL/L (ref 4–13)
ANION GAP SERPL CALCULATED.3IONS-SCNC: 12 MMOL/L (ref 4–13)
ANION GAP SERPL CALCULATED.3IONS-SCNC: 13 MMOL/L (ref 4–13)
BASOPHILS # BLD AUTO: 0.06 THOUSANDS/ΜL (ref 0–0.1)
BASOPHILS NFR BLD AUTO: 1 % (ref 0–1)
BUN SERPL-MCNC: 100 MG/DL (ref 5–25)
BUN SERPL-MCNC: 94 MG/DL (ref 5–25)
BUN SERPL-MCNC: 99 MG/DL (ref 5–25)
C DIFF TOX GENS STL QL NAA+PROBE: NEGATIVE
CALCIUM SERPL-MCNC: 7.7 MG/DL (ref 8.3–10.1)
CALCIUM SERPL-MCNC: 7.7 MG/DL (ref 8.3–10.1)
CALCIUM SERPL-MCNC: 7.9 MG/DL (ref 8.3–10.1)
CHLORIDE SERPL-SCNC: 102 MMOL/L (ref 100–108)
CHLORIDE SERPL-SCNC: 106 MMOL/L (ref 100–108)
CHLORIDE SERPL-SCNC: 107 MMOL/L (ref 100–108)
CO2 SERPL-SCNC: 18 MMOL/L (ref 21–32)
CO2 SERPL-SCNC: 20 MMOL/L (ref 21–32)
CO2 SERPL-SCNC: 24 MMOL/L (ref 21–32)
CREAT SERPL-MCNC: 8.29 MG/DL (ref 0.6–1.3)
CREAT SERPL-MCNC: 8.68 MG/DL (ref 0.6–1.3)
CREAT SERPL-MCNC: 9.28 MG/DL (ref 0.6–1.3)
EOSINOPHIL # BLD AUTO: 0.37 THOUSAND/ΜL (ref 0–0.61)
EOSINOPHIL NFR BLD AUTO: 5 % (ref 0–6)
ERYTHROCYTE [DISTWIDTH] IN BLOOD BY AUTOMATED COUNT: 14.8 % (ref 11.6–15.1)
GFR SERPL CREATININE-BSD FRML MDRD: 5 ML/MIN/1.73SQ M
GFR SERPL CREATININE-BSD FRML MDRD: 5 ML/MIN/1.73SQ M
GFR SERPL CREATININE-BSD FRML MDRD: 6 ML/MIN/1.73SQ M
GLUCOSE FLD-MCNC: 159 MG/DL
GLUCOSE SERPL-MCNC: 157 MG/DL (ref 65–140)
GLUCOSE SERPL-MCNC: 176 MG/DL (ref 65–140)
GLUCOSE SERPL-MCNC: 248 MG/DL (ref 65–140)
GLUCOSE SERPL-MCNC: 261 MG/DL (ref 65–140)
GLUCOSE SERPL-MCNC: 280 MG/DL (ref 65–140)
GLUCOSE SERPL-MCNC: 283 MG/DL (ref 65–140)
GLUCOSE SERPL-MCNC: 283 MG/DL (ref 65–140)
HCT VFR BLD AUTO: 27.6 % (ref 36.5–49.3)
HGB BLD-MCNC: 9 G/DL (ref 12–17)
HISTIOCYTES NFR FLD: 2 %
IMM GRANULOCYTES # BLD AUTO: 0.03 THOUSAND/UL (ref 0–0.2)
IMM GRANULOCYTES NFR BLD AUTO: 0 % (ref 0–2)
LDH FLD L TO P-CCNC: 494 U/L
LDH SERPL-CCNC: 427 U/L (ref 81–234)
LYMPHOCYTES # BLD AUTO: 1.02 THOUSANDS/ΜL (ref 0.6–4.47)
LYMPHOCYTES NFR BLD AUTO: 13 % (ref 14–44)
LYMPHOCYTES NFR BLD AUTO: 3 %
MAGNESIUM SERPL-MCNC: 2.1 MG/DL (ref 1.6–2.6)
MCH RBC QN AUTO: 28.2 PG (ref 26.8–34.3)
MCHC RBC AUTO-ENTMCNC: 32.6 G/DL (ref 31.4–37.4)
MCV RBC AUTO: 87 FL (ref 82–98)
MONOCYTES # BLD AUTO: 0.64 THOUSAND/ΜL (ref 0.17–1.22)
MONOCYTES NFR BLD AUTO: 13 %
MONOCYTES NFR BLD AUTO: 8 % (ref 4–12)
NEUTROPHILS # BLD AUTO: 5.97 THOUSANDS/ΜL (ref 1.85–7.62)
NEUTS SEG NFR BLD AUTO: 73 % (ref 43–75)
NEUTS SEG NFR BLD AUTO: 82 %
NRBC BLD AUTO-RTO: 0 /100 WBCS
PH BODY FLUID: 7.4
PHOSPHATE SERPL-MCNC: 6.7 MG/DL (ref 2.3–4.1)
PLATELET # BLD AUTO: 370 THOUSANDS/UL (ref 149–390)
PMV BLD AUTO: 10.1 FL (ref 8.9–12.7)
POTASSIUM SERPL-SCNC: 5.1 MMOL/L (ref 3.5–5.3)
POTASSIUM SERPL-SCNC: 5.3 MMOL/L (ref 3.5–5.3)
POTASSIUM SERPL-SCNC: 5.7 MMOL/L (ref 3.5–5.3)
PROT FLD-MCNC: 5 G/DL
PROT SERPL-MCNC: 7.6 G/DL (ref 6.4–8.2)
RBC # BLD AUTO: 3.19 MILLION/UL (ref 3.88–5.62)
SITE: NORMAL
SODIUM SERPL-SCNC: 136 MMOL/L (ref 136–145)
SODIUM SERPL-SCNC: 138 MMOL/L (ref 136–145)
SODIUM SERPL-SCNC: 138 MMOL/L (ref 136–145)
TOTAL CELLS COUNTED SPEC: 100
VANCOMYCIN SERPL-MCNC: 18.8 UG/ML
WBC # BLD AUTO: 8.09 THOUSAND/UL (ref 4.31–10.16)
WBC # FLD MANUAL: 6015 /UL

## 2020-09-20 PROCEDURE — 88341 IMHCHEM/IMCYTCHM EA ADD ANTB: CPT | Performed by: PATHOLOGY

## 2020-09-20 PROCEDURE — 0W993ZX DRAINAGE OF RIGHT PLEURAL CAVITY, PERCUTANEOUS APPROACH, DIAGNOSTIC: ICD-10-PCS | Performed by: INTERNAL MEDICINE

## 2020-09-20 PROCEDURE — 83615 LACTATE (LD) (LDH) ENZYME: CPT | Performed by: INTERNAL MEDICINE

## 2020-09-20 PROCEDURE — 85025 COMPLETE CBC W/AUTO DIFF WBC: CPT | Performed by: INTERNAL MEDICINE

## 2020-09-20 PROCEDURE — 03HY32Z INSERTION OF MONITORING DEVICE INTO UPPER ARTERY, PERCUTANEOUS APPROACH: ICD-10-PCS | Performed by: INTERNAL MEDICINE

## 2020-09-20 PROCEDURE — 80048 BASIC METABOLIC PNL TOTAL CA: CPT | Performed by: INTERNAL MEDICINE

## 2020-09-20 PROCEDURE — 89051 BODY FLUID CELL COUNT: CPT | Performed by: INTERNAL MEDICINE

## 2020-09-20 PROCEDURE — 36556 INSERT NON-TUNNEL CV CATH: CPT | Performed by: INTERNAL MEDICINE

## 2020-09-20 PROCEDURE — 88112 CYTOPATH CELL ENHANCE TECH: CPT | Performed by: PATHOLOGY

## 2020-09-20 PROCEDURE — 36620 INSERTION CATHETER ARTERY: CPT | Performed by: NURSE PRACTITIONER

## 2020-09-20 PROCEDURE — 02HV33Z INSERTION OF INFUSION DEVICE INTO SUPERIOR VENA CAVA, PERCUTANEOUS APPROACH: ICD-10-PCS | Performed by: INTERNAL MEDICINE

## 2020-09-20 PROCEDURE — 84100 ASSAY OF PHOSPHORUS: CPT | Performed by: INTERNAL MEDICINE

## 2020-09-20 PROCEDURE — 71045 X-RAY EXAM CHEST 1 VIEW: CPT

## 2020-09-20 PROCEDURE — 87081 CULTURE SCREEN ONLY: CPT | Performed by: FAMILY MEDICINE

## 2020-09-20 PROCEDURE — 82948 REAGENT STRIP/BLOOD GLUCOSE: CPT

## 2020-09-20 PROCEDURE — 80202 ASSAY OF VANCOMYCIN: CPT | Performed by: STUDENT IN AN ORGANIZED HEALTH CARE EDUCATION/TRAINING PROGRAM

## 2020-09-20 PROCEDURE — 88305 TISSUE EXAM BY PATHOLOGIST: CPT | Performed by: PATHOLOGY

## 2020-09-20 PROCEDURE — 80048 BASIC METABOLIC PNL TOTAL CA: CPT | Performed by: STUDENT IN AN ORGANIZED HEALTH CARE EDUCATION/TRAINING PROGRAM

## 2020-09-20 PROCEDURE — 84157 ASSAY OF PROTEIN OTHER: CPT | Performed by: INTERNAL MEDICINE

## 2020-09-20 PROCEDURE — 83986 ASSAY PH BODY FLUID NOS: CPT | Performed by: INTERNAL MEDICINE

## 2020-09-20 PROCEDURE — 4A133B1 MONITORING OF ARTERIAL PRESSURE, PERIPHERAL, PERCUTANEOUS APPROACH: ICD-10-PCS | Performed by: INTERNAL MEDICINE

## 2020-09-20 PROCEDURE — 87070 CULTURE OTHR SPECIMN AEROBIC: CPT | Performed by: INTERNAL MEDICINE

## 2020-09-20 PROCEDURE — 4A133J1 MONITORING OF ARTERIAL PULSE, PERIPHERAL, PERCUTANEOUS APPROACH: ICD-10-PCS | Performed by: INTERNAL MEDICINE

## 2020-09-20 PROCEDURE — 84155 ASSAY OF PROTEIN SERUM: CPT | Performed by: INTERNAL MEDICINE

## 2020-09-20 PROCEDURE — 88342 IMHCHEM/IMCYTCHM 1ST ANTB: CPT | Performed by: PATHOLOGY

## 2020-09-20 PROCEDURE — 87205 SMEAR GRAM STAIN: CPT | Performed by: INTERNAL MEDICINE

## 2020-09-20 PROCEDURE — 82945 GLUCOSE OTHER FLUID: CPT | Performed by: INTERNAL MEDICINE

## 2020-09-20 PROCEDURE — 99233 SBSQ HOSP IP/OBS HIGH 50: CPT | Performed by: INTERNAL MEDICINE

## 2020-09-20 PROCEDURE — 99291 CRITICAL CARE FIRST HOUR: CPT | Performed by: INTERNAL MEDICINE

## 2020-09-20 PROCEDURE — 32555 ASPIRATE PLEURA W/ IMAGING: CPT | Performed by: INTERNAL MEDICINE

## 2020-09-20 PROCEDURE — 83735 ASSAY OF MAGNESIUM: CPT | Performed by: INTERNAL MEDICINE

## 2020-09-20 RX ORDER — ACETAMINOPHEN 325 MG/1
650 TABLET ORAL EVERY 4 HOURS PRN
Status: DISCONTINUED | OUTPATIENT
Start: 2020-09-20 | End: 2020-09-29

## 2020-09-20 RX ORDER — SODIUM CHLORIDE, SODIUM GLUCONATE, SODIUM ACETATE, POTASSIUM CHLORIDE, MAGNESIUM CHLORIDE, SODIUM PHOSPHATE, DIBASIC, AND POTASSIUM PHOSPHATE .53; .5; .37; .037; .03; .012; .00082 G/100ML; G/100ML; G/100ML; G/100ML; G/100ML; G/100ML; G/100ML
125 INJECTION, SOLUTION INTRAVENOUS CONTINUOUS
Status: DISCONTINUED | OUTPATIENT
Start: 2020-09-20 | End: 2020-09-21

## 2020-09-20 RX ORDER — ALBUMIN, HUMAN INJ 5% 5 %
25 SOLUTION INTRAVENOUS ONCE
Status: COMPLETED | OUTPATIENT
Start: 2020-09-20 | End: 2020-09-20

## 2020-09-20 RX ORDER — FUROSEMIDE 10 MG/ML
60 INJECTION INTRAMUSCULAR; INTRAVENOUS ONCE
Status: COMPLETED | OUTPATIENT
Start: 2020-09-20 | End: 2020-09-20

## 2020-09-20 RX ORDER — ALBUMIN, HUMAN INJ 5% 5 %
SOLUTION INTRAVENOUS
Status: COMPLETED
Start: 2020-09-20 | End: 2020-09-20

## 2020-09-20 RX ORDER — ONDANSETRON 2 MG/ML
4 INJECTION INTRAMUSCULAR; INTRAVENOUS EVERY 6 HOURS PRN
Status: DISCONTINUED | OUTPATIENT
Start: 2020-09-20 | End: 2020-10-19 | Stop reason: HOSPADM

## 2020-09-20 RX ADMIN — ALBUMIN, HUMAN INJ 5% 25 G: 5 SOLUTION at 01:02

## 2020-09-20 RX ADMIN — INSULIN LISPRO 1 UNITS: 100 INJECTION, SOLUTION INTRAVENOUS; SUBCUTANEOUS at 09:07

## 2020-09-20 RX ADMIN — NOREPINEPHRINE BITARTRATE 8 MCG/MIN: 1 INJECTION, SOLUTION, CONCENTRATE INTRAVENOUS at 18:26

## 2020-09-20 RX ADMIN — INSULIN LISPRO 2 UNITS: 100 INJECTION, SOLUTION INTRAVENOUS; SUBCUTANEOUS at 12:05

## 2020-09-20 RX ADMIN — INSULIN LISPRO 2 UNITS: 100 INJECTION, SOLUTION INTRAVENOUS; SUBCUTANEOUS at 17:24

## 2020-09-20 RX ADMIN — HEPARIN SODIUM 5000 UNITS: 5000 INJECTION INTRAVENOUS; SUBCUTANEOUS at 14:21

## 2020-09-20 RX ADMIN — SODIUM CHLORIDE, SODIUM GLUCONATE, SODIUM ACETATE, POTASSIUM CHLORIDE, MAGNESIUM CHLORIDE, SODIUM PHOSPHATE, DIBASIC, AND POTASSIUM PHOSPHATE 125 ML/HR: .53; .5; .37; .037; .03; .012; .00082 INJECTION, SOLUTION INTRAVENOUS at 18:31

## 2020-09-20 RX ADMIN — HEPARIN SODIUM 5000 UNITS: 5000 INJECTION INTRAVENOUS; SUBCUTANEOUS at 05:24

## 2020-09-20 RX ADMIN — SODIUM BICARBONATE 100 ML/HR: 84 INJECTION, SOLUTION INTRAVENOUS at 16:48

## 2020-09-20 RX ADMIN — PIPERACILLIN AND TAZOBACTAM 3.38 G: 3; .375 INJECTION, POWDER, FOR SOLUTION INTRAVENOUS at 22:47

## 2020-09-20 RX ADMIN — FUROSEMIDE 60 MG: 10 INJECTION, SOLUTION INTRAMUSCULAR; INTRAVENOUS at 08:39

## 2020-09-20 RX ADMIN — NOREPINEPHRINE BITARTRATE 2 MCG/MIN: 1 INJECTION, SOLUTION, CONCENTRATE INTRAVENOUS at 15:37

## 2020-09-20 RX ADMIN — ACETAMINOPHEN 650 MG: 325 TABLET, FILM COATED ORAL at 00:22

## 2020-09-20 RX ADMIN — VANCOMYCIN HYDROCHLORIDE 1500 MG: 10 INJECTION, POWDER, LYOPHILIZED, FOR SOLUTION INTRAVENOUS at 20:07

## 2020-09-20 RX ADMIN — ALBUMIN (HUMAN) 25 G: 12.5 INJECTION, SOLUTION INTRAVENOUS at 01:02

## 2020-09-20 RX ADMIN — HEPARIN SODIUM 5000 UNITS: 5000 INJECTION INTRAVENOUS; SUBCUTANEOUS at 21:13

## 2020-09-20 RX ADMIN — SODIUM CHLORIDE 2 MCG/MIN: 0.9 INJECTION, SOLUTION INTRAVENOUS at 01:17

## 2020-09-20 RX ADMIN — INSULIN LISPRO 2 UNITS: 100 INJECTION, SOLUTION INTRAVENOUS; SUBCUTANEOUS at 21:14

## 2020-09-20 RX ADMIN — ACETAMINOPHEN 650 MG: 325 TABLET, FILM COATED ORAL at 14:42

## 2020-09-20 RX ADMIN — PIPERACILLIN AND TAZOBACTAM 3.38 G: 3; .375 INJECTION, POWDER, FOR SOLUTION INTRAVENOUS at 16:34

## 2020-09-20 RX ADMIN — SODIUM BICARBONATE 100 ML/HR: 84 INJECTION, SOLUTION INTRAVENOUS at 06:05

## 2020-09-20 NOTE — UTILIZATION REVIEW
Notification of Inpatient Admission/Inpatient Authorization Request   This is a Notification of Inpatient Admission for [address]  Be advised that this patient was admitted to our facility under Inpatient Status  Contact Josselyn Gruber at [phone] for additional admission information  [facility] DEPT  DEDICATED [phone]  Patient Name:   Sunil Armstrong   YOB: 1945       [address]   [address]:   [address]  Tax ID: [id number]  NPI: [id number] Attending Provider/NPI:  Phone:  Address: Damaris Farfan [[id number]]  [phone]  SAME AS FACILITY   Place of Service Code: 24 Place of Service Name:  [facility] [address] Date: 9/19/20 1048   Discharge Date & Time: 9/19/2020  4:05 PM    Type of Admission: Inpatient Status Discharge Disposition (if discharged): [address] [place] [address]   Patient Diagnoses: Hyperkalemia [E87 5]  Hypotension [I95 9]  Acute renal failure (ARF) ([facility] [address] ) [N17 9]  Low blood pressure [I95 9]   Orders: Admission Orders (From admission, onward)   Ordered     09/19/20 1048 Inpatient Admission (expected length of stay for this patient Order details is greater than two midnights)  Once                 Assigned Utilization Review Contact: Josselyn Gruber  Utilization   Network Utilization Review Department  Phone: [phone]; Fax [phone]  Email: [email or web address]   ATTENTION PAYERS: Please call the assigned Utilization  directly with any questions or concerns ALL voicemails in the department are confidential  Send all requests for admission clinical reviews, approved or denied determinations and any other requests to dedicated fax number belonging to the campus where the patient is receiving treatment

## 2020-09-21 LAB
ANION GAP SERPL CALCULATED.3IONS-SCNC: 10 MMOL/L (ref 4–13)
ANION GAP SERPL CALCULATED.3IONS-SCNC: 9 MMOL/L (ref 4–13)
BUN SERPL-MCNC: 83 MG/DL (ref 5–25)
BUN SERPL-MCNC: 83 MG/DL (ref 5–25)
CALCIUM SERPL-MCNC: 7.6 MG/DL (ref 8.3–10.1)
CALCIUM SERPL-MCNC: 7.7 MG/DL (ref 8.3–10.1)
CHLORIDE SERPL-SCNC: 104 MMOL/L (ref 100–108)
CHLORIDE SERPL-SCNC: 105 MMOL/L (ref 100–108)
CO2 SERPL-SCNC: 24 MMOL/L (ref 21–32)
CO2 SERPL-SCNC: 25 MMOL/L (ref 21–32)
CREAT SERPL-MCNC: 7.4 MG/DL (ref 0.6–1.3)
CREAT SERPL-MCNC: 7.62 MG/DL (ref 0.6–1.3)
ERYTHROCYTE [DISTWIDTH] IN BLOOD BY AUTOMATED COUNT: 14.4 % (ref 11.6–15.1)
GFR SERPL CREATININE-BSD FRML MDRD: 6 ML/MIN/1.73SQ M
GFR SERPL CREATININE-BSD FRML MDRD: 6 ML/MIN/1.73SQ M
GLUCOSE SERPL-MCNC: 192 MG/DL (ref 65–140)
GLUCOSE SERPL-MCNC: 230 MG/DL (ref 65–140)
GLUCOSE SERPL-MCNC: 235 MG/DL (ref 65–140)
GLUCOSE SERPL-MCNC: 258 MG/DL (ref 65–140)
GLUCOSE SERPL-MCNC: 275 MG/DL (ref 65–140)
GLUCOSE SERPL-MCNC: 445 MG/DL (ref 65–140)
HCT VFR BLD AUTO: 29.6 % (ref 36.5–49.3)
HGB BLD-MCNC: 9.7 G/DL (ref 12–17)
MCH RBC QN AUTO: 28 PG (ref 26.8–34.3)
MCHC RBC AUTO-ENTMCNC: 32.8 G/DL (ref 31.4–37.4)
MCV RBC AUTO: 86 FL (ref 82–98)
PLATELET # BLD AUTO: 468 THOUSANDS/UL (ref 149–390)
PMV BLD AUTO: 10.1 FL (ref 8.9–12.7)
POTASSIUM SERPL-SCNC: 4.7 MMOL/L (ref 3.5–5.3)
POTASSIUM SERPL-SCNC: 4.8 MMOL/L (ref 3.5–5.3)
RBC # BLD AUTO: 3.46 MILLION/UL (ref 3.88–5.62)
SODIUM SERPL-SCNC: 138 MMOL/L (ref 136–145)
SODIUM SERPL-SCNC: 139 MMOL/L (ref 136–145)
VANCOMYCIN SERPL-MCNC: 23.8 UG/ML
WBC # BLD AUTO: 8.46 THOUSAND/UL (ref 4.31–10.16)

## 2020-09-21 PROCEDURE — 82948 REAGENT STRIP/BLOOD GLUCOSE: CPT

## 2020-09-21 PROCEDURE — 97163 PT EVAL HIGH COMPLEX 45 MIN: CPT

## 2020-09-21 PROCEDURE — 80048 BASIC METABOLIC PNL TOTAL CA: CPT | Performed by: STUDENT IN AN ORGANIZED HEALTH CARE EDUCATION/TRAINING PROGRAM

## 2020-09-21 PROCEDURE — 99291 CRITICAL CARE FIRST HOUR: CPT | Performed by: INTERNAL MEDICINE

## 2020-09-21 PROCEDURE — 99232 SBSQ HOSP IP/OBS MODERATE 35: CPT | Performed by: INTERNAL MEDICINE

## 2020-09-21 PROCEDURE — 80202 ASSAY OF VANCOMYCIN: CPT | Performed by: PHYSICIAN ASSISTANT

## 2020-09-21 PROCEDURE — 97167 OT EVAL HIGH COMPLEX 60 MIN: CPT

## 2020-09-21 PROCEDURE — 85027 COMPLETE CBC AUTOMATED: CPT | Performed by: NURSE PRACTITIONER

## 2020-09-21 RX ORDER — SODIUM CHLORIDE, SODIUM GLUCONATE, SODIUM ACETATE, POTASSIUM CHLORIDE, MAGNESIUM CHLORIDE, SODIUM PHOSPHATE, DIBASIC, AND POTASSIUM PHOSPHATE .53; .5; .37; .037; .03; .012; .00082 G/100ML; G/100ML; G/100ML; G/100ML; G/100ML; G/100ML; G/100ML
75 INJECTION, SOLUTION INTRAVENOUS CONTINUOUS
Status: DISCONTINUED | OUTPATIENT
Start: 2020-09-21 | End: 2020-09-25

## 2020-09-21 RX ADMIN — METRONIDAZOLE 500 MG: 500 INJECTION, SOLUTION INTRAVENOUS at 14:43

## 2020-09-21 RX ADMIN — PIPERACILLIN AND TAZOBACTAM 3.38 G: 3; .375 INJECTION, POWDER, FOR SOLUTION INTRAVENOUS at 10:22

## 2020-09-21 RX ADMIN — HEPARIN SODIUM 5000 UNITS: 5000 INJECTION INTRAVENOUS; SUBCUTANEOUS at 05:15

## 2020-09-21 RX ADMIN — NOREPINEPHRINE BITARTRATE 4 MCG/MIN: 1 INJECTION, SOLUTION, CONCENTRATE INTRAVENOUS at 16:07

## 2020-09-21 RX ADMIN — SODIUM CHLORIDE, SODIUM GLUCONATE, SODIUM ACETATE, POTASSIUM CHLORIDE, MAGNESIUM CHLORIDE, SODIUM PHOSPHATE, DIBASIC, AND POTASSIUM PHOSPHATE 125 ML/HR: .53; .5; .37; .037; .03; .012; .00082 INJECTION, SOLUTION INTRAVENOUS at 10:02

## 2020-09-21 RX ADMIN — SODIUM CHLORIDE, SODIUM GLUCONATE, SODIUM ACETATE, POTASSIUM CHLORIDE, MAGNESIUM CHLORIDE, SODIUM PHOSPHATE, DIBASIC, AND POTASSIUM PHOSPHATE 125 ML/HR: .53; .5; .37; .037; .03; .012; .00082 INJECTION, SOLUTION INTRAVENOUS at 17:59

## 2020-09-21 RX ADMIN — METRONIDAZOLE 500 MG: 500 INJECTION, SOLUTION INTRAVENOUS at 20:41

## 2020-09-21 RX ADMIN — CEFEPIME HYDROCHLORIDE 1000 MG: 1 INJECTION, POWDER, FOR SOLUTION INTRAMUSCULAR; INTRAVENOUS at 16:07

## 2020-09-21 RX ADMIN — HEPARIN SODIUM 5000 UNITS: 5000 INJECTION INTRAVENOUS; SUBCUTANEOUS at 14:44

## 2020-09-21 RX ADMIN — SODIUM CHLORIDE, SODIUM GLUCONATE, SODIUM ACETATE, POTASSIUM CHLORIDE, MAGNESIUM CHLORIDE, SODIUM PHOSPHATE, DIBASIC, AND POTASSIUM PHOSPHATE 125 ML/HR: .53; .5; .37; .037; .03; .012; .00082 INJECTION, SOLUTION INTRAVENOUS at 02:24

## 2020-09-21 RX ADMIN — INSULIN LISPRO 12 UNITS: 100 INJECTION, SOLUTION INTRAVENOUS; SUBCUTANEOUS at 17:52

## 2020-09-21 RX ADMIN — HEPARIN SODIUM 5000 UNITS: 5000 INJECTION INTRAVENOUS; SUBCUTANEOUS at 21:04

## 2020-09-21 RX ADMIN — INSULIN LISPRO 2 UNITS: 100 INJECTION, SOLUTION INTRAVENOUS; SUBCUTANEOUS at 08:19

## 2020-09-21 RX ADMIN — NOREPINEPHRINE BITARTRATE 6 MCG/MIN: 1 INJECTION, SOLUTION, CONCENTRATE INTRAVENOUS at 04:51

## 2020-09-21 RX ADMIN — INSULIN LISPRO 6 UNITS: 100 INJECTION, SOLUTION INTRAVENOUS; SUBCUTANEOUS at 13:04

## 2020-09-22 ENCOUNTER — APPOINTMENT (INPATIENT)
Dept: RADIOLOGY | Facility: HOSPITAL | Age: 75
DRG: 673 | End: 2020-09-22
Payer: COMMERCIAL

## 2020-09-22 PROBLEM — R19.7 DIARRHEA: Status: RESOLVED | Noted: 2020-09-11 | Resolved: 2020-09-22

## 2020-09-22 PROBLEM — I50.9 CHRONIC HEART FAILURE (HCC): Status: ACTIVE | Noted: 2020-09-22

## 2020-09-22 PROBLEM — J18.9 PARAPNEUMONIC EFFUSION: Status: ACTIVE | Noted: 2020-09-22

## 2020-09-22 PROBLEM — R19.7 DIARRHEA: Status: ACTIVE | Noted: 2020-09-22

## 2020-09-22 PROBLEM — J91.8 PARAPNEUMONIC EFFUSION: Status: ACTIVE | Noted: 2020-09-22

## 2020-09-22 LAB
ALBUMIN SERPL BCP-MCNC: 1.9 G/DL (ref 3.5–5)
ALP SERPL-CCNC: 199 U/L (ref 46–116)
ALT SERPL W P-5'-P-CCNC: 57 U/L (ref 12–78)
ANION GAP SERPL CALCULATED.3IONS-SCNC: 7 MMOL/L (ref 4–13)
AST SERPL W P-5'-P-CCNC: 65 U/L (ref 5–45)
BACTERIA SPEC BFLD CULT: NO GROWTH
BILIRUB SERPL-MCNC: 0.52 MG/DL (ref 0.2–1)
BUN SERPL-MCNC: 64 MG/DL (ref 5–25)
CALCIUM ALBUM COR SERPL-MCNC: 9.6 MG/DL (ref 8.3–10.1)
CALCIUM SERPL-MCNC: 7.9 MG/DL (ref 8.3–10.1)
CHLORIDE SERPL-SCNC: 108 MMOL/L (ref 100–108)
CO2 SERPL-SCNC: 25 MMOL/L (ref 21–32)
CREAT SERPL-MCNC: 4.92 MG/DL (ref 0.6–1.3)
ERYTHROCYTE [DISTWIDTH] IN BLOOD BY AUTOMATED COUNT: 14.4 % (ref 11.6–15.1)
GFR SERPL CREATININE-BSD FRML MDRD: 11 ML/MIN/1.73SQ M
GLUCOSE SERPL-MCNC: 203 MG/DL (ref 65–140)
GLUCOSE SERPL-MCNC: 205 MG/DL (ref 65–140)
GLUCOSE SERPL-MCNC: 212 MG/DL (ref 65–140)
GLUCOSE SERPL-MCNC: 248 MG/DL (ref 65–140)
GLUCOSE SERPL-MCNC: 292 MG/DL (ref 65–140)
GRAM STN SPEC: NORMAL
GRAM STN SPEC: NORMAL
HCT VFR BLD AUTO: 28.9 % (ref 36.5–49.3)
HGB BLD-MCNC: 9.3 G/DL (ref 12–17)
MCH RBC QN AUTO: 28 PG (ref 26.8–34.3)
MCHC RBC AUTO-ENTMCNC: 32.2 G/DL (ref 31.4–37.4)
MCV RBC AUTO: 87 FL (ref 82–98)
MRSA NOSE QL CULT: NORMAL
PLATELET # BLD AUTO: 427 THOUSANDS/UL (ref 149–390)
PMV BLD AUTO: 10 FL (ref 8.9–12.7)
POTASSIUM SERPL-SCNC: 4.5 MMOL/L (ref 3.5–5.3)
PROCALCITONIN SERPL-MCNC: 1.92 NG/ML
PROT SERPL-MCNC: 7.3 G/DL (ref 6.4–8.2)
RBC # BLD AUTO: 3.32 MILLION/UL (ref 3.88–5.62)
SODIUM SERPL-SCNC: 140 MMOL/L (ref 136–145)
VANCOMYCIN SERPL-MCNC: 20.3 UG/ML
WBC # BLD AUTO: 8.21 THOUSAND/UL (ref 4.31–10.16)

## 2020-09-22 PROCEDURE — 71045 X-RAY EXAM CHEST 1 VIEW: CPT

## 2020-09-22 PROCEDURE — 80202 ASSAY OF VANCOMYCIN: CPT | Performed by: INTERNAL MEDICINE

## 2020-09-22 PROCEDURE — 99233 SBSQ HOSP IP/OBS HIGH 50: CPT | Performed by: INTERNAL MEDICINE

## 2020-09-22 PROCEDURE — 97110 THERAPEUTIC EXERCISES: CPT

## 2020-09-22 PROCEDURE — 82948 REAGENT STRIP/BLOOD GLUCOSE: CPT

## 2020-09-22 PROCEDURE — 99232 SBSQ HOSP IP/OBS MODERATE 35: CPT | Performed by: INTERNAL MEDICINE

## 2020-09-22 PROCEDURE — 80053 COMPREHEN METABOLIC PANEL: CPT | Performed by: INTERNAL MEDICINE

## 2020-09-22 PROCEDURE — 97535 SELF CARE MNGMENT TRAINING: CPT

## 2020-09-22 PROCEDURE — 97530 THERAPEUTIC ACTIVITIES: CPT

## 2020-09-22 PROCEDURE — NC001 PR NO CHARGE: Performed by: INTERNAL MEDICINE

## 2020-09-22 PROCEDURE — 85027 COMPLETE CBC AUTOMATED: CPT | Performed by: INTERNAL MEDICINE

## 2020-09-22 PROCEDURE — 84145 PROCALCITONIN (PCT): CPT | Performed by: PHYSICIAN ASSISTANT

## 2020-09-22 RX ORDER — ALBUMIN, HUMAN INJ 5% 5 %
12.5 SOLUTION INTRAVENOUS ONCE
Status: COMPLETED | OUTPATIENT
Start: 2020-09-22 | End: 2020-09-22

## 2020-09-22 RX ORDER — INSULIN GLARGINE 100 [IU]/ML
8 INJECTION, SOLUTION SUBCUTANEOUS
Status: DISCONTINUED | OUTPATIENT
Start: 2020-09-22 | End: 2020-09-25

## 2020-09-22 RX ORDER — MIDODRINE HYDROCHLORIDE 5 MG/1
10 TABLET ORAL
Status: DISCONTINUED | OUTPATIENT
Start: 2020-09-22 | End: 2020-09-25

## 2020-09-22 RX ORDER — METRONIDAZOLE 500 MG/1
500 TABLET ORAL EVERY 8 HOURS SCHEDULED
Status: DISCONTINUED | OUTPATIENT
Start: 2020-09-22 | End: 2020-09-23

## 2020-09-22 RX ADMIN — METRONIDAZOLE 500 MG: 500 TABLET, FILM COATED ORAL at 14:27

## 2020-09-22 RX ADMIN — INSULIN LISPRO 8 UNITS: 100 INJECTION, SOLUTION INTRAVENOUS; SUBCUTANEOUS at 16:17

## 2020-09-22 RX ADMIN — INSULIN LISPRO 12 UNITS: 100 INJECTION, SOLUTION INTRAVENOUS; SUBCUTANEOUS at 12:11

## 2020-09-22 RX ADMIN — HEPARIN SODIUM 5000 UNITS: 5000 INJECTION INTRAVENOUS; SUBCUTANEOUS at 22:54

## 2020-09-22 RX ADMIN — SODIUM CHLORIDE, SODIUM GLUCONATE, SODIUM ACETATE, POTASSIUM CHLORIDE, MAGNESIUM CHLORIDE, SODIUM PHOSPHATE, DIBASIC, AND POTASSIUM PHOSPHATE 125 ML/HR: .53; .5; .37; .037; .03; .012; .00082 INJECTION, SOLUTION INTRAVENOUS at 03:26

## 2020-09-22 RX ADMIN — METRONIDAZOLE 500 MG: 500 INJECTION, SOLUTION INTRAVENOUS at 05:02

## 2020-09-22 RX ADMIN — MIDODRINE HYDROCHLORIDE 10 MG: 5 TABLET ORAL at 18:28

## 2020-09-22 RX ADMIN — INSULIN GLARGINE 8 UNITS: 100 INJECTION, SOLUTION SUBCUTANEOUS at 22:54

## 2020-09-22 RX ADMIN — INSULIN LISPRO 4 UNITS: 100 INJECTION, SOLUTION INTRAVENOUS; SUBCUTANEOUS at 08:25

## 2020-09-22 RX ADMIN — HEPARIN SODIUM 5000 UNITS: 5000 INJECTION INTRAVENOUS; SUBCUTANEOUS at 05:02

## 2020-09-22 RX ADMIN — HEPARIN SODIUM 5000 UNITS: 5000 INJECTION INTRAVENOUS; SUBCUTANEOUS at 14:26

## 2020-09-22 RX ADMIN — ALBUMIN (HUMAN) 12.5 G: 12.5 INJECTION, SOLUTION INTRAVENOUS at 16:13

## 2020-09-22 RX ADMIN — SODIUM CHLORIDE, SODIUM GLUCONATE, SODIUM ACETATE, POTASSIUM CHLORIDE, MAGNESIUM CHLORIDE, SODIUM PHOSPHATE, DIBASIC, AND POTASSIUM PHOSPHATE 75 ML/HR: .53; .5; .37; .037; .03; .012; .00082 INJECTION, SOLUTION INTRAVENOUS at 12:18

## 2020-09-22 RX ADMIN — CEFEPIME HYDROCHLORIDE 1000 MG: 1 INJECTION, POWDER, FOR SOLUTION INTRAMUSCULAR; INTRAVENOUS at 14:26

## 2020-09-22 RX ADMIN — METRONIDAZOLE 500 MG: 500 TABLET, FILM COATED ORAL at 22:54

## 2020-09-22 RX ADMIN — NOREPINEPHRINE BITARTRATE 2 MCG/MIN: 1 INJECTION, SOLUTION, CONCENTRATE INTRAVENOUS at 17:33

## 2020-09-23 PROBLEM — S31.000A WOUND OF SACRAL REGION: Status: ACTIVE | Noted: 2020-09-23

## 2020-09-23 PROBLEM — R57.1 HYPOVOLEMIC SHOCK (HCC): Status: ACTIVE | Noted: 2020-09-19

## 2020-09-23 LAB
ANION GAP SERPL CALCULATED.3IONS-SCNC: 8 MMOL/L (ref 4–13)
BACTERIA SPEC BFLD CULT: NO GROWTH
BUN SERPL-MCNC: 52 MG/DL (ref 5–25)
CALCIUM SERPL-MCNC: 8 MG/DL (ref 8.3–10.1)
CHLORIDE SERPL-SCNC: 107 MMOL/L (ref 100–108)
CO2 SERPL-SCNC: 25 MMOL/L (ref 21–32)
CREAT SERPL-MCNC: 3.12 MG/DL (ref 0.6–1.3)
ERYTHROCYTE [DISTWIDTH] IN BLOOD BY AUTOMATED COUNT: 14.5 % (ref 11.6–15.1)
GFR SERPL CREATININE-BSD FRML MDRD: 18 ML/MIN/1.73SQ M
GLUCOSE SERPL-MCNC: 192 MG/DL (ref 65–140)
GLUCOSE SERPL-MCNC: 194 MG/DL (ref 65–140)
GLUCOSE SERPL-MCNC: 201 MG/DL (ref 65–140)
GLUCOSE SERPL-MCNC: 246 MG/DL (ref 65–140)
GLUCOSE SERPL-MCNC: 96 MG/DL (ref 65–140)
GRAM STN SPEC: NORMAL
GRAM STN SPEC: NORMAL
HCT VFR BLD AUTO: 28 % (ref 36.5–49.3)
HGB BLD-MCNC: 8.6 G/DL (ref 12–17)
MCH RBC QN AUTO: 28.1 PG (ref 26.8–34.3)
MCHC RBC AUTO-ENTMCNC: 30.7 G/DL (ref 31.4–37.4)
MCV RBC AUTO: 92 FL (ref 82–98)
PLATELET # BLD AUTO: 379 THOUSANDS/UL (ref 149–390)
PMV BLD AUTO: 10.3 FL (ref 8.9–12.7)
POTASSIUM SERPL-SCNC: 3.9 MMOL/L (ref 3.5–5.3)
PROCALCITONIN SERPL-MCNC: 1.65 NG/ML
RBC # BLD AUTO: 3.06 MILLION/UL (ref 3.88–5.62)
SODIUM SERPL-SCNC: 140 MMOL/L (ref 136–145)
WBC # BLD AUTO: 7.05 THOUSAND/UL (ref 4.31–10.16)

## 2020-09-23 PROCEDURE — 84145 PROCALCITONIN (PCT): CPT | Performed by: FAMILY MEDICINE

## 2020-09-23 PROCEDURE — 82948 REAGENT STRIP/BLOOD GLUCOSE: CPT

## 2020-09-23 PROCEDURE — 85027 COMPLETE CBC AUTOMATED: CPT | Performed by: FAMILY MEDICINE

## 2020-09-23 PROCEDURE — 99232 SBSQ HOSP IP/OBS MODERATE 35: CPT | Performed by: INTERNAL MEDICINE

## 2020-09-23 PROCEDURE — 93005 ELECTROCARDIOGRAM TRACING: CPT

## 2020-09-23 PROCEDURE — 80048 BASIC METABOLIC PNL TOTAL CA: CPT | Performed by: FAMILY MEDICINE

## 2020-09-23 PROCEDURE — 99233 SBSQ HOSP IP/OBS HIGH 50: CPT | Performed by: INTERNAL MEDICINE

## 2020-09-23 RX ORDER — ALBUMIN, HUMAN INJ 5% 5 %
25 SOLUTION INTRAVENOUS ONCE
Status: COMPLETED | OUTPATIENT
Start: 2020-09-23 | End: 2020-09-24

## 2020-09-23 RX ADMIN — INSULIN LISPRO 8 UNITS: 100 INJECTION, SOLUTION INTRAVENOUS; SUBCUTANEOUS at 12:25

## 2020-09-23 RX ADMIN — INSULIN GLARGINE 8 UNITS: 100 INJECTION, SOLUTION SUBCUTANEOUS at 22:22

## 2020-09-23 RX ADMIN — METRONIDAZOLE 500 MG: 500 TABLET, FILM COATED ORAL at 06:18

## 2020-09-23 RX ADMIN — INSULIN LISPRO 4 UNITS: 100 INJECTION, SOLUTION INTRAVENOUS; SUBCUTANEOUS at 09:45

## 2020-09-23 RX ADMIN — MIDODRINE HYDROCHLORIDE 10 MG: 5 TABLET ORAL at 15:47

## 2020-09-23 RX ADMIN — MIDODRINE HYDROCHLORIDE 10 MG: 5 TABLET ORAL at 12:25

## 2020-09-23 RX ADMIN — ALBUMIN (HUMAN) 25 G: 12.5 INJECTION, SOLUTION INTRAVENOUS at 10:11

## 2020-09-23 RX ADMIN — SODIUM CHLORIDE, SODIUM GLUCONATE, SODIUM ACETATE, POTASSIUM CHLORIDE, MAGNESIUM CHLORIDE, SODIUM PHOSPHATE, DIBASIC, AND POTASSIUM PHOSPHATE 75 ML/HR: .53; .5; .37; .037; .03; .012; .00082 INJECTION, SOLUTION INTRAVENOUS at 15:50

## 2020-09-23 RX ADMIN — HEPARIN SODIUM 5000 UNITS: 5000 INJECTION INTRAVENOUS; SUBCUTANEOUS at 22:23

## 2020-09-23 RX ADMIN — SODIUM CHLORIDE, SODIUM GLUCONATE, SODIUM ACETATE, POTASSIUM CHLORIDE, MAGNESIUM CHLORIDE, SODIUM PHOSPHATE, DIBASIC, AND POTASSIUM PHOSPHATE 75 ML/HR: .53; .5; .37; .037; .03; .012; .00082 INJECTION, SOLUTION INTRAVENOUS at 01:52

## 2020-09-23 RX ADMIN — HEPARIN SODIUM 5000 UNITS: 5000 INJECTION INTRAVENOUS; SUBCUTANEOUS at 15:47

## 2020-09-23 RX ADMIN — HEPARIN SODIUM 5000 UNITS: 5000 INJECTION INTRAVENOUS; SUBCUTANEOUS at 06:18

## 2020-09-23 RX ADMIN — MIDODRINE HYDROCHLORIDE 10 MG: 5 TABLET ORAL at 10:02

## 2020-09-24 PROBLEM — J90 PLEURAL EFFUSION: Status: ACTIVE | Noted: 2020-09-22

## 2020-09-24 LAB
ANION GAP SERPL CALCULATED.3IONS-SCNC: 5 MMOL/L (ref 4–13)
ATRIAL RATE: 114 BPM
BASOPHILS # BLD AUTO: 0.07 THOUSANDS/ΜL (ref 0–0.1)
BASOPHILS NFR BLD AUTO: 1 % (ref 0–1)
BUN SERPL-MCNC: 38 MG/DL (ref 5–25)
CALCIUM SERPL-MCNC: 8.4 MG/DL (ref 8.3–10.1)
CHLORIDE SERPL-SCNC: 113 MMOL/L (ref 100–108)
CO2 SERPL-SCNC: 26 MMOL/L (ref 21–32)
CREAT SERPL-MCNC: 2.47 MG/DL (ref 0.6–1.3)
EOSINOPHIL # BLD AUTO: 0.45 THOUSAND/ΜL (ref 0–0.61)
EOSINOPHIL NFR BLD AUTO: 7 % (ref 0–6)
ERYTHROCYTE [DISTWIDTH] IN BLOOD BY AUTOMATED COUNT: 14.4 % (ref 11.6–15.1)
GFR SERPL CREATININE-BSD FRML MDRD: 24 ML/MIN/1.73SQ M
GLUCOSE SERPL-MCNC: 155 MG/DL (ref 65–140)
GLUCOSE SERPL-MCNC: 156 MG/DL (ref 65–140)
GLUCOSE SERPL-MCNC: 160 MG/DL (ref 65–140)
GLUCOSE SERPL-MCNC: 175 MG/DL (ref 65–140)
GLUCOSE SERPL-MCNC: 236 MG/DL (ref 65–140)
HCT VFR BLD AUTO: 28.7 % (ref 36.5–49.3)
HGB BLD-MCNC: 8.8 G/DL (ref 12–17)
IMM GRANULOCYTES # BLD AUTO: 0.13 THOUSAND/UL (ref 0–0.2)
IMM GRANULOCYTES NFR BLD AUTO: 2 % (ref 0–2)
LYMPHOCYTES # BLD AUTO: 1.28 THOUSANDS/ΜL (ref 0.6–4.47)
LYMPHOCYTES NFR BLD AUTO: 21 % (ref 14–44)
MAGNESIUM SERPL-MCNC: 1.7 MG/DL (ref 1.6–2.6)
MCH RBC QN AUTO: 27.7 PG (ref 26.8–34.3)
MCHC RBC AUTO-ENTMCNC: 30.7 G/DL (ref 31.4–37.4)
MCV RBC AUTO: 90 FL (ref 82–98)
MONOCYTES # BLD AUTO: 0.66 THOUSAND/ΜL (ref 0.17–1.22)
MONOCYTES NFR BLD AUTO: 11 % (ref 4–12)
NEUTROPHILS # BLD AUTO: 3.49 THOUSANDS/ΜL (ref 1.85–7.62)
NEUTS SEG NFR BLD AUTO: 58 % (ref 43–75)
NRBC BLD AUTO-RTO: 0 /100 WBCS
P AXIS: 78 DEGREES
PHOSPHATE SERPL-MCNC: 2.9 MG/DL (ref 2.3–4.1)
PLATELET # BLD AUTO: 379 THOUSANDS/UL (ref 149–390)
PMV BLD AUTO: 9.8 FL (ref 8.9–12.7)
POTASSIUM SERPL-SCNC: 3.9 MMOL/L (ref 3.5–5.3)
PR INTERVAL: 188 MS
QRS AXIS: 79 DEGREES
QRSD INTERVAL: 96 MS
QT INTERVAL: 333 MS
QTC INTERVAL: 459 MS
RBC # BLD AUTO: 3.18 MILLION/UL (ref 3.88–5.62)
SODIUM SERPL-SCNC: 144 MMOL/L (ref 136–145)
T WAVE AXIS: 34 DEGREES
VENTRICULAR RATE: 114 BPM
WBC # BLD AUTO: 6.08 THOUSAND/UL (ref 4.31–10.16)

## 2020-09-24 PROCEDURE — 93010 ELECTROCARDIOGRAM REPORT: CPT | Performed by: INTERNAL MEDICINE

## 2020-09-24 PROCEDURE — 82948 REAGENT STRIP/BLOOD GLUCOSE: CPT

## 2020-09-24 PROCEDURE — 83735 ASSAY OF MAGNESIUM: CPT | Performed by: FAMILY MEDICINE

## 2020-09-24 PROCEDURE — 99233 SBSQ HOSP IP/OBS HIGH 50: CPT | Performed by: INTERNAL MEDICINE

## 2020-09-24 PROCEDURE — 85025 COMPLETE CBC W/AUTO DIFF WBC: CPT | Performed by: FAMILY MEDICINE

## 2020-09-24 PROCEDURE — 87205 SMEAR GRAM STAIN: CPT | Performed by: INTERNAL MEDICINE

## 2020-09-24 PROCEDURE — 87427 SHIGA-LIKE TOXIN AG IA: CPT

## 2020-09-24 PROCEDURE — 87046 STOOL CULTR AEROBIC BACT EA: CPT

## 2020-09-24 PROCEDURE — 84100 ASSAY OF PHOSPHORUS: CPT | Performed by: FAMILY MEDICINE

## 2020-09-24 PROCEDURE — 87045 FECES CULTURE AEROBIC BACT: CPT | Performed by: INTERNAL MEDICINE

## 2020-09-24 PROCEDURE — 80048 BASIC METABOLIC PNL TOTAL CA: CPT | Performed by: FAMILY MEDICINE

## 2020-09-24 PROCEDURE — 87177 OVA AND PARASITES SMEARS: CPT | Performed by: INTERNAL MEDICINE

## 2020-09-24 PROCEDURE — 87209 SMEAR COMPLEX STAIN: CPT | Performed by: INTERNAL MEDICINE

## 2020-09-24 RX ORDER — LOPERAMIDE HYDROCHLORIDE 2 MG/1
2 CAPSULE ORAL 4 TIMES DAILY PRN
Status: DISCONTINUED | OUTPATIENT
Start: 2020-09-24 | End: 2020-10-19 | Stop reason: HOSPADM

## 2020-09-24 RX ADMIN — HEPARIN SODIUM 5000 UNITS: 5000 INJECTION INTRAVENOUS; SUBCUTANEOUS at 22:23

## 2020-09-24 RX ADMIN — INSULIN LISPRO 4 UNITS: 100 INJECTION, SOLUTION INTRAVENOUS; SUBCUTANEOUS at 08:46

## 2020-09-24 RX ADMIN — INSULIN LISPRO 4 UNITS: 100 INJECTION, SOLUTION INTRAVENOUS; SUBCUTANEOUS at 16:50

## 2020-09-24 RX ADMIN — INSULIN GLARGINE 8 UNITS: 100 INJECTION, SOLUTION SUBCUTANEOUS at 22:23

## 2020-09-24 RX ADMIN — MIDODRINE HYDROCHLORIDE 10 MG: 5 TABLET ORAL at 15:40

## 2020-09-24 RX ADMIN — HEPARIN SODIUM 5000 UNITS: 5000 INJECTION INTRAVENOUS; SUBCUTANEOUS at 06:19

## 2020-09-24 RX ADMIN — SODIUM CHLORIDE, SODIUM GLUCONATE, SODIUM ACETATE, POTASSIUM CHLORIDE, MAGNESIUM CHLORIDE, SODIUM PHOSPHATE, DIBASIC, AND POTASSIUM PHOSPHATE 75 ML/HR: .53; .5; .37; .037; .03; .012; .00082 INJECTION, SOLUTION INTRAVENOUS at 22:21

## 2020-09-24 RX ADMIN — MIDODRINE HYDROCHLORIDE 10 MG: 5 TABLET ORAL at 12:56

## 2020-09-24 RX ADMIN — HEPARIN SODIUM 5000 UNITS: 5000 INJECTION INTRAVENOUS; SUBCUTANEOUS at 15:40

## 2020-09-24 RX ADMIN — SODIUM CHLORIDE, SODIUM GLUCONATE, SODIUM ACETATE, POTASSIUM CHLORIDE, MAGNESIUM CHLORIDE, SODIUM PHOSPHATE, DIBASIC, AND POTASSIUM PHOSPHATE 75 ML/HR: .53; .5; .37; .037; .03; .012; .00082 INJECTION, SOLUTION INTRAVENOUS at 06:08

## 2020-09-24 RX ADMIN — MIDODRINE HYDROCHLORIDE 10 MG: 5 TABLET ORAL at 06:19

## 2020-09-24 RX ADMIN — INSULIN LISPRO 8 UNITS: 100 INJECTION, SOLUTION INTRAVENOUS; SUBCUTANEOUS at 12:56

## 2020-09-25 LAB
ANION GAP SERPL CALCULATED.3IONS-SCNC: 4 MMOL/L (ref 4–13)
BACTERIA BLD CULT: NORMAL
BACTERIA BLD CULT: NORMAL
BUN SERPL-MCNC: 33 MG/DL (ref 5–25)
CALCIUM SERPL-MCNC: 8.5 MG/DL (ref 8.3–10.1)
CHLORIDE SERPL-SCNC: 111 MMOL/L (ref 100–108)
CO2 SERPL-SCNC: 26 MMOL/L (ref 21–32)
CREAT SERPL-MCNC: 1.86 MG/DL (ref 0.6–1.3)
CRP SERPL QL: 56.3 MG/L
FOLATE SERPL-MCNC: 18.9 NG/ML (ref 3.1–17.5)
GFR SERPL CREATININE-BSD FRML MDRD: 34 ML/MIN/1.73SQ M
GLUCOSE SERPL-MCNC: 103 MG/DL (ref 65–140)
GLUCOSE SERPL-MCNC: 123 MG/DL (ref 65–140)
GLUCOSE SERPL-MCNC: 156 MG/DL (ref 65–140)
GLUCOSE SERPL-MCNC: 156 MG/DL (ref 65–140)
GLUCOSE SERPL-MCNC: 222 MG/DL (ref 65–140)
POTASSIUM SERPL-SCNC: 4.1 MMOL/L (ref 3.5–5.3)
SODIUM SERPL-SCNC: 141 MMOL/L (ref 136–145)
VIT B12 SERPL-MCNC: 533 PG/ML (ref 100–900)
WBC STL QL MICRO: NORMAL

## 2020-09-25 PROCEDURE — 87427 SHIGA-LIKE TOXIN AG IA: CPT

## 2020-09-25 PROCEDURE — 99222 1ST HOSP IP/OBS MODERATE 55: CPT | Performed by: INTERNAL MEDICINE

## 2020-09-25 PROCEDURE — 86140 C-REACTIVE PROTEIN: CPT | Performed by: STUDENT IN AN ORGANIZED HEALTH CARE EDUCATION/TRAINING PROGRAM

## 2020-09-25 PROCEDURE — 99232 SBSQ HOSP IP/OBS MODERATE 35: CPT | Performed by: INTERNAL MEDICINE

## 2020-09-25 PROCEDURE — 82656 EL-1 FECAL QUAL/SEMIQ: CPT | Performed by: STUDENT IN AN ORGANIZED HEALTH CARE EDUCATION/TRAINING PROGRAM

## 2020-09-25 PROCEDURE — 87045 FECES CULTURE AEROBIC BACT: CPT | Performed by: STUDENT IN AN ORGANIZED HEALTH CARE EDUCATION/TRAINING PROGRAM

## 2020-09-25 PROCEDURE — 82784 ASSAY IGA/IGD/IGG/IGM EACH: CPT | Performed by: STUDENT IN AN ORGANIZED HEALTH CARE EDUCATION/TRAINING PROGRAM

## 2020-09-25 PROCEDURE — 83993 ASSAY FOR CALPROTECTIN FECAL: CPT | Performed by: STUDENT IN AN ORGANIZED HEALTH CARE EDUCATION/TRAINING PROGRAM

## 2020-09-25 PROCEDURE — 87177 OVA AND PARASITES SMEARS: CPT | Performed by: STUDENT IN AN ORGANIZED HEALTH CARE EDUCATION/TRAINING PROGRAM

## 2020-09-25 PROCEDURE — 87209 SMEAR COMPLEX STAIN: CPT | Performed by: STUDENT IN AN ORGANIZED HEALTH CARE EDUCATION/TRAINING PROGRAM

## 2020-09-25 PROCEDURE — 86255 FLUORESCENT ANTIBODY SCREEN: CPT | Performed by: STUDENT IN AN ORGANIZED HEALTH CARE EDUCATION/TRAINING PROGRAM

## 2020-09-25 PROCEDURE — 87329 GIARDIA AG IA: CPT | Performed by: STUDENT IN AN ORGANIZED HEALTH CARE EDUCATION/TRAINING PROGRAM

## 2020-09-25 PROCEDURE — 80048 BASIC METABOLIC PNL TOTAL CA: CPT | Performed by: INTERNAL MEDICINE

## 2020-09-25 PROCEDURE — 82948 REAGENT STRIP/BLOOD GLUCOSE: CPT

## 2020-09-25 PROCEDURE — 82746 ASSAY OF FOLIC ACID SERUM: CPT | Performed by: INTERNAL MEDICINE

## 2020-09-25 PROCEDURE — 87046 STOOL CULTR AEROBIC BACT EA: CPT

## 2020-09-25 PROCEDURE — 97110 THERAPEUTIC EXERCISES: CPT

## 2020-09-25 PROCEDURE — 83516 IMMUNOASSAY NONANTIBODY: CPT | Performed by: STUDENT IN AN ORGANIZED HEALTH CARE EDUCATION/TRAINING PROGRAM

## 2020-09-25 PROCEDURE — 97535 SELF CARE MNGMENT TRAINING: CPT

## 2020-09-25 PROCEDURE — 82607 VITAMIN B-12: CPT | Performed by: INTERNAL MEDICINE

## 2020-09-25 PROCEDURE — 99233 SBSQ HOSP IP/OBS HIGH 50: CPT | Performed by: INTERNAL MEDICINE

## 2020-09-25 PROCEDURE — 97530 THERAPEUTIC ACTIVITIES: CPT

## 2020-09-25 RX ORDER — MIDODRINE HYDROCHLORIDE 5 MG/1
5 TABLET ORAL
Status: DISCONTINUED | OUTPATIENT
Start: 2020-09-25 | End: 2020-10-19 | Stop reason: HOSPADM

## 2020-09-25 RX ORDER — INSULIN GLARGINE 100 [IU]/ML
10 INJECTION, SOLUTION SUBCUTANEOUS
Status: DISCONTINUED | OUTPATIENT
Start: 2020-09-25 | End: 2020-09-29

## 2020-09-25 RX ADMIN — INSULIN LISPRO 3 UNITS: 100 INJECTION, SOLUTION INTRAVENOUS; SUBCUTANEOUS at 12:07

## 2020-09-25 RX ADMIN — LOPERAMIDE HYDROCHLORIDE 2 MG: 2 CAPSULE ORAL at 10:26

## 2020-09-25 RX ADMIN — MIDODRINE HYDROCHLORIDE 5 MG: 5 TABLET ORAL at 17:33

## 2020-09-25 RX ADMIN — INSULIN LISPRO 3 UNITS: 100 INJECTION, SOLUTION INTRAVENOUS; SUBCUTANEOUS at 17:33

## 2020-09-25 RX ADMIN — INSULIN LISPRO 4 UNITS: 100 INJECTION, SOLUTION INTRAVENOUS; SUBCUTANEOUS at 08:53

## 2020-09-25 RX ADMIN — INSULIN GLARGINE 10 UNITS: 100 INJECTION, SOLUTION SUBCUTANEOUS at 22:41

## 2020-09-25 RX ADMIN — MIDODRINE HYDROCHLORIDE 10 MG: 5 TABLET ORAL at 11:16

## 2020-09-25 RX ADMIN — HEPARIN SODIUM 5000 UNITS: 5000 INJECTION INTRAVENOUS; SUBCUTANEOUS at 06:13

## 2020-09-25 RX ADMIN — SODIUM CHLORIDE, SODIUM GLUCONATE, SODIUM ACETATE, POTASSIUM CHLORIDE, MAGNESIUM CHLORIDE, SODIUM PHOSPHATE, DIBASIC, AND POTASSIUM PHOSPHATE 75 ML/HR: .53; .5; .37; .037; .03; .012; .00082 INJECTION, SOLUTION INTRAVENOUS at 11:30

## 2020-09-25 RX ADMIN — MIDODRINE HYDROCHLORIDE 10 MG: 5 TABLET ORAL at 06:13

## 2020-09-25 RX ADMIN — HEPARIN SODIUM 5000 UNITS: 5000 INJECTION INTRAVENOUS; SUBCUTANEOUS at 14:28

## 2020-09-25 RX ADMIN — HEPARIN SODIUM 5000 UNITS: 5000 INJECTION INTRAVENOUS; SUBCUTANEOUS at 22:41

## 2020-09-25 RX ADMIN — INSULIN LISPRO 8 UNITS: 100 INJECTION, SOLUTION INTRAVENOUS; SUBCUTANEOUS at 11:15

## 2020-09-26 LAB
ANION GAP SERPL CALCULATED.3IONS-SCNC: 5 MMOL/L (ref 4–13)
BASOPHILS # BLD AUTO: 0.07 THOUSANDS/ΜL (ref 0–0.1)
BASOPHILS NFR BLD AUTO: 1 % (ref 0–1)
BUN SERPL-MCNC: 29 MG/DL (ref 5–25)
CALCIUM SERPL-MCNC: 8.5 MG/DL (ref 8.3–10.1)
CHLORIDE SERPL-SCNC: 110 MMOL/L (ref 100–108)
CO2 SERPL-SCNC: 26 MMOL/L (ref 21–32)
CREAT SERPL-MCNC: 1.7 MG/DL (ref 0.6–1.3)
ENDOMYSIUM IGA SER QL: NEGATIVE
EOSINOPHIL # BLD AUTO: 0.56 THOUSAND/ΜL (ref 0–0.61)
EOSINOPHIL NFR BLD AUTO: 7 % (ref 0–6)
ERYTHROCYTE [DISTWIDTH] IN BLOOD BY AUTOMATED COUNT: 14.5 % (ref 11.6–15.1)
GFR SERPL CREATININE-BSD FRML MDRD: 38 ML/MIN/1.73SQ M
GLIADIN PEPTIDE IGA SER-ACNC: 4 UNITS (ref 0–19)
GLIADIN PEPTIDE IGG SER-ACNC: 2 UNITS (ref 0–19)
GLUCOSE SERPL-MCNC: 134 MG/DL (ref 65–140)
GLUCOSE SERPL-MCNC: 142 MG/DL (ref 65–140)
GLUCOSE SERPL-MCNC: 153 MG/DL (ref 65–140)
GLUCOSE SERPL-MCNC: 222 MG/DL (ref 65–140)
GLUCOSE SERPL-MCNC: 230 MG/DL (ref 65–140)
HCT VFR BLD AUTO: 28.7 % (ref 36.5–49.3)
HGB BLD-MCNC: 8.5 G/DL (ref 12–17)
IGA SERPL-MCNC: 424 MG/DL (ref 61–437)
IMM GRANULOCYTES # BLD AUTO: 0.18 THOUSAND/UL (ref 0–0.2)
IMM GRANULOCYTES NFR BLD AUTO: 2 % (ref 0–2)
LYMPHOCYTES # BLD AUTO: 1.74 THOUSANDS/ΜL (ref 0.6–4.47)
LYMPHOCYTES NFR BLD AUTO: 23 % (ref 14–44)
MCH RBC QN AUTO: 27.3 PG (ref 26.8–34.3)
MCHC RBC AUTO-ENTMCNC: 29.6 G/DL (ref 31.4–37.4)
MCV RBC AUTO: 92 FL (ref 82–98)
MONOCYTES # BLD AUTO: 0.74 THOUSAND/ΜL (ref 0.17–1.22)
MONOCYTES NFR BLD AUTO: 10 % (ref 4–12)
NEUTROPHILS # BLD AUTO: 4.35 THOUSANDS/ΜL (ref 1.85–7.62)
NEUTS SEG NFR BLD AUTO: 57 % (ref 43–75)
NRBC BLD AUTO-RTO: 0 /100 WBCS
O+P STL CONC: NORMAL
PLATELET # BLD AUTO: 403 THOUSANDS/UL (ref 149–390)
PMV BLD AUTO: 9.9 FL (ref 8.9–12.7)
POTASSIUM SERPL-SCNC: 4.2 MMOL/L (ref 3.5–5.3)
RBC # BLD AUTO: 3.11 MILLION/UL (ref 3.88–5.62)
SODIUM SERPL-SCNC: 141 MMOL/L (ref 136–145)
TTG IGA SER-ACNC: <2 U/ML (ref 0–3)
TTG IGG SER-ACNC: <2 U/ML (ref 0–5)
WBC # BLD AUTO: 7.64 THOUSAND/UL (ref 4.31–10.16)

## 2020-09-26 PROCEDURE — 85025 COMPLETE CBC W/AUTO DIFF WBC: CPT | Performed by: PHYSICIAN ASSISTANT

## 2020-09-26 PROCEDURE — 82948 REAGENT STRIP/BLOOD GLUCOSE: CPT

## 2020-09-26 PROCEDURE — 99232 SBSQ HOSP IP/OBS MODERATE 35: CPT | Performed by: INTERNAL MEDICINE

## 2020-09-26 PROCEDURE — 80048 BASIC METABOLIC PNL TOTAL CA: CPT | Performed by: INTERNAL MEDICINE

## 2020-09-26 RX ORDER — NYSTATIN 100000 [USP'U]/G
POWDER TOPICAL 2 TIMES DAILY
Status: DISCONTINUED | OUTPATIENT
Start: 2020-09-26 | End: 2020-10-19 | Stop reason: HOSPADM

## 2020-09-26 RX ADMIN — HEPARIN SODIUM 5000 UNITS: 5000 INJECTION INTRAVENOUS; SUBCUTANEOUS at 21:16

## 2020-09-26 RX ADMIN — MIDODRINE HYDROCHLORIDE 5 MG: 5 TABLET ORAL at 06:15

## 2020-09-26 RX ADMIN — INSULIN LISPRO 3 UNITS: 100 INJECTION, SOLUTION INTRAVENOUS; SUBCUTANEOUS at 08:06

## 2020-09-26 RX ADMIN — INSULIN LISPRO 3 UNITS: 100 INJECTION, SOLUTION INTRAVENOUS; SUBCUTANEOUS at 12:31

## 2020-09-26 RX ADMIN — NYSTATIN: 100000 POWDER TOPICAL at 18:08

## 2020-09-26 RX ADMIN — INSULIN LISPRO 3 UNITS: 100 INJECTION, SOLUTION INTRAVENOUS; SUBCUTANEOUS at 16:33

## 2020-09-26 RX ADMIN — INSULIN GLARGINE 10 UNITS: 100 INJECTION, SOLUTION SUBCUTANEOUS at 21:15

## 2020-09-26 RX ADMIN — NYSTATIN: 100000 POWDER TOPICAL at 13:20

## 2020-09-26 RX ADMIN — MIDODRINE HYDROCHLORIDE 5 MG: 5 TABLET ORAL at 17:42

## 2020-09-26 RX ADMIN — INSULIN LISPRO 8 UNITS: 100 INJECTION, SOLUTION INTRAVENOUS; SUBCUTANEOUS at 16:33

## 2020-09-26 RX ADMIN — HEPARIN SODIUM 5000 UNITS: 5000 INJECTION INTRAVENOUS; SUBCUTANEOUS at 13:29

## 2020-09-26 RX ADMIN — HEPARIN SODIUM 5000 UNITS: 5000 INJECTION INTRAVENOUS; SUBCUTANEOUS at 05:02

## 2020-09-26 RX ADMIN — INSULIN LISPRO 8 UNITS: 100 INJECTION, SOLUTION INTRAVENOUS; SUBCUTANEOUS at 12:30

## 2020-09-26 RX ADMIN — MIDODRINE HYDROCHLORIDE 5 MG: 5 TABLET ORAL at 12:32

## 2020-09-27 LAB
ANION GAP SERPL CALCULATED.3IONS-SCNC: 6 MMOL/L (ref 4–13)
BUN SERPL-MCNC: 27 MG/DL (ref 5–25)
CALCIUM SERPL-MCNC: 8.9 MG/DL (ref 8.3–10.1)
CHLORIDE SERPL-SCNC: 107 MMOL/L (ref 100–108)
CO2 SERPL-SCNC: 26 MMOL/L (ref 21–32)
CREAT SERPL-MCNC: 1.61 MG/DL (ref 0.6–1.3)
GFR SERPL CREATININE-BSD FRML MDRD: 41 ML/MIN/1.73SQ M
GLUCOSE SERPL-MCNC: 144 MG/DL (ref 65–140)
GLUCOSE SERPL-MCNC: 154 MG/DL (ref 65–140)
GLUCOSE SERPL-MCNC: 161 MG/DL (ref 65–140)
GLUCOSE SERPL-MCNC: 186 MG/DL (ref 65–140)
GLUCOSE SERPL-MCNC: 261 MG/DL (ref 65–140)
POTASSIUM SERPL-SCNC: 4.5 MMOL/L (ref 3.5–5.3)
SODIUM SERPL-SCNC: 139 MMOL/L (ref 136–145)

## 2020-09-27 PROCEDURE — 99232 SBSQ HOSP IP/OBS MODERATE 35: CPT | Performed by: INTERNAL MEDICINE

## 2020-09-27 PROCEDURE — 82948 REAGENT STRIP/BLOOD GLUCOSE: CPT

## 2020-09-27 PROCEDURE — 80048 BASIC METABOLIC PNL TOTAL CA: CPT | Performed by: INTERNAL MEDICINE

## 2020-09-27 PROCEDURE — 99233 SBSQ HOSP IP/OBS HIGH 50: CPT | Performed by: INTERNAL MEDICINE

## 2020-09-27 RX ADMIN — NYSTATIN: 100000 POWDER TOPICAL at 17:17

## 2020-09-27 RX ADMIN — INSULIN LISPRO 8 UNITS: 100 INJECTION, SOLUTION INTRAVENOUS; SUBCUTANEOUS at 11:37

## 2020-09-27 RX ADMIN — HEPARIN SODIUM 5000 UNITS: 5000 INJECTION INTRAVENOUS; SUBCUTANEOUS at 22:25

## 2020-09-27 RX ADMIN — MIDODRINE HYDROCHLORIDE 5 MG: 5 TABLET ORAL at 11:37

## 2020-09-27 RX ADMIN — INSULIN LISPRO 4 UNITS: 100 INJECTION, SOLUTION INTRAVENOUS; SUBCUTANEOUS at 08:15

## 2020-09-27 RX ADMIN — MIDODRINE HYDROCHLORIDE 5 MG: 5 TABLET ORAL at 17:15

## 2020-09-27 RX ADMIN — HEPARIN SODIUM 5000 UNITS: 5000 INJECTION INTRAVENOUS; SUBCUTANEOUS at 05:24

## 2020-09-27 RX ADMIN — INSULIN LISPRO 3 UNITS: 100 INJECTION, SOLUTION INTRAVENOUS; SUBCUTANEOUS at 11:38

## 2020-09-27 RX ADMIN — INSULIN LISPRO 3 UNITS: 100 INJECTION, SOLUTION INTRAVENOUS; SUBCUTANEOUS at 17:16

## 2020-09-27 RX ADMIN — HEPARIN SODIUM 5000 UNITS: 5000 INJECTION INTRAVENOUS; SUBCUTANEOUS at 14:15

## 2020-09-27 RX ADMIN — INSULIN GLARGINE 10 UNITS: 100 INJECTION, SOLUTION SUBCUTANEOUS at 22:25

## 2020-09-27 RX ADMIN — INSULIN LISPRO 3 UNITS: 100 INJECTION, SOLUTION INTRAVENOUS; SUBCUTANEOUS at 08:15

## 2020-09-27 RX ADMIN — MIDODRINE HYDROCHLORIDE 5 MG: 5 TABLET ORAL at 08:14

## 2020-09-27 RX ADMIN — NYSTATIN: 100000 POWDER TOPICAL at 08:16

## 2020-09-28 LAB
GLUCOSE SERPL-MCNC: 137 MG/DL (ref 65–140)
GLUCOSE SERPL-MCNC: 142 MG/DL (ref 65–140)
GLUCOSE SERPL-MCNC: 155 MG/DL (ref 65–140)
GLUCOSE SERPL-MCNC: 226 MG/DL (ref 65–140)
SARS-COV-2 RNA RESP QL NAA+PROBE: NEGATIVE

## 2020-09-28 PROCEDURE — U0003 INFECTIOUS AGENT DETECTION BY NUCLEIC ACID (DNA OR RNA); SEVERE ACUTE RESPIRATORY SYNDROME CORONAVIRUS 2 (SARS-COV-2) (CORONAVIRUS DISEASE [COVID-19]), AMPLIFIED PROBE TECHNIQUE, MAKING USE OF HIGH THROUGHPUT TECHNOLOGIES AS DESCRIBED BY CMS-2020-01-R: HCPCS | Performed by: INTERNAL MEDICINE

## 2020-09-28 PROCEDURE — 82948 REAGENT STRIP/BLOOD GLUCOSE: CPT

## 2020-09-28 PROCEDURE — 97535 SELF CARE MNGMENT TRAINING: CPT

## 2020-09-28 PROCEDURE — 97530 THERAPEUTIC ACTIVITIES: CPT

## 2020-09-28 PROCEDURE — 99232 SBSQ HOSP IP/OBS MODERATE 35: CPT | Performed by: INTERNAL MEDICINE

## 2020-09-28 RX ADMIN — HEPARIN SODIUM 5000 UNITS: 5000 INJECTION INTRAVENOUS; SUBCUTANEOUS at 21:32

## 2020-09-28 RX ADMIN — MIDODRINE HYDROCHLORIDE 5 MG: 5 TABLET ORAL at 16:48

## 2020-09-28 RX ADMIN — INSULIN LISPRO 3 UNITS: 100 INJECTION, SOLUTION INTRAVENOUS; SUBCUTANEOUS at 11:50

## 2020-09-28 RX ADMIN — MIDODRINE HYDROCHLORIDE 5 MG: 5 TABLET ORAL at 05:51

## 2020-09-28 RX ADMIN — MIDODRINE HYDROCHLORIDE 5 MG: 5 TABLET ORAL at 11:52

## 2020-09-28 RX ADMIN — INSULIN LISPRO 3 UNITS: 100 INJECTION, SOLUTION INTRAVENOUS; SUBCUTANEOUS at 16:47

## 2020-09-28 RX ADMIN — NYSTATIN: 100000 POWDER TOPICAL at 18:30

## 2020-09-28 RX ADMIN — HEPARIN SODIUM 5000 UNITS: 5000 INJECTION INTRAVENOUS; SUBCUTANEOUS at 05:51

## 2020-09-28 RX ADMIN — ACETAMINOPHEN 650 MG: 325 TABLET, FILM COATED ORAL at 22:04

## 2020-09-28 RX ADMIN — INSULIN GLARGINE 10 UNITS: 100 INJECTION, SOLUTION SUBCUTANEOUS at 21:32

## 2020-09-28 RX ADMIN — INSULIN LISPRO 3 UNITS: 100 INJECTION, SOLUTION INTRAVENOUS; SUBCUTANEOUS at 09:57

## 2020-09-28 RX ADMIN — INSULIN LISPRO 8 UNITS: 100 INJECTION, SOLUTION INTRAVENOUS; SUBCUTANEOUS at 11:50

## 2020-09-28 RX ADMIN — HEPARIN SODIUM 5000 UNITS: 5000 INJECTION INTRAVENOUS; SUBCUTANEOUS at 13:23

## 2020-09-28 RX ADMIN — COLLAGENASE SANTYL: 250 OINTMENT TOPICAL at 16:49

## 2020-09-28 RX ADMIN — INSULIN LISPRO 4 UNITS: 100 INJECTION, SOLUTION INTRAVENOUS; SUBCUTANEOUS at 09:57

## 2020-09-29 ENCOUNTER — APPOINTMENT (INPATIENT)
Dept: RADIOLOGY | Facility: HOSPITAL | Age: 75
DRG: 673 | End: 2020-09-29
Payer: COMMERCIAL

## 2020-09-29 LAB
ANION GAP SERPL CALCULATED.3IONS-SCNC: 5 MMOL/L (ref 4–13)
ANION GAP SERPL CALCULATED.3IONS-SCNC: 7 MMOL/L (ref 4–13)
ARTERIAL PATENCY WRIST A: YES
BASE EXCESS BLDA CALC-SCNC: -3.9 MMOL/L
BUN SERPL-MCNC: 32 MG/DL (ref 5–25)
BUN SERPL-MCNC: 40 MG/DL (ref 5–25)
CALCIUM SERPL-MCNC: 9.5 MG/DL (ref 8.3–10.1)
CALCIUM SERPL-MCNC: 9.6 MG/DL (ref 8.3–10.1)
CHLORIDE SERPL-SCNC: 103 MMOL/L (ref 100–108)
CHLORIDE SERPL-SCNC: 105 MMOL/L (ref 100–108)
CO2 SERPL-SCNC: 24 MMOL/L (ref 21–32)
CO2 SERPL-SCNC: 27 MMOL/L (ref 21–32)
CREAT SERPL-MCNC: 1.75 MG/DL (ref 0.6–1.3)
CREAT SERPL-MCNC: 2.35 MG/DL (ref 0.6–1.3)
G LAMBLIA AG STL QL IA: NEGATIVE
GFR SERPL CREATININE-BSD FRML MDRD: 26 ML/MIN/1.73SQ M
GFR SERPL CREATININE-BSD FRML MDRD: 37 ML/MIN/1.73SQ M
GLUCOSE SERPL-MCNC: 128 MG/DL (ref 65–140)
GLUCOSE SERPL-MCNC: 168 MG/DL (ref 65–140)
GLUCOSE SERPL-MCNC: 177 MG/DL (ref 65–140)
GLUCOSE SERPL-MCNC: 181 MG/DL (ref 65–140)
GLUCOSE SERPL-MCNC: 189 MG/DL (ref 65–140)
GLUCOSE SERPL-MCNC: 199 MG/DL (ref 65–140)
GLUCOSE SERPL-MCNC: 203 MG/DL (ref 65–140)
GLUCOSE SERPL-MCNC: 222 MG/DL (ref 65–140)
HCO3 BLDA-SCNC: 18.4 MMOL/L (ref 22–28)
LACTATE SERPL-SCNC: 1.9 MMOL/L (ref 0.5–2)
NASAL CANNULA: 15
O+P STL CONC: NORMAL
O2 CT BLDA-SCNC: 16.6 ML/DL (ref 16–23)
OXYHGB MFR BLDA: 91.3 % (ref 94–97)
PCO2 BLDA: 26.3 MM HG (ref 36–44)
PH BLDA: 7.46 [PH] (ref 7.35–7.45)
PO2 BLDA: 63.6 MM HG (ref 75–129)
POTASSIUM SERPL-SCNC: 4.4 MMOL/L (ref 3.5–5.3)
POTASSIUM SERPL-SCNC: 5.3 MMOL/L (ref 3.5–5.3)
PROCALCITONIN SERPL-MCNC: 0.81 NG/ML
SODIUM SERPL-SCNC: 134 MMOL/L (ref 136–145)
SODIUM SERPL-SCNC: 137 MMOL/L (ref 136–145)
SPECIMEN SOURCE: ABNORMAL

## 2020-09-29 PROCEDURE — 99232 SBSQ HOSP IP/OBS MODERATE 35: CPT | Performed by: INTERNAL MEDICINE

## 2020-09-29 PROCEDURE — 84145 PROCALCITONIN (PCT): CPT | Performed by: INTERNAL MEDICINE

## 2020-09-29 PROCEDURE — 94760 N-INVAS EAR/PLS OXIMETRY 1: CPT

## 2020-09-29 PROCEDURE — 82948 REAGENT STRIP/BLOOD GLUCOSE: CPT

## 2020-09-29 PROCEDURE — 80048 BASIC METABOLIC PNL TOTAL CA: CPT | Performed by: INTERNAL MEDICINE

## 2020-09-29 PROCEDURE — 83605 ASSAY OF LACTIC ACID: CPT | Performed by: INTERNAL MEDICINE

## 2020-09-29 PROCEDURE — 82805 BLOOD GASES W/O2 SATURATION: CPT | Performed by: INTERNAL MEDICINE

## 2020-09-29 PROCEDURE — 87040 BLOOD CULTURE FOR BACTERIA: CPT | Performed by: INTERNAL MEDICINE

## 2020-09-29 PROCEDURE — 36600 WITHDRAWAL OF ARTERIAL BLOOD: CPT

## 2020-09-29 PROCEDURE — 71045 X-RAY EXAM CHEST 1 VIEW: CPT

## 2020-09-29 PROCEDURE — 80048 BASIC METABOLIC PNL TOTAL CA: CPT | Performed by: NURSE PRACTITIONER

## 2020-09-29 RX ORDER — INSULIN GLARGINE 100 [IU]/ML
15 INJECTION, SOLUTION SUBCUTANEOUS
Status: DISCONTINUED | OUTPATIENT
Start: 2020-09-29 | End: 2020-10-04

## 2020-09-29 RX ORDER — ACETAMINOPHEN 650 MG/1
325 SUPPOSITORY RECTAL EVERY 4 HOURS PRN
Status: DISCONTINUED | OUTPATIENT
Start: 2020-09-29 | End: 2020-10-05

## 2020-09-29 RX ADMIN — NYSTATIN: 100000 POWDER TOPICAL at 09:02

## 2020-09-29 RX ADMIN — HEPARIN SODIUM 5000 UNITS: 5000 INJECTION INTRAVENOUS; SUBCUTANEOUS at 05:24

## 2020-09-29 RX ADMIN — INSULIN GLARGINE 15 UNITS: 100 INJECTION, SOLUTION SUBCUTANEOUS at 21:51

## 2020-09-29 RX ADMIN — AMPICILLIN SODIUM AND SULBACTAM SODIUM 3 G: 2; 1 INJECTION, POWDER, FOR SOLUTION INTRAMUSCULAR; INTRAVENOUS at 19:16

## 2020-09-29 RX ADMIN — INSULIN LISPRO 3 UNITS: 100 INJECTION, SOLUTION INTRAVENOUS; SUBCUTANEOUS at 09:02

## 2020-09-29 RX ADMIN — NYSTATIN: 100000 POWDER TOPICAL at 17:47

## 2020-09-29 RX ADMIN — INSULIN LISPRO 3 UNITS: 100 INJECTION, SOLUTION INTRAVENOUS; SUBCUTANEOUS at 11:50

## 2020-09-29 RX ADMIN — HEPARIN SODIUM 5000 UNITS: 5000 INJECTION INTRAVENOUS; SUBCUTANEOUS at 21:51

## 2020-09-29 RX ADMIN — COLLAGENASE SANTYL: 250 OINTMENT TOPICAL at 09:02

## 2020-09-29 RX ADMIN — MIDODRINE HYDROCHLORIDE 5 MG: 5 TABLET ORAL at 09:02

## 2020-09-29 RX ADMIN — SODIUM CHLORIDE 1000 ML: 0.9 INJECTION, SOLUTION INTRAVENOUS at 19:13

## 2020-09-29 RX ADMIN — HEPARIN SODIUM 5000 UNITS: 5000 INJECTION INTRAVENOUS; SUBCUTANEOUS at 14:33

## 2020-09-29 RX ADMIN — MIDODRINE HYDROCHLORIDE 5 MG: 5 TABLET ORAL at 17:47

## 2020-09-29 RX ADMIN — ONDANSETRON 4 MG: 2 INJECTION INTRAMUSCULAR; INTRAVENOUS at 14:33

## 2020-09-29 RX ADMIN — INSULIN LISPRO 8 UNITS: 100 INJECTION, SOLUTION INTRAVENOUS; SUBCUTANEOUS at 09:06

## 2020-09-29 RX ADMIN — ACETAMINOPHEN 325 MG: 650 SUPPOSITORY RECTAL at 19:48

## 2020-09-29 RX ADMIN — MIDODRINE HYDROCHLORIDE 5 MG: 5 TABLET ORAL at 14:32

## 2020-09-29 RX ADMIN — INSULIN LISPRO 4 UNITS: 100 INJECTION, SOLUTION INTRAVENOUS; SUBCUTANEOUS at 11:48

## 2020-09-29 RX ADMIN — ONDANSETRON 4 MG: 2 INJECTION INTRAMUSCULAR; INTRAVENOUS at 09:08

## 2020-09-30 ENCOUNTER — APPOINTMENT (INPATIENT)
Dept: RADIOLOGY | Facility: HOSPITAL | Age: 75
DRG: 673 | End: 2020-09-30
Payer: COMMERCIAL

## 2020-09-30 PROBLEM — J69.0 ASPIRATION PNEUMONIA (HCC): Status: ACTIVE | Noted: 2020-09-30

## 2020-09-30 PROBLEM — A41.9 SEPSIS (HCC): Status: ACTIVE | Noted: 2020-09-30

## 2020-09-30 LAB
ANION GAP SERPL CALCULATED.3IONS-SCNC: 9 MMOL/L (ref 4–13)
BASOPHILS # BLD AUTO: 0.06 THOUSANDS/ΜL (ref 0–0.1)
BASOPHILS NFR BLD AUTO: 0 % (ref 0–1)
BUN SERPL-MCNC: 45 MG/DL (ref 5–25)
CALCIUM SERPL-MCNC: 8.8 MG/DL (ref 8.3–10.1)
CHLORIDE SERPL-SCNC: 106 MMOL/L (ref 100–108)
CO2 SERPL-SCNC: 23 MMOL/L (ref 21–32)
CREAT SERPL-MCNC: 2.56 MG/DL (ref 0.6–1.3)
EOSINOPHIL # BLD AUTO: 0.15 THOUSAND/ΜL (ref 0–0.61)
EOSINOPHIL NFR BLD AUTO: 1 % (ref 0–6)
ERYTHROCYTE [DISTWIDTH] IN BLOOD BY AUTOMATED COUNT: 14.7 % (ref 11.6–15.1)
GFR SERPL CREATININE-BSD FRML MDRD: 23 ML/MIN/1.73SQ M
GLUCOSE SERPL-MCNC: 114 MG/DL (ref 65–140)
GLUCOSE SERPL-MCNC: 126 MG/DL (ref 65–140)
GLUCOSE SERPL-MCNC: 139 MG/DL (ref 65–140)
GLUCOSE SERPL-MCNC: 155 MG/DL (ref 65–140)
GLUCOSE SERPL-MCNC: 203 MG/DL (ref 65–140)
GLUCOSE SERPL-MCNC: 219 MG/DL (ref 65–140)
HCT VFR BLD AUTO: 26.8 % (ref 36.5–49.3)
HGB BLD-MCNC: 8.1 G/DL (ref 12–17)
IMM GRANULOCYTES # BLD AUTO: 0.17 THOUSAND/UL (ref 0–0.2)
IMM GRANULOCYTES NFR BLD AUTO: 1 % (ref 0–2)
LYMPHOCYTES # BLD AUTO: 1.41 THOUSANDS/ΜL (ref 0.6–4.47)
LYMPHOCYTES NFR BLD AUTO: 10 % (ref 14–44)
MCH RBC QN AUTO: 27.5 PG (ref 26.8–34.3)
MCHC RBC AUTO-ENTMCNC: 30.2 G/DL (ref 31.4–37.4)
MCV RBC AUTO: 91 FL (ref 82–98)
MISCELLANEOUS LAB TEST RESULT: NORMAL
MONOCYTES # BLD AUTO: 0.79 THOUSAND/ΜL (ref 0.17–1.22)
MONOCYTES NFR BLD AUTO: 6 % (ref 4–12)
NEUTROPHILS # BLD AUTO: 10.92 THOUSANDS/ΜL (ref 1.85–7.62)
NEUTS SEG NFR BLD AUTO: 82 % (ref 43–75)
NRBC BLD AUTO-RTO: 0 /100 WBCS
PLATELET # BLD AUTO: 336 THOUSANDS/UL (ref 149–390)
PMV BLD AUTO: 10.2 FL (ref 8.9–12.7)
POTASSIUM SERPL-SCNC: 4.9 MMOL/L (ref 3.5–5.3)
PROCALCITONIN SERPL-MCNC: 3.92 NG/ML
RBC # BLD AUTO: 2.95 MILLION/UL (ref 3.88–5.62)
SODIUM SERPL-SCNC: 138 MMOL/L (ref 136–145)
WBC # BLD AUTO: 13.5 THOUSAND/UL (ref 4.31–10.16)

## 2020-09-30 PROCEDURE — 94760 N-INVAS EAR/PLS OXIMETRY 1: CPT

## 2020-09-30 PROCEDURE — 97530 THERAPEUTIC ACTIVITIES: CPT

## 2020-09-30 PROCEDURE — 80048 BASIC METABOLIC PNL TOTAL CA: CPT | Performed by: NURSE PRACTITIONER

## 2020-09-30 PROCEDURE — 84145 PROCALCITONIN (PCT): CPT | Performed by: INTERNAL MEDICINE

## 2020-09-30 PROCEDURE — 99232 SBSQ HOSP IP/OBS MODERATE 35: CPT | Performed by: INTERNAL MEDICINE

## 2020-09-30 PROCEDURE — 74230 X-RAY XM SWLNG FUNCJ C+: CPT

## 2020-09-30 PROCEDURE — 92610 EVALUATE SWALLOWING FUNCTION: CPT

## 2020-09-30 PROCEDURE — 85025 COMPLETE CBC W/AUTO DIFF WBC: CPT | Performed by: INTERNAL MEDICINE

## 2020-09-30 PROCEDURE — 82948 REAGENT STRIP/BLOOD GLUCOSE: CPT

## 2020-09-30 PROCEDURE — 92611 MOTION FLUOROSCOPY/SWALLOW: CPT

## 2020-09-30 RX ORDER — SODIUM CHLORIDE, SODIUM GLUCONATE, SODIUM ACETATE, POTASSIUM CHLORIDE, MAGNESIUM CHLORIDE, SODIUM PHOSPHATE, DIBASIC, AND POTASSIUM PHOSPHATE .53; .5; .37; .037; .03; .012; .00082 G/100ML; G/100ML; G/100ML; G/100ML; G/100ML; G/100ML; G/100ML
1000 INJECTION, SOLUTION INTRAVENOUS ONCE
Status: COMPLETED | OUTPATIENT
Start: 2020-09-30 | End: 2020-10-01

## 2020-09-30 RX ADMIN — AMPICILLIN SODIUM AND SULBACTAM SODIUM 3 G: 2; 1 INJECTION, POWDER, FOR SOLUTION INTRAMUSCULAR; INTRAVENOUS at 01:56

## 2020-09-30 RX ADMIN — MIDODRINE HYDROCHLORIDE 5 MG: 5 TABLET ORAL at 17:15

## 2020-09-30 RX ADMIN — HEPARIN SODIUM 5000 UNITS: 5000 INJECTION INTRAVENOUS; SUBCUTANEOUS at 14:42

## 2020-09-30 RX ADMIN — SODIUM CHLORIDE, SODIUM GLUCONATE, SODIUM ACETATE, POTASSIUM CHLORIDE, MAGNESIUM CHLORIDE, SODIUM PHOSPHATE, DIBASIC, AND POTASSIUM PHOSPHATE 1000 ML: .53; .5; .37; .037; .03; .012; .00082 INJECTION, SOLUTION INTRAVENOUS at 16:12

## 2020-09-30 RX ADMIN — AMPICILLIN SODIUM AND SULBACTAM SODIUM 3 G: 2; 1 INJECTION, POWDER, FOR SOLUTION INTRAMUSCULAR; INTRAVENOUS at 19:34

## 2020-09-30 RX ADMIN — HEPARIN SODIUM 5000 UNITS: 5000 INJECTION INTRAVENOUS; SUBCUTANEOUS at 22:17

## 2020-09-30 RX ADMIN — AMPICILLIN SODIUM AND SULBACTAM SODIUM 3 G: 2; 1 INJECTION, POWDER, FOR SOLUTION INTRAMUSCULAR; INTRAVENOUS at 14:23

## 2020-09-30 RX ADMIN — HEPARIN SODIUM 5000 UNITS: 5000 INJECTION INTRAVENOUS; SUBCUTANEOUS at 14:35

## 2020-09-30 RX ADMIN — NYSTATIN: 100000 POWDER TOPICAL at 08:50

## 2020-09-30 RX ADMIN — NYSTATIN: 100000 POWDER TOPICAL at 17:16

## 2020-09-30 RX ADMIN — HEPARIN SODIUM 5000 UNITS: 5000 INJECTION INTRAVENOUS; SUBCUTANEOUS at 05:20

## 2020-09-30 RX ADMIN — AMPICILLIN SODIUM AND SULBACTAM SODIUM 3 G: 2; 1 INJECTION, POWDER, FOR SOLUTION INTRAMUSCULAR; INTRAVENOUS at 08:50

## 2020-09-30 RX ADMIN — COLLAGENASE SANTYL: 250 OINTMENT TOPICAL at 08:50

## 2020-09-30 RX ADMIN — INSULIN GLARGINE 15 UNITS: 100 INJECTION, SOLUTION SUBCUTANEOUS at 22:28

## 2020-10-01 LAB
ANION GAP SERPL CALCULATED.3IONS-SCNC: 6 MMOL/L (ref 4–13)
ATRIAL RATE: 99 BPM
BUN SERPL-MCNC: 37 MG/DL (ref 5–25)
CALCIUM SERPL-MCNC: 8.5 MG/DL (ref 8.3–10.1)
CHLORIDE SERPL-SCNC: 106 MMOL/L (ref 100–108)
CO2 SERPL-SCNC: 26 MMOL/L (ref 21–32)
CREAT SERPL-MCNC: 1.87 MG/DL (ref 0.6–1.3)
ELASTASE PANC STL-MCNT: 254 UG ELAST./G
GFR SERPL CREATININE-BSD FRML MDRD: 34 ML/MIN/1.73SQ M
GLUCOSE SERPL-MCNC: 139 MG/DL (ref 65–140)
GLUCOSE SERPL-MCNC: 145 MG/DL (ref 65–140)
GLUCOSE SERPL-MCNC: 170 MG/DL (ref 65–140)
GLUCOSE SERPL-MCNC: 87 MG/DL (ref 65–140)
GLUCOSE SERPL-MCNC: 93 MG/DL (ref 65–140)
GLUCOSE SERPL-MCNC: 95 MG/DL (ref 65–140)
P AXIS: 106 DEGREES
POTASSIUM SERPL-SCNC: 4.1 MMOL/L (ref 3.5–5.3)
PR INTERVAL: 194 MS
QRS AXIS: 79 DEGREES
QRSD INTERVAL: 98 MS
QT INTERVAL: 352 MS
QTC INTERVAL: 451 MS
SODIUM SERPL-SCNC: 138 MMOL/L (ref 136–145)
T WAVE AXIS: 168 DEGREES
VENTRICULAR RATE: 99 BPM

## 2020-10-01 PROCEDURE — 97530 THERAPEUTIC ACTIVITIES: CPT

## 2020-10-01 PROCEDURE — 82948 REAGENT STRIP/BLOOD GLUCOSE: CPT

## 2020-10-01 PROCEDURE — 93010 ELECTROCARDIOGRAM REPORT: CPT | Performed by: INTERNAL MEDICINE

## 2020-10-01 PROCEDURE — 80048 BASIC METABOLIC PNL TOTAL CA: CPT | Performed by: NURSE PRACTITIONER

## 2020-10-01 PROCEDURE — 99232 SBSQ HOSP IP/OBS MODERATE 35: CPT | Performed by: INTERNAL MEDICINE

## 2020-10-01 PROCEDURE — 94760 N-INVAS EAR/PLS OXIMETRY 1: CPT

## 2020-10-01 PROCEDURE — 97110 THERAPEUTIC EXERCISES: CPT

## 2020-10-01 PROCEDURE — 92526 ORAL FUNCTION THERAPY: CPT

## 2020-10-01 PROCEDURE — 93005 ELECTROCARDIOGRAM TRACING: CPT

## 2020-10-01 RX ADMIN — AMPICILLIN SODIUM AND SULBACTAM SODIUM 3 G: 2; 1 INJECTION, POWDER, FOR SOLUTION INTRAMUSCULAR; INTRAVENOUS at 15:29

## 2020-10-01 RX ADMIN — INSULIN GLARGINE 15 UNITS: 100 INJECTION, SOLUTION SUBCUTANEOUS at 21:15

## 2020-10-01 RX ADMIN — MIDODRINE HYDROCHLORIDE 5 MG: 5 TABLET ORAL at 17:47

## 2020-10-01 RX ADMIN — INSULIN LISPRO 1 UNITS: 100 INJECTION, SOLUTION INTRAVENOUS; SUBCUTANEOUS at 11:57

## 2020-10-01 RX ADMIN — COLLAGENASE SANTYL 1 APPLICATION: 250 OINTMENT TOPICAL at 09:29

## 2020-10-01 RX ADMIN — HEPARIN SODIUM 5000 UNITS: 5000 INJECTION INTRAVENOUS; SUBCUTANEOUS at 06:32

## 2020-10-01 RX ADMIN — MIDODRINE HYDROCHLORIDE 5 MG: 5 TABLET ORAL at 06:32

## 2020-10-01 RX ADMIN — HEPARIN SODIUM 5000 UNITS: 5000 INJECTION INTRAVENOUS; SUBCUTANEOUS at 17:47

## 2020-10-01 RX ADMIN — AMPICILLIN SODIUM AND SULBACTAM SODIUM 3 G: 2; 1 INJECTION, POWDER, FOR SOLUTION INTRAMUSCULAR; INTRAVENOUS at 09:30

## 2020-10-01 RX ADMIN — HEPARIN SODIUM 5000 UNITS: 5000 INJECTION INTRAVENOUS; SUBCUTANEOUS at 21:15

## 2020-10-01 RX ADMIN — AMPICILLIN SODIUM AND SULBACTAM SODIUM 3 G: 2; 1 INJECTION, POWDER, FOR SOLUTION INTRAMUSCULAR; INTRAVENOUS at 21:15

## 2020-10-01 RX ADMIN — MIDODRINE HYDROCHLORIDE 5 MG: 5 TABLET ORAL at 11:57

## 2020-10-01 RX ADMIN — NYSTATIN: 100000 POWDER TOPICAL at 17:48

## 2020-10-01 RX ADMIN — NYSTATIN 1 APPLICATION: 100000 POWDER TOPICAL at 09:29

## 2020-10-01 RX ADMIN — AMPICILLIN SODIUM AND SULBACTAM SODIUM 3 G: 2; 1 INJECTION, POWDER, FOR SOLUTION INTRAMUSCULAR; INTRAVENOUS at 01:58

## 2020-10-02 LAB
ANION GAP SERPL CALCULATED.3IONS-SCNC: 6 MMOL/L (ref 4–13)
BUN SERPL-MCNC: 32 MG/DL (ref 5–25)
CALCIUM SERPL-MCNC: 8.7 MG/DL (ref 8.3–10.1)
CALPROTECTIN STL-MCNT: <16 UG/G (ref 0–120)
CHLORIDE SERPL-SCNC: 107 MMOL/L (ref 100–108)
CO2 SERPL-SCNC: 25 MMOL/L (ref 21–32)
CREAT SERPL-MCNC: 1.52 MG/DL (ref 0.6–1.3)
GFR SERPL CREATININE-BSD FRML MDRD: 44 ML/MIN/1.73SQ M
GLUCOSE SERPL-MCNC: 125 MG/DL (ref 65–140)
GLUCOSE SERPL-MCNC: 126 MG/DL (ref 65–140)
GLUCOSE SERPL-MCNC: 154 MG/DL (ref 65–140)
GLUCOSE SERPL-MCNC: 171 MG/DL (ref 65–140)
GLUCOSE SERPL-MCNC: 73 MG/DL (ref 65–140)
GLUCOSE SERPL-MCNC: 80 MG/DL (ref 65–140)
MISCELLANEOUS LAB TEST RESULT: NORMAL
POTASSIUM SERPL-SCNC: 3.8 MMOL/L (ref 3.5–5.3)
PROCALCITONIN SERPL-MCNC: 2.11 NG/ML
SODIUM SERPL-SCNC: 138 MMOL/L (ref 136–145)

## 2020-10-02 PROCEDURE — 82948 REAGENT STRIP/BLOOD GLUCOSE: CPT

## 2020-10-02 PROCEDURE — 97110 THERAPEUTIC EXERCISES: CPT

## 2020-10-02 PROCEDURE — 80048 BASIC METABOLIC PNL TOTAL CA: CPT | Performed by: NURSE PRACTITIONER

## 2020-10-02 PROCEDURE — 99232 SBSQ HOSP IP/OBS MODERATE 35: CPT | Performed by: INTERNAL MEDICINE

## 2020-10-02 PROCEDURE — 97535 SELF CARE MNGMENT TRAINING: CPT

## 2020-10-02 PROCEDURE — 97530 THERAPEUTIC ACTIVITIES: CPT

## 2020-10-02 PROCEDURE — 84145 PROCALCITONIN (PCT): CPT | Performed by: INTERNAL MEDICINE

## 2020-10-02 PROCEDURE — 94760 N-INVAS EAR/PLS OXIMETRY 1: CPT

## 2020-10-02 RX ADMIN — HEPARIN SODIUM 5000 UNITS: 5000 INJECTION INTRAVENOUS; SUBCUTANEOUS at 14:26

## 2020-10-02 RX ADMIN — AMPICILLIN SODIUM AND SULBACTAM SODIUM 3 G: 2; 1 INJECTION, POWDER, FOR SOLUTION INTRAMUSCULAR; INTRAVENOUS at 19:22

## 2020-10-02 RX ADMIN — INSULIN GLARGINE 15 UNITS: 100 INJECTION, SOLUTION SUBCUTANEOUS at 21:20

## 2020-10-02 RX ADMIN — MIDODRINE HYDROCHLORIDE 5 MG: 5 TABLET ORAL at 11:28

## 2020-10-02 RX ADMIN — AMPICILLIN SODIUM AND SULBACTAM SODIUM 3 G: 2; 1 INJECTION, POWDER, FOR SOLUTION INTRAMUSCULAR; INTRAVENOUS at 14:26

## 2020-10-02 RX ADMIN — INSULIN LISPRO 1 UNITS: 100 INJECTION, SOLUTION INTRAVENOUS; SUBCUTANEOUS at 11:29

## 2020-10-02 RX ADMIN — MIDODRINE HYDROCHLORIDE 5 MG: 5 TABLET ORAL at 06:38

## 2020-10-02 RX ADMIN — COLLAGENASE SANTYL: 250 OINTMENT TOPICAL at 09:04

## 2020-10-02 RX ADMIN — HEPARIN SODIUM 5000 UNITS: 5000 INJECTION INTRAVENOUS; SUBCUTANEOUS at 21:20

## 2020-10-02 RX ADMIN — NYSTATIN: 100000 POWDER TOPICAL at 18:40

## 2020-10-02 RX ADMIN — AMPICILLIN SODIUM AND SULBACTAM SODIUM 3 G: 2; 1 INJECTION, POWDER, FOR SOLUTION INTRAMUSCULAR; INTRAVENOUS at 09:05

## 2020-10-02 RX ADMIN — HEPARIN SODIUM 5000 UNITS: 5000 INJECTION INTRAVENOUS; SUBCUTANEOUS at 06:38

## 2020-10-02 RX ADMIN — MIDODRINE HYDROCHLORIDE 5 MG: 5 TABLET ORAL at 18:40

## 2020-10-02 RX ADMIN — AMPICILLIN SODIUM AND SULBACTAM SODIUM 3 G: 2; 1 INJECTION, POWDER, FOR SOLUTION INTRAMUSCULAR; INTRAVENOUS at 01:05

## 2020-10-03 LAB
GLUCOSE SERPL-MCNC: 119 MG/DL (ref 65–140)
GLUCOSE SERPL-MCNC: 164 MG/DL (ref 65–140)
GLUCOSE SERPL-MCNC: 201 MG/DL (ref 65–140)
GLUCOSE SERPL-MCNC: 233 MG/DL (ref 65–140)
PROCALCITONIN SERPL-MCNC: 1.51 NG/ML

## 2020-10-03 PROCEDURE — 99232 SBSQ HOSP IP/OBS MODERATE 35: CPT | Performed by: INTERNAL MEDICINE

## 2020-10-03 PROCEDURE — 82948 REAGENT STRIP/BLOOD GLUCOSE: CPT

## 2020-10-03 PROCEDURE — 84145 PROCALCITONIN (PCT): CPT | Performed by: INTERNAL MEDICINE

## 2020-10-03 RX ADMIN — AMPICILLIN SODIUM AND SULBACTAM SODIUM 3 G: 2; 1 INJECTION, POWDER, FOR SOLUTION INTRAMUSCULAR; INTRAVENOUS at 07:56

## 2020-10-03 RX ADMIN — HEPARIN SODIUM 5000 UNITS: 5000 INJECTION INTRAVENOUS; SUBCUTANEOUS at 06:05

## 2020-10-03 RX ADMIN — AMPICILLIN SODIUM AND SULBACTAM SODIUM 3 G: 2; 1 INJECTION, POWDER, FOR SOLUTION INTRAMUSCULAR; INTRAVENOUS at 13:41

## 2020-10-03 RX ADMIN — INSULIN LISPRO 3 UNITS: 100 INJECTION, SOLUTION INTRAVENOUS; SUBCUTANEOUS at 16:15

## 2020-10-03 RX ADMIN — MIDODRINE HYDROCHLORIDE 5 MG: 5 TABLET ORAL at 16:15

## 2020-10-03 RX ADMIN — HEPARIN SODIUM 5000 UNITS: 5000 INJECTION INTRAVENOUS; SUBCUTANEOUS at 22:13

## 2020-10-03 RX ADMIN — AMPICILLIN SODIUM AND SULBACTAM SODIUM 3 G: 2; 1 INJECTION, POWDER, FOR SOLUTION INTRAMUSCULAR; INTRAVENOUS at 01:55

## 2020-10-03 RX ADMIN — NYSTATIN 1 APPLICATION: 100000 POWDER TOPICAL at 09:23

## 2020-10-03 RX ADMIN — MIDODRINE HYDROCHLORIDE 5 MG: 5 TABLET ORAL at 06:06

## 2020-10-03 RX ADMIN — HEPARIN SODIUM 5000 UNITS: 5000 INJECTION INTRAVENOUS; SUBCUTANEOUS at 13:41

## 2020-10-03 RX ADMIN — MIDODRINE HYDROCHLORIDE 5 MG: 5 TABLET ORAL at 11:30

## 2020-10-03 RX ADMIN — AMPICILLIN SODIUM AND SULBACTAM SODIUM 3 G: 2; 1 INJECTION, POWDER, FOR SOLUTION INTRAMUSCULAR; INTRAVENOUS at 18:33

## 2020-10-03 RX ADMIN — INSULIN LISPRO 1 UNITS: 100 INJECTION, SOLUTION INTRAVENOUS; SUBCUTANEOUS at 11:30

## 2020-10-03 RX ADMIN — INSULIN GLARGINE 15 UNITS: 100 INJECTION, SOLUTION SUBCUTANEOUS at 22:13

## 2020-10-03 RX ADMIN — COLLAGENASE SANTYL 1 APPLICATION: 250 OINTMENT TOPICAL at 09:23

## 2020-10-04 LAB
BACTERIA BLD CULT: NORMAL
BACTERIA BLD CULT: NORMAL
GLUCOSE SERPL-MCNC: 135 MG/DL (ref 65–140)
GLUCOSE SERPL-MCNC: 182 MG/DL (ref 65–140)
GLUCOSE SERPL-MCNC: 184 MG/DL (ref 65–140)
GLUCOSE SERPL-MCNC: 233 MG/DL (ref 65–140)

## 2020-10-04 PROCEDURE — 99232 SBSQ HOSP IP/OBS MODERATE 35: CPT | Performed by: INTERNAL MEDICINE

## 2020-10-04 PROCEDURE — 82948 REAGENT STRIP/BLOOD GLUCOSE: CPT

## 2020-10-04 RX ORDER — INSULIN GLARGINE 100 [IU]/ML
20 INJECTION, SOLUTION SUBCUTANEOUS
Status: DISCONTINUED | OUTPATIENT
Start: 2020-10-04 | End: 2020-10-08

## 2020-10-04 RX ADMIN — AMPICILLIN SODIUM AND SULBACTAM SODIUM 3 G: 2; 1 INJECTION, POWDER, FOR SOLUTION INTRAMUSCULAR; INTRAVENOUS at 01:11

## 2020-10-04 RX ADMIN — INSULIN LISPRO 5 UNITS: 100 INJECTION, SOLUTION INTRAVENOUS; SUBCUTANEOUS at 17:07

## 2020-10-04 RX ADMIN — AMPICILLIN SODIUM AND SULBACTAM SODIUM 3 G: 2; 1 INJECTION, POWDER, FOR SOLUTION INTRAMUSCULAR; INTRAVENOUS at 19:21

## 2020-10-04 RX ADMIN — HEPARIN SODIUM 5000 UNITS: 5000 INJECTION INTRAVENOUS; SUBCUTANEOUS at 05:50

## 2020-10-04 RX ADMIN — INSULIN GLARGINE 20 UNITS: 100 INJECTION, SOLUTION SUBCUTANEOUS at 22:44

## 2020-10-04 RX ADMIN — INSULIN LISPRO 1 UNITS: 100 INJECTION, SOLUTION INTRAVENOUS; SUBCUTANEOUS at 17:06

## 2020-10-04 RX ADMIN — HEPARIN SODIUM 5000 UNITS: 5000 INJECTION INTRAVENOUS; SUBCUTANEOUS at 15:09

## 2020-10-04 RX ADMIN — NYSTATIN: 100000 POWDER TOPICAL at 09:27

## 2020-10-04 RX ADMIN — INSULIN LISPRO 3 UNITS: 100 INJECTION, SOLUTION INTRAVENOUS; SUBCUTANEOUS at 11:31

## 2020-10-04 RX ADMIN — INSULIN LISPRO 5 UNITS: 100 INJECTION, SOLUTION INTRAVENOUS; SUBCUTANEOUS at 13:15

## 2020-10-04 RX ADMIN — MIDODRINE HYDROCHLORIDE 5 MG: 5 TABLET ORAL at 07:48

## 2020-10-04 RX ADMIN — COLLAGENASE SANTYL: 250 OINTMENT TOPICAL at 09:28

## 2020-10-04 RX ADMIN — AMPICILLIN SODIUM AND SULBACTAM SODIUM 3 G: 2; 1 INJECTION, POWDER, FOR SOLUTION INTRAMUSCULAR; INTRAVENOUS at 09:28

## 2020-10-04 RX ADMIN — MIDODRINE HYDROCHLORIDE 5 MG: 5 TABLET ORAL at 15:09

## 2020-10-04 RX ADMIN — MIDODRINE HYDROCHLORIDE 5 MG: 5 TABLET ORAL at 11:30

## 2020-10-04 RX ADMIN — NYSTATIN: 100000 POWDER TOPICAL at 17:28

## 2020-10-04 RX ADMIN — AMPICILLIN SODIUM AND SULBACTAM SODIUM 3 G: 2; 1 INJECTION, POWDER, FOR SOLUTION INTRAMUSCULAR; INTRAVENOUS at 13:15

## 2020-10-04 RX ADMIN — HEPARIN SODIUM 5000 UNITS: 5000 INJECTION INTRAVENOUS; SUBCUTANEOUS at 22:44

## 2020-10-05 ENCOUNTER — APPOINTMENT (INPATIENT)
Dept: NON INVASIVE DIAGNOSTICS | Facility: HOSPITAL | Age: 75
DRG: 673 | End: 2020-10-05
Payer: COMMERCIAL

## 2020-10-05 PROBLEM — I82.412 ACUTE DEEP VEIN THROMBOSIS (DVT) OF FEMORAL VEIN OF LEFT LOWER EXTREMITY (HCC): Status: ACTIVE | Noted: 2020-10-05

## 2020-10-05 LAB
APTT PPP: 29 SECONDS (ref 23–37)
APTT PPP: 52 SECONDS (ref 23–37)
ERYTHROCYTE [DISTWIDTH] IN BLOOD BY AUTOMATED COUNT: 14.5 % (ref 11.6–15.1)
GLUCOSE SERPL-MCNC: 138 MG/DL (ref 65–140)
GLUCOSE SERPL-MCNC: 171 MG/DL (ref 65–140)
GLUCOSE SERPL-MCNC: 197 MG/DL (ref 65–140)
GLUCOSE SERPL-MCNC: 92 MG/DL (ref 65–140)
HCT VFR BLD AUTO: 27.2 % (ref 36.5–49.3)
HGB BLD-MCNC: 8.1 G/DL (ref 12–17)
INR PPP: 1.1 (ref 0.84–1.19)
MCH RBC QN AUTO: 26.9 PG (ref 26.8–34.3)
MCHC RBC AUTO-ENTMCNC: 29.8 G/DL (ref 31.4–37.4)
MCV RBC AUTO: 90 FL (ref 82–98)
PLATELET # BLD AUTO: 448 THOUSANDS/UL (ref 149–390)
PMV BLD AUTO: 9.8 FL (ref 8.9–12.7)
PROTHROMBIN TIME: 14.2 SECONDS (ref 11.6–14.5)
RBC # BLD AUTO: 3.01 MILLION/UL (ref 3.88–5.62)
WBC # BLD AUTO: 10.57 THOUSAND/UL (ref 4.31–10.16)

## 2020-10-05 PROCEDURE — 82948 REAGENT STRIP/BLOOD GLUCOSE: CPT

## 2020-10-05 PROCEDURE — 85027 COMPLETE CBC AUTOMATED: CPT | Performed by: INTERNAL MEDICINE

## 2020-10-05 PROCEDURE — 99232 SBSQ HOSP IP/OBS MODERATE 35: CPT | Performed by: INTERNAL MEDICINE

## 2020-10-05 PROCEDURE — 85610 PROTHROMBIN TIME: CPT | Performed by: INTERNAL MEDICINE

## 2020-10-05 PROCEDURE — 97535 SELF CARE MNGMENT TRAINING: CPT

## 2020-10-05 PROCEDURE — 99223 1ST HOSP IP/OBS HIGH 75: CPT | Performed by: SURGERY

## 2020-10-05 PROCEDURE — 92526 ORAL FUNCTION THERAPY: CPT

## 2020-10-05 PROCEDURE — 93971 EXTREMITY STUDY: CPT | Performed by: SURGERY

## 2020-10-05 PROCEDURE — 93971 EXTREMITY STUDY: CPT

## 2020-10-05 PROCEDURE — 85730 THROMBOPLASTIN TIME PARTIAL: CPT | Performed by: INTERNAL MEDICINE

## 2020-10-05 RX ORDER — HEPARIN SODIUM 1000 [USP'U]/ML
7200 INJECTION, SOLUTION INTRAVENOUS; SUBCUTANEOUS ONCE
Status: COMPLETED | OUTPATIENT
Start: 2020-10-05 | End: 2020-10-05

## 2020-10-05 RX ORDER — ACETAMINOPHEN 325 MG/1
650 TABLET ORAL EVERY 6 HOURS PRN
Status: DISCONTINUED | OUTPATIENT
Start: 2020-10-05 | End: 2020-10-19 | Stop reason: HOSPADM

## 2020-10-05 RX ORDER — HEPARIN SODIUM 1000 [USP'U]/ML
7200 INJECTION, SOLUTION INTRAVENOUS; SUBCUTANEOUS
Status: DISCONTINUED | OUTPATIENT
Start: 2020-10-05 | End: 2020-10-07

## 2020-10-05 RX ORDER — HEPARIN SODIUM 10000 [USP'U]/100ML
3-30 INJECTION, SOLUTION INTRAVENOUS
Status: DISCONTINUED | OUTPATIENT
Start: 2020-10-05 | End: 2020-10-07

## 2020-10-05 RX ORDER — HEPARIN SODIUM 1000 [USP'U]/ML
3600 INJECTION, SOLUTION INTRAVENOUS; SUBCUTANEOUS
Status: DISCONTINUED | OUTPATIENT
Start: 2020-10-05 | End: 2020-10-07

## 2020-10-05 RX ORDER — SODIUM CHLORIDE, SODIUM LACTATE, POTASSIUM CHLORIDE, CALCIUM CHLORIDE 600; 310; 30; 20 MG/100ML; MG/100ML; MG/100ML; MG/100ML
125 INJECTION, SOLUTION INTRAVENOUS CONTINUOUS
Status: DISCONTINUED | OUTPATIENT
Start: 2020-10-06 | End: 2020-10-06

## 2020-10-05 RX ORDER — OXYCODONE HYDROCHLORIDE 5 MG/1
2.5 TABLET ORAL EVERY 6 HOURS PRN
Status: DISCONTINUED | OUTPATIENT
Start: 2020-10-05 | End: 2020-10-19 | Stop reason: HOSPADM

## 2020-10-05 RX ADMIN — INSULIN LISPRO 5 UNITS: 100 INJECTION, SOLUTION INTRAVENOUS; SUBCUTANEOUS at 17:12

## 2020-10-05 RX ADMIN — COLLAGENASE SANTYL: 250 OINTMENT TOPICAL at 08:33

## 2020-10-05 RX ADMIN — AMPICILLIN AND SULBACTAM 3 G: 2; 1 INJECTION, POWDER, FOR SOLUTION INTRAMUSCULAR; INTRAVENOUS at 20:34

## 2020-10-05 RX ADMIN — MIDODRINE HYDROCHLORIDE 5 MG: 5 TABLET ORAL at 17:08

## 2020-10-05 RX ADMIN — INSULIN LISPRO 5 UNITS: 100 INJECTION, SOLUTION INTRAVENOUS; SUBCUTANEOUS at 08:00

## 2020-10-05 RX ADMIN — MIDODRINE HYDROCHLORIDE 5 MG: 5 TABLET ORAL at 08:00

## 2020-10-05 RX ADMIN — HEPARIN SODIUM 3600 UNITS: 1000 INJECTION INTRAVENOUS; SUBCUTANEOUS at 19:33

## 2020-10-05 RX ADMIN — MIDODRINE HYDROCHLORIDE 5 MG: 5 TABLET ORAL at 11:53

## 2020-10-05 RX ADMIN — NYSTATIN: 100000 POWDER TOPICAL at 08:33

## 2020-10-05 RX ADMIN — HEPARIN SODIUM 7200 UNITS: 1000 INJECTION INTRAVENOUS; SUBCUTANEOUS at 09:47

## 2020-10-05 RX ADMIN — INSULIN LISPRO 1 UNITS: 100 INJECTION, SOLUTION INTRAVENOUS; SUBCUTANEOUS at 11:53

## 2020-10-05 RX ADMIN — HEPARIN SODIUM 5000 UNITS: 5000 INJECTION INTRAVENOUS; SUBCUTANEOUS at 05:11

## 2020-10-05 RX ADMIN — INSULIN LISPRO 5 UNITS: 100 INJECTION, SOLUTION INTRAVENOUS; SUBCUTANEOUS at 11:53

## 2020-10-05 RX ADMIN — INSULIN LISPRO 2 UNITS: 100 INJECTION, SOLUTION INTRAVENOUS; SUBCUTANEOUS at 17:11

## 2020-10-05 RX ADMIN — AMPICILLIN AND SULBACTAM 3 G: 2; 1 INJECTION, POWDER, FOR SOLUTION INTRAMUSCULAR; INTRAVENOUS at 13:43

## 2020-10-05 RX ADMIN — HEPARIN SODIUM 18 UNITS/KG/HR: 10000 INJECTION, SOLUTION INTRAVENOUS at 09:50

## 2020-10-05 RX ADMIN — NYSTATIN: 100000 POWDER TOPICAL at 17:08

## 2020-10-05 RX ADMIN — INSULIN GLARGINE 20 UNITS: 100 INJECTION, SOLUTION SUBCUTANEOUS at 21:46

## 2020-10-05 RX ADMIN — AMPICILLIN SODIUM AND SULBACTAM SODIUM 3 G: 2; 1 INJECTION, POWDER, FOR SOLUTION INTRAMUSCULAR; INTRAVENOUS at 08:00

## 2020-10-05 RX ADMIN — AMPICILLIN SODIUM AND SULBACTAM SODIUM 3 G: 2; 1 INJECTION, POWDER, FOR SOLUTION INTRAMUSCULAR; INTRAVENOUS at 01:17

## 2020-10-06 ENCOUNTER — ANESTHESIA (INPATIENT)
Dept: PERIOP | Facility: HOSPITAL | Age: 75
DRG: 673 | End: 2020-10-06
Payer: COMMERCIAL

## 2020-10-06 ENCOUNTER — ANESTHESIA EVENT (INPATIENT)
Dept: PERIOP | Facility: HOSPITAL | Age: 75
DRG: 673 | End: 2020-10-06
Payer: COMMERCIAL

## 2020-10-06 VITALS — HEART RATE: 94 BPM

## 2020-10-06 LAB
ABO GROUP BLD: NORMAL
ANION GAP SERPL CALCULATED.3IONS-SCNC: 3 MMOL/L (ref 4–13)
APTT PPP: 76 SECONDS (ref 23–37)
BASOPHILS # BLD AUTO: 0.08 THOUSANDS/ΜL (ref 0–0.1)
BASOPHILS NFR BLD AUTO: 1 % (ref 0–1)
BLD GP AB SCN SERPL QL: NEGATIVE
BUN SERPL-MCNC: 23 MG/DL (ref 5–25)
CALCIUM SERPL-MCNC: 8.7 MG/DL (ref 8.3–10.1)
CHLORIDE SERPL-SCNC: 108 MMOL/L (ref 100–108)
CO2 SERPL-SCNC: 26 MMOL/L (ref 21–32)
CREAT SERPL-MCNC: 1.29 MG/DL (ref 0.6–1.3)
EOSINOPHIL # BLD AUTO: 0.35 THOUSAND/ΜL (ref 0–0.61)
EOSINOPHIL NFR BLD AUTO: 4 % (ref 0–6)
ERYTHROCYTE [DISTWIDTH] IN BLOOD BY AUTOMATED COUNT: 14.6 % (ref 11.6–15.1)
GFR SERPL CREATININE-BSD FRML MDRD: 53 ML/MIN/1.73SQ M
GLUCOSE SERPL-MCNC: 101 MG/DL (ref 65–140)
GLUCOSE SERPL-MCNC: 103 MG/DL (ref 65–140)
GLUCOSE SERPL-MCNC: 213 MG/DL (ref 65–140)
GLUCOSE SERPL-MCNC: 86 MG/DL (ref 65–140)
GLUCOSE SERPL-MCNC: 95 MG/DL (ref 65–140)
HCT VFR BLD AUTO: 23 % (ref 36.5–49.3)
HGB BLD-MCNC: 7.3 G/DL (ref 12–17)
IMM GRANULOCYTES # BLD AUTO: 0.09 THOUSAND/UL (ref 0–0.2)
IMM GRANULOCYTES NFR BLD AUTO: 1 % (ref 0–2)
LYMPHOCYTES # BLD AUTO: 1.91 THOUSANDS/ΜL (ref 0.6–4.47)
LYMPHOCYTES NFR BLD AUTO: 19 % (ref 14–44)
MCH RBC QN AUTO: 28 PG (ref 26.8–34.3)
MCHC RBC AUTO-ENTMCNC: 31.7 G/DL (ref 31.4–37.4)
MCV RBC AUTO: 88 FL (ref 82–98)
MONOCYTES # BLD AUTO: 0.78 THOUSAND/ΜL (ref 0.17–1.22)
MONOCYTES NFR BLD AUTO: 8 % (ref 4–12)
NEUTROPHILS # BLD AUTO: 6.67 THOUSANDS/ΜL (ref 1.85–7.62)
NEUTS SEG NFR BLD AUTO: 67 % (ref 43–75)
NRBC BLD AUTO-RTO: 0 /100 WBCS
PLATELET # BLD AUTO: 430 THOUSANDS/UL (ref 149–390)
PMV BLD AUTO: 10 FL (ref 8.9–12.7)
POTASSIUM SERPL-SCNC: 3.9 MMOL/L (ref 3.5–5.3)
RBC # BLD AUTO: 2.61 MILLION/UL (ref 3.88–5.62)
RH BLD: POSITIVE
SODIUM SERPL-SCNC: 137 MMOL/L (ref 136–145)
SPECIMEN EXPIRATION DATE: NORMAL
WBC # BLD AUTO: 9.88 THOUSAND/UL (ref 4.31–10.16)

## 2020-10-06 PROCEDURE — 86850 RBC ANTIBODY SCREEN: CPT | Performed by: STUDENT IN AN ORGANIZED HEALTH CARE EDUCATION/TRAINING PROGRAM

## 2020-10-06 PROCEDURE — 11043 DBRDMT MUSC&/FSCA 1ST 20/<: CPT | Performed by: SURGERY

## 2020-10-06 PROCEDURE — 99233 SBSQ HOSP IP/OBS HIGH 50: CPT | Performed by: INTERNAL MEDICINE

## 2020-10-06 PROCEDURE — 85730 THROMBOPLASTIN TIME PARTIAL: CPT | Performed by: INTERNAL MEDICINE

## 2020-10-06 PROCEDURE — 82948 REAGENT STRIP/BLOOD GLUCOSE: CPT

## 2020-10-06 PROCEDURE — 11046 DBRDMT MUSC&/FSCA EA ADDL: CPT | Performed by: SURGERY

## 2020-10-06 PROCEDURE — 86901 BLOOD TYPING SEROLOGIC RH(D): CPT | Performed by: STUDENT IN AN ORGANIZED HEALTH CARE EDUCATION/TRAINING PROGRAM

## 2020-10-06 PROCEDURE — 86900 BLOOD TYPING SEROLOGIC ABO: CPT | Performed by: STUDENT IN AN ORGANIZED HEALTH CARE EDUCATION/TRAINING PROGRAM

## 2020-10-06 PROCEDURE — 99232 SBSQ HOSP IP/OBS MODERATE 35: CPT | Performed by: INTERNAL MEDICINE

## 2020-10-06 PROCEDURE — 94760 N-INVAS EAR/PLS OXIMETRY 1: CPT

## 2020-10-06 PROCEDURE — 0JB90ZZ EXCISION OF BUTTOCK SUBCUTANEOUS TISSUE AND FASCIA, OPEN APPROACH: ICD-10-PCS | Performed by: SURGERY

## 2020-10-06 PROCEDURE — 85025 COMPLETE CBC W/AUTO DIFF WBC: CPT | Performed by: INTERNAL MEDICINE

## 2020-10-06 PROCEDURE — 80048 BASIC METABOLIC PNL TOTAL CA: CPT | Performed by: INTERNAL MEDICINE

## 2020-10-06 RX ORDER — MAGNESIUM HYDROXIDE 1200 MG/15ML
LIQUID ORAL AS NEEDED
Status: DISCONTINUED | OUTPATIENT
Start: 2020-10-06 | End: 2020-10-06 | Stop reason: HOSPADM

## 2020-10-06 RX ORDER — EPHEDRINE SULFATE 50 MG/ML
INJECTION INTRAVENOUS AS NEEDED
Status: DISCONTINUED | OUTPATIENT
Start: 2020-10-06 | End: 2020-10-06

## 2020-10-06 RX ORDER — ALBUMIN, HUMAN INJ 5% 5 %
SOLUTION INTRAVENOUS CONTINUOUS PRN
Status: DISCONTINUED | OUTPATIENT
Start: 2020-10-06 | End: 2020-10-06

## 2020-10-06 RX ORDER — DEXAMETHASONE SODIUM PHOSPHATE 10 MG/ML
INJECTION, SOLUTION INTRAMUSCULAR; INTRAVENOUS AS NEEDED
Status: DISCONTINUED | OUTPATIENT
Start: 2020-10-06 | End: 2020-10-06

## 2020-10-06 RX ORDER — ONDANSETRON 2 MG/ML
INJECTION INTRAMUSCULAR; INTRAVENOUS AS NEEDED
Status: DISCONTINUED | OUTPATIENT
Start: 2020-10-06 | End: 2020-10-06

## 2020-10-06 RX ORDER — PROPOFOL 10 MG/ML
INJECTION, EMULSION INTRAVENOUS AS NEEDED
Status: DISCONTINUED | OUTPATIENT
Start: 2020-10-06 | End: 2020-10-06

## 2020-10-06 RX ORDER — FENTANYL CITRATE 50 UG/ML
INJECTION, SOLUTION INTRAMUSCULAR; INTRAVENOUS AS NEEDED
Status: DISCONTINUED | OUTPATIENT
Start: 2020-10-06 | End: 2020-10-06

## 2020-10-06 RX ORDER — LIDOCAINE HYDROCHLORIDE 10 MG/ML
INJECTION, SOLUTION EPIDURAL; INFILTRATION; INTRACAUDAL; PERINEURAL AS NEEDED
Status: DISCONTINUED | OUTPATIENT
Start: 2020-10-06 | End: 2020-10-06

## 2020-10-06 RX ADMIN — SODIUM CHLORIDE, SODIUM LACTATE, POTASSIUM CHLORIDE, AND CALCIUM CHLORIDE 125 ML/HR: .6; .31; .03; .02 INJECTION, SOLUTION INTRAVENOUS at 10:15

## 2020-10-06 RX ADMIN — PROPOFOL 150 MG: 10 INJECTION, EMULSION INTRAVENOUS at 15:57

## 2020-10-06 RX ADMIN — ONDANSETRON 4 MG: 2 INJECTION INTRAMUSCULAR; INTRAVENOUS at 16:05

## 2020-10-06 RX ADMIN — EPHEDRINE SULFATE 10 MG: 50 INJECTION, SOLUTION INTRAVENOUS at 16:10

## 2020-10-06 RX ADMIN — ALBUMIN (HUMAN): 12.5 INJECTION, SOLUTION INTRAVENOUS at 16:22

## 2020-10-06 RX ADMIN — INSULIN GLARGINE 20 UNITS: 100 INJECTION, SOLUTION SUBCUTANEOUS at 23:33

## 2020-10-06 RX ADMIN — PHENYLEPHRINE HYDROCHLORIDE 200 MCG: 10 INJECTION INTRAVENOUS at 16:22

## 2020-10-06 RX ADMIN — HEPARIN SODIUM 20 UNITS/KG/HR: 10000 INJECTION, SOLUTION INTRAVENOUS at 01:23

## 2020-10-06 RX ADMIN — Medication 2000 MG: at 15:51

## 2020-10-06 RX ADMIN — LIDOCAINE HYDROCHLORIDE 50 MG: 10 INJECTION, SOLUTION EPIDURAL; INFILTRATION; INTRACAUDAL; PERINEURAL at 15:57

## 2020-10-06 RX ADMIN — PHENYLEPHRINE HYDROCHLORIDE 200 MCG: 10 INJECTION INTRAVENOUS at 16:02

## 2020-10-06 RX ADMIN — METRONIDAZOLE 500 MG: 500 INJECTION, SOLUTION INTRAVENOUS at 16:10

## 2020-10-06 RX ADMIN — PHENYLEPHRINE HYDROCHLORIDE 200 MCG: 10 INJECTION INTRAVENOUS at 16:13

## 2020-10-06 RX ADMIN — SODIUM CHLORIDE, SODIUM LACTATE, POTASSIUM CHLORIDE, AND CALCIUM CHLORIDE 125 ML/HR: .6; .31; .03; .02 INJECTION, SOLUTION INTRAVENOUS at 01:28

## 2020-10-06 RX ADMIN — DEXAMETHASONE SODIUM PHOSPHATE 10 MG: 10 INJECTION, SOLUTION INTRAMUSCULAR; INTRAVENOUS at 16:05

## 2020-10-06 RX ADMIN — FENTANYL CITRATE 25 MCG: 50 INJECTION, SOLUTION INTRAMUSCULAR; INTRAVENOUS at 16:10

## 2020-10-07 PROBLEM — G93.41 METABOLIC ENCEPHALOPATHY: Status: RESOLVED | Noted: 2020-09-19 | Resolved: 2020-10-07

## 2020-10-07 LAB
ANION GAP SERPL CALCULATED.3IONS-SCNC: 4 MMOL/L (ref 4–13)
APTT PPP: 196 SECONDS (ref 23–37)
APTT PPP: 37 SECONDS (ref 23–37)
APTT PPP: 71 SECONDS (ref 23–37)
BUN SERPL-MCNC: 27 MG/DL (ref 5–25)
CALCIUM SERPL-MCNC: 8.6 MG/DL (ref 8.3–10.1)
CHLORIDE SERPL-SCNC: 106 MMOL/L (ref 100–108)
CO2 SERPL-SCNC: 22 MMOL/L (ref 21–32)
CREAT SERPL-MCNC: 1.27 MG/DL (ref 0.6–1.3)
ERYTHROCYTE [DISTWIDTH] IN BLOOD BY AUTOMATED COUNT: 14.2 % (ref 11.6–15.1)
GFR SERPL CREATININE-BSD FRML MDRD: 54 ML/MIN/1.73SQ M
GLUCOSE SERPL-MCNC: 276 MG/DL (ref 65–140)
GLUCOSE SERPL-MCNC: 293 MG/DL (ref 65–140)
GLUCOSE SERPL-MCNC: 389 MG/DL (ref 65–140)
GLUCOSE SERPL-MCNC: 475 MG/DL (ref 65–140)
HCT VFR BLD AUTO: 25.6 % (ref 36.5–49.3)
HGB BLD-MCNC: 7.6 G/DL (ref 12–17)
MCH RBC QN AUTO: 26.6 PG (ref 26.8–34.3)
MCHC RBC AUTO-ENTMCNC: 29.7 G/DL (ref 31.4–37.4)
MCV RBC AUTO: 90 FL (ref 82–98)
PLATELET # BLD AUTO: 430 THOUSANDS/UL (ref 149–390)
PMV BLD AUTO: 10.2 FL (ref 8.9–12.7)
POTASSIUM SERPL-SCNC: 4.8 MMOL/L (ref 3.5–5.3)
RBC # BLD AUTO: 2.86 MILLION/UL (ref 3.88–5.62)
SODIUM SERPL-SCNC: 132 MMOL/L (ref 136–145)
WBC # BLD AUTO: 8.37 THOUSAND/UL (ref 4.31–10.16)

## 2020-10-07 PROCEDURE — 80048 BASIC METABOLIC PNL TOTAL CA: CPT | Performed by: INTERNAL MEDICINE

## 2020-10-07 PROCEDURE — 97110 THERAPEUTIC EXERCISES: CPT

## 2020-10-07 PROCEDURE — 99233 SBSQ HOSP IP/OBS HIGH 50: CPT | Performed by: INTERNAL MEDICINE

## 2020-10-07 PROCEDURE — 85730 THROMBOPLASTIN TIME PARTIAL: CPT | Performed by: NURSE PRACTITIONER

## 2020-10-07 PROCEDURE — 99233 SBSQ HOSP IP/OBS HIGH 50: CPT | Performed by: SURGERY

## 2020-10-07 PROCEDURE — 82948 REAGENT STRIP/BLOOD GLUCOSE: CPT

## 2020-10-07 PROCEDURE — 85730 THROMBOPLASTIN TIME PARTIAL: CPT | Performed by: INTERNAL MEDICINE

## 2020-10-07 PROCEDURE — 97164 PT RE-EVAL EST PLAN CARE: CPT

## 2020-10-07 PROCEDURE — 94760 N-INVAS EAR/PLS OXIMETRY 1: CPT

## 2020-10-07 PROCEDURE — 85027 COMPLETE CBC AUTOMATED: CPT | Performed by: INTERNAL MEDICINE

## 2020-10-07 RX ADMIN — MIDODRINE HYDROCHLORIDE 5 MG: 5 TABLET ORAL at 17:48

## 2020-10-07 RX ADMIN — MIDODRINE HYDROCHLORIDE 5 MG: 5 TABLET ORAL at 08:52

## 2020-10-07 RX ADMIN — HEPARIN SODIUM 7200 UNITS: 1000 INJECTION INTRAVENOUS; SUBCUTANEOUS at 09:54

## 2020-10-07 RX ADMIN — INSULIN LISPRO 5 UNITS: 100 INJECTION, SOLUTION INTRAVENOUS; SUBCUTANEOUS at 17:48

## 2020-10-07 RX ADMIN — INSULIN LISPRO 6 UNITS: 100 INJECTION, SOLUTION INTRAVENOUS; SUBCUTANEOUS at 17:47

## 2020-10-07 RX ADMIN — APIXABAN 10 MG: 5 TABLET, FILM COATED ORAL at 17:56

## 2020-10-07 RX ADMIN — INSULIN LISPRO 4 UNITS: 100 INJECTION, SOLUTION INTRAVENOUS; SUBCUTANEOUS at 08:28

## 2020-10-07 RX ADMIN — INSULIN GLARGINE 20 UNITS: 100 INJECTION, SOLUTION SUBCUTANEOUS at 23:54

## 2020-10-07 RX ADMIN — NYSTATIN: 100000 POWDER TOPICAL at 17:49

## 2020-10-07 RX ADMIN — MIDODRINE HYDROCHLORIDE 5 MG: 5 TABLET ORAL at 11:51

## 2020-10-07 RX ADMIN — INSULIN LISPRO 6 UNITS: 100 INJECTION, SOLUTION INTRAVENOUS; SUBCUTANEOUS at 11:51

## 2020-10-07 RX ADMIN — INSULIN LISPRO 5 UNITS: 100 INJECTION, SOLUTION INTRAVENOUS; SUBCUTANEOUS at 11:51

## 2020-10-07 RX ADMIN — INSULIN LISPRO 5 UNITS: 100 INJECTION, SOLUTION INTRAVENOUS; SUBCUTANEOUS at 08:29

## 2020-10-07 RX ADMIN — NYSTATIN: 100000 POWDER TOPICAL at 08:52

## 2020-10-07 RX ADMIN — HEPARIN SODIUM 20 UNITS/KG/HR: 10000 INJECTION, SOLUTION INTRAVENOUS at 05:08

## 2020-10-08 PROBLEM — N17.9 ACUTE RENAL FAILURE (ARF) (HCC): Status: RESOLVED | Noted: 2020-09-19 | Resolved: 2020-10-08

## 2020-10-08 LAB
ANION GAP SERPL CALCULATED.3IONS-SCNC: 8 MMOL/L (ref 4–13)
BUN SERPL-MCNC: 35 MG/DL (ref 5–25)
CALCIUM SERPL-MCNC: 8.5 MG/DL (ref 8.3–10.1)
CHLORIDE SERPL-SCNC: 104 MMOL/L (ref 100–108)
CO2 SERPL-SCNC: 24 MMOL/L (ref 21–32)
CREAT SERPL-MCNC: 1.2 MG/DL (ref 0.6–1.3)
ERYTHROCYTE [DISTWIDTH] IN BLOOD BY AUTOMATED COUNT: 14.1 % (ref 11.6–15.1)
GFR SERPL CREATININE-BSD FRML MDRD: 58 ML/MIN/1.73SQ M
GLUCOSE SERPL-MCNC: 182 MG/DL (ref 65–140)
GLUCOSE SERPL-MCNC: 234 MG/DL (ref 65–140)
GLUCOSE SERPL-MCNC: 242 MG/DL (ref 65–140)
GLUCOSE SERPL-MCNC: 243 MG/DL (ref 65–140)
GLUCOSE SERPL-MCNC: 244 MG/DL (ref 65–140)
GLUCOSE SERPL-MCNC: 385 MG/DL (ref 65–140)
HCT VFR BLD AUTO: 22.7 % (ref 36.5–49.3)
HGB BLD-MCNC: 7.2 G/DL (ref 12–17)
MCH RBC QN AUTO: 27.7 PG (ref 26.8–34.3)
MCHC RBC AUTO-ENTMCNC: 31.7 G/DL (ref 31.4–37.4)
MCV RBC AUTO: 87 FL (ref 82–98)
PLATELET # BLD AUTO: 486 THOUSANDS/UL (ref 149–390)
PMV BLD AUTO: 10.2 FL (ref 8.9–12.7)
POTASSIUM SERPL-SCNC: 4.4 MMOL/L (ref 3.5–5.3)
RBC # BLD AUTO: 2.6 MILLION/UL (ref 3.88–5.62)
SODIUM SERPL-SCNC: 136 MMOL/L (ref 136–145)
WBC # BLD AUTO: 12.01 THOUSAND/UL (ref 4.31–10.16)

## 2020-10-08 PROCEDURE — 80048 BASIC METABOLIC PNL TOTAL CA: CPT | Performed by: INTERNAL MEDICINE

## 2020-10-08 PROCEDURE — 99233 SBSQ HOSP IP/OBS HIGH 50: CPT | Performed by: INTERNAL MEDICINE

## 2020-10-08 PROCEDURE — 85027 COMPLETE CBC AUTOMATED: CPT | Performed by: INTERNAL MEDICINE

## 2020-10-08 PROCEDURE — 82948 REAGENT STRIP/BLOOD GLUCOSE: CPT

## 2020-10-08 RX ORDER — INSULIN GLARGINE 100 [IU]/ML
25 INJECTION, SOLUTION SUBCUTANEOUS
Status: DISCONTINUED | OUTPATIENT
Start: 2020-10-08 | End: 2020-10-12

## 2020-10-08 RX ADMIN — APIXABAN 10 MG: 5 TABLET, FILM COATED ORAL at 08:53

## 2020-10-08 RX ADMIN — NYSTATIN: 100000 POWDER TOPICAL at 08:54

## 2020-10-08 RX ADMIN — INSULIN LISPRO 6 UNITS: 100 INJECTION, SOLUTION INTRAVENOUS; SUBCUTANEOUS at 11:50

## 2020-10-08 RX ADMIN — INSULIN LISPRO 3 UNITS: 100 INJECTION, SOLUTION INTRAVENOUS; SUBCUTANEOUS at 16:58

## 2020-10-08 RX ADMIN — MIDODRINE HYDROCHLORIDE 5 MG: 5 TABLET ORAL at 17:03

## 2020-10-08 RX ADMIN — APIXABAN 10 MG: 5 TABLET, FILM COATED ORAL at 17:03

## 2020-10-08 RX ADMIN — MIDODRINE HYDROCHLORIDE 5 MG: 5 TABLET ORAL at 08:53

## 2020-10-08 RX ADMIN — INSULIN LISPRO 3 UNITS: 100 INJECTION, SOLUTION INTRAVENOUS; SUBCUTANEOUS at 08:55

## 2020-10-08 RX ADMIN — MIDODRINE HYDROCHLORIDE 5 MG: 5 TABLET ORAL at 11:49

## 2020-10-08 RX ADMIN — NYSTATIN: 100000 POWDER TOPICAL at 17:00

## 2020-10-08 RX ADMIN — COLLAGENASE SANTYL: 250 OINTMENT TOPICAL at 08:54

## 2020-10-08 RX ADMIN — INSULIN GLARGINE 25 UNITS: 100 INJECTION, SOLUTION SUBCUTANEOUS at 22:55

## 2020-10-09 LAB
GLUCOSE SERPL-MCNC: 106 MG/DL (ref 65–140)
GLUCOSE SERPL-MCNC: 130 MG/DL (ref 65–140)
GLUCOSE SERPL-MCNC: 161 MG/DL (ref 65–140)
GLUCOSE SERPL-MCNC: 165 MG/DL (ref 65–140)
GLUCOSE SERPL-MCNC: 59 MG/DL (ref 65–140)
GLUCOSE SERPL-MCNC: 74 MG/DL (ref 65–140)
GLUCOSE SERPL-MCNC: 96 MG/DL (ref 65–140)

## 2020-10-09 PROCEDURE — 97535 SELF CARE MNGMENT TRAINING: CPT

## 2020-10-09 PROCEDURE — 82948 REAGENT STRIP/BLOOD GLUCOSE: CPT

## 2020-10-09 PROCEDURE — 97605 NEG PRS WND THER DME<=50SQCM: CPT | Performed by: PHYSICIAN ASSISTANT

## 2020-10-09 PROCEDURE — 97116 GAIT TRAINING THERAPY: CPT

## 2020-10-09 PROCEDURE — 97530 THERAPEUTIC ACTIVITIES: CPT

## 2020-10-09 PROCEDURE — 99233 SBSQ HOSP IP/OBS HIGH 50: CPT | Performed by: INTERNAL MEDICINE

## 2020-10-09 RX ADMIN — COLLAGENASE SANTYL: 250 OINTMENT TOPICAL at 09:06

## 2020-10-09 RX ADMIN — APIXABAN 10 MG: 5 TABLET, FILM COATED ORAL at 17:01

## 2020-10-09 RX ADMIN — MIDODRINE HYDROCHLORIDE 5 MG: 5 TABLET ORAL at 17:01

## 2020-10-09 RX ADMIN — MIDODRINE HYDROCHLORIDE 5 MG: 5 TABLET ORAL at 06:44

## 2020-10-09 RX ADMIN — NYSTATIN: 100000 POWDER TOPICAL at 17:02

## 2020-10-09 RX ADMIN — APIXABAN 10 MG: 5 TABLET, FILM COATED ORAL at 09:06

## 2020-10-09 RX ADMIN — NYSTATIN: 100000 POWDER TOPICAL at 09:06

## 2020-10-09 RX ADMIN — INSULIN GLARGINE 25 UNITS: 100 INJECTION, SOLUTION SUBCUTANEOUS at 22:27

## 2020-10-09 RX ADMIN — MIDODRINE HYDROCHLORIDE 5 MG: 5 TABLET ORAL at 11:20

## 2020-10-09 RX ADMIN — INSULIN LISPRO 1 UNITS: 100 INJECTION, SOLUTION INTRAVENOUS; SUBCUTANEOUS at 11:20

## 2020-10-10 PROBLEM — I42.9 CARDIOMYOPATHY (HCC): Status: RESOLVED | Noted: 2020-09-10 | Resolved: 2020-10-10

## 2020-10-10 PROBLEM — I50.42 CHRONIC COMBINED SYSTOLIC AND DIASTOLIC HEART FAILURE (HCC): Status: ACTIVE | Noted: 2020-09-22

## 2020-10-10 PROBLEM — R53.1 WEAKNESS GENERALIZED: Status: ACTIVE | Noted: 2020-10-10

## 2020-10-10 PROBLEM — I95.89 HYPOTENSION DUE TO HYPOVOLEMIA: Status: ACTIVE | Noted: 2020-09-19

## 2020-10-10 PROBLEM — E86.1 HYPOTENSION DUE TO HYPOVOLEMIA: Status: ACTIVE | Noted: 2020-09-19

## 2020-10-10 PROBLEM — J96.01 ACUTE RESPIRATORY FAILURE WITH HYPOXIA (HCC): Status: RESOLVED | Noted: 2020-09-02 | Resolved: 2020-10-10

## 2020-10-10 PROBLEM — R19.7 DIARRHEA: Status: RESOLVED | Noted: 2020-09-22 | Resolved: 2020-10-10

## 2020-10-10 PROBLEM — J69.0 ASPIRATION PNEUMONIA (HCC): Status: RESOLVED | Noted: 2020-09-30 | Resolved: 2020-10-10

## 2020-10-10 PROBLEM — N18.31 ACUTE RENAL FAILURE SUPERIMPOSED ON STAGE 3A CHRONIC KIDNEY DISEASE (HCC): Status: ACTIVE | Noted: 2020-09-19

## 2020-10-10 LAB
GLUCOSE SERPL-MCNC: 101 MG/DL (ref 65–140)
GLUCOSE SERPL-MCNC: 138 MG/DL (ref 65–140)
GLUCOSE SERPL-MCNC: 138 MG/DL (ref 65–140)
GLUCOSE SERPL-MCNC: 140 MG/DL (ref 65–140)
GLUCOSE SERPL-MCNC: 164 MG/DL (ref 65–140)

## 2020-10-10 PROCEDURE — 82948 REAGENT STRIP/BLOOD GLUCOSE: CPT

## 2020-10-10 PROCEDURE — 99233 SBSQ HOSP IP/OBS HIGH 50: CPT | Performed by: INTERNAL MEDICINE

## 2020-10-10 RX ADMIN — NYSTATIN: 100000 POWDER TOPICAL at 17:07

## 2020-10-10 RX ADMIN — NYSTATIN: 100000 POWDER TOPICAL at 08:09

## 2020-10-10 RX ADMIN — MIDODRINE HYDROCHLORIDE 5 MG: 5 TABLET ORAL at 08:08

## 2020-10-10 RX ADMIN — APIXABAN 10 MG: 5 TABLET, FILM COATED ORAL at 17:06

## 2020-10-10 RX ADMIN — MIDODRINE HYDROCHLORIDE 5 MG: 5 TABLET ORAL at 17:06

## 2020-10-10 RX ADMIN — APIXABAN 10 MG: 5 TABLET, FILM COATED ORAL at 08:08

## 2020-10-10 RX ADMIN — MIDODRINE HYDROCHLORIDE 5 MG: 5 TABLET ORAL at 12:55

## 2020-10-10 RX ADMIN — IRON SUCROSE 200 MG: 20 INJECTION, SOLUTION INTRAVENOUS at 09:52

## 2020-10-10 RX ADMIN — INSULIN GLARGINE 25 UNITS: 100 INJECTION, SOLUTION SUBCUTANEOUS at 21:42

## 2020-10-11 LAB
ANION GAP SERPL CALCULATED.3IONS-SCNC: 6 MMOL/L (ref 4–13)
BUN SERPL-MCNC: 27 MG/DL (ref 5–25)
CALCIUM SERPL-MCNC: 8.9 MG/DL (ref 8.3–10.1)
CHLORIDE SERPL-SCNC: 101 MMOL/L (ref 100–108)
CO2 SERPL-SCNC: 26 MMOL/L (ref 21–32)
CREAT SERPL-MCNC: 1.08 MG/DL (ref 0.6–1.3)
ERYTHROCYTE [DISTWIDTH] IN BLOOD BY AUTOMATED COUNT: 15.1 % (ref 11.6–15.1)
GFR SERPL CREATININE-BSD FRML MDRD: 66 ML/MIN/1.73SQ M
GLUCOSE SERPL-MCNC: 142 MG/DL (ref 65–140)
GLUCOSE SERPL-MCNC: 149 MG/DL (ref 65–140)
GLUCOSE SERPL-MCNC: 174 MG/DL (ref 65–140)
GLUCOSE SERPL-MCNC: 71 MG/DL (ref 65–140)
GLUCOSE SERPL-MCNC: 92 MG/DL (ref 65–140)
HCT VFR BLD AUTO: 30.1 % (ref 36.5–49.3)
HGB BLD-MCNC: 9 G/DL (ref 12–17)
MCH RBC QN AUTO: 26.7 PG (ref 26.8–34.3)
MCHC RBC AUTO-ENTMCNC: 29.9 G/DL (ref 31.4–37.4)
MCV RBC AUTO: 89 FL (ref 82–98)
PLATELET # BLD AUTO: 561 THOUSANDS/UL (ref 149–390)
PMV BLD AUTO: 9.9 FL (ref 8.9–12.7)
POTASSIUM SERPL-SCNC: 4.3 MMOL/L (ref 3.5–5.3)
PROCALCITONIN SERPL-MCNC: 0.32 NG/ML
RBC # BLD AUTO: 3.37 MILLION/UL (ref 3.88–5.62)
SODIUM SERPL-SCNC: 133 MMOL/L (ref 136–145)
WBC # BLD AUTO: 15.11 THOUSAND/UL (ref 4.31–10.16)

## 2020-10-11 PROCEDURE — 84145 PROCALCITONIN (PCT): CPT | Performed by: INTERNAL MEDICINE

## 2020-10-11 PROCEDURE — 99232 SBSQ HOSP IP/OBS MODERATE 35: CPT | Performed by: INTERNAL MEDICINE

## 2020-10-11 PROCEDURE — 80048 BASIC METABOLIC PNL TOTAL CA: CPT | Performed by: INTERNAL MEDICINE

## 2020-10-11 PROCEDURE — 82948 REAGENT STRIP/BLOOD GLUCOSE: CPT

## 2020-10-11 PROCEDURE — 85027 COMPLETE CBC AUTOMATED: CPT | Performed by: INTERNAL MEDICINE

## 2020-10-11 RX ADMIN — COLLAGENASE SANTYL 1 APPLICATION: 250 OINTMENT TOPICAL at 08:53

## 2020-10-11 RX ADMIN — NYSTATIN 1 APPLICATION: 100000 POWDER TOPICAL at 08:53

## 2020-10-11 RX ADMIN — IRON SUCROSE 200 MG: 20 INJECTION, SOLUTION INTRAVENOUS at 08:53

## 2020-10-11 RX ADMIN — INSULIN GLARGINE 25 UNITS: 100 INJECTION, SOLUTION SUBCUTANEOUS at 21:45

## 2020-10-11 RX ADMIN — NYSTATIN 1 APPLICATION: 100000 POWDER TOPICAL at 16:21

## 2020-10-11 RX ADMIN — MIDODRINE HYDROCHLORIDE 5 MG: 5 TABLET ORAL at 16:20

## 2020-10-11 RX ADMIN — MIDODRINE HYDROCHLORIDE 5 MG: 5 TABLET ORAL at 08:43

## 2020-10-11 RX ADMIN — APIXABAN 10 MG: 5 TABLET, FILM COATED ORAL at 16:21

## 2020-10-11 RX ADMIN — MIDODRINE HYDROCHLORIDE 5 MG: 5 TABLET ORAL at 11:28

## 2020-10-11 RX ADMIN — INSULIN LISPRO 1 UNITS: 100 INJECTION, SOLUTION INTRAVENOUS; SUBCUTANEOUS at 11:29

## 2020-10-11 RX ADMIN — APIXABAN 10 MG: 5 TABLET, FILM COATED ORAL at 08:42

## 2020-10-11 RX ADMIN — MIDODRINE HYDROCHLORIDE 5 MG: 5 TABLET ORAL at 06:06

## 2020-10-12 ENCOUNTER — APPOINTMENT (INPATIENT)
Dept: RADIOLOGY | Facility: HOSPITAL | Age: 75
DRG: 673 | End: 2020-10-12
Attending: INTERNAL MEDICINE
Payer: COMMERCIAL

## 2020-10-12 LAB
ANION GAP SERPL CALCULATED.3IONS-SCNC: 5 MMOL/L (ref 4–13)
BUN SERPL-MCNC: 30 MG/DL (ref 5–25)
CALCIUM SERPL-MCNC: 8.9 MG/DL (ref 8.3–10.1)
CHLORIDE SERPL-SCNC: 103 MMOL/L (ref 100–108)
CO2 SERPL-SCNC: 26 MMOL/L (ref 21–32)
CREAT SERPL-MCNC: 1.25 MG/DL (ref 0.6–1.3)
ERYTHROCYTE [DISTWIDTH] IN BLOOD BY AUTOMATED COUNT: 15.4 % (ref 11.6–15.1)
GFR SERPL CREATININE-BSD FRML MDRD: 55 ML/MIN/1.73SQ M
GLUCOSE SERPL-MCNC: 111 MG/DL (ref 65–140)
GLUCOSE SERPL-MCNC: 143 MG/DL (ref 65–140)
GLUCOSE SERPL-MCNC: 170 MG/DL (ref 65–140)
GLUCOSE SERPL-MCNC: 190 MG/DL (ref 65–140)
GLUCOSE SERPL-MCNC: 61 MG/DL (ref 65–140)
GLUCOSE SERPL-MCNC: 72 MG/DL (ref 65–140)
GLUCOSE SERPL-MCNC: 73 MG/DL (ref 65–140)
GLUCOSE SERPL-MCNC: 73 MG/DL (ref 65–140)
HCT VFR BLD AUTO: 29.1 % (ref 36.5–49.3)
HGB BLD-MCNC: 8.7 G/DL (ref 12–17)
MCH RBC QN AUTO: 26.4 PG (ref 26.8–34.3)
MCHC RBC AUTO-ENTMCNC: 29.9 G/DL (ref 31.4–37.4)
MCV RBC AUTO: 88 FL (ref 82–98)
PLATELET # BLD AUTO: 600 THOUSANDS/UL (ref 149–390)
PMV BLD AUTO: 9.8 FL (ref 8.9–12.7)
POTASSIUM SERPL-SCNC: 4.2 MMOL/L (ref 3.5–5.3)
PROCALCITONIN SERPL-MCNC: 1.63 NG/ML
RBC # BLD AUTO: 3.3 MILLION/UL (ref 3.88–5.62)
SODIUM SERPL-SCNC: 134 MMOL/L (ref 136–145)
WBC # BLD AUTO: 20.54 THOUSAND/UL (ref 4.31–10.16)

## 2020-10-12 PROCEDURE — 71045 X-RAY EXAM CHEST 1 VIEW: CPT

## 2020-10-12 PROCEDURE — 84145 PROCALCITONIN (PCT): CPT | Performed by: INTERNAL MEDICINE

## 2020-10-12 PROCEDURE — 99233 SBSQ HOSP IP/OBS HIGH 50: CPT | Performed by: INTERNAL MEDICINE

## 2020-10-12 PROCEDURE — 85027 COMPLETE CBC AUTOMATED: CPT | Performed by: INTERNAL MEDICINE

## 2020-10-12 PROCEDURE — 82948 REAGENT STRIP/BLOOD GLUCOSE: CPT

## 2020-10-12 PROCEDURE — 80048 BASIC METABOLIC PNL TOTAL CA: CPT | Performed by: INTERNAL MEDICINE

## 2020-10-12 PROCEDURE — 97535 SELF CARE MNGMENT TRAINING: CPT

## 2020-10-12 PROCEDURE — 97530 THERAPEUTIC ACTIVITIES: CPT

## 2020-10-12 RX ORDER — SODIUM CHLORIDE, SODIUM GLUCONATE, SODIUM ACETATE, POTASSIUM CHLORIDE, MAGNESIUM CHLORIDE, SODIUM PHOSPHATE, DIBASIC, AND POTASSIUM PHOSPHATE .53; .5; .37; .037; .03; .012; .00082 G/100ML; G/100ML; G/100ML; G/100ML; G/100ML; G/100ML; G/100ML
50 INJECTION, SOLUTION INTRAVENOUS CONTINUOUS
Status: DISPENSED | OUTPATIENT
Start: 2020-10-12 | End: 2020-10-12

## 2020-10-12 RX ORDER — INSULIN GLARGINE 100 [IU]/ML
18 INJECTION, SOLUTION SUBCUTANEOUS
Status: DISCONTINUED | OUTPATIENT
Start: 2020-10-12 | End: 2020-10-16

## 2020-10-12 RX ADMIN — INSULIN LISPRO 1 UNITS: 100 INJECTION, SOLUTION INTRAVENOUS; SUBCUTANEOUS at 17:23

## 2020-10-12 RX ADMIN — COLLAGENASE SANTYL: 250 OINTMENT TOPICAL at 09:51

## 2020-10-12 RX ADMIN — APIXABAN 10 MG: 5 TABLET, FILM COATED ORAL at 09:51

## 2020-10-12 RX ADMIN — Medication 4 G: at 08:29

## 2020-10-12 RX ADMIN — NYSTATIN: 100000 POWDER TOPICAL at 09:51

## 2020-10-12 RX ADMIN — IRON SUCROSE 200 MG: 20 INJECTION, SOLUTION INTRAVENOUS at 09:32

## 2020-10-12 RX ADMIN — NYSTATIN: 100000 POWDER TOPICAL at 17:24

## 2020-10-12 RX ADMIN — MIDODRINE HYDROCHLORIDE 5 MG: 5 TABLET ORAL at 17:22

## 2020-10-12 RX ADMIN — SODIUM CHLORIDE, SODIUM GLUCONATE, SODIUM ACETATE, POTASSIUM CHLORIDE, MAGNESIUM CHLORIDE, SODIUM PHOSPHATE, DIBASIC, AND POTASSIUM PHOSPHATE 50 ML/HR: .53; .5; .37; .037; .03; .012; .00082 INJECTION, SOLUTION INTRAVENOUS at 11:40

## 2020-10-12 RX ADMIN — APIXABAN 10 MG: 5 TABLET, FILM COATED ORAL at 17:22

## 2020-10-12 RX ADMIN — MIDODRINE HYDROCHLORIDE 5 MG: 5 TABLET ORAL at 11:40

## 2020-10-12 RX ADMIN — INSULIN GLARGINE 18 UNITS: 100 INJECTION, SOLUTION SUBCUTANEOUS at 22:15

## 2020-10-12 RX ADMIN — MIDODRINE HYDROCHLORIDE 5 MG: 5 TABLET ORAL at 06:24

## 2020-10-13 LAB
ALBUMIN SERPL BCP-MCNC: 2.1 G/DL (ref 3.5–5)
ALP SERPL-CCNC: 110 U/L (ref 46–116)
ALT SERPL W P-5'-P-CCNC: 24 U/L (ref 12–78)
ANION GAP SERPL CALCULATED.3IONS-SCNC: 7 MMOL/L (ref 4–13)
AST SERPL W P-5'-P-CCNC: 41 U/L (ref 5–45)
BILIRUB SERPL-MCNC: 0.47 MG/DL (ref 0.2–1)
BUN SERPL-MCNC: 36 MG/DL (ref 5–25)
CALCIUM ALBUM COR SERPL-MCNC: 10.2 MG/DL (ref 8.3–10.1)
CALCIUM SERPL-MCNC: 8.7 MG/DL (ref 8.3–10.1)
CHLORIDE SERPL-SCNC: 103 MMOL/L (ref 100–108)
CO2 SERPL-SCNC: 24 MMOL/L (ref 21–32)
CREAT SERPL-MCNC: 1.3 MG/DL (ref 0.6–1.3)
ERYTHROCYTE [DISTWIDTH] IN BLOOD BY AUTOMATED COUNT: 15.4 % (ref 11.6–15.1)
GFR SERPL CREATININE-BSD FRML MDRD: 53 ML/MIN/1.73SQ M
GLUCOSE SERPL-MCNC: 100 MG/DL (ref 65–140)
GLUCOSE SERPL-MCNC: 100 MG/DL (ref 65–140)
GLUCOSE SERPL-MCNC: 150 MG/DL (ref 65–140)
GLUCOSE SERPL-MCNC: 166 MG/DL (ref 65–140)
GLUCOSE SERPL-MCNC: 82 MG/DL (ref 65–140)
HCT VFR BLD AUTO: 23.4 % (ref 36.5–49.3)
HGB BLD-MCNC: 7.1 G/DL (ref 12–17)
MCH RBC QN AUTO: 26.5 PG (ref 26.8–34.3)
MCHC RBC AUTO-ENTMCNC: 30.3 G/DL (ref 31.4–37.4)
MCV RBC AUTO: 87 FL (ref 82–98)
PLATELET # BLD AUTO: 434 THOUSANDS/UL (ref 149–390)
PMV BLD AUTO: 9.6 FL (ref 8.9–12.7)
POTASSIUM SERPL-SCNC: 4.1 MMOL/L (ref 3.5–5.3)
PROT SERPL-MCNC: 7.3 G/DL (ref 6.4–8.2)
RBC # BLD AUTO: 2.68 MILLION/UL (ref 3.88–5.62)
SODIUM SERPL-SCNC: 134 MMOL/L (ref 136–145)
WBC # BLD AUTO: 19.46 THOUSAND/UL (ref 4.31–10.16)

## 2020-10-13 PROCEDURE — 80053 COMPREHEN METABOLIC PANEL: CPT | Performed by: INTERNAL MEDICINE

## 2020-10-13 PROCEDURE — 85027 COMPLETE CBC AUTOMATED: CPT | Performed by: INTERNAL MEDICINE

## 2020-10-13 PROCEDURE — 99232 SBSQ HOSP IP/OBS MODERATE 35: CPT | Performed by: INTERNAL MEDICINE

## 2020-10-13 PROCEDURE — 82948 REAGENT STRIP/BLOOD GLUCOSE: CPT

## 2020-10-13 RX ADMIN — INSULIN LISPRO 1 UNITS: 100 INJECTION, SOLUTION INTRAVENOUS; SUBCUTANEOUS at 11:37

## 2020-10-13 RX ADMIN — MIDODRINE HYDROCHLORIDE 5 MG: 5 TABLET ORAL at 16:47

## 2020-10-13 RX ADMIN — NYSTATIN: 100000 POWDER TOPICAL at 16:49

## 2020-10-13 RX ADMIN — INSULIN GLARGINE 18 UNITS: 100 INJECTION, SOLUTION SUBCUTANEOUS at 22:18

## 2020-10-13 RX ADMIN — MIDODRINE HYDROCHLORIDE 5 MG: 5 TABLET ORAL at 08:31

## 2020-10-13 RX ADMIN — MIDODRINE HYDROCHLORIDE 5 MG: 5 TABLET ORAL at 11:38

## 2020-10-13 RX ADMIN — NYSTATIN: 100000 POWDER TOPICAL at 08:33

## 2020-10-13 RX ADMIN — APIXABAN 10 MG: 5 TABLET, FILM COATED ORAL at 08:31

## 2020-10-13 RX ADMIN — APIXABAN 10 MG: 5 TABLET, FILM COATED ORAL at 16:47

## 2020-10-14 LAB
GLUCOSE SERPL-MCNC: 106 MG/DL (ref 65–140)
GLUCOSE SERPL-MCNC: 181 MG/DL (ref 65–140)
GLUCOSE SERPL-MCNC: 58 MG/DL (ref 65–140)
GLUCOSE SERPL-MCNC: 79 MG/DL (ref 65–140)
GLUCOSE SERPL-MCNC: 89 MG/DL (ref 65–140)
GLUCOSE SERPL-MCNC: 93 MG/DL (ref 65–140)

## 2020-10-14 PROCEDURE — 97530 THERAPEUTIC ACTIVITIES: CPT

## 2020-10-14 PROCEDURE — 82948 REAGENT STRIP/BLOOD GLUCOSE: CPT

## 2020-10-14 PROCEDURE — 97535 SELF CARE MNGMENT TRAINING: CPT

## 2020-10-14 PROCEDURE — 97110 THERAPEUTIC EXERCISES: CPT

## 2020-10-14 PROCEDURE — 99232 SBSQ HOSP IP/OBS MODERATE 35: CPT | Performed by: INTERNAL MEDICINE

## 2020-10-14 RX ORDER — SODIUM CHLORIDE 9 MG/ML
75 INJECTION, SOLUTION INTRAVENOUS CONTINUOUS
Status: DISCONTINUED | OUTPATIENT
Start: 2020-10-14 | End: 2020-10-15

## 2020-10-14 RX ADMIN — NYSTATIN: 100000 POWDER TOPICAL at 18:01

## 2020-10-14 RX ADMIN — NYSTATIN: 100000 POWDER TOPICAL at 09:37

## 2020-10-14 RX ADMIN — OXYCODONE HYDROCHLORIDE 2.5 MG: 5 TABLET ORAL at 13:47

## 2020-10-14 RX ADMIN — COLLAGENASE SANTYL: 250 OINTMENT TOPICAL at 09:38

## 2020-10-14 RX ADMIN — APIXABAN 10 MG: 5 TABLET, FILM COATED ORAL at 09:35

## 2020-10-14 RX ADMIN — SODIUM CHLORIDE 75 ML/HR: 0.9 INJECTION, SOLUTION INTRAVENOUS at 09:35

## 2020-10-14 RX ADMIN — INSULIN LISPRO 1 UNITS: 100 INJECTION, SOLUTION INTRAVENOUS; SUBCUTANEOUS at 13:45

## 2020-10-14 RX ADMIN — APIXABAN 5 MG: 5 TABLET, FILM COATED ORAL at 18:01

## 2020-10-14 RX ADMIN — MIDODRINE HYDROCHLORIDE 5 MG: 5 TABLET ORAL at 06:14

## 2020-10-14 RX ADMIN — MIDODRINE HYDROCHLORIDE 5 MG: 5 TABLET ORAL at 18:01

## 2020-10-14 RX ADMIN — MIDODRINE HYDROCHLORIDE 5 MG: 5 TABLET ORAL at 13:47

## 2020-10-14 RX ADMIN — COLLAGENASE SANTYL: 250 OINTMENT TOPICAL at 13:45

## 2020-10-15 ENCOUNTER — ANESTHESIA EVENT (INPATIENT)
Dept: PERIOP | Facility: HOSPITAL | Age: 75
DRG: 673 | End: 2020-10-15
Payer: COMMERCIAL

## 2020-10-15 ENCOUNTER — ANESTHESIA (INPATIENT)
Dept: PERIOP | Facility: HOSPITAL | Age: 75
DRG: 673 | End: 2020-10-15
Payer: COMMERCIAL

## 2020-10-15 VITALS — HEART RATE: 82 BPM

## 2020-10-15 LAB
ABO GROUP BLD: NORMAL
BASOPHILS # BLD AUTO: 0.05 THOUSANDS/ΜL (ref 0–0.1)
BASOPHILS NFR BLD AUTO: 0 % (ref 0–1)
BLD GP AB SCN SERPL QL: NEGATIVE
EOSINOPHIL # BLD AUTO: 0.37 THOUSAND/ΜL (ref 0–0.61)
EOSINOPHIL NFR BLD AUTO: 3 % (ref 0–6)
ERYTHROCYTE [DISTWIDTH] IN BLOOD BY AUTOMATED COUNT: 15.5 % (ref 11.6–15.1)
GLUCOSE SERPL-MCNC: 113 MG/DL (ref 65–140)
GLUCOSE SERPL-MCNC: 130 MG/DL (ref 65–140)
GLUCOSE SERPL-MCNC: 145 MG/DL (ref 65–140)
GLUCOSE SERPL-MCNC: 314 MG/DL (ref 65–140)
GLUCOSE SERPL-MCNC: 320 MG/DL (ref 65–140)
GLUCOSE SERPL-MCNC: 62 MG/DL (ref 65–140)
HCT VFR BLD AUTO: 22.3 % (ref 36.5–49.3)
HGB BLD-MCNC: 6.8 G/DL (ref 12–17)
IMM GRANULOCYTES # BLD AUTO: 0.11 THOUSAND/UL (ref 0–0.2)
IMM GRANULOCYTES NFR BLD AUTO: 1 % (ref 0–2)
LYMPHOCYTES # BLD AUTO: 1.45 THOUSANDS/ΜL (ref 0.6–4.47)
LYMPHOCYTES NFR BLD AUTO: 12 % (ref 14–44)
MCH RBC QN AUTO: 26.4 PG (ref 26.8–34.3)
MCHC RBC AUTO-ENTMCNC: 29.6 G/DL (ref 31.4–37.4)
MCV RBC AUTO: 89 FL (ref 82–98)
MONOCYTES # BLD AUTO: 1.13 THOUSAND/ΜL (ref 0.17–1.22)
MONOCYTES NFR BLD AUTO: 10 % (ref 4–12)
NEUTROPHILS # BLD AUTO: 8.58 THOUSANDS/ΜL (ref 1.85–7.62)
NEUTS SEG NFR BLD AUTO: 74 % (ref 43–75)
NRBC BLD AUTO-RTO: 0 /100 WBCS
PLATELET # BLD AUTO: 439 THOUSANDS/UL (ref 149–390)
PMV BLD AUTO: 9.9 FL (ref 8.9–12.7)
RBC # BLD AUTO: 2.5 MILLION/UL (ref 3.88–5.62)
RH BLD: POSITIVE
SPECIMEN EXPIRATION DATE: NORMAL
WBC # BLD AUTO: 11.69 THOUSAND/UL (ref 4.31–10.16)

## 2020-10-15 PROCEDURE — 11042 DBRDMT SUBQ TIS 1ST 20SQCM/<: CPT | Performed by: SURGERY

## 2020-10-15 PROCEDURE — 97606 NEG PRS WND THER DME>50 SQCM: CPT | Performed by: SURGERY

## 2020-10-15 PROCEDURE — 86923 COMPATIBILITY TEST ELECTRIC: CPT

## 2020-10-15 PROCEDURE — 11045 DBRDMT SUBQ TISS EACH ADDL: CPT | Performed by: SURGERY

## 2020-10-15 PROCEDURE — 86900 BLOOD TYPING SEROLOGIC ABO: CPT | Performed by: STUDENT IN AN ORGANIZED HEALTH CARE EDUCATION/TRAINING PROGRAM

## 2020-10-15 PROCEDURE — 85025 COMPLETE CBC W/AUTO DIFF WBC: CPT | Performed by: STUDENT IN AN ORGANIZED HEALTH CARE EDUCATION/TRAINING PROGRAM

## 2020-10-15 PROCEDURE — 0JB90ZZ EXCISION OF BUTTOCK SUBCUTANEOUS TISSUE AND FASCIA, OPEN APPROACH: ICD-10-PCS | Performed by: SURGERY

## 2020-10-15 PROCEDURE — 82948 REAGENT STRIP/BLOOD GLUCOSE: CPT

## 2020-10-15 PROCEDURE — 86901 BLOOD TYPING SEROLOGIC RH(D): CPT | Performed by: STUDENT IN AN ORGANIZED HEALTH CARE EDUCATION/TRAINING PROGRAM

## 2020-10-15 PROCEDURE — 86850 RBC ANTIBODY SCREEN: CPT | Performed by: STUDENT IN AN ORGANIZED HEALTH CARE EDUCATION/TRAINING PROGRAM

## 2020-10-15 PROCEDURE — 99232 SBSQ HOSP IP/OBS MODERATE 35: CPT | Performed by: SURGERY

## 2020-10-15 PROCEDURE — 99232 SBSQ HOSP IP/OBS MODERATE 35: CPT | Performed by: INTERNAL MEDICINE

## 2020-10-15 PROCEDURE — 92526 ORAL FUNCTION THERAPY: CPT

## 2020-10-15 PROCEDURE — P9016 RBC LEUKOCYTES REDUCED: HCPCS

## 2020-10-15 RX ORDER — SODIUM CHLORIDE, SODIUM LACTATE, POTASSIUM CHLORIDE, CALCIUM CHLORIDE 600; 310; 30; 20 MG/100ML; MG/100ML; MG/100ML; MG/100ML
INJECTION, SOLUTION INTRAVENOUS CONTINUOUS PRN
Status: DISCONTINUED | OUTPATIENT
Start: 2020-10-15 | End: 2020-10-15

## 2020-10-15 RX ORDER — PROPOFOL 10 MG/ML
INJECTION, EMULSION INTRAVENOUS AS NEEDED
Status: DISCONTINUED | OUTPATIENT
Start: 2020-10-15 | End: 2020-10-15

## 2020-10-15 RX ORDER — DEXAMETHASONE SODIUM PHOSPHATE 10 MG/ML
INJECTION, SOLUTION INTRAMUSCULAR; INTRAVENOUS AS NEEDED
Status: DISCONTINUED | OUTPATIENT
Start: 2020-10-15 | End: 2020-10-15

## 2020-10-15 RX ORDER — SODIUM CHLORIDE 9 MG/ML
75 INJECTION, SOLUTION INTRAVENOUS CONTINUOUS
Status: DISCONTINUED | OUTPATIENT
Start: 2020-10-15 | End: 2020-10-16

## 2020-10-15 RX ORDER — MAGNESIUM HYDROXIDE 1200 MG/15ML
LIQUID ORAL AS NEEDED
Status: DISCONTINUED | OUTPATIENT
Start: 2020-10-15 | End: 2020-10-15 | Stop reason: HOSPADM

## 2020-10-15 RX ORDER — CEFAZOLIN SODIUM 1 G/3ML
INJECTION, POWDER, FOR SOLUTION INTRAMUSCULAR; INTRAVENOUS AS NEEDED
Status: DISCONTINUED | OUTPATIENT
Start: 2020-10-15 | End: 2020-10-15

## 2020-10-15 RX ORDER — ONDANSETRON 2 MG/ML
INJECTION INTRAMUSCULAR; INTRAVENOUS AS NEEDED
Status: DISCONTINUED | OUTPATIENT
Start: 2020-10-15 | End: 2020-10-15

## 2020-10-15 RX ORDER — LIDOCAINE HYDROCHLORIDE 10 MG/ML
INJECTION, SOLUTION EPIDURAL; INFILTRATION; INTRACAUDAL; PERINEURAL AS NEEDED
Status: DISCONTINUED | OUTPATIENT
Start: 2020-10-15 | End: 2020-10-15

## 2020-10-15 RX ORDER — DEXTROSE MONOHYDRATE 25 G/50ML
INJECTION, SOLUTION INTRAVENOUS
Status: COMPLETED
Start: 2020-10-15 | End: 2020-10-15

## 2020-10-15 RX ORDER — DEXTROSE MONOHYDRATE 25 G/50ML
50 INJECTION, SOLUTION INTRAVENOUS ONCE
Status: DISCONTINUED | OUTPATIENT
Start: 2020-10-15 | End: 2020-10-17

## 2020-10-15 RX ORDER — FENTANYL CITRATE 50 UG/ML
INJECTION, SOLUTION INTRAMUSCULAR; INTRAVENOUS AS NEEDED
Status: DISCONTINUED | OUTPATIENT
Start: 2020-10-15 | End: 2020-10-15

## 2020-10-15 RX ORDER — SODIUM CHLORIDE 9 MG/ML
INJECTION, SOLUTION INTRAVENOUS CONTINUOUS PRN
Status: DISCONTINUED | OUTPATIENT
Start: 2020-10-15 | End: 2020-10-15

## 2020-10-15 RX ORDER — FENTANYL CITRATE/PF 50 MCG/ML
25 SYRINGE (ML) INJECTION
Status: DISCONTINUED | OUTPATIENT
Start: 2020-10-15 | End: 2020-10-15 | Stop reason: HOSPADM

## 2020-10-15 RX ORDER — ALBUMIN, HUMAN INJ 5% 5 %
SOLUTION INTRAVENOUS CONTINUOUS PRN
Status: DISCONTINUED | OUTPATIENT
Start: 2020-10-15 | End: 2020-10-15

## 2020-10-15 RX ADMIN — PHENYLEPHRINE HYDROCHLORIDE 200 MCG: 10 INJECTION INTRAVENOUS at 11:15

## 2020-10-15 RX ADMIN — APIXABAN 5 MG: 5 TABLET, FILM COATED ORAL at 17:13

## 2020-10-15 RX ADMIN — PHENYLEPHRINE HYDROCHLORIDE 200 MCG: 10 INJECTION INTRAVENOUS at 10:50

## 2020-10-15 RX ADMIN — PHENYLEPHRINE HYDROCHLORIDE 300 MCG: 10 INJECTION INTRAVENOUS at 10:41

## 2020-10-15 RX ADMIN — CEFAZOLIN 2000 MG: 1 INJECTION, POWDER, FOR SOLUTION INTRAVENOUS at 10:15

## 2020-10-15 RX ADMIN — NYSTATIN: 100000 POWDER TOPICAL at 08:53

## 2020-10-15 RX ADMIN — PHENYLEPHRINE HYDROCHLORIDE 300 MCG: 10 INJECTION INTRAVENOUS at 10:56

## 2020-10-15 RX ADMIN — SODIUM CHLORIDE 75 ML/HR: 0.9 INJECTION, SOLUTION INTRAVENOUS at 12:00

## 2020-10-15 RX ADMIN — INSULIN LISPRO 5 UNITS: 100 INJECTION, SOLUTION INTRAVENOUS; SUBCUTANEOUS at 17:00

## 2020-10-15 RX ADMIN — SODIUM CHLORIDE 75 ML/HR: 0.9 INJECTION, SOLUTION INTRAVENOUS at 17:14

## 2020-10-15 RX ADMIN — MIDODRINE HYDROCHLORIDE 5 MG: 5 TABLET ORAL at 06:06

## 2020-10-15 RX ADMIN — SODIUM CHLORIDE: 0.9 INJECTION, SOLUTION INTRAVENOUS at 10:07

## 2020-10-15 RX ADMIN — INSULIN GLARGINE 9 UNITS: 100 INJECTION, SOLUTION SUBCUTANEOUS at 00:02

## 2020-10-15 RX ADMIN — ONDANSETRON 4 MG: 2 INJECTION INTRAMUSCULAR; INTRAVENOUS at 10:02

## 2020-10-15 RX ADMIN — PHENYLEPHRINE HYDROCHLORIDE 300 MCG: 10 INJECTION INTRAVENOUS at 10:35

## 2020-10-15 RX ADMIN — ALBUMIN (HUMAN): 12.5 INJECTION, SOLUTION INTRAVENOUS at 10:33

## 2020-10-15 RX ADMIN — SODIUM CHLORIDE, SODIUM LACTATE, POTASSIUM CHLORIDE, AND CALCIUM CHLORIDE: .6; .31; .03; .02 INJECTION, SOLUTION INTRAVENOUS at 09:55

## 2020-10-15 RX ADMIN — COLLAGENASE SANTYL: 250 OINTMENT TOPICAL at 08:52

## 2020-10-15 RX ADMIN — PHENYLEPHRINE HYDROCHLORIDE 200 MCG: 10 INJECTION INTRAVENOUS at 10:47

## 2020-10-15 RX ADMIN — PHENYLEPHRINE HYDROCHLORIDE 200 MCG: 10 INJECTION INTRAVENOUS at 10:29

## 2020-10-15 RX ADMIN — MIDODRINE HYDROCHLORIDE 5 MG: 5 TABLET ORAL at 13:01

## 2020-10-15 RX ADMIN — LIDOCAINE HYDROCHLORIDE 50 MG: 10 INJECTION, SOLUTION EPIDURAL; INFILTRATION; INTRACAUDAL; PERINEURAL at 10:02

## 2020-10-15 RX ADMIN — PHENYLEPHRINE HYDROCHLORIDE 200 MCG: 10 INJECTION INTRAVENOUS at 11:20

## 2020-10-15 RX ADMIN — PHENYLEPHRINE HYDROCHLORIDE 300 MCG: 10 INJECTION INTRAVENOUS at 11:02

## 2020-10-15 RX ADMIN — PHENYLEPHRINE HYDROCHLORIDE 200 MCG: 10 INJECTION INTRAVENOUS at 11:09

## 2020-10-15 RX ADMIN — NYSTATIN 1 APPLICATION: 100000 POWDER TOPICAL at 17:14

## 2020-10-15 RX ADMIN — DEXTROSE MONOHYDRATE 50 ML: 25 INJECTION, SOLUTION INTRAVENOUS at 03:31

## 2020-10-15 RX ADMIN — FENTANYL CITRATE 12.5 MCG: 50 INJECTION, SOLUTION INTRAMUSCULAR; INTRAVENOUS at 10:27

## 2020-10-15 RX ADMIN — PHENYLEPHRINE HYDROCHLORIDE 300 MCG: 10 INJECTION INTRAVENOUS at 10:23

## 2020-10-15 RX ADMIN — DEXAMETHASONE SODIUM PHOSPHATE 5 MG: 10 INJECTION, SOLUTION INTRAMUSCULAR; INTRAVENOUS at 10:05

## 2020-10-15 RX ADMIN — INSULIN GLARGINE 18 UNITS: 100 INJECTION, SOLUTION SUBCUTANEOUS at 22:20

## 2020-10-15 RX ADMIN — PHENYLEPHRINE HYDROCHLORIDE 200 MCG: 10 INJECTION INTRAVENOUS at 10:13

## 2020-10-15 RX ADMIN — PHENYLEPHRINE HYDROCHLORIDE 200 MCG: 10 INJECTION INTRAVENOUS at 11:33

## 2020-10-15 RX ADMIN — PROPOFOL 120 MG: 10 INJECTION, EMULSION INTRAVENOUS at 10:02

## 2020-10-15 RX ADMIN — Medication 25 MCG: at 11:55

## 2020-10-15 RX ADMIN — PHENYLEPHRINE HYDROCHLORIDE 300 MCG: 10 INJECTION INTRAVENOUS at 10:20

## 2020-10-15 RX ADMIN — MIDODRINE HYDROCHLORIDE 5 MG: 5 TABLET ORAL at 17:00

## 2020-10-15 RX ADMIN — FENTANYL CITRATE 12.5 MCG: 50 INJECTION, SOLUTION INTRAMUSCULAR; INTRAVENOUS at 10:34

## 2020-10-15 RX ADMIN — PHENYLEPHRINE HYDROCHLORIDE 200 MCG: 10 INJECTION INTRAVENOUS at 10:08

## 2020-10-16 LAB
ABO GROUP BLD BPU: NORMAL
ANION GAP SERPL CALCULATED.3IONS-SCNC: 6 MMOL/L (ref 4–13)
BASOPHILS # BLD AUTO: 0.02 THOUSANDS/ΜL (ref 0–0.1)
BASOPHILS NFR BLD AUTO: 0 % (ref 0–1)
BPU ID: NORMAL
BUN SERPL-MCNC: 25 MG/DL (ref 5–25)
CALCIUM SERPL-MCNC: 7.3 MG/DL (ref 8.3–10.1)
CHLORIDE SERPL-SCNC: 110 MMOL/L (ref 100–108)
CO2 SERPL-SCNC: 21 MMOL/L (ref 21–32)
CREAT SERPL-MCNC: 0.78 MG/DL (ref 0.6–1.3)
CROSSMATCH: NORMAL
EOSINOPHIL # BLD AUTO: 0 THOUSAND/ΜL (ref 0–0.61)
EOSINOPHIL NFR BLD AUTO: 0 % (ref 0–6)
ERYTHROCYTE [DISTWIDTH] IN BLOOD BY AUTOMATED COUNT: 15.4 % (ref 11.6–15.1)
GFR SERPL CREATININE-BSD FRML MDRD: 87 ML/MIN/1.73SQ M
GLUCOSE SERPL-MCNC: 254 MG/DL (ref 65–140)
GLUCOSE SERPL-MCNC: 290 MG/DL (ref 65–140)
GLUCOSE SERPL-MCNC: 314 MG/DL (ref 65–140)
GLUCOSE SERPL-MCNC: 349 MG/DL (ref 65–140)
GLUCOSE SERPL-MCNC: 430 MG/DL (ref 65–140)
GLUCOSE SERPL-MCNC: 441 MG/DL (ref 65–140)
HCT VFR BLD AUTO: 23.7 % (ref 36.5–49.3)
HGB BLD-MCNC: 7.4 G/DL (ref 12–17)
IMM GRANULOCYTES # BLD AUTO: 0.14 THOUSAND/UL (ref 0–0.2)
IMM GRANULOCYTES NFR BLD AUTO: 1 % (ref 0–2)
LYMPHOCYTES # BLD AUTO: 0.99 THOUSANDS/ΜL (ref 0.6–4.47)
LYMPHOCYTES NFR BLD AUTO: 8 % (ref 14–44)
MCH RBC QN AUTO: 27 PG (ref 26.8–34.3)
MCHC RBC AUTO-ENTMCNC: 31.2 G/DL (ref 31.4–37.4)
MCV RBC AUTO: 87 FL (ref 82–98)
MONOCYTES # BLD AUTO: 0.54 THOUSAND/ΜL (ref 0.17–1.22)
MONOCYTES NFR BLD AUTO: 5 % (ref 4–12)
NEUTROPHILS # BLD AUTO: 10.05 THOUSANDS/ΜL (ref 1.85–7.62)
NEUTS SEG NFR BLD AUTO: 86 % (ref 43–75)
NRBC BLD AUTO-RTO: 0 /100 WBCS
PLATELET # BLD AUTO: 436 THOUSANDS/UL (ref 149–390)
PMV BLD AUTO: 10.2 FL (ref 8.9–12.7)
POTASSIUM SERPL-SCNC: 4.4 MMOL/L (ref 3.5–5.3)
RBC # BLD AUTO: 2.74 MILLION/UL (ref 3.88–5.62)
SODIUM SERPL-SCNC: 137 MMOL/L (ref 136–145)
UNIT DISPENSE STATUS: NORMAL
UNIT PRODUCT CODE: NORMAL
UNIT RH: NORMAL
WBC # BLD AUTO: 11.74 THOUSAND/UL (ref 4.31–10.16)

## 2020-10-16 PROCEDURE — 99232 SBSQ HOSP IP/OBS MODERATE 35: CPT | Performed by: INTERNAL MEDICINE

## 2020-10-16 PROCEDURE — 82948 REAGENT STRIP/BLOOD GLUCOSE: CPT

## 2020-10-16 PROCEDURE — 97535 SELF CARE MNGMENT TRAINING: CPT

## 2020-10-16 PROCEDURE — 80048 BASIC METABOLIC PNL TOTAL CA: CPT | Performed by: SURGERY

## 2020-10-16 PROCEDURE — 85025 COMPLETE CBC W/AUTO DIFF WBC: CPT | Performed by: SURGERY

## 2020-10-16 PROCEDURE — 97530 THERAPEUTIC ACTIVITIES: CPT

## 2020-10-16 PROCEDURE — 97116 GAIT TRAINING THERAPY: CPT

## 2020-10-16 RX ORDER — INSULIN GLARGINE 100 [IU]/ML
25 INJECTION, SOLUTION SUBCUTANEOUS
Status: DISCONTINUED | OUTPATIENT
Start: 2020-10-16 | End: 2020-10-17

## 2020-10-16 RX ADMIN — NYSTATIN: 100000 POWDER TOPICAL at 08:37

## 2020-10-16 RX ADMIN — INSULIN LISPRO 10 UNITS: 100 INJECTION, SOLUTION INTRAVENOUS; SUBCUTANEOUS at 16:55

## 2020-10-16 RX ADMIN — MIDODRINE HYDROCHLORIDE 5 MG: 5 TABLET ORAL at 11:22

## 2020-10-16 RX ADMIN — INSULIN LISPRO 5 UNITS: 100 INJECTION, SOLUTION INTRAVENOUS; SUBCUTANEOUS at 16:52

## 2020-10-16 RX ADMIN — COLLAGENASE SANTYL: 250 OINTMENT TOPICAL at 08:37

## 2020-10-16 RX ADMIN — INSULIN LISPRO 4 UNITS: 100 INJECTION, SOLUTION INTRAVENOUS; SUBCUTANEOUS at 08:35

## 2020-10-16 RX ADMIN — APIXABAN 5 MG: 5 TABLET, FILM COATED ORAL at 08:36

## 2020-10-16 RX ADMIN — MIDODRINE HYDROCHLORIDE 5 MG: 5 TABLET ORAL at 16:56

## 2020-10-16 RX ADMIN — INSULIN LISPRO 10 UNITS: 100 INJECTION, SOLUTION INTRAVENOUS; SUBCUTANEOUS at 15:00

## 2020-10-16 RX ADMIN — INSULIN LISPRO 6 UNITS: 100 INJECTION, SOLUTION INTRAVENOUS; SUBCUTANEOUS at 11:22

## 2020-10-16 RX ADMIN — MIDODRINE HYDROCHLORIDE 5 MG: 5 TABLET ORAL at 08:36

## 2020-10-16 RX ADMIN — APIXABAN 5 MG: 5 TABLET, FILM COATED ORAL at 16:56

## 2020-10-16 RX ADMIN — SODIUM CHLORIDE 75 ML/HR: 0.9 INJECTION, SOLUTION INTRAVENOUS at 02:25

## 2020-10-16 RX ADMIN — NYSTATIN: 100000 POWDER TOPICAL at 16:56

## 2020-10-16 RX ADMIN — INSULIN GLARGINE 25 UNITS: 100 INJECTION, SOLUTION SUBCUTANEOUS at 22:43

## 2020-10-17 LAB
GLUCOSE SERPL-MCNC: 117 MG/DL (ref 65–140)
GLUCOSE SERPL-MCNC: 126 MG/DL (ref 65–140)
GLUCOSE SERPL-MCNC: 150 MG/DL (ref 65–140)
GLUCOSE SERPL-MCNC: 209 MG/DL (ref 65–140)
GLUCOSE SERPL-MCNC: 24 MG/DL (ref 65–140)
GLUCOSE SERPL-MCNC: 27 MG/DL (ref 65–140)

## 2020-10-17 PROCEDURE — 99232 SBSQ HOSP IP/OBS MODERATE 35: CPT | Performed by: INTERNAL MEDICINE

## 2020-10-17 PROCEDURE — 82948 REAGENT STRIP/BLOOD GLUCOSE: CPT

## 2020-10-17 RX ORDER — INSULIN GLARGINE 100 [IU]/ML
30 INJECTION, SOLUTION SUBCUTANEOUS
Status: DISCONTINUED | OUTPATIENT
Start: 2020-10-17 | End: 2020-10-19

## 2020-10-17 RX ORDER — DEXTROSE MONOHYDRATE 25 G/50ML
50 INJECTION, SOLUTION INTRAVENOUS ONCE
Status: COMPLETED | OUTPATIENT
Start: 2020-10-17 | End: 2020-10-17

## 2020-10-17 RX ADMIN — INSULIN LISPRO 10 UNITS: 100 INJECTION, SOLUTION INTRAVENOUS; SUBCUTANEOUS at 13:25

## 2020-10-17 RX ADMIN — INSULIN LISPRO 10 UNITS: 100 INJECTION, SOLUTION INTRAVENOUS; SUBCUTANEOUS at 08:58

## 2020-10-17 RX ADMIN — NYSTATIN: 100000 POWDER TOPICAL at 08:59

## 2020-10-17 RX ADMIN — MIDODRINE HYDROCHLORIDE 5 MG: 5 TABLET ORAL at 17:36

## 2020-10-17 RX ADMIN — DEXTROSE MONOHYDRATE 50 ML: 25 INJECTION, SOLUTION INTRAVENOUS at 21:12

## 2020-10-17 RX ADMIN — APIXABAN 5 MG: 5 TABLET, FILM COATED ORAL at 08:56

## 2020-10-17 RX ADMIN — MIDODRINE HYDROCHLORIDE 5 MG: 5 TABLET ORAL at 13:25

## 2020-10-17 RX ADMIN — MIDODRINE HYDROCHLORIDE 5 MG: 5 TABLET ORAL at 08:56

## 2020-10-17 RX ADMIN — INSULIN LISPRO 1 UNITS: 100 INJECTION, SOLUTION INTRAVENOUS; SUBCUTANEOUS at 08:57

## 2020-10-17 RX ADMIN — APIXABAN 5 MG: 5 TABLET, FILM COATED ORAL at 17:36

## 2020-10-17 RX ADMIN — NYSTATIN: 100000 POWDER TOPICAL at 18:07

## 2020-10-17 RX ADMIN — COLLAGENASE SANTYL: 250 OINTMENT TOPICAL at 08:59

## 2020-10-17 RX ADMIN — INSULIN LISPRO 2 UNITS: 100 INJECTION, SOLUTION INTRAVENOUS; SUBCUTANEOUS at 13:26

## 2020-10-18 LAB
GLUCOSE SERPL-MCNC: 101 MG/DL (ref 65–140)
GLUCOSE SERPL-MCNC: 104 MG/DL (ref 65–140)
GLUCOSE SERPL-MCNC: 115 MG/DL (ref 65–140)
GLUCOSE SERPL-MCNC: 131 MG/DL (ref 65–140)
GLUCOSE SERPL-MCNC: 231 MG/DL (ref 65–140)
GLUCOSE SERPL-MCNC: 96 MG/DL (ref 65–140)

## 2020-10-18 PROCEDURE — 82948 REAGENT STRIP/BLOOD GLUCOSE: CPT

## 2020-10-18 PROCEDURE — 99232 SBSQ HOSP IP/OBS MODERATE 35: CPT | Performed by: INTERNAL MEDICINE

## 2020-10-18 RX ADMIN — INSULIN GLARGINE 30 UNITS: 100 INJECTION, SOLUTION SUBCUTANEOUS at 21:32

## 2020-10-18 RX ADMIN — MIDODRINE HYDROCHLORIDE 5 MG: 5 TABLET ORAL at 11:19

## 2020-10-18 RX ADMIN — MIDODRINE HYDROCHLORIDE 5 MG: 5 TABLET ORAL at 17:42

## 2020-10-18 RX ADMIN — NYSTATIN: 100000 POWDER TOPICAL at 09:49

## 2020-10-18 RX ADMIN — APIXABAN 5 MG: 5 TABLET, FILM COATED ORAL at 11:40

## 2020-10-18 RX ADMIN — INSULIN LISPRO 3 UNITS: 100 INJECTION, SOLUTION INTRAVENOUS; SUBCUTANEOUS at 11:12

## 2020-10-18 RX ADMIN — NYSTATIN: 100000 POWDER TOPICAL at 17:41

## 2020-10-18 RX ADMIN — APIXABAN 5 MG: 5 TABLET, FILM COATED ORAL at 17:42

## 2020-10-19 VITALS
RESPIRATION RATE: 20 BRPM | OXYGEN SATURATION: 96 % | SYSTOLIC BLOOD PRESSURE: 91 MMHG | HEART RATE: 94 BPM | WEIGHT: 202.38 LBS | BODY MASS INDEX: 25.97 KG/M2 | HEIGHT: 74 IN | DIASTOLIC BLOOD PRESSURE: 59 MMHG | TEMPERATURE: 99.2 F

## 2020-10-19 LAB
FUNGUS SPEC CULT: NORMAL
GLUCOSE SERPL-MCNC: 217 MG/DL (ref 65–140)
GLUCOSE SERPL-MCNC: 39 MG/DL (ref 65–140)
GLUCOSE SERPL-MCNC: 80 MG/DL (ref 65–140)
GLUCOSE SERPL-MCNC: 81 MG/DL (ref 65–140)
GLUCOSE SERPL-MCNC: 92 MG/DL (ref 65–140)
SARS-COV-2 RNA RESP QL NAA+PROBE: NEGATIVE

## 2020-10-19 PROCEDURE — 97530 THERAPEUTIC ACTIVITIES: CPT

## 2020-10-19 PROCEDURE — 82948 REAGENT STRIP/BLOOD GLUCOSE: CPT

## 2020-10-19 PROCEDURE — 97164 PT RE-EVAL EST PLAN CARE: CPT

## 2020-10-19 PROCEDURE — 97535 SELF CARE MNGMENT TRAINING: CPT

## 2020-10-19 PROCEDURE — 97110 THERAPEUTIC EXERCISES: CPT

## 2020-10-19 PROCEDURE — 97168 OT RE-EVAL EST PLAN CARE: CPT

## 2020-10-19 PROCEDURE — 99239 HOSP IP/OBS DSCHRG MGMT >30: CPT | Performed by: INTERNAL MEDICINE

## 2020-10-19 PROCEDURE — U0003 INFECTIOUS AGENT DETECTION BY NUCLEIC ACID (DNA OR RNA); SEVERE ACUTE RESPIRATORY SYNDROME CORONAVIRUS 2 (SARS-COV-2) (CORONAVIRUS DISEASE [COVID-19]), AMPLIFIED PROBE TECHNIQUE, MAKING USE OF HIGH THROUGHPUT TECHNOLOGIES AS DESCRIBED BY CMS-2020-01-R: HCPCS | Performed by: INTERNAL MEDICINE

## 2020-10-19 RX ORDER — INSULIN GLARGINE 100 [IU]/ML
15 INJECTION, SOLUTION SUBCUTANEOUS
Status: DISCONTINUED | OUTPATIENT
Start: 2020-10-19 | End: 2020-10-19 | Stop reason: HOSPADM

## 2020-10-19 RX ORDER — NYSTATIN 100000 [USP'U]/G
POWDER TOPICAL 2 TIMES DAILY
Qty: 15 G | Refills: 0
Start: 2020-10-19 | End: 2020-11-03 | Stop reason: ALTCHOICE

## 2020-10-19 RX ORDER — ACETAMINOPHEN 325 MG/1
650 TABLET ORAL EVERY 6 HOURS PRN
Refills: 0
Start: 2020-10-19 | End: 2020-11-18

## 2020-10-19 RX ORDER — INSULIN GLARGINE 100 [IU]/ML
20 INJECTION, SOLUTION SUBCUTANEOUS
Status: DISCONTINUED | OUTPATIENT
Start: 2020-10-19 | End: 2020-10-19

## 2020-10-19 RX ORDER — INSULIN GLARGINE 100 [IU]/ML
25 INJECTION, SOLUTION SUBCUTANEOUS
Status: DISCONTINUED | OUTPATIENT
Start: 2020-10-19 | End: 2020-10-19

## 2020-10-19 RX ORDER — OXYCODONE HYDROCHLORIDE 5 MG/1
2.5 TABLET ORAL EVERY 6 HOURS PRN
Qty: 5 TABLET | Refills: 0 | Status: SHIPPED | OUTPATIENT
Start: 2020-10-19 | End: 2020-10-22

## 2020-10-19 RX ORDER — INSULIN GLARGINE 100 [IU]/ML
10 INJECTION, SOLUTION SUBCUTANEOUS
Qty: 300 UNITS | Refills: 0
Start: 2020-10-19 | End: 2020-10-27

## 2020-10-19 RX ORDER — MIDODRINE HYDROCHLORIDE 5 MG/1
5 TABLET ORAL
Qty: 90 TABLET | Refills: 0
Start: 2020-10-19 | End: 2020-10-27

## 2020-10-19 RX ADMIN — NYSTATIN 1 APPLICATION: 100000 POWDER TOPICAL at 09:02

## 2020-10-19 RX ADMIN — MIDODRINE HYDROCHLORIDE 5 MG: 5 TABLET ORAL at 16:29

## 2020-10-19 RX ADMIN — MIDODRINE HYDROCHLORIDE 5 MG: 5 TABLET ORAL at 06:01

## 2020-10-19 RX ADMIN — APIXABAN 5 MG: 5 TABLET, FILM COATED ORAL at 08:52

## 2020-10-19 RX ADMIN — APIXABAN 5 MG: 5 TABLET, FILM COATED ORAL at 16:29

## 2020-10-19 RX ADMIN — INSULIN LISPRO 2 UNITS: 100 INJECTION, SOLUTION INTRAVENOUS; SUBCUTANEOUS at 16:30

## 2020-10-19 RX ADMIN — MIDODRINE HYDROCHLORIDE 5 MG: 5 TABLET ORAL at 12:28

## 2020-10-20 ENCOUNTER — NURSING HOME VISIT (OUTPATIENT)
Dept: GERIATRICS | Facility: OTHER | Age: 75
End: 2020-10-20
Payer: COMMERCIAL

## 2020-10-20 VITALS
RESPIRATION RATE: 18 BRPM | BODY MASS INDEX: 25.19 KG/M2 | OXYGEN SATURATION: 98 % | SYSTOLIC BLOOD PRESSURE: 80 MMHG | HEART RATE: 94 BPM | WEIGHT: 196.2 LBS | DIASTOLIC BLOOD PRESSURE: 49 MMHG | TEMPERATURE: 94 F

## 2020-10-20 DIAGNOSIS — S31.000D WOUND OF SACRAL REGION, SUBSEQUENT ENCOUNTER: ICD-10-CM

## 2020-10-20 DIAGNOSIS — I50.42 CHRONIC COMBINED SYSTOLIC AND DIASTOLIC HEART FAILURE (HCC): ICD-10-CM

## 2020-10-20 DIAGNOSIS — J18.9 COMMUNITY ACQUIRED PNEUMONIA, UNSPECIFIED LATERALITY: ICD-10-CM

## 2020-10-20 DIAGNOSIS — I82.412 ACUTE DEEP VEIN THROMBOSIS (DVT) OF FEMORAL VEIN OF LEFT LOWER EXTREMITY (HCC): ICD-10-CM

## 2020-10-20 DIAGNOSIS — R53.1 WEAKNESS GENERALIZED: ICD-10-CM

## 2020-10-20 DIAGNOSIS — D64.9 ANEMIA, UNSPECIFIED TYPE: ICD-10-CM

## 2020-10-20 DIAGNOSIS — E11.9 TYPE 2 DIABETES MELLITUS WITHOUT COMPLICATION, WITH LONG-TERM CURRENT USE OF INSULIN (HCC): ICD-10-CM

## 2020-10-20 DIAGNOSIS — I95.9 HYPOTENSION, UNSPECIFIED HYPOTENSION TYPE: ICD-10-CM

## 2020-10-20 DIAGNOSIS — A41.9 SEPSIS, DUE TO UNSPECIFIED ORGANISM, UNSPECIFIED WHETHER ACUTE ORGAN DYSFUNCTION PRESENT (HCC): ICD-10-CM

## 2020-10-20 DIAGNOSIS — E87.5 HYPERKALEMIA: ICD-10-CM

## 2020-10-20 DIAGNOSIS — Z79.4 TYPE 2 DIABETES MELLITUS WITHOUT COMPLICATION, WITH LONG-TERM CURRENT USE OF INSULIN (HCC): ICD-10-CM

## 2020-10-20 DIAGNOSIS — J90 BILATERAL PLEURAL EFFUSION: ICD-10-CM

## 2020-10-20 DIAGNOSIS — N17.9 ACUTE RENAL FAILURE SUPERIMPOSED ON STAGE 3A CHRONIC KIDNEY DISEASE, UNSPECIFIED ACUTE RENAL FAILURE TYPE (HCC): Primary | ICD-10-CM

## 2020-10-20 DIAGNOSIS — N18.31 ACUTE RENAL FAILURE SUPERIMPOSED ON STAGE 3A CHRONIC KIDNEY DISEASE, UNSPECIFIED ACUTE RENAL FAILURE TYPE (HCC): Primary | ICD-10-CM

## 2020-10-20 DIAGNOSIS — I21.A1 TYPE 2 MI (MYOCARDIAL INFARCTION) (HCC): ICD-10-CM

## 2020-10-20 PROCEDURE — 99306 1ST NF CARE HIGH MDM 50: CPT | Performed by: INTERNAL MEDICINE

## 2020-10-21 ENCOUNTER — TELEPHONE (OUTPATIENT)
Dept: GASTROENTEROLOGY | Facility: CLINIC | Age: 75
End: 2020-10-21

## 2020-10-27 ENCOUNTER — NURSING HOME VISIT (OUTPATIENT)
Dept: GERIATRICS | Facility: OTHER | Age: 75
End: 2020-10-27
Payer: COMMERCIAL

## 2020-10-27 DIAGNOSIS — J90 BILATERAL PLEURAL EFFUSION: ICD-10-CM

## 2020-10-27 DIAGNOSIS — N18.31 ACUTE RENAL FAILURE SUPERIMPOSED ON STAGE 3A CHRONIC KIDNEY DISEASE, UNSPECIFIED ACUTE RENAL FAILURE TYPE (HCC): Primary | ICD-10-CM

## 2020-10-27 DIAGNOSIS — N17.9 ACUTE RENAL FAILURE SUPERIMPOSED ON STAGE 3A CHRONIC KIDNEY DISEASE, UNSPECIFIED ACUTE RENAL FAILURE TYPE (HCC): Primary | ICD-10-CM

## 2020-10-27 DIAGNOSIS — E11.9 TYPE 2 DIABETES MELLITUS WITHOUT COMPLICATION, WITH LONG-TERM CURRENT USE OF INSULIN (HCC): ICD-10-CM

## 2020-10-27 DIAGNOSIS — R53.81 PHYSICAL DECONDITIONING: ICD-10-CM

## 2020-10-27 DIAGNOSIS — I95.9 HYPOTENSION, UNSPECIFIED HYPOTENSION TYPE: ICD-10-CM

## 2020-10-27 DIAGNOSIS — S31.000D WOUND OF SACRAL REGION, SUBSEQUENT ENCOUNTER: ICD-10-CM

## 2020-10-27 DIAGNOSIS — I95.9 HYPOTENSION: ICD-10-CM

## 2020-10-27 DIAGNOSIS — Z79.4 TYPE 2 DIABETES MELLITUS WITHOUT COMPLICATION, WITH LONG-TERM CURRENT USE OF INSULIN (HCC): ICD-10-CM

## 2020-10-27 DIAGNOSIS — D64.9 ANEMIA, UNSPECIFIED TYPE: ICD-10-CM

## 2020-10-27 DIAGNOSIS — A41.9 SEPSIS, DUE TO UNSPECIFIED ORGANISM, UNSPECIFIED WHETHER ACUTE ORGAN DYSFUNCTION PRESENT (HCC): ICD-10-CM

## 2020-10-27 PROCEDURE — 99309 SBSQ NF CARE MODERATE MDM 30: CPT | Performed by: NURSE PRACTITIONER

## 2020-10-27 RX ORDER — INSULIN GLARGINE 100 [IU]/ML
12 INJECTION, SOLUTION SUBCUTANEOUS
Qty: 300 UNITS | Refills: 0
Start: 2020-10-27 | End: 2020-11-03 | Stop reason: ALTCHOICE

## 2020-10-27 RX ORDER — MIDODRINE HYDROCHLORIDE 5 MG/1
10 TABLET ORAL
Qty: 90 TABLET | Refills: 0
Start: 2020-10-27 | End: 2020-11-26

## 2020-10-28 ENCOUNTER — OFFICE VISIT (OUTPATIENT)
Dept: WOUND CARE | Facility: HOSPITAL | Age: 75
End: 2020-10-28
Payer: COMMERCIAL

## 2020-10-28 VITALS
WEIGHT: 207.3 LBS | RESPIRATION RATE: 18 BRPM | DIASTOLIC BLOOD PRESSURE: 64 MMHG | TEMPERATURE: 97.2 F | HEIGHT: 74 IN | BODY MASS INDEX: 26.6 KG/M2 | SYSTOLIC BLOOD PRESSURE: 104 MMHG | HEART RATE: 88 BPM

## 2020-10-28 DIAGNOSIS — Z79.4 TYPE 2 DIABETES MELLITUS WITH CHRONIC KIDNEY DISEASE, WITH LONG-TERM CURRENT USE OF INSULIN, UNSPECIFIED CKD STAGE (HCC): ICD-10-CM

## 2020-10-28 DIAGNOSIS — E11.22 TYPE 2 DIABETES MELLITUS WITH CHRONIC KIDNEY DISEASE, WITH LONG-TERM CURRENT USE OF INSULIN, UNSPECIFIED CKD STAGE (HCC): ICD-10-CM

## 2020-10-28 DIAGNOSIS — N18.31 ACUTE RENAL FAILURE SUPERIMPOSED ON STAGE 3A CHRONIC KIDNEY DISEASE, UNSPECIFIED ACUTE RENAL FAILURE TYPE (HCC): ICD-10-CM

## 2020-10-28 DIAGNOSIS — I50.42 CHRONIC COMBINED SYSTOLIC AND DIASTOLIC HEART FAILURE (HCC): ICD-10-CM

## 2020-10-28 DIAGNOSIS — L89.314 DECUBITUS ULCER OF RIGHT BUTTOCK, STAGE 4 (HCC): Primary | ICD-10-CM

## 2020-10-28 DIAGNOSIS — R53.81 PHYSICAL DECONDITIONING: ICD-10-CM

## 2020-10-28 DIAGNOSIS — N17.9 ACUTE RENAL FAILURE SUPERIMPOSED ON STAGE 3A CHRONIC KIDNEY DISEASE, UNSPECIFIED ACUTE RENAL FAILURE TYPE (HCC): ICD-10-CM

## 2020-10-28 PROCEDURE — 99213 OFFICE O/P EST LOW 20 MIN: CPT | Performed by: PREVENTIVE MEDICINE

## 2020-10-28 PROCEDURE — 11046 DBRDMT MUSC&/FSCA EA ADDL: CPT | Performed by: PREVENTIVE MEDICINE

## 2020-10-28 PROCEDURE — 11043 DBRDMT MUSC&/FSCA 1ST 20/<: CPT | Performed by: PREVENTIVE MEDICINE

## 2020-10-28 PROCEDURE — 99204 OFFICE O/P NEW MOD 45 MIN: CPT | Performed by: PREVENTIVE MEDICINE

## 2020-10-28 RX ORDER — NICOTINE POLACRILEX 2 MG
1 LOZENGE BUCCAL DAILY
COMMUNITY

## 2020-10-28 RX ORDER — LIDOCAINE HYDROCHLORIDE 40 MG/ML
1 SOLUTION TOPICAL ONCE
Status: COMPLETED | OUTPATIENT
Start: 2020-10-28 | End: 2020-10-28

## 2020-10-28 RX ORDER — OXYCODONE HYDROCHLORIDE 5 MG/1
5 CAPSULE ORAL EVERY 6 HOURS PRN
COMMUNITY

## 2020-10-28 RX ADMIN — LIDOCAINE HYDROCHLORIDE 1 ML: 40 SOLUTION TOPICAL at 09:02

## 2020-10-29 ENCOUNTER — TELEPHONE (OUTPATIENT)
Dept: WOUND CARE | Facility: HOSPITAL | Age: 75
End: 2020-10-29

## 2020-11-02 ENCOUNTER — NURSING HOME VISIT (OUTPATIENT)
Dept: GERIATRICS | Facility: OTHER | Age: 75
End: 2020-11-02
Payer: COMMERCIAL

## 2020-11-02 DIAGNOSIS — S31.000D WOUND OF SACRAL REGION, SUBSEQUENT ENCOUNTER: ICD-10-CM

## 2020-11-02 DIAGNOSIS — E11.9 TYPE 2 DIABETES MELLITUS WITHOUT COMPLICATION, WITH LONG-TERM CURRENT USE OF INSULIN (HCC): ICD-10-CM

## 2020-11-02 DIAGNOSIS — I95.1 ORTHOSTATIC HYPOTENSION: Primary | ICD-10-CM

## 2020-11-02 DIAGNOSIS — Z79.4 TYPE 2 DIABETES MELLITUS WITHOUT COMPLICATION, WITH LONG-TERM CURRENT USE OF INSULIN (HCC): ICD-10-CM

## 2020-11-02 PROCEDURE — 99309 SBSQ NF CARE MODERATE MDM 30: CPT | Performed by: NURSE PRACTITIONER

## 2020-11-03 VITALS
WEIGHT: 187 LBS | SYSTOLIC BLOOD PRESSURE: 105 MMHG | RESPIRATION RATE: 20 BRPM | BODY MASS INDEX: 24.01 KG/M2 | DIASTOLIC BLOOD PRESSURE: 60 MMHG | HEART RATE: 90 BPM | TEMPERATURE: 97.9 F | OXYGEN SATURATION: 97 %

## 2020-11-03 LAB
MYCOBACTERIUM SPEC CULT: NORMAL
RHODAMINE-AURAMINE STN SPEC: NORMAL

## 2020-11-04 ENCOUNTER — OFFICE VISIT (OUTPATIENT)
Dept: WOUND CARE | Facility: HOSPITAL | Age: 75
End: 2020-11-04
Payer: COMMERCIAL

## 2020-11-04 VITALS
SYSTOLIC BLOOD PRESSURE: 113 MMHG | HEART RATE: 99 BPM | TEMPERATURE: 97.2 F | RESPIRATION RATE: 18 BRPM | DIASTOLIC BLOOD PRESSURE: 60 MMHG

## 2020-11-04 DIAGNOSIS — I50.42 CHRONIC COMBINED SYSTOLIC AND DIASTOLIC HEART FAILURE (HCC): ICD-10-CM

## 2020-11-04 DIAGNOSIS — Z79.4 TYPE 2 DIABETES MELLITUS WITH CHRONIC KIDNEY DISEASE, WITH LONG-TERM CURRENT USE OF INSULIN, UNSPECIFIED CKD STAGE (HCC): ICD-10-CM

## 2020-11-04 DIAGNOSIS — R53.81 PHYSICAL DECONDITIONING: ICD-10-CM

## 2020-11-04 DIAGNOSIS — E11.22 TYPE 2 DIABETES MELLITUS WITH CHRONIC KIDNEY DISEASE, WITH LONG-TERM CURRENT USE OF INSULIN, UNSPECIFIED CKD STAGE (HCC): ICD-10-CM

## 2020-11-04 DIAGNOSIS — L89.314 DECUBITUS ULCER OF RIGHT BUTTOCK, STAGE 4 (HCC): Primary | ICD-10-CM

## 2020-11-04 PROCEDURE — 11043 DBRDMT MUSC&/FSCA 1ST 20/<: CPT | Performed by: PREVENTIVE MEDICINE

## 2020-11-04 PROCEDURE — 11046 DBRDMT MUSC&/FSCA EA ADDL: CPT | Performed by: PREVENTIVE MEDICINE

## 2020-11-04 RX ORDER — SODIUM HYPOCHLORITE 2.5 MG/ML
1 SOLUTION TOPICAL DAILY
COMMUNITY
End: 2020-11-26 | Stop reason: SDUPTHER

## 2020-11-04 RX ORDER — LIDOCAINE HYDROCHLORIDE 40 MG/ML
5 SOLUTION TOPICAL ONCE
Status: COMPLETED | OUTPATIENT
Start: 2020-11-04 | End: 2020-11-04

## 2020-11-04 RX ADMIN — LIDOCAINE HYDROCHLORIDE 5 ML: 40 SOLUTION TOPICAL at 09:07

## 2020-11-11 ENCOUNTER — OFFICE VISIT (OUTPATIENT)
Dept: WOUND CARE | Facility: HOSPITAL | Age: 75
End: 2020-11-11
Payer: COMMERCIAL

## 2020-11-11 VITALS
HEART RATE: 95 BPM | DIASTOLIC BLOOD PRESSURE: 64 MMHG | RESPIRATION RATE: 16 BRPM | TEMPERATURE: 96.7 F | SYSTOLIC BLOOD PRESSURE: 105 MMHG

## 2020-11-11 DIAGNOSIS — Z79.4 TYPE 2 DIABETES MELLITUS WITH CHRONIC KIDNEY DISEASE, WITH LONG-TERM CURRENT USE OF INSULIN, UNSPECIFIED CKD STAGE (HCC): ICD-10-CM

## 2020-11-11 DIAGNOSIS — R53.81 PHYSICAL DECONDITIONING: ICD-10-CM

## 2020-11-11 DIAGNOSIS — I50.42 CHRONIC COMBINED SYSTOLIC AND DIASTOLIC HEART FAILURE (HCC): ICD-10-CM

## 2020-11-11 DIAGNOSIS — L89.314 DECUBITUS ULCER OF RIGHT BUTTOCK, STAGE 4 (HCC): Primary | ICD-10-CM

## 2020-11-11 DIAGNOSIS — E11.22 TYPE 2 DIABETES MELLITUS WITH CHRONIC KIDNEY DISEASE, WITH LONG-TERM CURRENT USE OF INSULIN, UNSPECIFIED CKD STAGE (HCC): ICD-10-CM

## 2020-11-11 PROCEDURE — 11045 DBRDMT SUBQ TISS EACH ADDL: CPT | Performed by: PREVENTIVE MEDICINE

## 2020-11-11 PROCEDURE — 11042 DBRDMT SUBQ TIS 1ST 20SQCM/<: CPT | Performed by: PREVENTIVE MEDICINE

## 2020-11-11 RX ORDER — LIDOCAINE HYDROCHLORIDE 40 MG/ML
5 SOLUTION TOPICAL ONCE
Status: COMPLETED | OUTPATIENT
Start: 2020-11-11 | End: 2020-11-11

## 2020-11-11 RX ADMIN — LIDOCAINE HYDROCHLORIDE 5 ML: 40 SOLUTION TOPICAL at 09:00

## 2020-11-17 ENCOUNTER — OFFICE VISIT (OUTPATIENT)
Dept: WOUND CARE | Facility: HOSPITAL | Age: 75
End: 2020-11-17
Attending: PREVENTIVE MEDICINE
Payer: COMMERCIAL

## 2020-11-17 VITALS
DIASTOLIC BLOOD PRESSURE: 56 MMHG | RESPIRATION RATE: 20 BRPM | SYSTOLIC BLOOD PRESSURE: 97 MMHG | HEART RATE: 106 BPM | TEMPERATURE: 97.3 F

## 2020-11-17 DIAGNOSIS — R53.81 PHYSICAL DECONDITIONING: ICD-10-CM

## 2020-11-17 DIAGNOSIS — Z79.4 TYPE 2 DIABETES MELLITUS WITH CHRONIC KIDNEY DISEASE, WITH LONG-TERM CURRENT USE OF INSULIN, UNSPECIFIED CKD STAGE (HCC): ICD-10-CM

## 2020-11-17 DIAGNOSIS — I50.42 CHRONIC COMBINED SYSTOLIC AND DIASTOLIC HEART FAILURE (HCC): ICD-10-CM

## 2020-11-17 DIAGNOSIS — N18.31 ACUTE RENAL FAILURE SUPERIMPOSED ON STAGE 3A CHRONIC KIDNEY DISEASE, UNSPECIFIED ACUTE RENAL FAILURE TYPE (HCC): ICD-10-CM

## 2020-11-17 DIAGNOSIS — L89.314 DECUBITUS ULCER OF RIGHT BUTTOCK, STAGE 4 (HCC): Primary | ICD-10-CM

## 2020-11-17 DIAGNOSIS — N17.9 ACUTE RENAL FAILURE SUPERIMPOSED ON STAGE 3A CHRONIC KIDNEY DISEASE, UNSPECIFIED ACUTE RENAL FAILURE TYPE (HCC): ICD-10-CM

## 2020-11-17 DIAGNOSIS — E11.22 TYPE 2 DIABETES MELLITUS WITH CHRONIC KIDNEY DISEASE, WITH LONG-TERM CURRENT USE OF INSULIN, UNSPECIFIED CKD STAGE (HCC): ICD-10-CM

## 2020-11-17 PROCEDURE — 11042 DBRDMT SUBQ TIS 1ST 20SQCM/<: CPT | Performed by: PREVENTIVE MEDICINE

## 2020-11-17 RX ORDER — LIDOCAINE HYDROCHLORIDE 40 MG/ML
5 SOLUTION TOPICAL ONCE
Status: COMPLETED | OUTPATIENT
Start: 2020-11-17 | End: 2020-11-17

## 2020-11-17 RX ADMIN — LIDOCAINE HYDROCHLORIDE 5 ML: 40 SOLUTION TOPICAL at 13:56

## 2020-11-24 ENCOUNTER — OFFICE VISIT (OUTPATIENT)
Dept: WOUND CARE | Facility: HOSPITAL | Age: 75
End: 2020-11-24
Payer: COMMERCIAL

## 2020-11-24 VITALS
RESPIRATION RATE: 20 BRPM | SYSTOLIC BLOOD PRESSURE: 116 MMHG | HEART RATE: 108 BPM | DIASTOLIC BLOOD PRESSURE: 56 MMHG | TEMPERATURE: 98 F

## 2020-11-24 DIAGNOSIS — N17.9 ACUTE RENAL FAILURE SUPERIMPOSED ON STAGE 3A CHRONIC KIDNEY DISEASE, UNSPECIFIED ACUTE RENAL FAILURE TYPE (HCC): ICD-10-CM

## 2020-11-24 DIAGNOSIS — Z79.4 TYPE 2 DIABETES MELLITUS WITH CHRONIC KIDNEY DISEASE, WITH LONG-TERM CURRENT USE OF INSULIN, UNSPECIFIED CKD STAGE (HCC): ICD-10-CM

## 2020-11-24 DIAGNOSIS — E11.22 TYPE 2 DIABETES MELLITUS WITH CHRONIC KIDNEY DISEASE, WITH LONG-TERM CURRENT USE OF INSULIN, UNSPECIFIED CKD STAGE (HCC): ICD-10-CM

## 2020-11-24 DIAGNOSIS — N18.31 ACUTE RENAL FAILURE SUPERIMPOSED ON STAGE 3A CHRONIC KIDNEY DISEASE, UNSPECIFIED ACUTE RENAL FAILURE TYPE (HCC): ICD-10-CM

## 2020-11-24 DIAGNOSIS — I50.42 CHRONIC COMBINED SYSTOLIC AND DIASTOLIC HEART FAILURE (HCC): ICD-10-CM

## 2020-11-24 DIAGNOSIS — L89.314 DECUBITUS ULCER OF RIGHT BUTTOCK, STAGE 4 (HCC): Primary | ICD-10-CM

## 2020-11-24 DIAGNOSIS — R53.81 PHYSICAL DECONDITIONING: ICD-10-CM

## 2020-11-24 PROCEDURE — 11042 DBRDMT SUBQ TIS 1ST 20SQCM/<: CPT | Performed by: PREVENTIVE MEDICINE

## 2020-11-24 PROCEDURE — 11045 DBRDMT SUBQ TISS EACH ADDL: CPT | Performed by: PREVENTIVE MEDICINE

## 2020-11-24 RX ORDER — LIDOCAINE HYDROCHLORIDE 40 MG/ML
1 SOLUTION TOPICAL ONCE
Status: COMPLETED | OUTPATIENT
Start: 2020-11-24 | End: 2020-11-24

## 2020-11-24 RX ADMIN — LIDOCAINE HYDROCHLORIDE 1 ML: 40 SOLUTION TOPICAL at 13:36

## 2020-11-25 ENCOUNTER — TELEMEDICINE (OUTPATIENT)
Dept: GERIATRICS | Facility: OTHER | Age: 75
End: 2020-11-25
Payer: COMMERCIAL

## 2020-11-25 DIAGNOSIS — I82.412 ACUTE DEEP VEIN THROMBOSIS (DVT) OF FEMORAL VEIN OF LEFT LOWER EXTREMITY (HCC): ICD-10-CM

## 2020-11-25 DIAGNOSIS — E11.22 TYPE 2 DIABETES MELLITUS WITH CHRONIC KIDNEY DISEASE, WITH LONG-TERM CURRENT USE OF INSULIN, UNSPECIFIED CKD STAGE (HCC): Primary | ICD-10-CM

## 2020-11-25 DIAGNOSIS — S31.000D WOUND OF SACRAL REGION, SUBSEQUENT ENCOUNTER: ICD-10-CM

## 2020-11-25 DIAGNOSIS — I95.9 HYPOTENSION: ICD-10-CM

## 2020-11-25 DIAGNOSIS — Z79.4 TYPE 2 DIABETES MELLITUS WITH CHRONIC KIDNEY DISEASE, WITH LONG-TERM CURRENT USE OF INSULIN, UNSPECIFIED CKD STAGE (HCC): Primary | ICD-10-CM

## 2020-11-25 PROCEDURE — 99315 NF DSCHRG MGMT 30 MIN/LESS: CPT | Performed by: INTERNAL MEDICINE

## 2020-11-26 VITALS
SYSTOLIC BLOOD PRESSURE: 113 MMHG | HEART RATE: 98 BPM | RESPIRATION RATE: 17 BRPM | TEMPERATURE: 97.9 F | WEIGHT: 194 LBS | DIASTOLIC BLOOD PRESSURE: 70 MMHG | BODY MASS INDEX: 24.91 KG/M2

## 2020-11-26 RX ORDER — MIDODRINE HYDROCHLORIDE 5 MG/1
10 TABLET ORAL
Qty: 90 TABLET | Refills: 1
Start: 2020-11-26 | End: 2020-12-26

## 2020-11-26 RX ORDER — SODIUM HYPOCHLORITE 2.5 MG/ML
1 SOLUTION TOPICAL DAILY
Qty: 473 ML | Refills: 0 | Status: SHIPPED | OUTPATIENT
Start: 2020-11-26

## 2020-11-26 RX ORDER — MIDODRINE HYDROCHLORIDE 5 MG/1
10 TABLET ORAL
Qty: 90 TABLET | Refills: 0
Start: 2020-11-26 | End: 2020-11-26 | Stop reason: SDUPTHER

## 2020-12-02 ENCOUNTER — TELEPHONE (OUTPATIENT)
Dept: WOUND CARE | Facility: HOSPITAL | Age: 75
End: 2020-12-02

## 2020-12-03 ENCOUNTER — TELEPHONE (OUTPATIENT)
Dept: WOUND CARE | Facility: HOSPITAL | Age: 75
End: 2020-12-03

## 2021-03-18 DIAGNOSIS — I51.81 TAKOTSUBO SYNDROME: ICD-10-CM

## 2021-03-24 ENCOUNTER — HOSPITAL ENCOUNTER (OUTPATIENT)
Dept: NON INVASIVE DIAGNOSTICS | Facility: HOSPITAL | Age: 76
Discharge: HOME | End: 2021-03-24
Payer: COMMERCIAL

## 2021-03-24 DIAGNOSIS — I51.81 TAKOTSUBO SYNDROME: ICD-10-CM

## 2021-03-24 PROCEDURE — 93306 TTE W/DOPPLER COMPLETE: CPT

## 2021-03-29 DIAGNOSIS — I82.412 ACUTE DEEP VEIN THROMBOSIS (DVT) OF FEMORAL VEIN OF LEFT LOWER EXTREMITY (HCC): ICD-10-CM

## 2021-03-29 RX ORDER — APIXABAN 5 MG/1
TABLET, FILM COATED ORAL
Qty: 60 TABLET | Refills: 2 | OUTPATIENT
Start: 2021-03-29

## 2021-05-05 DIAGNOSIS — R60.0 LOCALIZED EDEMA: ICD-10-CM

## 2021-05-05 DIAGNOSIS — E11.9 TYPE 2 DIABETES MELLITUS WITHOUT COMPLICATIONS (HCC): ICD-10-CM

## 2021-05-05 DIAGNOSIS — R94.31 ABNORMAL ELECTROCARDIOGRAM (ECG) (EKG): ICD-10-CM

## 2021-05-05 DIAGNOSIS — I51.81 TAKOTSUBO SYNDROME: ICD-10-CM

## 2021-05-05 DIAGNOSIS — I10 ESSENTIAL (PRIMARY) HYPERTENSION: ICD-10-CM

## 2021-05-05 DIAGNOSIS — E78.5 HYPERLIPIDEMIA, UNSPECIFIED: ICD-10-CM

## 2021-05-05 DIAGNOSIS — I82.402 ACUTE EMBOLISM AND THROMBOSIS OF UNSPECIFIED DEEP VEINS OF LEFT LOWER EXTREMITY (HCC): ICD-10-CM

## 2021-07-29 ENCOUNTER — HOSPITAL ENCOUNTER (OUTPATIENT)
Dept: NON INVASIVE DIAGNOSTICS | Facility: HOSPITAL | Age: 76
Discharge: HOME | End: 2021-07-29
Payer: COMMERCIAL

## 2021-07-29 DIAGNOSIS — I82.402 ACUTE EMBOLISM AND THROMBOSIS OF UNSPECIFIED DEEP VEINS OF LEFT LOWER EXTREMITY (HCC): ICD-10-CM

## 2021-07-29 DIAGNOSIS — R60.0 LOCALIZED EDEMA: ICD-10-CM

## 2021-07-29 PROCEDURE — 93970 EXTREMITY STUDY: CPT

## 2021-07-29 PROCEDURE — 93970 EXTREMITY STUDY: CPT | Performed by: SURGERY

## 2022-01-13 ENCOUNTER — HOSPITAL ENCOUNTER (OUTPATIENT)
Dept: NUCLEAR MEDICINE | Facility: HOSPITAL | Age: 77
Discharge: HOME | End: 2022-01-13
Attending: INTERNAL MEDICINE
Payer: COMMERCIAL

## 2022-01-13 ENCOUNTER — HOSPITAL ENCOUNTER (OUTPATIENT)
Dept: NON INVASIVE DIAGNOSTICS | Facility: HOSPITAL | Age: 77
Discharge: HOME | End: 2022-01-13
Attending: INTERNAL MEDICINE
Payer: COMMERCIAL

## 2022-01-13 DIAGNOSIS — R94.31 ABNORMAL ELECTROCARDIOGRAM (ECG) (EKG): ICD-10-CM

## 2022-01-13 DIAGNOSIS — I10 ESSENTIAL (PRIMARY) HYPERTENSION: ICD-10-CM

## 2022-01-13 DIAGNOSIS — E11.9 TYPE 2 DIABETES MELLITUS WITHOUT COMPLICATIONS (HCC): ICD-10-CM

## 2022-01-13 DIAGNOSIS — I51.81 TAKOTSUBO SYNDROME: ICD-10-CM

## 2022-01-13 DIAGNOSIS — E78.5 HYPERLIPIDEMIA, UNSPECIFIED: ICD-10-CM

## 2022-01-13 LAB
MAX HR PERCENT: 86 %
MAX HR: 142 BPM
RATE PRESSURE PRODUCT: NORMAL
SL CV REST NUCLEAR ISOTOPE DOSE: 10.6 MCI
SL CV STRESS NUCLEAR ISOTOPE DOSE: 32.7 MCI
SL CV STRESS RECOVERY BP: NORMAL MMHG
SL CV STRESS RECOVERY HR: 88 BPM
SL CV STRESS RECOVERY O2 SAT: 95 %
SL CV STRESS STAGE REACHED: 2
STRESS ANGINA INDEX: 0
STRESS BASELINE BP: NORMAL MMHG
STRESS BASELINE HR: 70 BPM
STRESS O2 SAT REST: 95 %
STRESS PEAK HR: 123 BPM
STRESS PERCENT HR: 86 %
STRESS POST ESTIMATED WORKLOAD: 2.3 METS
STRESS POST EXERCISE DUR MIN: 3 MIN
STRESS POST EXERCISE DUR SEC: 30 SEC
STRESS POST O2 SAT PEAK: 96 %
STRESS POST PEAK BP: 150 MMHG
STRESS TARGET HR: 123 BPM
STRESS/REST PERFUSION RATIO: 1.09

## 2022-01-13 PROCEDURE — A9502 TC99M TETROFOSMIN: HCPCS

## 2022-01-13 PROCEDURE — 93017 CV STRESS TEST TRACING ONLY: CPT

## 2022-01-13 PROCEDURE — 78452 HT MUSCLE IMAGE SPECT MULT: CPT

## 2023-10-17 ENCOUNTER — HOSPITAL ENCOUNTER (OUTPATIENT)
Dept: NON INVASIVE DIAGNOSTICS | Facility: HOSPITAL | Age: 78
Discharge: HOME | End: 2023-10-17
Attending: INTERNAL MEDICINE
Payer: COMMERCIAL

## 2023-10-17 VITALS
HEIGHT: 74 IN | BODY MASS INDEX: 27.46 KG/M2 | DIASTOLIC BLOOD PRESSURE: 70 MMHG | WEIGHT: 214 LBS | SYSTOLIC BLOOD PRESSURE: 113 MMHG | HEART RATE: 68 BPM

## 2023-10-17 DIAGNOSIS — I25.118 ATHEROSCLEROTIC HEART DISEASE OF NATIVE CORONARY ARTERY WITH OTHER FORMS OF ANGINA PECTORIS (HCC): ICD-10-CM

## 2023-10-17 DIAGNOSIS — I35.0 NONRHEUMATIC AORTIC (VALVE) STENOSIS: ICD-10-CM

## 2023-10-17 DIAGNOSIS — Z95.1 PRESENCE OF AORTOCORONARY BYPASS GRAFT: ICD-10-CM

## 2023-10-17 LAB
AORTIC ROOT: 3.6 CM
AORTIC VALVE MEAN VELOCITY: 17.3 M/S
APICAL FOUR CHAMBER EJECTION FRACTION: 42 %
ASCENDING AORTA: 2.3 CM
AV AREA BY CONTINUOUS VTI: 1.2 CM2
AV AREA PEAK VELOCITY: 1.1 CM2
AV LVOT MEAN GRADIENT: 1 MMHG
AV LVOT PEAK GRADIENT: 1 MMHG
AV MEAN GRADIENT: 14 MMHG
AV PEAK GRADIENT: 25 MMHG
AV VALVE AREA: 1.22 CM2
AV VELOCITY RATIO: 0.25
DOP CALC AO PEAK VEL: 2.48 M/S
DOP CALC AO VTI: 52.71 CM
DOP CALC LVOT AREA: 4.52 CM2
DOP CALC LVOT CARDIAC INDEX: 2.09 L/MIN/M2
DOP CALC LVOT CARDIAC OUTPUT: 4.68 L/MIN
DOP CALC LVOT DIAMETER: 2.4 CM
DOP CALC LVOT PEAK VEL VTI: 14.18 CM
DOP CALC LVOT PEAK VEL: 0.61 M/S
DOP CALC LVOT STROKE INDEX: 28.6 ML/M2
DOP CALC LVOT STROKE VOLUME: 64.12
E WAVE DECELERATION TIME: 166 MS
E/A RATIO: 0.76
FRACTIONAL SHORTENING: 29 (ref 28–44)
INTERVENTRICULAR SEPTUM IN DIASTOLE (PARASTERNAL SHORT AXIS VIEW): 1.9 CM
INTERVENTRICULAR SEPTUM: 1.9 CM (ref 0.6–1.1)
LAAS-AP2: 15.2 CM2
LAAS-AP4: 17.7 CM2
LEFT ATRIUM SIZE: 4.6 CM
LEFT ATRIUM VOLUME (MOD BIPLANE): 52 ML
LEFT ATRIUM VOLUME INDEX (MOD BIPLANE): 23.2 ML/M2
LEFT INTERNAL DIMENSION IN SYSTOLE: 2.7 CM (ref 2.1–4)
LEFT VENTRICLE DIASTOLIC VOLUME (MOD BIPLANE): 94 ML
LEFT VENTRICLE SYSTOLIC VOLUME (MOD BIPLANE): 47 ML
LEFT VENTRICULAR INTERNAL DIMENSION IN DIASTOLE: 3.8 CM (ref 3.5–6)
LEFT VENTRICULAR POSTERIOR WALL IN END DIASTOLE: 1.2 CM
LEFT VENTRICULAR STROKE VOLUME: 37 ML
LV EF: 50 %
LVSV (TEICH): 37 ML
MV E'TISSUE VEL-LAT: 7 CM/S
MV E'TISSUE VEL-SEP: 6 CM/S
MV PEAK A VEL: 0.7 M/S
MV PEAK E VEL: 53 CM/S
MV STENOSIS PRESSURE HALF TIME: 48 MS
MV VALVE AREA P 1/2 METHOD: 4.58
PV PEAK GRADIENT: 2 MMHG
RA PRESSURE ESTIMATED: 3 MMHG
RIGHT ATRIUM AREA SYSTOLE A4C: 23.1 CM2
RIGHT VENTRICLE ID DIMENSION: 3.5 CM
SINOTUBULAR JUNCTION: 2.4 CM
SL CV LEFT ATRIUM LENGTH A2C: 4.4 CM
SL CV LV EF: 50
SL CV PED ECHO LEFT VENTRICLE DIASTOLIC VOLUME (MOD BIPLANE) 2D: 63 ML
SL CV PED ECHO LEFT VENTRICLE SYSTOLIC VOLUME (MOD BIPLANE) 2D: 26 ML
SL CV SINUS OF VALSALVA 2D: 3.4 CM
STJ: 2.4 CM
TRICUSPID ANNULAR PLANE SYSTOLIC EXCURSION: 1.3 CM

## 2023-10-17 PROCEDURE — 93306 TTE W/DOPPLER COMPLETE: CPT

## 2024-02-21 PROBLEM — A41.9 SEPSIS (HCC): Status: RESOLVED | Noted: 2020-09-30 | Resolved: 2024-02-21

## 2024-02-21 PROBLEM — J18.9 COMMUNITY ACQUIRED PNEUMONIA: Status: RESOLVED | Noted: 2020-09-01 | Resolved: 2024-02-21

## 2025-01-31 DIAGNOSIS — I35.0 NONRHEUMATIC AORTIC (VALVE) STENOSIS: ICD-10-CM

## 2025-01-31 DIAGNOSIS — I25.5 ISCHEMIC CARDIOMYOPATHY: ICD-10-CM

## 2025-02-19 ENCOUNTER — HOSPITAL ENCOUNTER (OUTPATIENT)
Dept: NON INVASIVE DIAGNOSTICS | Facility: HOSPITAL | Age: 80
Discharge: HOME | End: 2025-02-19
Attending: INTERNAL MEDICINE
Payer: COMMERCIAL

## 2025-02-19 VITALS
BODY MASS INDEX: 27.46 KG/M2 | WEIGHT: 214 LBS | HEIGHT: 74 IN | HEART RATE: 80 BPM | SYSTOLIC BLOOD PRESSURE: 113 MMHG | DIASTOLIC BLOOD PRESSURE: 70 MMHG

## 2025-02-19 DIAGNOSIS — I35.0 NONRHEUMATIC AORTIC (VALVE) STENOSIS: ICD-10-CM

## 2025-02-19 DIAGNOSIS — I25.5 ISCHEMIC CARDIOMYOPATHY: ICD-10-CM

## 2025-02-19 LAB
AORTIC ROOT: 3.3 CM
AORTIC VALVE MEAN VELOCITY: 17.7 M/S
ASCENDING AORTA: 2.9 CM
AV AREA BY CONTINUOUS VTI: 1.9 CM2
AV AREA PEAK VELOCITY: 1.5 CM2
AV LVOT MEAN GRADIENT: 2 MMHG
AV LVOT PEAK GRADIENT: 4 MMHG
AV MEAN PRESS GRAD SYS DOP V1V2: 14 MMHG
AV ORIFICE AREA US: 1.86 CM2
AV PEAK GRADIENT: 23 MMHG
AV VELOCITY RATIO: 0.49
AV VMAX SYS DOP: 2.39 M/S
BSA FOR ECHO PROCEDURE: 2.24 M2
DOP CALC AO VTI: 53.9 CM
DOP CALC LVOT AREA: 3.8 CM2
DOP CALC LVOT CARDIAC INDEX: 3.15 L/MIN/M2
DOP CALC LVOT CARDIAC OUTPUT: 7.06 L/MIN
DOP CALC LVOT DIAMETER: 2.2 CM
DOP CALC LVOT PEAK VEL VTI: 26.42 CM
DOP CALC LVOT PEAK VEL: 0.96 M/S
DOP CALC LVOT STROKE INDEX: 45.5 ML/M2
DOP CALC LVOT STROKE VOLUME: 100.38
E WAVE DECELERATION TIME: 158 MS
E/A RATIO: 0.8
FRACTIONAL SHORTENING: 34 (ref 28–44)
INTERVENTRICULAR SEPTUM IN DIASTOLE (PARASTERNAL SHORT AXIS VIEW): 1.2 CM
INTERVENTRICULAR SEPTUM: 1.2 CM (ref 0.6–1.1)
LAAS-AP2: 11.7 CM2
LAAS-AP4: 12.9 CM2
LEFT ATRIUM SIZE: 3.6 CM
LEFT ATRIUM VOLUME (MOD BIPLANE): 34 ML
LEFT ATRIUM VOLUME INDEX (MOD BIPLANE): 15.2 ML/M2
LEFT INTERNAL DIMENSION IN SYSTOLE: 2.3 CM (ref 2.1–4)
LEFT VENTRICULAR INTERNAL DIMENSION IN DIASTOLE: 3.5 CM (ref 3.5–6)
LEFT VENTRICULAR POSTERIOR WALL IN END DIASTOLE: 1 CM
LEFT VENTRICULAR STROKE VOLUME: 35 ML
LV EF US.2D.A4C+ESTIMATED: 54 %
LVSV (TEICH): 35 ML
MV E'TISSUE VEL-SEP: 9 CM/S
MV PEAK A VEL: 0.8 M/S
MV PEAK E VEL: 64 CM/S
MV STENOSIS PRESSURE HALF TIME: 46 MS
MV VALVE AREA P 1/2 METHOD: 4.78
RA PRESSURE ESTIMATED: 3 MMHG
RIGHT ATRIUM AREA SYSTOLE A4C: 13.5 CM2
RIGHT VENTRICLE ID DIMENSION: 2.9 CM
RV PSP: 32 MMHG
SINOTUBULAR JUNCTION: 2.4 CM
SL CV LEFT ATRIUM LENGTH A2C: 3.5 CM
SL CV LV EF: 53
SL CV PED ECHO LEFT VENTRICLE DIASTOLIC VOLUME (MOD BIPLANE) 2D: 52 ML
SL CV PED ECHO LEFT VENTRICLE SYSTOLIC VOLUME (MOD BIPLANE) 2D: 17 ML
SL CV SINUS OF VALSALVA 2D: 2.8 CM
STJ: 2.4 CM
TR MAX PG: 29 MMHG
TR PEAK VELOCITY: 2.7 M/S
TRICUSPID ANNULAR PLANE SYSTOLIC EXCURSION: 1.9 CM
TRICUSPID VALVE PEAK REGURGITATION VELOCITY: 2.7 M/S

## 2025-02-19 PROCEDURE — 93306 TTE W/DOPPLER COMPLETE: CPT

## 2025-06-27 ENCOUNTER — HOSPITAL ENCOUNTER (OUTPATIENT)
Dept: RADIOLOGY | Facility: HOSPITAL | Age: 80
End: 2025-06-27
Payer: COMMERCIAL

## 2025-06-27 DIAGNOSIS — R05.1 ACUTE COUGH: ICD-10-CM

## 2025-06-27 DIAGNOSIS — R06.2 WHEEZING: ICD-10-CM

## 2025-06-27 DIAGNOSIS — E11.65 TYPE 2 DIABETES MELLITUS WITH HYPERGLYCEMIA, UNSPECIFIED WHETHER LONG TERM INSULIN USE (HCC): ICD-10-CM

## 2025-06-27 PROCEDURE — 71046 X-RAY EXAM CHEST 2 VIEWS: CPT

## (undated) DEVICE — TUBING SUCTION 5MM X 12 FT

## (undated) DEVICE — MEDI-VAC YANK SUCT HNDL W/TPRD BULBOUS TIP: Brand: CARDINAL HEALTH

## (undated) DEVICE — BETHLEHEM UNIV MAJOR ORTHO,KIT: Brand: CARDINAL HEALTH

## (undated) DEVICE — THE SIMPULSE SOLO SYSTEM WITH ULTREX RETRACTABLE SPLASH SHIELD TIP: Brand: SIMPULSE SOLO

## (undated) DEVICE — INTENDED FOR TISSUE SEPARATION, AND OTHER PROCEDURES THAT REQUIRE A SHARP SURGICAL BLADE TO PUNCTURE OR CUT.: Brand: BARD-PARKER SAFETY BLADES SIZE 15, STERILE

## (undated) DEVICE — PLUMEPEN PRO 10FT

## (undated) DEVICE — CHLORAPREP HI-LITE 26ML ORANGE

## (undated) DEVICE — KERLIX BANDAGE ROLL: Brand: KERLIX

## (undated) DEVICE — BASIC SINGLE BASIN-LF: Brand: MEDLINE INDUSTRIES, INC.

## (undated) DEVICE — VAC DRESSING SENSATRAC LRG

## (undated) DEVICE — BETHLEHEM UNIVERSAL OUTPATIENT: Brand: CARDINAL HEALTH

## (undated) DEVICE — TIBURON SPLIT SHEET: Brand: CONVERTORS

## (undated) DEVICE — VAC CANISTER 500ML

## (undated) DEVICE — BULB SYRINGE,IRRIGATION WITH PROTECTIVE CAP: Brand: DOVER

## (undated) DEVICE — GAUZE SPONGES,16 PLY: Brand: CURITY

## (undated) DEVICE — GLOVE SRG BIOGEL ECLIPSE 7.5

## (undated) DEVICE — SPONGE LAP 18 X 18 IN

## (undated) DEVICE — ABDOMINAL PAD: Brand: DERMACEA

## (undated) DEVICE — 3000CC GUARDIAN II: Brand: GUARDIAN

## (undated) DEVICE — V.A.C. DRAPE: Brand: V.A.C.®